# Patient Record
Sex: MALE | Race: BLACK OR AFRICAN AMERICAN | Employment: OTHER | URBAN - METROPOLITAN AREA
[De-identification: names, ages, dates, MRNs, and addresses within clinical notes are randomized per-mention and may not be internally consistent; named-entity substitution may affect disease eponyms.]

---

## 2017-03-17 ENCOUNTER — OFFICE VISIT (OUTPATIENT)
Dept: FAMILY MEDICINE CLINIC | Age: 52
End: 2017-03-17
Payer: MEDICAID

## 2017-03-17 VITALS
BODY MASS INDEX: 23.05 KG/M2 | DIASTOLIC BLOOD PRESSURE: 100 MMHG | SYSTOLIC BLOOD PRESSURE: 140 MMHG | TEMPERATURE: 98.1 F | WEIGHT: 155.6 LBS | HEART RATE: 104 BPM | HEIGHT: 69 IN

## 2017-03-17 DIAGNOSIS — E11.65 UNCONTROLLED TYPE 2 DIABETES MELLITUS WITH COMPLICATION, UNSPECIFIED LONG TERM INSULIN USE STATUS: Primary | ICD-10-CM

## 2017-03-17 DIAGNOSIS — N52.1 ERECTILE DYSFUNCTION ASSOCIATED WITH TYPE 2 DIABETES MELLITUS (HCC): ICD-10-CM

## 2017-03-17 DIAGNOSIS — M43.06 LUMBAR SPONDYLOLYSIS: ICD-10-CM

## 2017-03-17 DIAGNOSIS — I10 ESSENTIAL HYPERTENSION: ICD-10-CM

## 2017-03-17 DIAGNOSIS — E11.69 ERECTILE DYSFUNCTION ASSOCIATED WITH TYPE 2 DIABETES MELLITUS (HCC): ICD-10-CM

## 2017-03-17 DIAGNOSIS — E11.8 UNCONTROLLED TYPE 2 DIABETES MELLITUS WITH COMPLICATION, UNSPECIFIED LONG TERM INSULIN USE STATUS: Primary | ICD-10-CM

## 2017-03-17 DIAGNOSIS — F17.200 SMOKING: ICD-10-CM

## 2017-03-17 PROCEDURE — 99213 OFFICE O/P EST LOW 20 MIN: CPT | Performed by: FAMILY MEDICINE

## 2017-03-17 RX ORDER — LISINOPRIL 40 MG/1
40 TABLET ORAL DAILY
Qty: 30 TABLET | Refills: 3 | Status: SHIPPED | OUTPATIENT
Start: 2017-03-17 | End: 2017-07-06 | Stop reason: ALTCHOICE

## 2017-03-17 RX ORDER — SILDENAFIL 50 MG/1
50 TABLET, FILM COATED ORAL PRN
Qty: 10 TABLET | Refills: 0 | Status: SHIPPED | OUTPATIENT
Start: 2017-03-17 | End: 2018-03-29 | Stop reason: SDUPTHER

## 2017-03-17 RX ORDER — GABAPENTIN 300 MG/1
600 CAPSULE ORAL 3 TIMES DAILY
Qty: 120 CAPSULE | Refills: 3 | Status: SHIPPED | OUTPATIENT
Start: 2017-03-17 | End: 2017-07-06 | Stop reason: SDUPTHER

## 2017-03-17 RX ORDER — CHLORTHALIDONE 25 MG/1
25 TABLET ORAL DAILY
Qty: 30 TABLET | Refills: 3 | Status: SHIPPED | OUTPATIENT
Start: 2017-03-17 | End: 2017-07-06 | Stop reason: ALTCHOICE

## 2017-03-17 RX ORDER — ASPIRIN 81 MG/1
81 TABLET ORAL DAILY
Qty: 30 TABLET | Refills: 3 | Status: SHIPPED | OUTPATIENT
Start: 2017-03-17 | End: 2017-07-06 | Stop reason: ALTCHOICE

## 2017-03-17 RX ORDER — MEGESTROL ACETATE 40 MG/ML
625 SUSPENSION ORAL DAILY
Qty: 480 ML | Refills: 3 | Status: SHIPPED | OUTPATIENT
Start: 2017-03-17 | End: 2017-07-18 | Stop reason: SDUPTHER

## 2017-03-17 RX ORDER — LANCETS 33 GAUGE
EACH MISCELLANEOUS
Qty: 100 EACH | Refills: 0 | Status: SHIPPED | OUTPATIENT
Start: 2017-03-17 | End: 2017-07-18 | Stop reason: SDUPTHER

## 2017-03-17 RX ORDER — NICOTINE 21 MG/24HR
1 PATCH, TRANSDERMAL 24 HOURS TRANSDERMAL EVERY 24 HOURS
Qty: 30 PATCH | Refills: 3 | Status: SHIPPED | OUTPATIENT
Start: 2017-03-17 | End: 2017-07-18 | Stop reason: CLARIF

## 2017-03-17 RX ORDER — NAPROXEN SODIUM 220 MG
TABLET ORAL
Qty: 10 EACH | Refills: 6 | Status: SHIPPED | OUTPATIENT
Start: 2017-03-17 | End: 2017-07-18 | Stop reason: SDUPTHER

## 2017-03-17 RX ORDER — AMLODIPINE BESYLATE 10 MG/1
TABLET ORAL
Qty: 30 TABLET | Refills: 3 | Status: SHIPPED | OUTPATIENT
Start: 2017-03-17 | End: 2017-07-06 | Stop reason: ALTCHOICE

## 2017-03-17 RX ORDER — INSULIN GLARGINE 100 [IU]/ML
INJECTION, SOLUTION SUBCUTANEOUS
Qty: 3 VIAL | Refills: 3 | Status: SHIPPED | OUTPATIENT
Start: 2017-03-17 | End: 2017-07-18 | Stop reason: SDUPTHER

## 2017-03-17 RX ORDER — FAMOTIDINE 20 MG/1
TABLET, FILM COATED ORAL
Qty: 60 TABLET | Refills: 3 | Status: SHIPPED | OUTPATIENT
Start: 2017-03-17 | End: 2017-07-06 | Stop reason: ALTCHOICE

## 2017-03-17 ASSESSMENT — PATIENT HEALTH QUESTIONNAIRE - PHQ9
1. LITTLE INTEREST OR PLEASURE IN DOING THINGS: 0
SUM OF ALL RESPONSES TO PHQ9 QUESTIONS 1 & 2: 0
2. FEELING DOWN, DEPRESSED OR HOPELESS: 0
SUM OF ALL RESPONSES TO PHQ QUESTIONS 1-9: 0

## 2017-03-17 ASSESSMENT — ENCOUNTER SYMPTOMS
SHORTNESS OF BREATH: 0
SORE THROAT: 0
ABDOMINAL PAIN: 0

## 2017-04-03 ENCOUNTER — HOSPITAL ENCOUNTER (OUTPATIENT)
Age: 52
Setting detail: SPECIMEN
Discharge: HOME | End: 2017-04-03
Payer: MEDICAID

## 2017-04-03 DIAGNOSIS — E11.8 UNCONTROLLED TYPE 2 DIABETES MELLITUS WITH COMPLICATION, UNSPECIFIED LONG TERM INSULIN USE STATUS: ICD-10-CM

## 2017-04-03 DIAGNOSIS — E11.65 UNCONTROLLED TYPE 2 DIABETES MELLITUS WITH COMPLICATION, UNSPECIFIED LONG TERM INSULIN USE STATUS: ICD-10-CM

## 2017-04-03 LAB
CHOLESTEROL/HDL RATIO: 2.4
CHOLESTEROL: 158 MG/DL
CREATININE URINE: 245 MG/DL (ref 39–259)
HDLC SERPL-MCNC: 66 MG/DL
LDL CHOLESTEROL: 63 MG/DL (ref 0–130)
MICROALBUMIN/CREAT 24H UR: 147 MG/L
MICROALBUMIN/CREAT UR-RTO: 60 MCG/MG CREAT
TRIGL SERPL-MCNC: 147 MG/DL
VLDLC SERPL CALC-MCNC: NORMAL MG/DL (ref 1–30)

## 2017-04-07 ENCOUNTER — CLINICAL DOCUMENTATION (OUTPATIENT)
Dept: INTERNAL MEDICINE | Age: 52
End: 2017-04-07

## 2017-04-11 ENCOUNTER — TELEPHONE (OUTPATIENT)
Dept: FAMILY MEDICINE CLINIC | Age: 52
End: 2017-04-11

## 2017-04-17 ENCOUNTER — OFFICE VISIT (OUTPATIENT)
Dept: FAMILY MEDICINE CLINIC | Age: 52
End: 2017-04-17
Payer: MEDICAID

## 2017-04-17 VITALS
DIASTOLIC BLOOD PRESSURE: 83 MMHG | WEIGHT: 156.4 LBS | HEART RATE: 105 BPM | BODY MASS INDEX: 23.16 KG/M2 | HEIGHT: 69 IN | TEMPERATURE: 96.7 F | SYSTOLIC BLOOD PRESSURE: 110 MMHG

## 2017-04-17 DIAGNOSIS — H00.011 HORDEOLUM EXTERNUM OF RIGHT UPPER EYELID: ICD-10-CM

## 2017-04-17 DIAGNOSIS — E11.8 DIABETIC FOOT (HCC): Primary | ICD-10-CM

## 2017-04-17 PROCEDURE — 99213 OFFICE O/P EST LOW 20 MIN: CPT | Performed by: FAMILY MEDICINE

## 2017-04-17 ASSESSMENT — ENCOUNTER SYMPTOMS
SORE THROAT: 0
SHORTNESS OF BREATH: 0
ABDOMINAL PAIN: 0

## 2017-07-06 DIAGNOSIS — I10 ESSENTIAL HYPERTENSION: ICD-10-CM

## 2017-07-06 DIAGNOSIS — E11.8 UNCONTROLLED TYPE 2 DIABETES MELLITUS WITH COMPLICATION, UNSPECIFIED LONG TERM INSULIN USE STATUS: ICD-10-CM

## 2017-07-06 DIAGNOSIS — E11.65 UNCONTROLLED TYPE 2 DIABETES MELLITUS WITH COMPLICATION, UNSPECIFIED LONG TERM INSULIN USE STATUS: ICD-10-CM

## 2017-07-06 DIAGNOSIS — M43.06 LUMBAR SPONDYLOLYSIS: ICD-10-CM

## 2017-07-06 RX ORDER — FAMOTIDINE 20 MG/1
TABLET, FILM COATED ORAL
Qty: 60 TABLET | Refills: 0 | Status: SHIPPED | OUTPATIENT
Start: 2017-07-06 | End: 2017-07-18 | Stop reason: SDUPTHER

## 2017-07-06 RX ORDER — GABAPENTIN 300 MG/1
CAPSULE ORAL
Qty: 120 CAPSULE | Refills: 0 | Status: SHIPPED | OUTPATIENT
Start: 2017-07-06 | End: 2017-07-18 | Stop reason: SDUPTHER

## 2017-07-06 RX ORDER — AMLODIPINE BESYLATE 10 MG/1
TABLET ORAL
Qty: 30 TABLET | Refills: 0 | Status: SHIPPED | OUTPATIENT
Start: 2017-07-06 | End: 2017-07-18 | Stop reason: SDUPTHER

## 2017-07-06 RX ORDER — CHLORTHALIDONE 25 MG/1
TABLET ORAL
Qty: 30 TABLET | Refills: 0 | Status: SHIPPED | OUTPATIENT
Start: 2017-07-06 | End: 2017-07-18 | Stop reason: SDUPTHER

## 2017-07-06 RX ORDER — ASPIRIN 81 MG
TABLET, DELAYED RELEASE (ENTERIC COATED) ORAL
Qty: 30 TABLET | Refills: 0 | Status: SHIPPED | OUTPATIENT
Start: 2017-07-06 | End: 2017-07-18 | Stop reason: SDUPTHER

## 2017-07-06 RX ORDER — LISINOPRIL 40 MG/1
TABLET ORAL
Qty: 30 TABLET | Refills: 0 | Status: SHIPPED | OUTPATIENT
Start: 2017-07-06 | End: 2017-07-18 | Stop reason: SDUPTHER

## 2017-07-18 ENCOUNTER — OFFICE VISIT (OUTPATIENT)
Dept: FAMILY MEDICINE CLINIC | Age: 52
End: 2017-07-18
Payer: MEDICAID

## 2017-07-18 VITALS
HEART RATE: 95 BPM | BODY MASS INDEX: 21.8 KG/M2 | HEIGHT: 69 IN | TEMPERATURE: 98 F | WEIGHT: 147.2 LBS | SYSTOLIC BLOOD PRESSURE: 113 MMHG | DIASTOLIC BLOOD PRESSURE: 87 MMHG

## 2017-07-18 DIAGNOSIS — Z76.0 MEDICATION REFILL: ICD-10-CM

## 2017-07-18 DIAGNOSIS — E11.8 UNCONTROLLED TYPE 2 DIABETES MELLITUS WITH COMPLICATION, UNSPECIFIED LONG TERM INSULIN USE STATUS: ICD-10-CM

## 2017-07-18 DIAGNOSIS — E11.65 UNCONTROLLED TYPE 2 DIABETES MELLITUS WITH COMPLICATION, UNSPECIFIED LONG TERM INSULIN USE STATUS: ICD-10-CM

## 2017-07-18 DIAGNOSIS — I10 ESSENTIAL HYPERTENSION: Primary | ICD-10-CM

## 2017-07-18 DIAGNOSIS — M43.06 LUMBAR SPONDYLOLYSIS: ICD-10-CM

## 2017-07-18 LAB — HBA1C MFR BLD: 6.2 %

## 2017-07-18 PROCEDURE — 83036 HEMOGLOBIN GLYCOSYLATED A1C: CPT | Performed by: STUDENT IN AN ORGANIZED HEALTH CARE EDUCATION/TRAINING PROGRAM

## 2017-07-18 PROCEDURE — 99213 OFFICE O/P EST LOW 20 MIN: CPT | Performed by: STUDENT IN AN ORGANIZED HEALTH CARE EDUCATION/TRAINING PROGRAM

## 2017-07-18 RX ORDER — FAMOTIDINE 20 MG/1
TABLET, FILM COATED ORAL
Qty: 60 TABLET | Refills: 0 | Status: SHIPPED | OUTPATIENT
Start: 2017-07-18 | End: 2017-09-05 | Stop reason: SDUPTHER

## 2017-07-18 RX ORDER — NICOTINE 21 MG/24HR
1 PATCH, TRANSDERMAL 24 HOURS TRANSDERMAL EVERY 24 HOURS
Qty: 30 PATCH | Refills: 3 | Status: CANCELLED | OUTPATIENT
Start: 2017-07-18 | End: 2018-07-18

## 2017-07-18 RX ORDER — NAPROXEN SODIUM 220 MG
TABLET ORAL
Qty: 10 EACH | Refills: 6 | Status: SHIPPED | OUTPATIENT
Start: 2017-07-18 | End: 2018-03-29 | Stop reason: SDUPTHER

## 2017-07-18 RX ORDER — POLYETHYLENE GLYCOL 3350 17 G/17G
POWDER, FOR SOLUTION ORAL
Qty: 1 BOTTLE | Refills: 0 | Status: SHIPPED | OUTPATIENT
Start: 2017-07-18 | End: 2017-09-05 | Stop reason: SDUPTHER

## 2017-07-18 RX ORDER — ASPIRIN 81 MG/1
TABLET ORAL
Qty: 30 TABLET | Refills: 0 | Status: SHIPPED | OUTPATIENT
Start: 2017-07-18 | End: 2017-09-05 | Stop reason: SDUPTHER

## 2017-07-18 RX ORDER — LANCETS 33 GAUGE
EACH MISCELLANEOUS
Qty: 100 EACH | Refills: 0 | Status: SHIPPED | OUTPATIENT
Start: 2017-07-18 | End: 2017-09-05 | Stop reason: SDUPTHER

## 2017-07-18 RX ORDER — MEGESTROL ACETATE 40 MG/ML
625 SUSPENSION ORAL DAILY
Qty: 480 ML | Refills: 3 | Status: SHIPPED | OUTPATIENT
Start: 2017-07-18 | End: 2017-07-18 | Stop reason: ALTCHOICE

## 2017-07-18 RX ORDER — ONDANSETRON 4 MG/1
4 TABLET, FILM COATED ORAL EVERY 8 HOURS PRN
Qty: 12 TABLET | Refills: 0 | Status: SHIPPED | OUTPATIENT
Start: 2017-07-18 | End: 2017-12-12 | Stop reason: SDUPTHER

## 2017-07-18 RX ORDER — GABAPENTIN 300 MG/1
CAPSULE ORAL
Qty: 120 CAPSULE | Refills: 0 | Status: SHIPPED | OUTPATIENT
Start: 2017-07-18 | End: 2017-09-05 | Stop reason: SDUPTHER

## 2017-07-18 RX ORDER — CHLORTHALIDONE 25 MG/1
TABLET ORAL
Qty: 30 TABLET | Refills: 0 | Status: SHIPPED | OUTPATIENT
Start: 2017-07-18 | End: 2017-09-05 | Stop reason: SDUPTHER

## 2017-07-18 RX ORDER — INSULIN GLARGINE 100 [IU]/ML
INJECTION, SOLUTION SUBCUTANEOUS
Qty: 3 VIAL | Refills: 3 | Status: SHIPPED | OUTPATIENT
Start: 2017-07-18 | End: 2017-08-14 | Stop reason: ALTCHOICE

## 2017-07-18 RX ORDER — DOCUSATE SODIUM 100 MG/1
100 CAPSULE, LIQUID FILLED ORAL DAILY PRN
Qty: 60 CAPSULE | Refills: 3 | Status: SHIPPED | OUTPATIENT
Start: 2017-07-18 | End: 2018-03-29 | Stop reason: SDUPTHER

## 2017-07-18 RX ORDER — LISINOPRIL 40 MG/1
TABLET ORAL
Qty: 30 TABLET | Refills: 0 | Status: SHIPPED | OUTPATIENT
Start: 2017-07-18 | End: 2017-09-05 | Stop reason: SDUPTHER

## 2017-07-18 RX ORDER — AMLODIPINE BESYLATE 10 MG/1
TABLET ORAL
Qty: 30 TABLET | Refills: 0 | Status: SHIPPED | OUTPATIENT
Start: 2017-07-18 | End: 2017-09-05 | Stop reason: SDUPTHER

## 2017-07-18 ASSESSMENT — ENCOUNTER SYMPTOMS
ANAL BLEEDING: 0
CONSTIPATION: 0
WHEEZING: 0
CHEST TIGHTNESS: 0
DIARRHEA: 0
SHORTNESS OF BREATH: 0
STRIDOR: 0

## 2017-09-05 DIAGNOSIS — E11.65 UNCONTROLLED TYPE 2 DIABETES MELLITUS WITH COMPLICATION, UNSPECIFIED LONG TERM INSULIN USE STATUS: ICD-10-CM

## 2017-09-05 DIAGNOSIS — I10 ESSENTIAL HYPERTENSION: ICD-10-CM

## 2017-09-05 DIAGNOSIS — M43.06 LUMBAR SPONDYLOLYSIS: ICD-10-CM

## 2017-09-05 DIAGNOSIS — Z76.0 MEDICATION REFILL: ICD-10-CM

## 2017-09-05 DIAGNOSIS — E11.8 UNCONTROLLED TYPE 2 DIABETES MELLITUS WITH COMPLICATION, UNSPECIFIED LONG TERM INSULIN USE STATUS: ICD-10-CM

## 2017-09-06 RX ORDER — ASPIRIN 81 MG
TABLET, DELAYED RELEASE (ENTERIC COATED) ORAL
Qty: 30 TABLET | Refills: 0 | Status: SHIPPED | OUTPATIENT
Start: 2017-09-06 | End: 2017-10-06 | Stop reason: SDUPTHER

## 2017-09-06 RX ORDER — LANCETS 33 GAUGE
EACH MISCELLANEOUS
Qty: 100 EACH | Refills: 0 | Status: SHIPPED | OUTPATIENT
Start: 2017-09-06 | End: 2018-01-29 | Stop reason: SDUPTHER

## 2017-09-06 RX ORDER — AMLODIPINE BESYLATE 10 MG/1
TABLET ORAL
Qty: 30 TABLET | Refills: 0 | Status: SHIPPED | OUTPATIENT
Start: 2017-09-06 | End: 2017-10-06 | Stop reason: SDUPTHER

## 2017-09-06 RX ORDER — LISINOPRIL 40 MG/1
TABLET ORAL
Qty: 30 TABLET | Refills: 0 | Status: SHIPPED | OUTPATIENT
Start: 2017-09-06 | End: 2017-10-06 | Stop reason: SDUPTHER

## 2017-09-06 RX ORDER — POLYETHYLENE GLYCOL 3350 17 G/17G
POWDER, FOR SOLUTION ORAL
Qty: 255 BOTTLE | Refills: 0 | Status: SHIPPED | OUTPATIENT
Start: 2017-09-06 | End: 2017-10-06 | Stop reason: SDUPTHER

## 2017-09-06 RX ORDER — GABAPENTIN 300 MG/1
CAPSULE ORAL
Qty: 120 CAPSULE | Refills: 0 | Status: SHIPPED | OUTPATIENT
Start: 2017-09-06 | End: 2017-10-06 | Stop reason: SDUPTHER

## 2017-09-06 RX ORDER — FAMOTIDINE 20 MG/1
TABLET, FILM COATED ORAL
Qty: 60 TABLET | Refills: 0 | Status: SHIPPED | OUTPATIENT
Start: 2017-09-06 | End: 2017-10-06 | Stop reason: SDUPTHER

## 2017-09-06 RX ORDER — CHLORTHALIDONE 25 MG/1
TABLET ORAL
Qty: 30 TABLET | Refills: 0 | Status: SHIPPED | OUTPATIENT
Start: 2017-09-06 | End: 2017-10-06 | Stop reason: SDUPTHER

## 2017-10-06 DIAGNOSIS — I10 ESSENTIAL HYPERTENSION: ICD-10-CM

## 2017-10-06 DIAGNOSIS — M43.06 LUMBAR SPONDYLOLYSIS: ICD-10-CM

## 2017-10-06 DIAGNOSIS — E11.8 UNCONTROLLED TYPE 2 DIABETES MELLITUS WITH COMPLICATION, UNSPECIFIED LONG TERM INSULIN USE STATUS: ICD-10-CM

## 2017-10-06 DIAGNOSIS — E11.65 UNCONTROLLED TYPE 2 DIABETES MELLITUS WITH COMPLICATION, UNSPECIFIED LONG TERM INSULIN USE STATUS: ICD-10-CM

## 2017-10-06 DIAGNOSIS — Z76.0 MEDICATION REFILL: ICD-10-CM

## 2017-10-06 RX ORDER — LISINOPRIL 40 MG/1
TABLET ORAL
Qty: 30 TABLET | Refills: 0 | Status: SHIPPED | OUTPATIENT
Start: 2017-10-06 | End: 2017-11-08 | Stop reason: SDUPTHER

## 2017-10-06 RX ORDER — FAMOTIDINE 20 MG/1
TABLET, FILM COATED ORAL
Qty: 60 TABLET | Refills: 0 | Status: SHIPPED | OUTPATIENT
Start: 2017-10-06 | End: 2017-11-08 | Stop reason: SDUPTHER

## 2017-10-06 RX ORDER — AMLODIPINE BESYLATE 10 MG/1
TABLET ORAL
Qty: 30 TABLET | Refills: 0 | Status: SHIPPED | OUTPATIENT
Start: 2017-10-06 | End: 2017-11-08 | Stop reason: SDUPTHER

## 2017-10-06 RX ORDER — CHLORTHALIDONE 25 MG/1
TABLET ORAL
Qty: 30 TABLET | Refills: 0 | Status: SHIPPED | OUTPATIENT
Start: 2017-10-06 | End: 2017-11-08 | Stop reason: SDUPTHER

## 2017-10-06 RX ORDER — ASPIRIN 81 MG/1
TABLET ORAL
Qty: 30 TABLET | Refills: 0 | Status: SHIPPED | OUTPATIENT
Start: 2017-10-06 | End: 2017-11-08 | Stop reason: SDUPTHER

## 2017-10-06 RX ORDER — GABAPENTIN 300 MG/1
CAPSULE ORAL
Qty: 120 CAPSULE | Refills: 0 | Status: SHIPPED | OUTPATIENT
Start: 2017-10-06 | End: 2017-11-08 | Stop reason: SDUPTHER

## 2017-10-06 RX ORDER — POLYETHYLENE GLYCOL 3350 17 G/17G
POWDER, FOR SOLUTION ORAL
Qty: 255 BOTTLE | Refills: 0 | Status: SHIPPED | OUTPATIENT
Start: 2017-10-06 | End: 2018-07-30

## 2017-11-08 DIAGNOSIS — E11.8 UNCONTROLLED TYPE 2 DIABETES MELLITUS WITH COMPLICATION, UNSPECIFIED LONG TERM INSULIN USE STATUS: ICD-10-CM

## 2017-11-08 DIAGNOSIS — I10 ESSENTIAL HYPERTENSION: ICD-10-CM

## 2017-11-08 DIAGNOSIS — E11.65 UNCONTROLLED TYPE 2 DIABETES MELLITUS WITH COMPLICATION, UNSPECIFIED LONG TERM INSULIN USE STATUS: ICD-10-CM

## 2017-11-08 DIAGNOSIS — M43.06 LUMBAR SPONDYLOLYSIS: ICD-10-CM

## 2017-11-08 RX ORDER — LISINOPRIL 40 MG/1
TABLET ORAL
Qty: 30 TABLET | Refills: 0 | Status: SHIPPED | OUTPATIENT
Start: 2017-11-08 | End: 2017-12-07 | Stop reason: SDUPTHER

## 2017-11-08 RX ORDER — GABAPENTIN 300 MG/1
CAPSULE ORAL
Qty: 120 CAPSULE | Refills: 0 | Status: SHIPPED | OUTPATIENT
Start: 2017-11-08 | End: 2017-12-07 | Stop reason: SDUPTHER

## 2017-11-08 RX ORDER — AMLODIPINE BESYLATE 10 MG/1
TABLET ORAL
Qty: 30 TABLET | Refills: 0 | Status: SHIPPED | OUTPATIENT
Start: 2017-11-08 | End: 2017-12-07 | Stop reason: SDUPTHER

## 2017-11-08 RX ORDER — FAMOTIDINE 20 MG/1
TABLET, FILM COATED ORAL
Qty: 60 TABLET | Refills: 0 | Status: SHIPPED | OUTPATIENT
Start: 2017-11-08 | End: 2017-12-07 | Stop reason: SDUPTHER

## 2017-11-08 RX ORDER — ASPIRIN 81 MG/1
TABLET ORAL
Qty: 30 TABLET | Refills: 0 | Status: SHIPPED | OUTPATIENT
Start: 2017-11-08 | End: 2017-12-07 | Stop reason: SDUPTHER

## 2017-11-08 RX ORDER — CHLORTHALIDONE 25 MG/1
TABLET ORAL
Qty: 30 TABLET | Refills: 0 | Status: SHIPPED | OUTPATIENT
Start: 2017-11-08 | End: 2017-12-07 | Stop reason: SDUPTHER

## 2017-12-12 ENCOUNTER — OFFICE VISIT (OUTPATIENT)
Dept: FAMILY MEDICINE CLINIC | Age: 52
End: 2017-12-12
Payer: MEDICAID

## 2017-12-12 VITALS
WEIGHT: 147.8 LBS | BODY MASS INDEX: 21.89 KG/M2 | HEIGHT: 69 IN | DIASTOLIC BLOOD PRESSURE: 84 MMHG | TEMPERATURE: 99.7 F | SYSTOLIC BLOOD PRESSURE: 128 MMHG | HEART RATE: 108 BPM

## 2017-12-12 DIAGNOSIS — Z23 FLU VACCINE NEED: ICD-10-CM

## 2017-12-12 DIAGNOSIS — E11.8 UNCONTROLLED TYPE 2 DIABETES MELLITUS WITH COMPLICATION, UNSPECIFIED LONG TERM INSULIN USE STATUS: Primary | ICD-10-CM

## 2017-12-12 DIAGNOSIS — E78.5 DYSLIPIDEMIA: ICD-10-CM

## 2017-12-12 DIAGNOSIS — Z11.4 ENCOUNTER FOR SCREENING FOR HIV: ICD-10-CM

## 2017-12-12 DIAGNOSIS — Z12.11 COLON CANCER SCREENING: ICD-10-CM

## 2017-12-12 DIAGNOSIS — E11.65 UNCONTROLLED TYPE 2 DIABETES MELLITUS WITH COMPLICATION, UNSPECIFIED LONG TERM INSULIN USE STATUS: Primary | ICD-10-CM

## 2017-12-12 LAB — HBA1C MFR BLD: 9.8 %

## 2017-12-12 PROCEDURE — G8420 CALC BMI NORM PARAMETERS: HCPCS | Performed by: STUDENT IN AN ORGANIZED HEALTH CARE EDUCATION/TRAINING PROGRAM

## 2017-12-12 PROCEDURE — 3017F COLORECTAL CA SCREEN DOC REV: CPT | Performed by: STUDENT IN AN ORGANIZED HEALTH CARE EDUCATION/TRAINING PROGRAM

## 2017-12-12 PROCEDURE — 3046F HEMOGLOBIN A1C LEVEL >9.0%: CPT | Performed by: STUDENT IN AN ORGANIZED HEALTH CARE EDUCATION/TRAINING PROGRAM

## 2017-12-12 PROCEDURE — G8427 DOCREV CUR MEDS BY ELIG CLIN: HCPCS | Performed by: STUDENT IN AN ORGANIZED HEALTH CARE EDUCATION/TRAINING PROGRAM

## 2017-12-12 PROCEDURE — 99213 OFFICE O/P EST LOW 20 MIN: CPT | Performed by: STUDENT IN AN ORGANIZED HEALTH CARE EDUCATION/TRAINING PROGRAM

## 2017-12-12 PROCEDURE — 4004F PT TOBACCO SCREEN RCVD TLK: CPT | Performed by: STUDENT IN AN ORGANIZED HEALTH CARE EDUCATION/TRAINING PROGRAM

## 2017-12-12 PROCEDURE — 83036 HEMOGLOBIN GLYCOSYLATED A1C: CPT | Performed by: STUDENT IN AN ORGANIZED HEALTH CARE EDUCATION/TRAINING PROGRAM

## 2017-12-12 PROCEDURE — 90471 IMMUNIZATION ADMIN: CPT | Performed by: FAMILY MEDICINE

## 2017-12-12 PROCEDURE — G8484 FLU IMMUNIZE NO ADMIN: HCPCS | Performed by: STUDENT IN AN ORGANIZED HEALTH CARE EDUCATION/TRAINING PROGRAM

## 2017-12-12 PROCEDURE — 90688 IIV4 VACCINE SPLT 0.5 ML IM: CPT | Performed by: FAMILY MEDICINE

## 2017-12-12 RX ORDER — ATORVASTATIN CALCIUM 40 MG/1
40 TABLET, FILM COATED ORAL DAILY
Qty: 30 TABLET | Refills: 3 | Status: SHIPPED | OUTPATIENT
Start: 2017-12-12 | End: 2018-01-29 | Stop reason: SDUPTHER

## 2017-12-12 RX ORDER — ONDANSETRON 4 MG/1
4 TABLET, FILM COATED ORAL EVERY 8 HOURS PRN
Qty: 12 TABLET | Refills: 0 | Status: SHIPPED | OUTPATIENT
Start: 2017-12-12 | End: 2018-01-29 | Stop reason: SDUPTHER

## 2017-12-12 ASSESSMENT — ENCOUNTER SYMPTOMS
SHORTNESS OF BREATH: 0
COUGH: 0
DIARRHEA: 0
NAUSEA: 1
VOMITING: 0

## 2017-12-12 NOTE — PATIENT INSTRUCTIONS
Thank you for letting us take care of you today. We hope all your questions were addressed. If a question was overlooked or something else comes to mind after you return home, please contact a member of your Care Team listed below. Please make sure you have a routine office visit set up to follow-up on [address]. Your Care Team at [place] [address] is Team #1  Yuridia Washington MD (Faculty)  Mike Drummond MD (Faculty  Adalijanine Lockett MD (Resident)  Thierno Villatoro MD (Resident)  Anne Posadas MD (Resident)  Han Morrison MD (Resident)  Oscar Guajardo MD (Resident)  [facility]  Smitha Funez SARI MANZO, [facility], ([address] [facility])  LUCA Alva, ([address] [facility])  Zion Arroyo, Ph.D., ([address] [facility])  Ap Fink [facility] (Clinical Pharmacist)     Office phone number: [phone]    If you need to get in right away due to illness, please be advised we have \"Same Day\" appointments available Monday-Friday. Please call us at [phone] option #1 to schedule your \"Same Day\" appointment.

## 2017-12-12 NOTE — PROGRESS NOTES
Subjective: Deanna Rider is a 47 y.o. male with  has a past medical history of Chronic back pain; Diabetes mellitus ([address] Diabetic neuropathy ([address] Hepatitis C; Hypertension; MDRO (multiple drug resistant organisms) resistance; Murmur; and Osteoarthritis. Family History   Problem Relation Age of Onset    High Blood Pressure Mother     High Blood Pressure Father        Presented to the office today for:  Chief Complaint   Patient presents with    Diabetes     Follow up. Last A1C 07/18/17 = 6.2%       HPI   Presented for follow up of DM. [facility] sugar has been running high in the range of 300-400. [facility] has been drinking 1-2 beers every other day. Denies other complaints  [facility] has been taking basaglar 25 units daily. Prescription for pharmacy was sent for bid. [facility] he received it for daily    Review of Systems   Constitutional: Negative for fatigue and fever. Respiratory: Negative for cough and shortness of breath. Cardiovascular: Negative for chest pain, palpitations and leg swelling. Gastrointestinal: Positive for nausea (has not required to use zofran often). Negative for diarrhea and vomiting. Neurological: Negative for weakness and headaches. Objective:    /84 (Site: Left Arm, Position: Sitting, Cuff Size: Medium Adult)   Pulse 108   Temp 99.7 °F (37.6 °C) (Temporal)   Ht 5' 8.9\" (1.75 m)   Wt 147 lb 12.8 oz (67 kg)   BMI 21.89 kg/m²    BP Readings from Last 3 Encounters:   12/12/17 128/84   08/07/17 120/85   07/18/17 113/87     Physical Exam   Constitutional: He is oriented to person, place, and time. He appears well-developed and well-nourished. Cardiovascular: Normal heart sounds. No murmur heard. Pulmonary/Chest: Effort normal and breath sounds normal. No respiratory distress. He has no wheezes. He has no rales. He exhibits no tenderness. Musculoskeletal: He exhibits no edema. Neurological: He is alert and oriented to person, place, and time.          Lab 40 MG tablet 30 tablet 3     Sig: Take 1 tablet by mouth daily       Medications Discontinued During This Encounter   Medication Reason    glucose blood VI test strips (ONE TOUCH ULTRA TEST) strip Reorder    ondansetron (ZOFRAN) 4 MG tablet Reorder         Discussed use, benefit, and side effects of prescribed medications. Barriers to medication compliance addressed. All patient questions answered. Pt voiced understanding.      Return in about 1 month (around 1/12/2018) for DM, BP.

## 2017-12-18 ENCOUNTER — HOSPITAL ENCOUNTER (OUTPATIENT)
Age: 52
Setting detail: SPECIMEN
Discharge: HOME | End: 2017-12-18
Payer: MEDICAID

## 2017-12-18 DIAGNOSIS — Z11.4 ENCOUNTER FOR SCREENING FOR HIV: ICD-10-CM

## 2017-12-18 LAB — HIV AG/AB: NONREACTIVE

## 2017-12-21 DIAGNOSIS — Z12.11 COLON CANCER SCREENING: ICD-10-CM

## 2017-12-21 LAB
CONTROL: PRESENT
HEMOCCULT STL QL: NEGATIVE

## 2018-01-04 DIAGNOSIS — E11.65 UNCONTROLLED TYPE 2 DIABETES MELLITUS WITH COMPLICATION, UNSPECIFIED LONG TERM INSULIN USE STATUS: ICD-10-CM

## 2018-01-04 DIAGNOSIS — E11.8 UNCONTROLLED TYPE 2 DIABETES MELLITUS WITH COMPLICATION, UNSPECIFIED LONG TERM INSULIN USE STATUS: ICD-10-CM

## 2018-01-04 DIAGNOSIS — M43.06 LUMBAR SPONDYLOLYSIS: ICD-10-CM

## 2018-01-04 DIAGNOSIS — I10 ESSENTIAL HYPERTENSION: ICD-10-CM

## 2018-01-05 RX ORDER — LISINOPRIL 40 MG/1
TABLET ORAL
Qty: 30 TABLET | Refills: 0 | Status: SHIPPED | OUTPATIENT
Start: 2018-01-05 | End: 2018-01-29 | Stop reason: SDUPTHER

## 2018-01-05 RX ORDER — FAMOTIDINE 20 MG/1
TABLET, FILM COATED ORAL
Qty: 60 TABLET | Refills: 0 | Status: SHIPPED | OUTPATIENT
Start: 2018-01-05 | End: 2018-01-29 | Stop reason: SDUPTHER

## 2018-01-05 RX ORDER — GABAPENTIN 300 MG/1
CAPSULE ORAL
Qty: 120 CAPSULE | Refills: 0 | Status: SHIPPED | OUTPATIENT
Start: 2018-01-05 | End: 2018-01-29 | Stop reason: SDUPTHER

## 2018-01-05 RX ORDER — INSULIN GLARGINE 100 [IU]/ML
25 INJECTION, SOLUTION SUBCUTANEOUS 2 TIMES DAILY
Qty: 2 PEN | Refills: 3 | Status: SHIPPED | OUTPATIENT
Start: 2018-01-05 | End: 2018-01-29 | Stop reason: SDUPTHER

## 2018-01-05 RX ORDER — AMLODIPINE BESYLATE 10 MG/1
TABLET ORAL
Qty: 30 TABLET | Refills: 0 | Status: SHIPPED | OUTPATIENT
Start: 2018-01-05 | End: 2018-01-29 | Stop reason: SDUPTHER

## 2018-01-05 RX ORDER — ASPIRIN 81 MG/1
TABLET ORAL
Qty: 30 TABLET | Refills: 0 | Status: SHIPPED | OUTPATIENT
Start: 2018-01-05 | End: 2018-01-29 | Stop reason: SDUPTHER

## 2018-01-05 RX ORDER — CHLORTHALIDONE 25 MG/1
TABLET ORAL
Qty: 30 TABLET | Refills: 0 | Status: SHIPPED | OUTPATIENT
Start: 2018-01-05 | End: 2018-03-29 | Stop reason: SDUPTHER

## 2018-01-29 ENCOUNTER — OFFICE VISIT (OUTPATIENT)
Dept: FAMILY MEDICINE CLINIC | Age: 53
End: 2018-01-29
Payer: MEDICAID

## 2018-01-29 VITALS
TEMPERATURE: 97.3 F | HEART RATE: 86 BPM | SYSTOLIC BLOOD PRESSURE: 140 MMHG | DIASTOLIC BLOOD PRESSURE: 86 MMHG | HEIGHT: 69 IN | WEIGHT: 151.2 LBS | BODY MASS INDEX: 22.39 KG/M2

## 2018-01-29 DIAGNOSIS — M43.06 LUMBAR SPONDYLOLYSIS: ICD-10-CM

## 2018-01-29 DIAGNOSIS — E11.8 UNCONTROLLED TYPE 2 DIABETES MELLITUS WITH COMPLICATION, UNSPECIFIED LONG TERM INSULIN USE STATUS: Primary | ICD-10-CM

## 2018-01-29 DIAGNOSIS — J06.9 VIRAL URI WITH COUGH: ICD-10-CM

## 2018-01-29 DIAGNOSIS — Z71.6 ENCOUNTER FOR SMOKING CESSATION COUNSELING: ICD-10-CM

## 2018-01-29 DIAGNOSIS — E11.65 UNCONTROLLED TYPE 2 DIABETES MELLITUS WITH COMPLICATION, UNSPECIFIED LONG TERM INSULIN USE STATUS: Primary | ICD-10-CM

## 2018-01-29 DIAGNOSIS — I10 ESSENTIAL HYPERTENSION: ICD-10-CM

## 2018-01-29 DIAGNOSIS — E78.5 DYSLIPIDEMIA: ICD-10-CM

## 2018-01-29 PROCEDURE — 3046F HEMOGLOBIN A1C LEVEL >9.0%: CPT | Performed by: STUDENT IN AN ORGANIZED HEALTH CARE EDUCATION/TRAINING PROGRAM

## 2018-01-29 PROCEDURE — 99215 OFFICE O/P EST HI 40 MIN: CPT | Performed by: STUDENT IN AN ORGANIZED HEALTH CARE EDUCATION/TRAINING PROGRAM

## 2018-01-29 PROCEDURE — 4004F PT TOBACCO SCREEN RCVD TLK: CPT | Performed by: STUDENT IN AN ORGANIZED HEALTH CARE EDUCATION/TRAINING PROGRAM

## 2018-01-29 PROCEDURE — G8427 DOCREV CUR MEDS BY ELIG CLIN: HCPCS | Performed by: STUDENT IN AN ORGANIZED HEALTH CARE EDUCATION/TRAINING PROGRAM

## 2018-01-29 PROCEDURE — G8484 FLU IMMUNIZE NO ADMIN: HCPCS | Performed by: STUDENT IN AN ORGANIZED HEALTH CARE EDUCATION/TRAINING PROGRAM

## 2018-01-29 PROCEDURE — G8420 CALC BMI NORM PARAMETERS: HCPCS | Performed by: STUDENT IN AN ORGANIZED HEALTH CARE EDUCATION/TRAINING PROGRAM

## 2018-01-29 PROCEDURE — 3017F COLORECTAL CA SCREEN DOC REV: CPT | Performed by: STUDENT IN AN ORGANIZED HEALTH CARE EDUCATION/TRAINING PROGRAM

## 2018-01-29 RX ORDER — ASPIRIN 81 MG/1
TABLET ORAL
Qty: 30 TABLET | Refills: 3 | Status: SHIPPED | OUTPATIENT
Start: 2018-01-29 | End: 2018-03-29 | Stop reason: SDUPTHER

## 2018-01-29 RX ORDER — ATORVASTATIN CALCIUM 40 MG/1
40 TABLET, FILM COATED ORAL DAILY
Qty: 30 TABLET | Refills: 3 | Status: SHIPPED | OUTPATIENT
Start: 2018-01-29 | End: 2018-03-29 | Stop reason: SDUPTHER

## 2018-01-29 RX ORDER — AMLODIPINE BESYLATE 10 MG/1
TABLET ORAL
Qty: 30 TABLET | Refills: 3 | Status: SHIPPED | OUTPATIENT
Start: 2018-01-29 | End: 2018-03-29 | Stop reason: SDUPTHER

## 2018-01-29 RX ORDER — FAMOTIDINE 20 MG/1
TABLET, FILM COATED ORAL
Qty: 60 TABLET | Refills: 3 | Status: SHIPPED | OUTPATIENT
Start: 2018-01-29 | End: 2018-03-29 | Stop reason: SDUPTHER

## 2018-01-29 RX ORDER — AZITHROMYCIN 250 MG/1
TABLET, FILM COATED ORAL
Qty: 7 TABLET | Refills: 0 | Status: SHIPPED | OUTPATIENT
Start: 2018-01-29 | End: 2018-07-30

## 2018-01-29 RX ORDER — LISINOPRIL 40 MG/1
TABLET ORAL
Qty: 30 TABLET | Refills: 0 | Status: SHIPPED | OUTPATIENT
Start: 2018-01-29 | End: 2018-03-29 | Stop reason: SDUPTHER

## 2018-01-29 RX ORDER — GABAPENTIN 300 MG/1
CAPSULE ORAL
Qty: 120 CAPSULE | Refills: 3 | Status: SHIPPED | OUTPATIENT
Start: 2018-01-29 | End: 2018-03-29 | Stop reason: SDUPTHER

## 2018-01-29 RX ORDER — LANCETS 33 GAUGE
EACH MISCELLANEOUS
Qty: 100 EACH | Refills: 0 | Status: SHIPPED | OUTPATIENT
Start: 2018-01-29 | End: 2018-03-29 | Stop reason: SDUPTHER

## 2018-01-29 RX ORDER — DEXTROMETHORPHAN POLISTIREX 30 MG/5ML
30 SUSPENSION ORAL 2 TIMES DAILY PRN
Qty: 1 BOTTLE | Refills: 0 | Status: SHIPPED | OUTPATIENT
Start: 2018-01-29 | End: 2018-02-08

## 2018-01-29 RX ORDER — ONDANSETRON 4 MG/1
4 TABLET, FILM COATED ORAL EVERY 8 HOURS PRN
Qty: 12 TABLET | Refills: 0 | Status: SHIPPED | OUTPATIENT
Start: 2018-01-29 | End: 2018-03-29 | Stop reason: SDUPTHER

## 2018-01-29 ASSESSMENT — ENCOUNTER SYMPTOMS
TROUBLE SWALLOWING: 0
WHEEZING: 0
RHINORRHEA: 1
SHORTNESS OF BREATH: 0
BLOOD IN STOOL: 0
CHEST TIGHTNESS: 0
SORE THROAT: 0
ABDOMINAL PAIN: 0
DIARRHEA: 0
VOMITING: 0

## 2018-01-29 NOTE — PROGRESS NOTES
Subjective: Liang Faye is a 47 y.o. male with  has a past medical history of Chronic back pain; Diabetes mellitus ([address] Diabetic neuropathy ([address] Hepatitis C; Hypertension; MDRO (multiple drug resistant organisms) resistance; Murmur; and Osteoarthritis. Family History   Problem Relation Age of Onset    High Blood Pressure Mother     High Blood Pressure Father        Presented to the office today for:  Chief Complaint   Patient presents with    Diabetes     Follow up    Hypertension     Follow up    Congestion     Patient reports c/o cough, congestion and sore throat for about 2 weeks. Would like an antibiotic if possible. HPI   47 y.o. Male presents to the outpatient clinic for a follow up for HTN and DM, insulin-dependent. Patient has currently been on Basaglar 25 units BID and Metformin 1000 mg. He states that he has not been compliant to a diabetic diet over the holiday season. Patient did not bring his sugars log but states that his sugars are around 130 fasting and random draws up to 200. Last HbA1c was 9.8 in 12/2017. However, Patient was on Lantus previously and due to insurance at that time, Lantus had to be switched to [address] [place] [address] and patient mistakenly was only taking Basaglar 25 QD in the AM. Patient denies vision changes, chest pains, abdominal pain, or N/V. Patient states he has some mild neuropathy in his toes (more so in the R>L) that has been long standing. Patient currently complaint with his anti-hypertensives Amlodipine and Lisinopril and is HTN is controlled. Patient also complains of productive cough with green-whitish sputum x 2 weeks. + rhinorrhea and congestion associated with cough. Patient denies fevers, chest pain, nausea, vomiting, dysphagia or body aches. Denies sick contacts. Review of Systems   Constitutional: Negative for appetite change and fever. HENT: Positive for congestion and rhinorrhea.  Negative for ear discharge, ear pain, sore throat

## 2018-01-29 NOTE — PATIENT INSTRUCTIONS
Thank you for letting us take care of you today. We hope all your questions were addressed. If a question was overlooked or something else comes to mind after you return home, please contact a member of your Care Team listed below. Please make sure you have a routine office visit set up to follow-up on [address]. Your Care Team at [place] [address] is Team #1  Gaurav Botello MD (Faculty)  Tiana Covarrubias MD (Faculty  Lorcholo Fagan MD (Resident)  Jazmine Iglesias MD (Resident)  Jono Morrow MD (Resident)  Antoine Hopkins MD (Resident)  Joseph Galicia MD (Resident)  [facility], [facility]  Tg MANZO, [facility], ([address])  LUCA Barbosa, ([address] [facility])  Jonathan Angela, Ph.D., ([address] [facility])  Deneen Cortez [facility] (Clinical Pharmacist)     Office phone number: [phone]    If you need to get in right away due to illness, please be advised we have \"Same Day\" appointments available Monday-Friday. Please call us at [phone] option #1 to schedule your \"Same Day\" appointment.

## 2018-03-02 ENCOUNTER — HOSPITAL ENCOUNTER (OUTPATIENT)
Age: 53
Setting detail: SPECIMEN
Discharge: HOME | End: 2018-03-02
Payer: MEDICAID

## 2018-03-02 DIAGNOSIS — E11.8 UNCONTROLLED TYPE 2 DIABETES MELLITUS WITH COMPLICATION, UNSPECIFIED LONG TERM INSULIN USE STATUS: ICD-10-CM

## 2018-03-02 DIAGNOSIS — E11.65 UNCONTROLLED TYPE 2 DIABETES MELLITUS WITH COMPLICATION, UNSPECIFIED LONG TERM INSULIN USE STATUS: ICD-10-CM

## 2018-03-02 LAB
ALBUMIN SERPL-MCNC: 4.1 G/DL (ref 3.5–5.2)
ALBUMIN/GLOBULIN RATIO: 1.3 (ref 1–2.5)
ALP BLD-CCNC: 72 U/L (ref 40–129)
ALT SERPL-CCNC: 21 U/L (ref 5–41)
ANION GAP SERPL CALCULATED.3IONS-SCNC: 11 MMOL/L (ref 9–17)
AST SERPL-CCNC: 23 U/L
BILIRUB SERPL-MCNC: 0.56 MG/DL (ref 0.3–1.2)
BUN BLDV-MCNC: 11 MG/DL (ref 6–20)
BUN/CREAT BLD: ABNORMAL (ref 9–20)
CALCIUM SERPL-MCNC: 9.9 MG/DL (ref 8.6–10.4)
CHLORIDE BLD-SCNC: 96 MMOL/L (ref 98–107)
CO2: 25 MMOL/L (ref 20–31)
CREAT SERPL-MCNC: 0.7 MG/DL (ref 0.7–1.2)
CREATININE URINE: 71.8 MG/DL (ref 39–259)
ESTIMATED AVERAGE GLUCOSE: 169 MG/DL
GFR AFRICAN AMERICAN: >60 ML/MIN
GFR NON-AFRICAN AMERICAN: >60 ML/MIN
GFR SERPL CREATININE-BSD FRML MDRD: ABNORMAL ML/MIN/{1.73_M2}
GFR SERPL CREATININE-BSD FRML MDRD: ABNORMAL ML/MIN/{1.73_M2}
GLUCOSE BLD-MCNC: 166 MG/DL (ref 70–99)
HBA1C MFR BLD: 7.5 % (ref 4–6)
MICROALBUMIN/CREAT 24H UR: 106 MG/L
MICROALBUMIN/CREAT UR-RTO: 148 MCG/MG CREAT
POTASSIUM SERPL-SCNC: 4.2 MMOL/L (ref 3.7–5.3)
SODIUM BLD-SCNC: 132 MMOL/L (ref 135–144)
TOTAL PROTEIN: 7.3 G/DL (ref 6.4–8.3)

## 2018-03-29 ENCOUNTER — OFFICE VISIT (OUTPATIENT)
Dept: FAMILY MEDICINE CLINIC | Age: 53
End: 2018-03-29
Payer: MEDICAID

## 2018-03-29 VITALS
DIASTOLIC BLOOD PRESSURE: 92 MMHG | SYSTOLIC BLOOD PRESSURE: 140 MMHG | HEIGHT: 69 IN | WEIGHT: 154.4 LBS | HEART RATE: 90 BPM | BODY MASS INDEX: 22.87 KG/M2 | TEMPERATURE: 97.3 F

## 2018-03-29 DIAGNOSIS — E11.69 ERECTILE DYSFUNCTION ASSOCIATED WITH TYPE 2 DIABETES MELLITUS (HCC): ICD-10-CM

## 2018-03-29 DIAGNOSIS — N52.1 ERECTILE DYSFUNCTION ASSOCIATED WITH TYPE 2 DIABETES MELLITUS (HCC): ICD-10-CM

## 2018-03-29 DIAGNOSIS — Z71.6 ENCOUNTER FOR SMOKING CESSATION COUNSELING: ICD-10-CM

## 2018-03-29 DIAGNOSIS — I10 ESSENTIAL HYPERTENSION: Primary | ICD-10-CM

## 2018-03-29 DIAGNOSIS — E78.5 DYSLIPIDEMIA: ICD-10-CM

## 2018-03-29 DIAGNOSIS — Z12.11 COLON CANCER SCREENING: ICD-10-CM

## 2018-03-29 DIAGNOSIS — M43.06 LUMBAR SPONDYLOLYSIS: ICD-10-CM

## 2018-03-29 LAB — HBA1C MFR BLD: 7.5 %

## 2018-03-29 PROCEDURE — 3045F PR MOST RECENT HEMOGLOBIN A1C LEVEL 7.0-9.0%: CPT | Performed by: STUDENT IN AN ORGANIZED HEALTH CARE EDUCATION/TRAINING PROGRAM

## 2018-03-29 PROCEDURE — G8427 DOCREV CUR MEDS BY ELIG CLIN: HCPCS | Performed by: STUDENT IN AN ORGANIZED HEALTH CARE EDUCATION/TRAINING PROGRAM

## 2018-03-29 PROCEDURE — 99213 OFFICE O/P EST LOW 20 MIN: CPT

## 2018-03-29 PROCEDURE — 83036 HEMOGLOBIN GLYCOSYLATED A1C: CPT | Performed by: STUDENT IN AN ORGANIZED HEALTH CARE EDUCATION/TRAINING PROGRAM

## 2018-03-29 PROCEDURE — 1036F TOBACCO NON-USER: CPT | Performed by: STUDENT IN AN ORGANIZED HEALTH CARE EDUCATION/TRAINING PROGRAM

## 2018-03-29 PROCEDURE — 3017F COLORECTAL CA SCREEN DOC REV: CPT | Performed by: STUDENT IN AN ORGANIZED HEALTH CARE EDUCATION/TRAINING PROGRAM

## 2018-03-29 PROCEDURE — G8420 CALC BMI NORM PARAMETERS: HCPCS | Performed by: STUDENT IN AN ORGANIZED HEALTH CARE EDUCATION/TRAINING PROGRAM

## 2018-03-29 PROCEDURE — 99213 OFFICE O/P EST LOW 20 MIN: CPT | Performed by: STUDENT IN AN ORGANIZED HEALTH CARE EDUCATION/TRAINING PROGRAM

## 2018-03-29 PROCEDURE — G8482 FLU IMMUNIZE ORDER/ADMIN: HCPCS | Performed by: STUDENT IN AN ORGANIZED HEALTH CARE EDUCATION/TRAINING PROGRAM

## 2018-03-29 RX ORDER — AZITHROMYCIN 250 MG/1
TABLET, FILM COATED ORAL
Qty: 7 TABLET | Refills: 0 | Status: CANCELLED | OUTPATIENT
Start: 2018-03-29

## 2018-03-29 RX ORDER — CHLORTHALIDONE 25 MG/1
TABLET ORAL
Qty: 30 TABLET | Refills: 3 | Status: SHIPPED | OUTPATIENT
Start: 2018-03-29 | End: 2018-07-28 | Stop reason: SDUPTHER

## 2018-03-29 RX ORDER — LISINOPRIL 40 MG/1
TABLET ORAL
Qty: 30 TABLET | Refills: 0 | Status: SHIPPED | OUTPATIENT
Start: 2018-03-29 | End: 2018-04-25 | Stop reason: SDUPTHER

## 2018-03-29 RX ORDER — NAPROXEN SODIUM 220 MG
TABLET ORAL
Qty: 10 EACH | Refills: 6 | Status: SHIPPED | OUTPATIENT
Start: 2018-03-29 | End: 2018-10-01 | Stop reason: SDUPTHER

## 2018-03-29 RX ORDER — FAMOTIDINE 20 MG/1
TABLET, FILM COATED ORAL
Qty: 60 TABLET | Refills: 3 | Status: SHIPPED | OUTPATIENT
Start: 2018-03-29 | End: 2018-07-28 | Stop reason: SDUPTHER

## 2018-03-29 RX ORDER — ASPIRIN 81 MG/1
TABLET ORAL
Qty: 30 TABLET | Refills: 3 | Status: SHIPPED | OUTPATIENT
Start: 2018-03-29 | End: 2018-07-28 | Stop reason: SDUPTHER

## 2018-03-29 RX ORDER — DOCUSATE SODIUM 100 MG/1
100 CAPSULE, LIQUID FILLED ORAL DAILY PRN
Qty: 60 CAPSULE | Refills: 3 | Status: SHIPPED | OUTPATIENT
Start: 2018-03-29 | End: 2018-12-07 | Stop reason: SDUPTHER

## 2018-03-29 RX ORDER — BLOOD-GLUCOSE METER
1 KIT MISCELLANEOUS DAILY
Qty: 1 KIT | Refills: 0 | Status: SHIPPED | OUTPATIENT
Start: 2018-03-29 | End: 2018-12-07 | Stop reason: SDUPTHER

## 2018-03-29 RX ORDER — GABAPENTIN 300 MG/1
CAPSULE ORAL
Qty: 120 CAPSULE | Refills: 3 | Status: SHIPPED | OUTPATIENT
Start: 2018-03-29 | End: 2018-07-28 | Stop reason: SDUPTHER

## 2018-03-29 RX ORDER — LANCETS 33 GAUGE
EACH MISCELLANEOUS
Qty: 100 EACH | Refills: 0 | Status: SHIPPED | OUTPATIENT
Start: 2018-03-29 | End: 2018-06-26 | Stop reason: SDUPTHER

## 2018-03-29 RX ORDER — AMLODIPINE BESYLATE 10 MG/1
TABLET ORAL
Qty: 30 TABLET | Refills: 3 | Status: SHIPPED | OUTPATIENT
Start: 2018-03-29 | End: 2018-07-28 | Stop reason: SDUPTHER

## 2018-03-29 RX ORDER — ONDANSETRON 4 MG/1
4 TABLET, FILM COATED ORAL EVERY 8 HOURS PRN
Qty: 12 TABLET | Refills: 0 | Status: SHIPPED | OUTPATIENT
Start: 2018-03-29 | End: 2018-10-01 | Stop reason: SDUPTHER

## 2018-03-29 RX ORDER — SILDENAFIL 50 MG/1
50 TABLET, FILM COATED ORAL PRN
Qty: 10 TABLET | Refills: 0 | Status: SHIPPED | OUTPATIENT
Start: 2018-03-29 | End: 2019-07-26 | Stop reason: SDUPTHER

## 2018-03-29 RX ORDER — ATORVASTATIN CALCIUM 40 MG/1
40 TABLET, FILM COATED ORAL DAILY
Qty: 30 TABLET | Refills: 3 | Status: SHIPPED | OUTPATIENT
Start: 2018-03-29 | End: 2018-07-28 | Stop reason: SDUPTHER

## 2018-03-29 ASSESSMENT — ENCOUNTER SYMPTOMS
VOMITING: 0
BLOOD IN STOOL: 0
WHEEZING: 0
SHORTNESS OF BREATH: 0
COUGH: 0
CONSTIPATION: 0
NAUSEA: 0
ABDOMINAL PAIN: 0

## 2018-03-29 ASSESSMENT — PATIENT HEALTH QUESTIONNAIRE - PHQ9
SUM OF ALL RESPONSES TO PHQ9 QUESTIONS 1 & 2: 0
2. FEELING DOWN, DEPRESSED OR HOPELESS: 0
1. LITTLE INTEREST OR PLEASURE IN DOING THINGS: 0
SUM OF ALL RESPONSES TO PHQ QUESTIONS 1-9: 0

## 2018-04-10 ENCOUNTER — TELEPHONE (OUTPATIENT)
Dept: FAMILY MEDICINE CLINIC | Age: 53
End: 2018-04-10

## 2018-04-25 ENCOUNTER — OFFICE VISIT (OUTPATIENT)
Dept: SURGERY | Age: 53
End: 2018-04-25
Payer: MEDICAID

## 2018-04-25 VITALS
BODY MASS INDEX: 23.09 KG/M2 | WEIGHT: 155.9 LBS | SYSTOLIC BLOOD PRESSURE: 140 MMHG | DIASTOLIC BLOOD PRESSURE: 100 MMHG | TEMPERATURE: 98.2 F | HEIGHT: 69 IN | HEART RATE: 79 BPM

## 2018-04-25 DIAGNOSIS — Z12.11 ENCOUNTER FOR SCREENING COLONOSCOPY: ICD-10-CM

## 2018-04-25 DIAGNOSIS — I10 ESSENTIAL HYPERTENSION: ICD-10-CM

## 2018-04-25 DIAGNOSIS — B18.2 CHRONIC HEPATITIS C WITHOUT HEPATIC COMA (HCC): Primary | ICD-10-CM

## 2018-04-25 DIAGNOSIS — F17.200 SMOKER UNMOTIVATED TO QUIT: ICD-10-CM

## 2018-04-25 PROCEDURE — G8427 DOCREV CUR MEDS BY ELIG CLIN: HCPCS | Performed by: SURGERY

## 2018-04-25 PROCEDURE — 99213 OFFICE O/P EST LOW 20 MIN: CPT

## 2018-04-25 PROCEDURE — 99215 OFFICE O/P EST HI 40 MIN: CPT | Performed by: SURGERY

## 2018-04-25 PROCEDURE — 1036F TOBACCO NON-USER: CPT | Performed by: SURGERY

## 2018-04-25 PROCEDURE — 3017F COLORECTAL CA SCREEN DOC REV: CPT | Performed by: SURGERY

## 2018-04-25 PROCEDURE — G8420 CALC BMI NORM PARAMETERS: HCPCS | Performed by: SURGERY

## 2018-04-25 RX ORDER — POLYETHYLENE GLYCOL 3350 17 G/17G
POWDER, FOR SOLUTION ORAL
Qty: 238 G | Refills: 0 | Status: SHIPPED | OUTPATIENT
Start: 2018-04-25 | End: 2018-07-30

## 2018-04-26 RX ORDER — LISINOPRIL 40 MG/1
TABLET ORAL
Qty: 30 TABLET | Refills: 0 | Status: SHIPPED | OUTPATIENT
Start: 2018-04-26 | End: 2018-05-25 | Stop reason: SDUPTHER

## 2018-05-02 ENCOUNTER — TELEPHONE (OUTPATIENT)
Dept: SURGERY | Age: 53
End: 2018-05-02

## 2018-05-03 ENCOUNTER — TELEPHONE (OUTPATIENT)
Dept: FAMILY MEDICINE CLINIC | Age: 53
End: 2018-05-03

## 2018-05-25 DIAGNOSIS — I10 ESSENTIAL HYPERTENSION: ICD-10-CM

## 2018-05-25 RX ORDER — LISINOPRIL 40 MG/1
TABLET ORAL
Qty: 30 TABLET | Refills: 0 | Status: SHIPPED | OUTPATIENT
Start: 2018-05-25 | End: 2018-06-26 | Stop reason: SDUPTHER

## 2018-06-14 ENCOUNTER — OFFICE VISIT (OUTPATIENT)
Dept: GASTROENTEROLOGY | Age: 53
End: 2018-06-14
Payer: MEDICAID

## 2018-06-14 VITALS
WEIGHT: 152.2 LBS | DIASTOLIC BLOOD PRESSURE: 84 MMHG | HEART RATE: 98 BPM | OXYGEN SATURATION: 96 % | SYSTOLIC BLOOD PRESSURE: 127 MMHG | BODY MASS INDEX: 22.54 KG/M2

## 2018-06-14 DIAGNOSIS — Z12.11 COLON CANCER SCREENING: ICD-10-CM

## 2018-06-14 DIAGNOSIS — B18.2 HEP C W/O COMA, CHRONIC (HCC): Primary | ICD-10-CM

## 2018-06-14 DIAGNOSIS — R14.0 BLOATING: ICD-10-CM

## 2018-06-14 PROCEDURE — G8427 DOCREV CUR MEDS BY ELIG CLIN: HCPCS | Performed by: INTERNAL MEDICINE

## 2018-06-14 PROCEDURE — 3017F COLORECTAL CA SCREEN DOC REV: CPT | Performed by: INTERNAL MEDICINE

## 2018-06-14 PROCEDURE — G8420 CALC BMI NORM PARAMETERS: HCPCS | Performed by: INTERNAL MEDICINE

## 2018-06-14 PROCEDURE — 4004F PT TOBACCO SCREEN RCVD TLK: CPT | Performed by: INTERNAL MEDICINE

## 2018-06-14 PROCEDURE — 99214 OFFICE O/P EST MOD 30 MIN: CPT | Performed by: INTERNAL MEDICINE

## 2018-06-14 ASSESSMENT — ENCOUNTER SYMPTOMS
ABDOMINAL DISTENTION: 1
DIARRHEA: 0
SINUS PRESSURE: 0
WHEEZING: 0
SORE THROAT: 0
ANAL BLEEDING: 0
BLOOD IN STOOL: 0
RECTAL PAIN: 0
SINUS PAIN: 0
ABDOMINAL PAIN: 0
VOMITING: 0
SHORTNESS OF BREATH: 0
COUGH: 0
NAUSEA: 0
CHEST TIGHTNESS: 0
TROUBLE SWALLOWING: 0
CONSTIPATION: 0
BACK PAIN: 1

## 2018-06-26 DIAGNOSIS — I10 ESSENTIAL HYPERTENSION: ICD-10-CM

## 2018-06-27 RX ORDER — LANCETS 33 GAUGE
EACH MISCELLANEOUS
Qty: 100 EACH | Refills: 0 | Status: SHIPPED | OUTPATIENT
Start: 2018-06-27 | End: 2018-07-28 | Stop reason: SDUPTHER

## 2018-06-27 RX ORDER — INSULIN GLARGINE 100 [IU]/ML
30 INJECTION, SOLUTION SUBCUTANEOUS 2 TIMES DAILY
Qty: 18 ML | Refills: 0 | Status: SHIPPED | OUTPATIENT
Start: 2018-06-27 | End: 2018-07-28 | Stop reason: SDUPTHER

## 2018-06-27 RX ORDER — LISINOPRIL 40 MG/1
TABLET ORAL
Qty: 30 TABLET | Refills: 0 | Status: SHIPPED | OUTPATIENT
Start: 2018-06-27 | End: 2018-07-28 | Stop reason: SDUPTHER

## 2018-07-09 ENCOUNTER — TELEPHONE (OUTPATIENT)
Dept: SURGERY | Age: 53
End: 2018-07-09

## 2018-07-27 ENCOUNTER — HOSPITAL ENCOUNTER (OUTPATIENT)
Age: 53
Setting detail: SPECIMEN
Discharge: HOME | End: 2018-07-27
Payer: MEDICAID

## 2018-07-27 DIAGNOSIS — B18.2 HEP C W/O COMA, CHRONIC (HCC): ICD-10-CM

## 2018-07-27 LAB
ABSOLUTE EOS #: 0.15 K/UL (ref 0–0.44)
ABSOLUTE IMMATURE GRANULOCYTE: 0.03 K/UL (ref 0–0.3)
ABSOLUTE LYMPH #: 1.93 K/UL (ref 1.1–3.7)
ABSOLUTE MONO #: 1.04 K/UL (ref 0.1–1.2)
ALBUMIN SERPL-MCNC: 4.4 G/DL (ref 3.5–5.2)
ALBUMIN/GLOBULIN RATIO: 1.3 (ref 1–2.5)
ALP BLD-CCNC: 85 U/L (ref 40–129)
ALT SERPL-CCNC: 32 U/L (ref 5–41)
ANION GAP SERPL CALCULATED.3IONS-SCNC: 14 MMOL/L (ref 9–17)
AST SERPL-CCNC: 31 U/L
BASOPHILS # BLD: 1 % (ref 0–2)
BASOPHILS ABSOLUTE: 0.04 K/UL (ref 0–0.2)
BILIRUB SERPL-MCNC: 0.56 MG/DL (ref 0.3–1.2)
BILIRUBIN DIRECT: 0.19 MG/DL
BILIRUBIN, INDIRECT: 0.37 MG/DL (ref 0–1)
BUN BLDV-MCNC: 15 MG/DL (ref 6–20)
CALCIUM SERPL-MCNC: 10.1 MG/DL (ref 8.6–10.4)
CHLORIDE BLD-SCNC: 99 MMOL/L (ref 98–107)
CO2: 23 MMOL/L (ref 20–31)
CREAT SERPL-MCNC: 0.86 MG/DL (ref 0.7–1.2)
DIFFERENTIAL TYPE: ABNORMAL
EOSINOPHILS RELATIVE PERCENT: 2 % (ref 1–4)
ETHANOL PERCENT: <0.01 %
ETHANOL: <10 MG/DL
FERRITIN: 82 UG/L (ref 30–400)
GFR AFRICAN AMERICAN: >60 ML/MIN
GFR NON-AFRICAN AMERICAN: >60 ML/MIN
GFR SERPL CREATININE-BSD FRML MDRD: NORMAL ML/MIN/{1.73_M2}
GFR SERPL CREATININE-BSD FRML MDRD: NORMAL ML/MIN/{1.73_M2}
GLUCOSE BLD-MCNC: 92 MG/DL (ref 70–99)
HAV AB SERPL IA-ACNC: NONREACTIVE
HBV SURFACE AB TITR SER: <3.5 MIU/ML
HCT VFR BLD CALC: 54.7 % (ref 40.7–50.3)
HEMOGLOBIN: 17.6 G/DL (ref 13–17)
HEPATITIS B CORE TOTAL ANTIBODY: NONREACTIVE
IGG: 1086 MG/DL (ref 700–1600)
IGM: 409 MG/DL (ref 40–230)
IMMATURE GRANULOCYTES: 0 %
IRON SATURATION: 38 % (ref 20–55)
IRON: 147 UG/DL (ref 59–158)
LYMPHOCYTES # BLD: 24 % (ref 24–43)
MCH RBC QN AUTO: 25.8 PG (ref 25.2–33.5)
MCHC RBC AUTO-ENTMCNC: 32.2 G/DL (ref 28.4–34.8)
MCV RBC AUTO: 80.2 FL (ref 82.6–102.9)
MONOCYTES # BLD: 13 % (ref 3–12)
NRBC AUTOMATED: 0 PER 100 WBC
PDW BLD-RTO: 14.3 % (ref 11.8–14.4)
PLATELET # BLD: 295 K/UL (ref 138–453)
PLATELET ESTIMATE: ABNORMAL
PMV BLD AUTO: 10.5 FL (ref 8.1–13.5)
POTASSIUM SERPL-SCNC: 4.1 MMOL/L (ref 3.7–5.3)
RBC # BLD: 6.82 M/UL (ref 4.21–5.77)
RBC # BLD: ABNORMAL 10*6/UL
SEG NEUTROPHILS: 60 % (ref 36–65)
SEGMENTED NEUTROPHILS ABSOLUTE COUNT: 4.86 K/UL (ref 1.5–8.1)
SODIUM BLD-SCNC: 136 MMOL/L (ref 135–144)
TOTAL IRON BINDING CAPACITY: 385 UG/DL (ref 250–450)
TOTAL PROTEIN: 7.9 G/DL (ref 6.4–8.3)
UNSATURATED IRON BINDING CAPACITY: 238 UG/DL (ref 112–347)
WBC # BLD: 8.1 K/UL (ref 3.5–11.3)
WBC # BLD: ABNORMAL 10*3/UL

## 2018-07-28 DIAGNOSIS — E78.5 DYSLIPIDEMIA: ICD-10-CM

## 2018-07-28 DIAGNOSIS — I10 ESSENTIAL HYPERTENSION: ICD-10-CM

## 2018-07-28 DIAGNOSIS — Z71.6 ENCOUNTER FOR SMOKING CESSATION COUNSELING: ICD-10-CM

## 2018-07-28 DIAGNOSIS — M43.06 LUMBAR SPONDYLOLYSIS: ICD-10-CM

## 2018-07-30 ENCOUNTER — OFFICE VISIT (OUTPATIENT)
Dept: FAMILY MEDICINE CLINIC | Age: 53
End: 2018-07-30
Payer: MEDICAID

## 2018-07-30 VITALS
BODY MASS INDEX: 23.43 KG/M2 | DIASTOLIC BLOOD PRESSURE: 100 MMHG | TEMPERATURE: 99.3 F | WEIGHT: 158.2 LBS | HEART RATE: 88 BPM | SYSTOLIC BLOOD PRESSURE: 142 MMHG

## 2018-07-30 DIAGNOSIS — I10 HYPERTENSION, UNSPECIFIED TYPE: ICD-10-CM

## 2018-07-30 DIAGNOSIS — E78.5 DYSLIPIDEMIA: ICD-10-CM

## 2018-07-30 DIAGNOSIS — Z80.3 FAMILY HISTORY OF BREAST CANCER: Primary | ICD-10-CM

## 2018-07-30 LAB
ALANINE AMINOTRANSFERASE, FIBROMETER: 36 U/L (ref 5–50)
ALPHA-2-MACROGLOBULIN, FIBROMETER: 448 MG/DL (ref 131–293)
ANTI-NUCLEAR ANTIBODY (ANA): NEGATIVE
ASPARTATE AMINOTRANSFERASE, FIBROMETER: 42 U/L (ref 9–50)
CIRRHOMETER PATIENT SCORE: 0.14
EER FIBROMETER REPORT: ABNORMAL
FIBROMETER INTERPRETATION: ABNORMAL
FIBROMETER PATIENT SCORE: 0.89
FIBROMETER PLATELET COUNT: 295
FIBROMETER PROTHROMBIN INDEX: 82 % (ref 90–120)
FIBROSIS METAVIR CLASSIFICATION: ABNORMAL
GAMMA GLUTAMYL TRANSFERASE, FIBROMETER: 279 U/L (ref 7–51)
HCV QUANTITATIVE: NORMAL
HEPATITIS C GENOTYPE: NORMAL
INFLAMETER METAVIR CLASSIFICATION: ABNORMAL
INFLAMETER PATIENT SCORE: 0.59
SEND OUT REPORT: NORMAL
SMOOTH MUSCLE ANTIBODY: NORMAL
TEST NAME: NORMAL
UREA NITROGEN, FIBROMETER: 16 MG/DL (ref 7–20)

## 2018-07-30 PROCEDURE — G8427 DOCREV CUR MEDS BY ELIG CLIN: HCPCS | Performed by: STUDENT IN AN ORGANIZED HEALTH CARE EDUCATION/TRAINING PROGRAM

## 2018-07-30 PROCEDURE — 2022F DILAT RTA XM EVC RTNOPTHY: CPT | Performed by: STUDENT IN AN ORGANIZED HEALTH CARE EDUCATION/TRAINING PROGRAM

## 2018-07-30 PROCEDURE — 99213 OFFICE O/P EST LOW 20 MIN: CPT | Performed by: STUDENT IN AN ORGANIZED HEALTH CARE EDUCATION/TRAINING PROGRAM

## 2018-07-30 PROCEDURE — 3017F COLORECTAL CA SCREEN DOC REV: CPT | Performed by: STUDENT IN AN ORGANIZED HEALTH CARE EDUCATION/TRAINING PROGRAM

## 2018-07-30 PROCEDURE — G8420 CALC BMI NORM PARAMETERS: HCPCS | Performed by: STUDENT IN AN ORGANIZED HEALTH CARE EDUCATION/TRAINING PROGRAM

## 2018-07-30 PROCEDURE — 3045F PR MOST RECENT HEMOGLOBIN A1C LEVEL 7.0-9.0%: CPT | Performed by: STUDENT IN AN ORGANIZED HEALTH CARE EDUCATION/TRAINING PROGRAM

## 2018-07-30 PROCEDURE — 4004F PT TOBACCO SCREEN RCVD TLK: CPT | Performed by: STUDENT IN AN ORGANIZED HEALTH CARE EDUCATION/TRAINING PROGRAM

## 2018-07-30 RX ORDER — LISINOPRIL 40 MG/1
TABLET ORAL
Qty: 30 TABLET | Refills: 0 | Status: SHIPPED | OUTPATIENT
Start: 2018-07-30 | End: 2018-08-27 | Stop reason: SDUPTHER

## 2018-07-30 RX ORDER — GABAPENTIN 300 MG/1
CAPSULE ORAL
Qty: 120 CAPSULE | Refills: 1 | Status: SHIPPED | OUTPATIENT
Start: 2018-07-30 | End: 2018-10-01 | Stop reason: SDUPTHER

## 2018-07-30 RX ORDER — AMLODIPINE BESYLATE 10 MG/1
TABLET ORAL
Qty: 30 TABLET | Refills: 0 | Status: SHIPPED | OUTPATIENT
Start: 2018-07-30 | End: 2018-08-27 | Stop reason: SDUPTHER

## 2018-07-30 RX ORDER — ASPIRIN 81 MG/1
TABLET ORAL
Qty: 30 TABLET | Refills: 0 | Status: SHIPPED | OUTPATIENT
Start: 2018-07-30 | End: 2018-08-27 | Stop reason: SDUPTHER

## 2018-07-30 RX ORDER — FAMOTIDINE 20 MG/1
TABLET, FILM COATED ORAL
Qty: 60 TABLET | Refills: 0 | Status: SHIPPED | OUTPATIENT
Start: 2018-07-30 | End: 2018-08-27 | Stop reason: SDUPTHER

## 2018-07-30 RX ORDER — INSULIN GLARGINE 100 [IU]/ML
30 INJECTION, SOLUTION SUBCUTANEOUS 2 TIMES DAILY
Qty: 18 ML | Refills: 0 | Status: SHIPPED | OUTPATIENT
Start: 2018-07-30 | End: 2018-07-30 | Stop reason: SDUPTHER

## 2018-07-30 RX ORDER — NICOTINE POLACRILEX 2 MG/1
2 GUM, CHEWING ORAL PRN
Qty: 110 EACH | Refills: 0 | Status: SHIPPED | OUTPATIENT
Start: 2018-07-30 | End: 2018-08-27 | Stop reason: SDUPTHER

## 2018-07-30 RX ORDER — LANCETS 33 GAUGE
EACH MISCELLANEOUS
Qty: 100 EACH | Refills: 0 | Status: SHIPPED | OUTPATIENT
Start: 2018-07-30 | End: 2018-08-27 | Stop reason: SDUPTHER

## 2018-07-30 RX ORDER — ATORVASTATIN CALCIUM 40 MG/1
TABLET, FILM COATED ORAL
Qty: 30 TABLET | Refills: 0 | Status: SHIPPED | OUTPATIENT
Start: 2018-07-30 | End: 2018-08-27 | Stop reason: SDUPTHER

## 2018-07-30 RX ORDER — CHLORTHALIDONE 25 MG/1
TABLET ORAL
Qty: 30 TABLET | Refills: 0 | Status: SHIPPED | OUTPATIENT
Start: 2018-07-30 | End: 2018-08-27 | Stop reason: SDUPTHER

## 2018-07-30 ASSESSMENT — ENCOUNTER SYMPTOMS
VOMITING: 0
COUGH: 0
SHORTNESS OF BREATH: 0
WHEEZING: 0
NAUSEA: 0

## 2018-07-30 NOTE — PROGRESS NOTES
Attending Physician Statement  I have discussed the care of Remi Gomez, including pertinent history and exam findings,  with the resident. I have reviewed the key elements of all parts of the encounter with the resident. I agree with the assessment, plan and orders as documented by the resident.   (GE Modifier)    Rodney Moon
CLEANUP    polyethylene glycol (GLYCOLAX) powder LIST CLEANUP    polyethylene glycol (MIRALAX) powder LIST CLEANUP    BASAGLAR KWIKPEN 100 UNIT/ML injection pen Woodrow Pellet received counseling on the following healthy behaviors: nutrition, exercise and medication adherence    Discussed use, benefit, and side effects of prescribed medications. Barriers to medication compliance addressed. All patient questions answered. Pt voiced understanding. Return in about 1 week (around 8/6/2018) for follow up of lab results, BP, DM.

## 2018-07-30 NOTE — PATIENT INSTRUCTIONS
Visit Information    Have you changed or started any medications since your last visit including any over-the-counter medicines, vitamins, or herbal medicines? no   Have you stopped taking any of your medications? Is so, why? -  no  Are you having any side effects from any of your medications? - no    Have you seen any other physician or provider since your last visit?  no   Have you had any other diagnostic tests since your last visit?  no   Have you been seen in the emergency room and/or had an admission in a hospital since we last saw you?  no   Have you had your routine dental cleaning in the past 6 months?  no     Do you have an active MyChart account? If no, what is the barrier? No:     Patient Care Team:  Barbara Schultz MD as PCP - General (Family Medicine)  Belle Brown MD as PCP - S Attributed Provider  Sharon Jaramillo MD as Orthopedic Surgeon (Orthopedic Surgery)  Nikole Blancas MD as Consulting Physician (Neurology)  Claudette Soliman DPM as Surgeon (Podiatry)  Cristine Hatch DO as Consulting Physician (General Surgery)    Medical History Review  Past Medical, Family, and Social History reviewed and does not contribute to the patient presenting condition    Health Maintenance   Topic Date Due    Shingles Vaccine (1 of 2 - 2 Dose Series) 02/11/2014    Diabetic retinal exam  03/10/2016    Lipid screen  04/03/2018    Diabetic foot exam  01/07/2019 (Originally 4/3/2015)    Flu vaccine (1) 09/01/2018    Colon Cancer Screen FIT/FOBT  12/20/2018    Diabetic microalbuminuria test  03/02/2019    Creatinine monitoring  03/02/2019    A1C test (Diabetic or Prediabetic)  03/29/2019    Potassium monitoring  07/27/2019    DTaP/Tdap/Td vaccine (2 - Td) 02/22/2026    Pneumococcal med risk  Completed    HIV screen  Completed         Thank you for letting us take care of you today. We hope all your questions were addressed.  If a question was overlooked or something else comes to mind after you return home, please contact a member of your Care Team listed below. Please make sure you have a routine office visit set up to follow-up on [address]. Your Care Team at [place] [address] is Team #1  Bryan Zimmerman MD (Faculty)  Daly Felton MD (Faculty)  Sigifredo Boateng MD (Resident)  Laurie Johnson MD (Resident)  Edis Pierre MD (Resident)  Georgie Sam MD (Resident)  Raza Chowdhury., [facility]  Alexis Fortune., [address] [place] [address], ([address] [facility])  LUCA Dubois Res, (Clinical Practice Manager)  Mariusz Cavanaugh, Ph.D., (Behavioral Services)  Yael Salcido [facility] (Clinical Pharmacist)     Office phone number: [phone]    If you need to get in right away due to illness, please be advised we have \"Same Day\" appointments available Monday-Friday. Please call us at [phone] option #3 to schedule your \"Same Day\" appointment.

## 2018-07-30 NOTE — TELEPHONE ENCOUNTER
Please address the medication refill and close the encounter. If I can be of assistance, please route to the applicable pool. Thank you. Next Visit Date:  Future Appointments  Date Time Provider [place]   7/30/2018 3:45 PM Teresa Lenz MD [facility] [place]   8/30/2018 1:15 PM Donald Castelan MD [address] [address] Maintenance   Topic Date Due    Shingles Vaccine (1 of 2 - 2 Dose Series) 02/11/2014    Diabetic retinal exam  03/10/2016    Lipid screen  04/03/2018    Diabetic foot exam  01/07/2019 (Originally 4/3/2015)    Flu vaccine (1) 09/01/2018    Colon Cancer Screen FIT/FOBT  12/20/2018    Diabetic microalbuminuria test  03/02/2019    Creatinine monitoring  03/02/2019    A1C test (Diabetic or Prediabetic)  03/29/2019    Potassium monitoring  07/27/2019    DTaP/Tdap/Td vaccine (2 - Td) 02/22/2026    Pneumococcal med risk  Completed    HIV screen  Completed       Hemoglobin A1C (%)   Date Value   03/29/2018 7.5   03/02/2018 7.5 (H)   12/12/2017 9.8             ( goal A1C is < 7)   Microalb/Crt.  Ratio (mcg/mg creat)   Date Value   03/02/2018 148 (H)     LDL Cholesterol (mg/dL)   Date Value   04/03/2017 63   12/09/2013 95       (goal LDL is <100)   AST (U/L)   Date Value   07/27/2018 31     ALT (U/L)   Date Value   07/27/2018 32     BUN (mg/dL)   Date Value   07/27/2018 15     BP Readings from Last 3 Encounters:   06/14/18 127/84   04/25/18 (!) 140/100   03/29/18 (!) 140/92          (goal 120/80)    All Future Testing planned in [facility]  Lab Frequency Next Occurrence               Patient Active Problem List:     Hypertension     Liver disease     Type II or unspecified type diabetes mellitus without mention of complication, not stated as uncontrolled     Lumbago     Thoracic or lumbosacral neuritis or radiculitis, unspecified     Degeneration of lumbar or lumbosacral intervertebral disc     DM (diabetes mellitus) (HCC)     Hep C w/o coma, chronic (HCC)     Back pain     Back pain, chronic     HTN (hypertension)     Osteoarthritis of lumbar spine     Diabetes mellitus ([address]     Needs flu shot     HLD (hyperlipidemia)     Smoking     Chronic back pain     Spinal stenosis, lumbar region, without neurogenic claudication     Foot drop, right     Neuritis or radiculitis due to displacement of lumbar intervertebral disc     Lumbar disc herniation     Polyneuropathic pain     Displacement of lumbar intervertebral disc without myelopathy     Diabetes mellitus, type 2 (HCC)     Smoker     Cellulitis and abscess     Exposed orthopaedic hardware ([address]     Hallux abducto valgus     Diabetes (HCC)     Positional vertigo     Abdominal pain, right upper quadrant     Cellulitis     Esophagitis determined by endoscopy     Cholelithiasis     Diabetes type 2, uncontrolled ([address]     Diabetic mononeuropathy associated with diabetes mellitus due to underlying condition ([address]     Lumbar spondylolysis     Erectile dysfunction associated with type 2 diabetes mellitus ([address]     Colon cancer screening     Bloating

## 2018-07-31 LAB
DIRECT EXAM: ABNORMAL
Lab: ABNORMAL
SPECIMEN DESCRIPTION: ABNORMAL
STATUS: ABNORMAL

## 2018-08-01 DIAGNOSIS — N64.4 BREAST PAIN IN MALE: ICD-10-CM

## 2018-08-01 DIAGNOSIS — N62 GYNECOMASTIA: ICD-10-CM

## 2018-08-01 DIAGNOSIS — Z80.3 FAMILY HISTORY OF BREAST CANCER: Primary | ICD-10-CM

## 2018-08-08 ENCOUNTER — HOSPITAL ENCOUNTER (OUTPATIENT)
Dept: ULTRASOUND IMAGING | Age: 53
Discharge: HOME | End: 2018-08-10
Payer: MEDICAID

## 2018-08-08 ENCOUNTER — HOSPITAL ENCOUNTER (OUTPATIENT)
Dept: MAMMOGRAPHY | Age: 53
Discharge: HOME | End: 2018-08-10
Payer: MEDICAID

## 2018-08-08 DIAGNOSIS — N62 GYNECOMASTIA: ICD-10-CM

## 2018-08-08 DIAGNOSIS — N64.4 BREAST PAIN IN MALE: ICD-10-CM

## 2018-08-08 DIAGNOSIS — R52 PAIN: ICD-10-CM

## 2018-08-08 DIAGNOSIS — Z80.3 FAMILY HISTORY OF BREAST CANCER: ICD-10-CM

## 2018-08-08 PROCEDURE — 76642 ULTRASOUND BREAST LIMITED: CPT

## 2018-08-08 PROCEDURE — 77066 DX MAMMO INCL CAD BI: CPT

## 2018-08-27 DIAGNOSIS — Z71.6 ENCOUNTER FOR SMOKING CESSATION COUNSELING: ICD-10-CM

## 2018-08-27 DIAGNOSIS — M43.06 LUMBAR SPONDYLOLYSIS: ICD-10-CM

## 2018-08-27 DIAGNOSIS — I10 ESSENTIAL HYPERTENSION: ICD-10-CM

## 2018-08-27 DIAGNOSIS — E78.5 DYSLIPIDEMIA: ICD-10-CM

## 2018-08-27 RX ORDER — FAMOTIDINE 20 MG/1
TABLET, FILM COATED ORAL
Qty: 60 TABLET | Refills: 0 | Status: SHIPPED | OUTPATIENT
Start: 2018-08-27 | End: 2018-09-24 | Stop reason: SDUPTHER

## 2018-08-27 RX ORDER — NICOTINE POLACRILEX 2 MG/1
2 GUM, CHEWING ORAL PRN
Qty: 110 EACH | Refills: 0 | Status: SHIPPED | OUTPATIENT
Start: 2018-08-27 | End: 2018-09-24 | Stop reason: SDUPTHER

## 2018-08-27 RX ORDER — ATORVASTATIN CALCIUM 40 MG/1
TABLET, FILM COATED ORAL
Qty: 30 TABLET | Refills: 0 | Status: SHIPPED | OUTPATIENT
Start: 2018-08-27 | End: 2018-09-24 | Stop reason: SDUPTHER

## 2018-08-27 RX ORDER — LISINOPRIL 40 MG/1
TABLET ORAL
Qty: 30 TABLET | Refills: 0 | Status: SHIPPED | OUTPATIENT
Start: 2018-08-27 | End: 2018-09-24 | Stop reason: SDUPTHER

## 2018-08-27 RX ORDER — LANCETS 33 GAUGE
EACH MISCELLANEOUS
Qty: 100 EACH | Refills: 0 | Status: SHIPPED | OUTPATIENT
Start: 2018-08-27 | End: 2018-09-24 | Stop reason: SDUPTHER

## 2018-08-27 RX ORDER — CHLORTHALIDONE 25 MG/1
TABLET ORAL
Qty: 30 TABLET | Refills: 0 | Status: SHIPPED | OUTPATIENT
Start: 2018-08-27 | End: 2018-09-24 | Stop reason: SDUPTHER

## 2018-08-27 RX ORDER — ASPIRIN 81 MG/1
TABLET ORAL
Qty: 30 TABLET | Refills: 0 | Status: SHIPPED | OUTPATIENT
Start: 2018-08-27 | End: 2018-09-24 | Stop reason: SDUPTHER

## 2018-08-27 RX ORDER — AMLODIPINE BESYLATE 10 MG/1
TABLET ORAL
Qty: 30 TABLET | Refills: 0 | Status: SHIPPED | OUTPATIENT
Start: 2018-08-27 | End: 2018-09-24 | Stop reason: SDUPTHER

## 2018-08-27 NOTE — TELEPHONE ENCOUNTER
Medications are on med list please review and address    Please address the medication refill and close the encounter. If I can be of assistance, please route to the applicable pool. Thank you. Last visit:n/a  Last Med refill:n/a    Next Visit Date:  Future Appointments  Date Time Provider [place]   9/27/2018 2:45 Laurel Vance MD [facility]   10/1/2018 10:30 AM Saqib Joseph MD [address] [place] [address] Maintenance   Topic Date Due    Shingles Vaccine (1 of 2 - 2 Dose Series) 02/11/2014    Diabetic retinal exam  03/10/2016    Lipid screen  04/03/2018    Diabetic foot exam  01/07/2019 (Originally 4/3/2015)    Flu vaccine (1) 09/01/2018    Colon Cancer Screen FIT/FOBT  12/20/2018    Diabetic microalbuminuria test  03/02/2019    Creatinine monitoring  03/02/2019    A1C test (Diabetic or Prediabetic)  03/29/2019    Potassium monitoring  07/27/2019    DTaP/Tdap/Td vaccine (2 - Td) 02/22/2026    Pneumococcal med risk  Completed    HIV screen  Completed       Hemoglobin A1C (%)   Date Value   03/29/2018 7.5   03/02/2018 7.5 (H)   12/12/2017 9.8             ( goal A1C is < 7)   Microalb/Crt.  Ratio (mcg/mg creat)   Date Value   03/02/2018 148 (H)     LDL Cholesterol (mg/dL)   Date Value   04/03/2017 63   12/09/2013 95       (goal LDL is <100)   AST (U/L)   Date Value   07/27/2018 31     ALT (U/L)   Date Value   07/27/2018 32     BUN (mg/dL)   Date Value   07/27/2018 15     BP Readings from Last 3 Encounters:   07/30/18 (!) 142/100   06/14/18 127/84   04/25/18 (!) 140/100          (goal 120/80)    All Future Testing planned in [facility]  Lab Frequency Next Occurrence   VANESSA DIGITAL SCREEN W OR WO CAD BILATERAL Once 07/30/2018   Lipid, Fasting Once 10/08/2018               Patient Active Problem List:     Hypertension     Liver disease     Type II or unspecified type diabetes mellitus without mention of complication, not stated as uncontrolled     Lumbago     Thoracic or

## 2018-09-24 DIAGNOSIS — I10 ESSENTIAL HYPERTENSION: ICD-10-CM

## 2018-09-24 DIAGNOSIS — M43.06 LUMBAR SPONDYLOLYSIS: ICD-10-CM

## 2018-09-24 DIAGNOSIS — Z71.6 ENCOUNTER FOR SMOKING CESSATION COUNSELING: ICD-10-CM

## 2018-09-24 DIAGNOSIS — E78.5 DYSLIPIDEMIA: ICD-10-CM

## 2018-09-24 RX ORDER — AMLODIPINE BESYLATE 10 MG/1
TABLET ORAL
Qty: 30 TABLET | Refills: 0 | Status: SHIPPED | OUTPATIENT
Start: 2018-09-24 | End: 2018-10-01 | Stop reason: SDUPTHER

## 2018-09-24 RX ORDER — ATORVASTATIN CALCIUM 40 MG/1
TABLET, FILM COATED ORAL
Qty: 30 TABLET | Refills: 0 | Status: SHIPPED | OUTPATIENT
Start: 2018-09-24 | End: 2018-10-01 | Stop reason: SDUPTHER

## 2018-09-24 RX ORDER — FAMOTIDINE 20 MG/1
TABLET, FILM COATED ORAL
Qty: 60 TABLET | Refills: 0 | Status: SHIPPED | OUTPATIENT
Start: 2018-09-24 | End: 2018-10-01 | Stop reason: SDUPTHER

## 2018-09-24 RX ORDER — LANCETS 33 GAUGE
EACH MISCELLANEOUS
Qty: 100 EACH | Refills: 0 | Status: SHIPPED | OUTPATIENT
Start: 2018-09-24 | End: 2018-12-07 | Stop reason: SDUPTHER

## 2018-09-24 RX ORDER — ASPIRIN 81 MG/1
TABLET ORAL
Qty: 30 TABLET | Refills: 0 | Status: SHIPPED | OUTPATIENT
Start: 2018-09-24 | End: 2018-10-01 | Stop reason: SDUPTHER

## 2018-09-24 RX ORDER — NICOTINE POLACRILEX 2 MG/1
2 GUM, CHEWING ORAL PRN
Qty: 110 EACH | Refills: 0 | Status: ON HOLD | OUTPATIENT
Start: 2018-09-24 | End: 2020-01-05 | Stop reason: SDUPTHER

## 2018-09-24 RX ORDER — LISINOPRIL 40 MG/1
TABLET ORAL
Qty: 30 TABLET | Refills: 0 | Status: SHIPPED | OUTPATIENT
Start: 2018-09-24 | End: 2018-10-01 | Stop reason: SDUPTHER

## 2018-09-24 RX ORDER — CHLORTHALIDONE 25 MG/1
TABLET ORAL
Qty: 30 TABLET | Refills: 0 | Status: SHIPPED | OUTPATIENT
Start: 2018-09-24 | End: 2018-10-01 | Stop reason: SDUPTHER

## 2018-09-24 NOTE — TELEPHONE ENCOUNTER
Last visit:7/30/2018  Last Med refill:8/27/2018    Next Visit Date:  Future Appointments  Date Time Provider [place]   9/27/2018 2:45 PM Haven Peralta MD [facility]   10/1/2018 10:30 AM Evy Dobbins MD [address] [place] [address] Maintenance   Topic Date Due    Shingles Vaccine (1 of 2 - 2 Dose Series) 02/11/2014    Diabetic retinal exam  03/10/2016    Lipid screen  04/03/2018    Flu vaccine (1) 09/01/2018    Diabetic foot exam  01/07/2019 (Originally 4/3/2015)    Colon Cancer Screen FIT/FOBT  12/20/2018    Diabetic microalbuminuria test  03/02/2019    Creatinine monitoring  03/02/2019    A1C test (Diabetic or Prediabetic)  03/29/2019    Potassium monitoring  07/27/2019    DTaP/Tdap/Td vaccine (2 - Td) 02/22/2026    Pneumococcal med risk  Completed    HIV screen  Completed       Hemoglobin A1C (%)   Date Value   03/29/2018 7.5   03/02/2018 7.5 (H)   12/12/2017 9.8             ( goal A1C is < 7)   Microalb/Crt.  Ratio (mcg/mg creat)   Date Value   03/02/2018 148 (H)     LDL Cholesterol (mg/dL)   Date Value   04/03/2017 63   12/09/2013 95       (goal LDL is <100)   AST (U/L)   Date Value   07/27/2018 31     ALT (U/L)   Date Value   07/27/2018 32     BUN (mg/dL)   Date Value   07/27/2018 15     BP Readings from Last 3 Encounters:   07/30/18 (!) 142/100   06/14/18 127/84   04/25/18 (!) 140/100          (goal 120/80)    All Future Testing planned in [facility]  Lab Frequency Next Occurrence   VANESSA DIGITAL SCREEN W OR WO CAD BILATERAL Once 07/30/2018   Lipid, Fasting Once 10/08/2018               Patient Active Problem List:     Hypertension     Liver disease     Type II or unspecified type diabetes mellitus without mention of complication, not stated as uncontrolled     Lumbago     Thoracic or lumbosacral neuritis or radiculitis, unspecified     Degeneration of lumbar or lumbosacral intervertebral disc     DM (diabetes mellitus) (HCC)     Hep C w/o coma, chronic (HCC)     Back pain Back pain, chronic     HTN (hypertension)     Osteoarthritis of lumbar spine     Diabetes mellitus ([address]     Needs flu shot     HLD (hyperlipidemia)     Smoking     Chronic back pain     Spinal stenosis, lumbar region, without neurogenic claudication     Foot drop, right     Neuritis or radiculitis due to displacement of lumbar intervertebral disc     Lumbar disc herniation     Polyneuropathic pain     Displacement of lumbar intervertebral disc without myelopathy     Diabetes mellitus, type 2 (HCC)     Smoker     Cellulitis and abscess     Exposed orthopaedic hardware ([address]     Hallux abducto valgus     Diabetes (HCC)     Positional vertigo     Abdominal pain, right upper quadrant     Cellulitis     Esophagitis determined by endoscopy     Cholelithiasis     Diabetes type 2, uncontrolled ([address]     Diabetic mononeuropathy associated with diabetes mellitus due to underlying condition ([address]     Lumbar spondylolysis     Erectile dysfunction associated with type 2 diabetes mellitus ([address]     Colon cancer screening     Bloating     Family history of breast cancer     Dyslipidemia     Please address the medication refill and close the encounter. If I can be of assistance, please route to the applicable pool. Thank you.

## 2018-10-01 ENCOUNTER — OFFICE VISIT (OUTPATIENT)
Dept: FAMILY MEDICINE CLINIC | Age: 53
End: 2018-10-01
Payer: MEDICAID

## 2018-10-01 VITALS
HEIGHT: 69 IN | BODY MASS INDEX: 22.36 KG/M2 | DIASTOLIC BLOOD PRESSURE: 80 MMHG | WEIGHT: 151 LBS | TEMPERATURE: 97.9 F | HEART RATE: 60 BPM | SYSTOLIC BLOOD PRESSURE: 136 MMHG

## 2018-10-01 DIAGNOSIS — Z23 FLU VACCINE NEED: ICD-10-CM

## 2018-10-01 DIAGNOSIS — E78.5 DYSLIPIDEMIA: ICD-10-CM

## 2018-10-01 DIAGNOSIS — Z23 NEED FOR SHINGLES VACCINE: Primary | ICD-10-CM

## 2018-10-01 DIAGNOSIS — I10 ESSENTIAL HYPERTENSION: ICD-10-CM

## 2018-10-01 DIAGNOSIS — M43.06 LUMBAR SPONDYLOLYSIS: ICD-10-CM

## 2018-10-01 PROCEDURE — 3017F COLORECTAL CA SCREEN DOC REV: CPT | Performed by: STUDENT IN AN ORGANIZED HEALTH CARE EDUCATION/TRAINING PROGRAM

## 2018-10-01 PROCEDURE — 3045F PR MOST RECENT HEMOGLOBIN A1C LEVEL 7.0-9.0%: CPT | Performed by: STUDENT IN AN ORGANIZED HEALTH CARE EDUCATION/TRAINING PROGRAM

## 2018-10-01 PROCEDURE — 90686 IIV4 VACC NO PRSV 0.5 ML IM: CPT | Performed by: HOSPITALIST

## 2018-10-01 PROCEDURE — G8427 DOCREV CUR MEDS BY ELIG CLIN: HCPCS | Performed by: STUDENT IN AN ORGANIZED HEALTH CARE EDUCATION/TRAINING PROGRAM

## 2018-10-01 PROCEDURE — 4004F PT TOBACCO SCREEN RCVD TLK: CPT | Performed by: STUDENT IN AN ORGANIZED HEALTH CARE EDUCATION/TRAINING PROGRAM

## 2018-10-01 PROCEDURE — G8482 FLU IMMUNIZE ORDER/ADMIN: HCPCS | Performed by: STUDENT IN AN ORGANIZED HEALTH CARE EDUCATION/TRAINING PROGRAM

## 2018-10-01 PROCEDURE — G8420 CALC BMI NORM PARAMETERS: HCPCS | Performed by: STUDENT IN AN ORGANIZED HEALTH CARE EDUCATION/TRAINING PROGRAM

## 2018-10-01 PROCEDURE — 2022F DILAT RTA XM EVC RTNOPTHY: CPT | Performed by: STUDENT IN AN ORGANIZED HEALTH CARE EDUCATION/TRAINING PROGRAM

## 2018-10-01 PROCEDURE — 99213 OFFICE O/P EST LOW 20 MIN: CPT | Performed by: STUDENT IN AN ORGANIZED HEALTH CARE EDUCATION/TRAINING PROGRAM

## 2018-10-01 RX ORDER — AMLODIPINE BESYLATE 10 MG/1
10 TABLET ORAL DAILY
Qty: 30 TABLET | Refills: 0 | Status: SHIPPED | OUTPATIENT
Start: 2018-10-01 | End: 2019-07-26 | Stop reason: SDUPTHER

## 2018-10-01 RX ORDER — ASPIRIN 81 MG/1
81 TABLET ORAL DAILY
Qty: 30 TABLET | Refills: 0 | Status: SHIPPED | OUTPATIENT
Start: 2018-10-01 | End: 2018-11-26 | Stop reason: SDUPTHER

## 2018-10-01 RX ORDER — LISINOPRIL 40 MG/1
40 TABLET ORAL DAILY
Qty: 30 TABLET | Refills: 3 | Status: SHIPPED | OUTPATIENT
Start: 2018-10-01 | End: 2019-07-26 | Stop reason: SDUPTHER

## 2018-10-01 RX ORDER — ATORVASTATIN CALCIUM 40 MG/1
40 TABLET, FILM COATED ORAL DAILY
Qty: 60 TABLET | Refills: 0 | Status: SHIPPED | OUTPATIENT
Start: 2018-10-01 | End: 2018-12-07 | Stop reason: SDUPTHER

## 2018-10-01 RX ORDER — NAPROXEN SODIUM 220 MG
TABLET ORAL
Qty: 10 EACH | Refills: 6 | Status: SHIPPED | OUTPATIENT
Start: 2018-10-01 | End: 2018-12-07 | Stop reason: SDUPTHER

## 2018-10-01 RX ORDER — FAMOTIDINE 20 MG/1
TABLET, FILM COATED ORAL
Qty: 60 TABLET | Refills: 0 | Status: SHIPPED | OUTPATIENT
Start: 2018-10-01 | End: 2018-11-26 | Stop reason: SDUPTHER

## 2018-10-01 RX ORDER — CHLORTHALIDONE 25 MG/1
TABLET ORAL
Qty: 30 TABLET | Refills: 0 | Status: SHIPPED | OUTPATIENT
Start: 2018-10-01 | End: 2018-11-26 | Stop reason: SDUPTHER

## 2018-10-01 RX ORDER — ONDANSETRON 4 MG/1
4 TABLET, FILM COATED ORAL EVERY 8 HOURS PRN
Qty: 12 TABLET | Refills: 0 | Status: SHIPPED | OUTPATIENT
Start: 2018-10-01 | End: 2018-12-07 | Stop reason: SDUPTHER

## 2018-10-01 RX ORDER — GABAPENTIN 300 MG/1
CAPSULE ORAL
Qty: 120 CAPSULE | Refills: 1 | Status: SHIPPED | OUTPATIENT
Start: 2018-10-01 | End: 2018-11-26 | Stop reason: SDUPTHER

## 2018-10-01 ASSESSMENT — ENCOUNTER SYMPTOMS
CONSTIPATION: 0
COUGH: 0
SHORTNESS OF BREATH: 0
VOMITING: 0
WHEEZING: 0
DIARRHEA: 0
NAUSEA: 0
ABDOMINAL PAIN: 0
ANAL BLEEDING: 0

## 2018-10-01 NOTE — PROGRESS NOTES
with diabetic mononeuropathy, with long-term current use of insulin (HCC)  aspirin 81 MG EC tablet    ondansetron (ZOFRAN) 4 MG tablet    blood glucose test strips (ONE TOUCH ULTRA TEST) strip    insulin glargine (BASAGLAR KWIKPEN) 100 UNIT/ML injection pen    Insulin Syringe-Needle U-100 (INSULIN SYRINGE 1CC/30GX5/16\") 30G X 5/16\" 1 ML MISC    metFORMIN (GLUCOPHAGE) 1000 MG tablet    [facility] MD [place]*   6. Flu vaccine need  INFLUENZA, QUADV, 3 YRS AND OLDER, IM, PF, PREFILL SYR OR SDV, 0.5ML (FLUZONE QUADV, PF)     I agree with  orders as documented by the resident. Recommendations:   Patient's exam is benign, BP is controlled, Hgb A1C is 7.3, and diabetic eye and foot exams updated. Patient had positive FIT positive and he was referred for screening colonoscopy. Medication regimen reviewed and refills provided. Health maintenance reviewed and updated. Return in about 2 months (around 12/1/2018) for DM with sugar log, follow up of lab results.    (Cheyanne Master ) Dr. Lynette Denny MD

## 2018-10-01 NOTE — PROGRESS NOTES
tenderness. Musculoskeletal: He exhibits no edema. Neurological: He is alert and oriented to person, place, and time. Skin: He is not diaphoretic. Lab Results   Component Value Date    WBC 8.1 07/27/2018    HGB 17.6 (H) 07/27/2018    HCT 54.7 (H) 07/27/2018     07/27/2018    CHOL 158 04/03/2017    TRIG 147 04/03/2017    HDL 66 04/03/2017    ALT 32 07/27/2018    AST 31 07/27/2018     07/27/2018    K 4.1 07/27/2018    CL 99 07/27/2018    CREATININE 0.86 07/27/2018    BUN 15 07/27/2018    CO2 23 07/27/2018    TSH 0.35 10/10/2014    INR 1.6 09/08/2015    LABA1C 7.5 03/29/2018    LABMICR 148 (H) 03/02/2018     Lab Results   Component Value Date    CALCIUM 10.1 07/27/2018     Lab Results   Component Value Date    LDLCHOLESTEROL 63 04/03/2017       Assessment and Plan:    1. Essential hypertension    - amLODIPine (NORVASC) 10 MG tablet; Take 1 tablet by mouth daily  Dispense: 30 tablet; Refill: 0  - lisinopril (PRINIVIL;ZESTRIL) 40 MG tablet; Take 1 tablet by mouth daily  Dispense: 30 tablet; Refill: 3    2. Dyslipidemia    - atorvastatin (LIPITOR) 40 MG tablet; Take 1 tablet by mouth daily  Dispense: 60 tablet; Refill: 0    3. Lumbar spondylolysis    - chlorthalidone (HYGROTON) 25 MG tablet; TAKE 1 TAB BY MOUTH ONCE A DAY  Dispense: 30 tablet; Refill: 0  - famotidine (PEPCID) 20 MG tablet; TAKE 1 TAB BY MOUTH TWICE A DAY  Dispense: 60 tablet; Refill: 0  - gabapentin (NEURONTIN) 300 MG capsule; TAKE TWO CAPSULES BY MOUTH THREE TIMES A DAY. Dispense: 120 capsule; Refill: 1    4. Uncontrolled type 2 diabetes mellitus with diabetic mononeuropathy, with long-term current use of insulin ([facility]    - A1 c in office today is 7.4  - aspirin 81 MG EC tablet; Take 1 tablet by mouth daily  Dispense: 30 tablet; Refill: 0  - ondansetron (ZOFRAN) 4 MG tablet; Take 1 tablet by mouth every 8 hours as needed for Nausea or Vomiting  Dispense: 12 tablet;  Refill: 0  - blood glucose test strips (ONE TOUCH ULTRA TEST)

## 2018-11-07 ENCOUNTER — INITIAL CONSULT (OUTPATIENT)
Dept: SURGERY | Age: 53
End: 2018-11-07
Payer: MEDICAID

## 2018-11-07 VITALS
DIASTOLIC BLOOD PRESSURE: 90 MMHG | SYSTOLIC BLOOD PRESSURE: 150 MMHG | HEART RATE: 69 BPM | TEMPERATURE: 97 F | BODY MASS INDEX: 22.74 KG/M2 | WEIGHT: 154 LBS

## 2018-11-07 DIAGNOSIS — R19.5 POSITIVE FIT (FECAL IMMUNOCHEMICAL TEST): Primary | ICD-10-CM

## 2018-11-07 PROCEDURE — G8420 CALC BMI NORM PARAMETERS: HCPCS | Performed by: STUDENT IN AN ORGANIZED HEALTH CARE EDUCATION/TRAINING PROGRAM

## 2018-11-07 PROCEDURE — 3017F COLORECTAL CA SCREEN DOC REV: CPT | Performed by: STUDENT IN AN ORGANIZED HEALTH CARE EDUCATION/TRAINING PROGRAM

## 2018-11-07 PROCEDURE — 99212 OFFICE O/P EST SF 10 MIN: CPT | Performed by: STUDENT IN AN ORGANIZED HEALTH CARE EDUCATION/TRAINING PROGRAM

## 2018-11-07 PROCEDURE — G8427 DOCREV CUR MEDS BY ELIG CLIN: HCPCS | Performed by: STUDENT IN AN ORGANIZED HEALTH CARE EDUCATION/TRAINING PROGRAM

## 2018-11-07 PROCEDURE — 4004F PT TOBACCO SCREEN RCVD TLK: CPT | Performed by: STUDENT IN AN ORGANIZED HEALTH CARE EDUCATION/TRAINING PROGRAM

## 2018-11-07 PROCEDURE — G8482 FLU IMMUNIZE ORDER/ADMIN: HCPCS | Performed by: STUDENT IN AN ORGANIZED HEALTH CARE EDUCATION/TRAINING PROGRAM

## 2018-11-07 RX ORDER — OXYCODONE HYDROCHLORIDE AND ACETAMINOPHEN 5; 325 MG/1; MG/1
1 TABLET ORAL EVERY 6 HOURS
Refills: 0 | COMMUNITY
Start: 2018-09-28 | End: 2019-11-09

## 2018-11-07 RX ORDER — POLYETHYLENE GLYCOL 3350 17 G/17G
238 POWDER, FOR SOLUTION ORAL DAILY
Qty: 238 G | Refills: 0 | Status: SHIPPED | OUTPATIENT
Start: 2018-11-07 | End: 2018-12-07

## 2018-11-19 ENCOUNTER — ANESTHESIA EVENT (OUTPATIENT)
Dept: ENDOSCOPY | Age: 53
End: 2018-11-19
Payer: MEDICAID

## 2018-11-19 ENCOUNTER — ANESTHESIA (OUTPATIENT)
Dept: ENDOSCOPY | Age: 53
End: 2018-11-19
Payer: MEDICAID

## 2018-11-19 ENCOUNTER — APPOINTMENT (OUTPATIENT)
Dept: GENERAL RADIOLOGY | Age: 53
End: 2018-11-19
Attending: SURGERY
Payer: MEDICAID

## 2018-11-19 ENCOUNTER — HOSPITAL ENCOUNTER (OUTPATIENT)
Age: 53
Setting detail: OUTPATIENT SURGERY
Discharge: HOME | End: 2018-11-19
Attending: SURGERY | Admitting: SURGERY
Payer: MEDICAID

## 2018-11-19 VITALS
BODY MASS INDEX: 23.55 KG/M2 | OXYGEN SATURATION: 94 % | HEART RATE: 71 BPM | HEIGHT: 69 IN | TEMPERATURE: 96.6 F | SYSTOLIC BLOOD PRESSURE: 123 MMHG | RESPIRATION RATE: 18 BRPM | WEIGHT: 159 LBS | DIASTOLIC BLOOD PRESSURE: 89 MMHG

## 2018-11-19 VITALS
DIASTOLIC BLOOD PRESSURE: 77 MMHG | RESPIRATION RATE: 17 BRPM | SYSTOLIC BLOOD PRESSURE: 113 MMHG | OXYGEN SATURATION: 96 %

## 2018-11-19 PROCEDURE — 3700000000 HC ANESTHESIA ATTENDED CARE: Performed by: SURGERY

## 2018-11-19 PROCEDURE — 2500000003 HC RX 250 WO HCPCS: Performed by: NURSE ANESTHETIST, CERTIFIED REGISTERED

## 2018-11-19 PROCEDURE — 6360000002 HC RX W HCPCS: Performed by: NURSE ANESTHETIST, CERTIFIED REGISTERED

## 2018-11-19 PROCEDURE — 2580000003 HC RX 258: Performed by: SURGERY

## 2018-11-19 PROCEDURE — 3609009500 HC COLONOSCOPY DIAGNOSTIC OR SCREENING: Performed by: SURGERY

## 2018-11-19 PROCEDURE — 7100000011 HC PHASE II RECOVERY - ADDTL 15 MIN: Performed by: SURGERY

## 2018-11-19 PROCEDURE — 3700000001 HC ADD 15 MINUTES (ANESTHESIA): Performed by: SURGERY

## 2018-11-19 PROCEDURE — 7100000010 HC PHASE II RECOVERY - FIRST 15 MIN: Performed by: SURGERY

## 2018-11-19 PROCEDURE — 71045 X-RAY EXAM CHEST 1 VIEW: CPT

## 2018-11-19 RX ORDER — PROPOFOL 10 MG/ML
INJECTION, EMULSION INTRAVENOUS PRN
Status: DISCONTINUED | OUTPATIENT
Start: 2018-11-19 | End: 2018-11-19 | Stop reason: SDUPTHER

## 2018-11-19 RX ORDER — SODIUM CHLORIDE 9 MG/ML
INJECTION, SOLUTION INTRAVENOUS CONTINUOUS
Status: DISCONTINUED | OUTPATIENT
Start: 2018-11-19 | End: 2018-11-19 | Stop reason: HOSPADM

## 2018-11-19 RX ORDER — LIDOCAINE HYDROCHLORIDE 10 MG/ML
INJECTION, SOLUTION INFILTRATION; PERINEURAL PRN
Status: DISCONTINUED | OUTPATIENT
Start: 2018-11-19 | End: 2018-11-19 | Stop reason: SDUPTHER

## 2018-11-19 RX ADMIN — PROPOFOL 100 MG: 10 INJECTION, EMULSION INTRAVENOUS at 12:54

## 2018-11-19 RX ADMIN — SODIUM CHLORIDE: 9 INJECTION, SOLUTION INTRAVENOUS at 12:16

## 2018-11-19 RX ADMIN — PROPOFOL 100 MG: 10 INJECTION, EMULSION INTRAVENOUS at 12:45

## 2018-11-19 RX ADMIN — LIDOCAINE HYDROCHLORIDE 50 MG: 10 INJECTION, SOLUTION INFILTRATION; PERINEURAL at 12:30

## 2018-11-19 RX ADMIN — PROPOFOL 200 MG: 10 INJECTION, EMULSION INTRAVENOUS at 12:30

## 2018-11-19 RX ADMIN — PROPOFOL 50 MG: 10 INJECTION, EMULSION INTRAVENOUS at 13:10

## 2018-11-19 RX ADMIN — PROPOFOL 100 MG: 10 INJECTION, EMULSION INTRAVENOUS at 13:00

## 2018-11-19 ASSESSMENT — PAIN SCALES - GENERAL
PAINLEVEL_OUTOF10: 0
PAINLEVEL_OUTOF10: 0

## 2018-11-19 ASSESSMENT — PAIN - FUNCTIONAL ASSESSMENT: PAIN_FUNCTIONAL_ASSESSMENT: 0-10

## 2018-11-19 ASSESSMENT — ENCOUNTER SYMPTOMS
SHORTNESS OF BREATH: 0
STRIDOR: 0

## 2018-11-26 DIAGNOSIS — I10 ESSENTIAL HYPERTENSION: ICD-10-CM

## 2018-11-26 DIAGNOSIS — M43.06 LUMBAR SPONDYLOLYSIS: ICD-10-CM

## 2018-11-27 RX ORDER — CHLORTHALIDONE 25 MG/1
TABLET ORAL
Qty: 30 TABLET | Refills: 0 | Status: SHIPPED | OUTPATIENT
Start: 2018-11-27 | End: 2018-12-07 | Stop reason: SDUPTHER

## 2018-11-27 RX ORDER — FAMOTIDINE 20 MG/1
TABLET, FILM COATED ORAL
Qty: 60 TABLET | Refills: 0 | Status: SHIPPED | OUTPATIENT
Start: 2018-11-27 | End: 2018-12-07 | Stop reason: SDUPTHER

## 2018-11-27 RX ORDER — GABAPENTIN 300 MG/1
CAPSULE ORAL
Qty: 120 CAPSULE | Refills: 2 | Status: SHIPPED | OUTPATIENT
Start: 2018-11-27 | End: 2018-12-07 | Stop reason: SDUPTHER

## 2018-11-27 RX ORDER — ASPIRIN 81 MG/1
TABLET ORAL
Qty: 30 TABLET | Refills: 0 | Status: SHIPPED | OUTPATIENT
Start: 2018-11-27 | End: 2018-12-07 | Stop reason: SDUPTHER

## 2018-11-27 NOTE — TELEPHONE ENCOUNTER
Please address the medication refill and close the encounter. If I can be of assistance, please route to the applicable pool. Thank you. Last visit: 10/01/2018  Last Med refill: 10/01/2018    Next Visit Date:  Future Appointments  Date Time Provider Maryana Chairez   12/7/2018 9:15 AM Purnima Huang MD [id number]   12/13/2018 2:45 PM Dearl MD [facility] [address] Maintenance   Topic Date Due    Shingles Vaccine (1 of 2 - 2 Dose Series) 02/11/2014    Diabetic retinal exam  03/10/2016    Lipid screen  04/03/2018    Diabetic foot exam  01/07/2019 (Originally 4/3/2015)    Diabetic microalbuminuria test  03/02/2019    Creatinine monitoring  03/02/2019    A1C test (Diabetic or Prediabetic)  03/29/2019    Potassium monitoring  07/27/2019    DTaP/Tdap/Td vaccine (2 - Td) 02/22/2026    Colon cancer screen colonoscopy  11/19/2028    Flu vaccine  Completed    Pneumococcal med risk  Completed    HIV screen  Completed       Hemoglobin A1C (%)   Date Value   03/29/2018 7.5   03/02/2018 7.5 (H)   12/12/2017 9.8             ( goal A1C is < 7)   Microalb/Crt.  Ratio (mcg/mg creat)   Date Value   03/02/2018 148 (H)     LDL Cholesterol (mg/dL)   Date Value   04/03/2017 63   12/09/2013 95       (goal LDL is <100)   AST (U/L)   Date Value   07/27/2018 31     ALT (U/L)   Date Value   07/27/2018 32     BUN (mg/dL)   Date Value   07/27/2018 15     BP Readings from Last 3 Encounters:   11/19/18 123/89   11/19/18 113/77   11/07/18 (!) 150/90          (goal 120/80)    All Future Testing planned in [facility]  Lab Frequency Next Occurrence   VANESSA DIGITAL SCREEN W OR WO CAD BILATERAL Once 07/30/2018   Lipid, Fasting Once 12/14/2018               Patient Active Problem List:     Hypertension     Liver disease     Type II or unspecified type diabetes mellitus without mention of complication, not stated as uncontrolled     Lumbago     Thoracic or lumbosacral neuritis or radiculitis, unspecified Degeneration of lumbar or lumbosacral intervertebral disc     DM (diabetes mellitus) (HCC)     Hep C w/o coma, chronic (HCC)     Back pain     Back pain, chronic     HTN (hypertension)     Osteoarthritis of lumbar spine     Diabetes mellitus ([address]     Needs flu shot     HLD (hyperlipidemia)     Smoking     Chronic back pain     Spinal stenosis, lumbar region, without neurogenic claudication     Foot drop, right     Neuritis or radiculitis due to displacement of lumbar intervertebral disc     Lumbar disc herniation     Polyneuropathic pain     Displacement of lumbar intervertebral disc without myelopathy     Diabetes mellitus, type 2 (HCC)     Smoker     Cellulitis and abscess     Exposed orthopaedic hardware ([address]     Hallux abducto valgus     Diabetes (HCC)     Positional vertigo     Abdominal pain, right upper quadrant     Cellulitis     Esophagitis determined by endoscopy     Cholelithiasis     Diabetes type 2, uncontrolled ([address]     Diabetic mononeuropathy associated with diabetes mellitus due to underlying condition ([address]     Lumbar spondylolysis     Erectile dysfunction associated with type 2 diabetes mellitus (HCC)     Bloating     Family history of breast cancer     Dyslipidemia

## 2018-12-07 ENCOUNTER — OFFICE VISIT (OUTPATIENT)
Dept: FAMILY MEDICINE CLINIC | Age: 53
End: 2018-12-07
Payer: MEDICAID

## 2018-12-07 VITALS
HEIGHT: 69 IN | TEMPERATURE: 97.9 F | DIASTOLIC BLOOD PRESSURE: 83 MMHG | SYSTOLIC BLOOD PRESSURE: 130 MMHG | WEIGHT: 154.8 LBS | HEART RATE: 69 BPM | BODY MASS INDEX: 22.93 KG/M2

## 2018-12-07 DIAGNOSIS — I10 ESSENTIAL HYPERTENSION: ICD-10-CM

## 2018-12-07 DIAGNOSIS — J01.90 ACUTE BACTERIAL SINUSITIS: Primary | ICD-10-CM

## 2018-12-07 DIAGNOSIS — Z71.6 ENCOUNTER FOR SMOKING CESSATION COUNSELING: ICD-10-CM

## 2018-12-07 DIAGNOSIS — E78.5 DYSLIPIDEMIA: ICD-10-CM

## 2018-12-07 DIAGNOSIS — M43.06 LUMBAR SPONDYLOLYSIS: ICD-10-CM

## 2018-12-07 DIAGNOSIS — B96.89 ACUTE BACTERIAL SINUSITIS: Primary | ICD-10-CM

## 2018-12-07 PROCEDURE — 99211 OFF/OP EST MAY X REQ PHY/QHP: CPT | Performed by: STUDENT IN AN ORGANIZED HEALTH CARE EDUCATION/TRAINING PROGRAM

## 2018-12-07 PROCEDURE — 99213 OFFICE O/P EST LOW 20 MIN: CPT | Performed by: STUDENT IN AN ORGANIZED HEALTH CARE EDUCATION/TRAINING PROGRAM

## 2018-12-07 PROCEDURE — G8482 FLU IMMUNIZE ORDER/ADMIN: HCPCS | Performed by: STUDENT IN AN ORGANIZED HEALTH CARE EDUCATION/TRAINING PROGRAM

## 2018-12-07 PROCEDURE — 2022F DILAT RTA XM EVC RTNOPTHY: CPT | Performed by: STUDENT IN AN ORGANIZED HEALTH CARE EDUCATION/TRAINING PROGRAM

## 2018-12-07 PROCEDURE — G8427 DOCREV CUR MEDS BY ELIG CLIN: HCPCS | Performed by: STUDENT IN AN ORGANIZED HEALTH CARE EDUCATION/TRAINING PROGRAM

## 2018-12-07 PROCEDURE — 3045F PR MOST RECENT HEMOGLOBIN A1C LEVEL 7.0-9.0%: CPT | Performed by: STUDENT IN AN ORGANIZED HEALTH CARE EDUCATION/TRAINING PROGRAM

## 2018-12-07 PROCEDURE — 4004F PT TOBACCO SCREEN RCVD TLK: CPT | Performed by: STUDENT IN AN ORGANIZED HEALTH CARE EDUCATION/TRAINING PROGRAM

## 2018-12-07 PROCEDURE — G8420 CALC BMI NORM PARAMETERS: HCPCS | Performed by: STUDENT IN AN ORGANIZED HEALTH CARE EDUCATION/TRAINING PROGRAM

## 2018-12-07 PROCEDURE — 3017F COLORECTAL CA SCREEN DOC REV: CPT | Performed by: STUDENT IN AN ORGANIZED HEALTH CARE EDUCATION/TRAINING PROGRAM

## 2018-12-07 RX ORDER — DOCUSATE SODIUM 100 MG/1
100 CAPSULE, LIQUID FILLED ORAL DAILY PRN
Qty: 60 CAPSULE | Refills: 3 | Status: SHIPPED | OUTPATIENT
Start: 2018-12-07 | End: 2019-10-24 | Stop reason: SDUPTHER

## 2018-12-07 RX ORDER — AMOXICILLIN AND CLAVULANATE POTASSIUM 875; 125 MG/1; MG/1
1 TABLET, FILM COATED ORAL 2 TIMES DAILY
Qty: 14 TABLET | Refills: 0 | Status: SHIPPED | OUTPATIENT
Start: 2018-12-07 | End: 2018-12-14

## 2018-12-07 RX ORDER — BLOOD-GLUCOSE METER
1 KIT MISCELLANEOUS DAILY
Qty: 1 KIT | Refills: 0 | Status: SHIPPED | OUTPATIENT
Start: 2018-12-07 | End: 2021-12-07 | Stop reason: SDUPTHER

## 2018-12-07 RX ORDER — CHLORTHALIDONE 25 MG/1
TABLET ORAL
Qty: 30 TABLET | Refills: 3 | Status: SHIPPED | OUTPATIENT
Start: 2018-12-07 | End: 2019-07-15 | Stop reason: SDUPTHER

## 2018-12-07 RX ORDER — FAMOTIDINE 20 MG/1
TABLET, FILM COATED ORAL
Qty: 60 TABLET | Refills: 3 | Status: SHIPPED | OUTPATIENT
Start: 2018-12-07 | End: 2019-07-26 | Stop reason: SDUPTHER

## 2018-12-07 RX ORDER — ASPIRIN 81 MG/1
TABLET ORAL
Qty: 30 TABLET | Refills: 3 | Status: SHIPPED | OUTPATIENT
Start: 2018-12-07 | End: 2019-07-15 | Stop reason: SDUPTHER

## 2018-12-07 RX ORDER — NAPROXEN SODIUM 220 MG
TABLET ORAL
Qty: 10 EACH | Refills: 6 | Status: SHIPPED | OUTPATIENT
Start: 2018-12-07 | End: 2019-07-26 | Stop reason: SDUPTHER

## 2018-12-07 RX ORDER — FLUTICASONE PROPIONATE 50 MCG
1 SPRAY, SUSPENSION (ML) NASAL DAILY
Qty: 1 BOTTLE | Refills: 0 | Status: SHIPPED | OUTPATIENT
Start: 2018-12-07 | End: 2019-07-26 | Stop reason: SDUPTHER

## 2018-12-07 RX ORDER — LANCETS 33 GAUGE
EACH MISCELLANEOUS
Qty: 100 EACH | Refills: 0 | Status: SHIPPED | OUTPATIENT
Start: 2018-12-07 | End: 2019-07-26 | Stop reason: SDUPTHER

## 2018-12-07 RX ORDER — LORATADINE 10 MG/1
10 TABLET ORAL DAILY
Qty: 10 TABLET | Refills: 0 | Status: SHIPPED | OUTPATIENT
Start: 2018-12-07 | End: 2018-12-17

## 2018-12-07 RX ORDER — ONDANSETRON 4 MG/1
4 TABLET, FILM COATED ORAL EVERY 8 HOURS PRN
Qty: 12 TABLET | Refills: 0 | Status: SHIPPED | OUTPATIENT
Start: 2018-12-07 | End: 2019-07-26 | Stop reason: SDUPTHER

## 2018-12-07 RX ORDER — ATORVASTATIN CALCIUM 40 MG/1
40 TABLET, FILM COATED ORAL DAILY
Qty: 60 TABLET | Refills: 3 | Status: SHIPPED | OUTPATIENT
Start: 2018-12-07 | End: 2019-07-26 | Stop reason: SDUPTHER

## 2018-12-07 RX ORDER — GABAPENTIN 300 MG/1
CAPSULE ORAL
Qty: 120 CAPSULE | Refills: 2 | Status: SHIPPED | OUTPATIENT
Start: 2018-12-07 | End: 2019-06-17 | Stop reason: SDUPTHER

## 2018-12-07 ASSESSMENT — ENCOUNTER SYMPTOMS
NAUSEA: 0
SHORTNESS OF BREATH: 0
EYE PAIN: 0
COUGH: 1
RHINORRHEA: 1
WHEEZING: 0
VOMITING: 0
CHEST TIGHTNESS: 0
DIARRHEA: 0
SORE THROAT: 1
SINUS PRESSURE: 0
EYE DISCHARGE: 0
SINUS PAIN: 0

## 2018-12-11 ENCOUNTER — TELEPHONE (OUTPATIENT)
Dept: FAMILY MEDICINE CLINIC | Age: 53
End: 2018-12-11

## 2018-12-13 ENCOUNTER — OFFICE VISIT (OUTPATIENT)
Dept: GASTROENTEROLOGY | Age: 53
End: 2018-12-13
Payer: MEDICAID

## 2018-12-13 VITALS
BODY MASS INDEX: 22.96 KG/M2 | HEART RATE: 79 BPM | SYSTOLIC BLOOD PRESSURE: 131 MMHG | WEIGHT: 155 LBS | OXYGEN SATURATION: 98 % | DIASTOLIC BLOOD PRESSURE: 88 MMHG | HEIGHT: 69 IN

## 2018-12-13 DIAGNOSIS — B18.2 HEP C W/O COMA, CHRONIC (HCC): Primary | ICD-10-CM

## 2018-12-13 PROCEDURE — 99213 OFFICE O/P EST LOW 20 MIN: CPT | Performed by: INTERNAL MEDICINE

## 2018-12-13 ASSESSMENT — ENCOUNTER SYMPTOMS
RECTAL PAIN: 0
DIARRHEA: 0
BACK PAIN: 1
ALLERGIC/IMMUNOLOGIC NEGATIVE: 1
BLOOD IN STOOL: 0
GASTROINTESTINAL NEGATIVE: 1
NAUSEA: 0
EYES NEGATIVE: 1
RESPIRATORY NEGATIVE: 1
ABDOMINAL PAIN: 0
ABDOMINAL DISTENTION: 0
ANAL BLEEDING: 0
CONSTIPATION: 0
VOMITING: 0

## 2018-12-13 NOTE — PROGRESS NOTES
Take 1 tablet by mouth as needed for Erectile Dysfunction, Disp: 10 tablet, Rfl: 0    AmLODIPine Besylate (NORVASC PO), Take by mouth, Disp: , Rfl:     oxyCODONE-acetaminophen (PERCOCET) 5-325 MG per tablet, Take 1 tablet by mouth every 4 hours as needed for Pain, Disp: , Rfl:     ALLERGIES:   Allergies   Allergen Reactions    Morphine Hives       SOCIAL HISTORY:   Social History     Social History    Marital status: Single     Spouse name: N/A    Number of children: N/A    Years of education: N/A     Occupational History     N/A     Social History Main Topics    Smoking status: Current Every Day Smoker     Packs/day: 0.25     Years: 15.00     Types: Cigarettes    Smokeless tobacco: Former User     Types: Chew      Comment: stopped chew 9 years ago . pt states he smokes 4-5 ciggs daily    Alcohol use Yes      Comment: pt reports that he was a daily drinker approx. 1 year ago. Pt reports that now he just drinks occasionally    Drug use: No    Sexual activity: Not on file     Other Topics Concern    Not on file     Social History Narrative    ** Merged History Encounter **            REVIEW OF SYSTEMS: A 12-point review of systems was obtained and pertinent positives and negatives were enumerated above in the history of present illness. All other reviewed systems / symptoms were negative. Review of Systems   Constitutional: Negative. HENT: Negative. Eyes: Negative. Respiratory: Negative. Cardiovascular: Negative. Gastrointestinal: Negative. Negative for abdominal distention, abdominal pain, anal bleeding, blood in stool, constipation, diarrhea, nausea, rectal pain and vomiting. Endocrine: Negative. Genitourinary: Negative. Musculoskeletal: Positive for back pain and myalgias. Skin: Negative. Allergic/Immunologic: Negative. Neurological: Negative. Hematological: Negative. Psychiatric/Behavioral: Negative.          PHYSICAL EXAMINATION: Vital signs reviewed per the

## 2019-06-17 DIAGNOSIS — M43.06 LUMBAR SPONDYLOLYSIS: ICD-10-CM

## 2019-06-17 RX ORDER — GABAPENTIN 300 MG/1
CAPSULE ORAL
Qty: 120 CAPSULE | Refills: 0 | Status: SHIPPED | OUTPATIENT
Start: 2019-06-17 | End: 2019-07-15 | Stop reason: SDUPTHER

## 2019-06-17 NOTE — TELEPHONE ENCOUNTER
Please address the medication refill and close the encounter. If I can be of assistance, please route to the applicable pool. Thank you. Last visit: 509386  Last Med refill: [id number]  Does patient have enough medication for 72 hours:  Next Visit Date:  No future appointments. Health Maintenance   Topic Date Due    Hepatitis A vaccine (1 of 2 - Risk 2-dose series) 02[birth year]11/1965    Hepatitis B Vaccine (1 of 3 - Risk 3-dose series) 02/11/1983    Shingles Vaccine (1 of 2) 02/11/2014    Diabetic foot exam  04/03/2015    Diabetic retinal exam  03/10/2016    Lipid screen  04/03/2018    Diabetic microalbuminuria test  03/02/2019    Creatinine monitoring  03/02/2019    A1C test (Diabetic or Prediabetic)  03/29/2019    Potassium monitoring  07/27/2019    DTaP/Tdap/Td vaccine (2 - Td) 02/22/2026    Colon cancer screen colonoscopy  11/19/2028    Flu vaccine  Completed    Pneumococcal 0-64 years Vaccine  Completed    HIV screen  Completed       Hemoglobin A1C (%)   Date Value   03/29/2018 7.5   03/02/2018 7.5 (H)   12/12/2017 9.8             ( goal A1C is < 7)   Microalb/Crt.  Ratio (mcg/mg creat)   Date Value   03/02/2018 148 (H)     LDL Cholesterol (mg/dL)   Date Value   04/03/2017 63   12/09/2013 95       (goal LDL is <100)   AST (U/L)   Date Value   07/27/2018 31     ALT (U/L)   Date Value   07/27/2018 32     BUN (mg/dL)   Date Value   07/27/2018 15     BP Readings from Last 3 Encounters:   12/13/18 131/88   12/07/18 130/83   11/19/18 123/89          (goal 120/80)    All Future Testing planned in [facility]  Lab Frequency Next Occurrence   VANESSA DIGITAL SCREEN W OR WO CAD BILATERAL Once 07/30/2018   Lipid, Fasting Once 07/30/2019   Urine Drug Screen Once 06/36/4320   Comp Metabolic w Bili Profile Once 12/13/2018   CBC Auto Differential Once 12/13/2018   Hepatitis C RNA, quantitative, PCR Once 03/13/2019   Ethanol Once 12/13/2018               Patient Active Problem List:     Hypertension     Liver disease Type II or unspecified type diabetes mellitus without mention of complication, not stated as uncontrolled     Lumbago     Thoracic or lumbosacral neuritis or radiculitis, unspecified     Degeneration of lumbar or lumbosacral intervertebral disc     DM (diabetes mellitus) ([facility])     Hep C w/o coma, chronic (HCC)     Back pain     Back pain, chronic     HTN (hypertension)     Osteoarthritis of lumbar spine     Diabetes mellitus ([address]     Needs flu shot     HLD (hyperlipidemia)     Smoking     Chronic back pain     Spinal stenosis, lumbar region, without neurogenic claudication     Foot drop, right     Neuritis or radiculitis due to displacement of lumbar intervertebral disc     Lumbar disc herniation     Polyneuropathic pain     Displacement of lumbar intervertebral disc without myelopathy     Diabetes mellitus, type 2 (HCC)     Smoker     Cellulitis and abscess     Exposed orthopaedic hardware ([address]     Hallux abducto valgus     Diabetes (HCC)     Positional vertigo     Abdominal pain, right upper quadrant     Cellulitis     Esophagitis determined by endoscopy     Cholelithiasis     Diabetes type 2, uncontrolled ([address]     Diabetic mononeuropathy associated with diabetes mellitus due to underlying condition ([address]     Lumbar spondylolysis     Erectile dysfunction associated with type 2 diabetes mellitus (HCC)     Bloating     Family history of breast cancer     Dyslipidemia

## 2019-07-15 DIAGNOSIS — I10 ESSENTIAL HYPERTENSION: ICD-10-CM

## 2019-07-15 DIAGNOSIS — M43.06 LUMBAR SPONDYLOLYSIS: ICD-10-CM

## 2019-07-16 RX ORDER — GABAPENTIN 300 MG/1
CAPSULE ORAL
Qty: 120 CAPSULE | Refills: 0 | Status: SHIPPED | OUTPATIENT
Start: 2019-07-16 | End: 2019-10-24 | Stop reason: SDUPTHER

## 2019-07-16 RX ORDER — ASPIRIN 81 MG/1
TABLET ORAL
Qty: 30 TABLET | Refills: 0 | Status: SHIPPED | OUTPATIENT
Start: 2019-07-16 | End: 2019-07-26 | Stop reason: SDUPTHER

## 2019-07-16 RX ORDER — CHLORTHALIDONE 25 MG/1
TABLET ORAL
Qty: 30 TABLET | Refills: 0 | Status: SHIPPED | OUTPATIENT
Start: 2019-07-16 | End: 2019-07-26 | Stop reason: SDUPTHER

## 2019-07-26 ENCOUNTER — OFFICE VISIT (OUTPATIENT)
Dept: FAMILY MEDICINE CLINIC | Age: 54
End: 2019-07-26
Payer: MEDICAID

## 2019-07-26 VITALS
WEIGHT: 147 LBS | TEMPERATURE: 98.1 F | HEART RATE: 85 BPM | SYSTOLIC BLOOD PRESSURE: 142 MMHG | DIASTOLIC BLOOD PRESSURE: 96 MMHG | BODY MASS INDEX: 21.71 KG/M2

## 2019-07-26 DIAGNOSIS — B96.89 ACUTE BACTERIAL SINUSITIS: ICD-10-CM

## 2019-07-26 DIAGNOSIS — Z23 NEED FOR VACCINATION: ICD-10-CM

## 2019-07-26 DIAGNOSIS — B18.2 HEP C W/O COMA, CHRONIC (HCC): ICD-10-CM

## 2019-07-26 DIAGNOSIS — E78.5 DYSLIPIDEMIA: ICD-10-CM

## 2019-07-26 DIAGNOSIS — J01.90 ACUTE BACTERIAL SINUSITIS: ICD-10-CM

## 2019-07-26 DIAGNOSIS — M21.612 BUNION OF GREAT TOE OF LEFT FOOT: ICD-10-CM

## 2019-07-26 DIAGNOSIS — E11.69 ERECTILE DYSFUNCTION ASSOCIATED WITH TYPE 2 DIABETES MELLITUS (HCC): ICD-10-CM

## 2019-07-26 DIAGNOSIS — I10 ESSENTIAL HYPERTENSION: ICD-10-CM

## 2019-07-26 DIAGNOSIS — E08.41 DIABETIC MONONEUROPATHY ASSOCIATED WITH DIABETES MELLITUS DUE TO UNDERLYING CONDITION (HCC): ICD-10-CM

## 2019-07-26 DIAGNOSIS — N52.1 ERECTILE DYSFUNCTION ASSOCIATED WITH TYPE 2 DIABETES MELLITUS (HCC): ICD-10-CM

## 2019-07-26 DIAGNOSIS — J06.9 VIRAL URI: ICD-10-CM

## 2019-07-26 DIAGNOSIS — M43.06 LUMBAR SPONDYLOLYSIS: ICD-10-CM

## 2019-07-26 DIAGNOSIS — Z71.6 ENCOUNTER FOR SMOKING CESSATION COUNSELING: ICD-10-CM

## 2019-07-26 LAB — HBA1C MFR BLD: 6.7 %

## 2019-07-26 PROCEDURE — G0010 ADMIN HEPATITIS B VACCINE: HCPCS | Performed by: STUDENT IN AN ORGANIZED HEALTH CARE EDUCATION/TRAINING PROGRAM

## 2019-07-26 PROCEDURE — 83036 HEMOGLOBIN GLYCOSYLATED A1C: CPT | Performed by: STUDENT IN AN ORGANIZED HEALTH CARE EDUCATION/TRAINING PROGRAM

## 2019-07-26 PROCEDURE — 90632 HEPA VACCINE ADULT IM: CPT | Performed by: STUDENT IN AN ORGANIZED HEALTH CARE EDUCATION/TRAINING PROGRAM

## 2019-07-26 PROCEDURE — 99213 OFFICE O/P EST LOW 20 MIN: CPT | Performed by: STUDENT IN AN ORGANIZED HEALTH CARE EDUCATION/TRAINING PROGRAM

## 2019-07-26 RX ORDER — NAPROXEN SODIUM 220 MG
TABLET ORAL
Qty: 10 EACH | Refills: 6 | Status: SHIPPED | OUTPATIENT
Start: 2019-07-26 | End: 2019-12-03 | Stop reason: SDUPTHER

## 2019-07-26 RX ORDER — AMLODIPINE BESYLATE 10 MG/1
10 TABLET ORAL DAILY
Qty: 30 TABLET | Refills: 3 | Status: SHIPPED | OUTPATIENT
Start: 2019-07-26 | End: 2019-10-24 | Stop reason: SDUPTHER

## 2019-07-26 RX ORDER — BLOOD PRESSURE TEST KIT
1 KIT MISCELLANEOUS 2 TIMES DAILY
Qty: 1 KIT | Refills: 0 | Status: SHIPPED | OUTPATIENT
Start: 2019-07-26 | End: 2020-01-09 | Stop reason: ALTCHOICE

## 2019-07-26 RX ORDER — ONDANSETRON 4 MG/1
4 TABLET, FILM COATED ORAL EVERY 8 HOURS PRN
Qty: 12 TABLET | Refills: 3 | Status: SHIPPED | OUTPATIENT
Start: 2019-07-26 | End: 2019-10-24 | Stop reason: SDUPTHER

## 2019-07-26 RX ORDER — FAMOTIDINE 20 MG/1
TABLET, FILM COATED ORAL
Qty: 60 TABLET | Refills: 3 | Status: SHIPPED | OUTPATIENT
Start: 2019-07-26 | End: 2019-10-24 | Stop reason: SDUPTHER

## 2019-07-26 RX ORDER — ASPIRIN 81 MG/1
TABLET ORAL
Qty: 30 TABLET | Refills: 3 | Status: SHIPPED | OUTPATIENT
Start: 2019-07-26 | End: 2019-10-24 | Stop reason: SDUPTHER

## 2019-07-26 RX ORDER — SILDENAFIL 50 MG/1
50 TABLET, FILM COATED ORAL PRN
Qty: 10 TABLET | Refills: 3 | Status: SHIPPED | OUTPATIENT
Start: 2019-07-26 | End: 2020-08-19 | Stop reason: SDUPTHER

## 2019-07-26 RX ORDER — LANCETS 33 GAUGE
EACH MISCELLANEOUS
Qty: 100 EACH | Refills: 0 | Status: SHIPPED | OUTPATIENT
Start: 2019-07-26 | End: 2019-12-03 | Stop reason: SDUPTHER

## 2019-07-26 RX ORDER — CHLORTHALIDONE 25 MG/1
TABLET ORAL
Qty: 30 TABLET | Refills: 3 | Status: SHIPPED | OUTPATIENT
Start: 2019-07-26 | End: 2019-10-24 | Stop reason: SDUPTHER

## 2019-07-26 RX ORDER — ATORVASTATIN CALCIUM 40 MG/1
40 TABLET, FILM COATED ORAL DAILY
Qty: 60 TABLET | Refills: 3 | Status: SHIPPED | OUTPATIENT
Start: 2019-07-26 | End: 2019-10-24 | Stop reason: SDUPTHER

## 2019-07-26 RX ORDER — FLUTICASONE PROPIONATE 50 MCG
1 SPRAY, SUSPENSION (ML) NASAL DAILY
Qty: 1 BOTTLE | Refills: 3 | Status: SHIPPED | OUTPATIENT
Start: 2019-07-26 | End: 2019-10-24 | Stop reason: SDUPTHER

## 2019-07-26 RX ORDER — LISINOPRIL 40 MG/1
40 TABLET ORAL DAILY
Qty: 30 TABLET | Refills: 3 | Status: SHIPPED | OUTPATIENT
Start: 2019-07-26 | End: 2019-10-24 | Stop reason: SDUPTHER

## 2019-07-26 ASSESSMENT — PATIENT HEALTH QUESTIONNAIRE - PHQ9
SUM OF ALL RESPONSES TO PHQ QUESTIONS 1-9: 0
2. FEELING DOWN, DEPRESSED OR HOPELESS: 0
1. LITTLE INTEREST OR PLEASURE IN DOING THINGS: 0
SUM OF ALL RESPONSES TO PHQ QUESTIONS 1-9: 0
SUM OF ALL RESPONSES TO PHQ9 QUESTIONS 1 & 2: 0

## 2019-07-26 ASSESSMENT — ENCOUNTER SYMPTOMS
SINUS PAIN: 0
PHOTOPHOBIA: 0
STRIDOR: 0
SORE THROAT: 0
RHINORRHEA: 1
SINUS PRESSURE: 0
COUGH: 1
WHEEZING: 0
CHEST TIGHTNESS: 0
ABDOMINAL DISTENTION: 0
TROUBLE SWALLOWING: 0
SHORTNESS OF BREATH: 0
ABDOMINAL PAIN: 0

## 2019-07-26 NOTE — PROGRESS NOTES
I have reviewed and discussed key elements of [address] [place] [address] with the resident including plan of care and follow up and agree with the care rosemarie plan.

## 2019-07-26 NOTE — PROGRESS NOTES
hearing loss, postnasal drip, sinus pressure, sinus pain, sneezing, sore throat and trouble swallowing. Eyes: Negative for photophobia and visual disturbance. Respiratory: Positive for cough (dry cough). Negative for chest tightness, shortness of breath, wheezing and stridor. Cardiovascular: Negative for chest pain, palpitations and leg swelling. Gastrointestinal: Negative for abdominal distention and abdominal pain. Endocrine: Negative for polydipsia, polyphagia and polyuria. Genitourinary: Negative for difficulty urinating, dysuria, enuresis, frequency and urgency. Musculoskeletal: Negative for arthralgias. Neurological: Negative for dizziness, weakness, numbness and headaches. The patient has a   Family History   Problem Relation Age of Onset    High Blood Pressure Mother     High Blood Pressure Father        Objective:    BP (!) 142/96 (Site: Right Upper Arm, Position: Sitting, Cuff Size: Medium Adult)   Pulse 85   Temp 98.1 °F (36.7 °C)   Wt 147 lb (66.7 kg)   BMI 21.71 kg/m²    BP Readings from Last 3 Encounters:   07/26/19 (!) 142/96   12/13/18 131/88   12/07/18 130/83       Physical Exam   Constitutional: He is oriented to person, place, and time. He appears well-developed and well-nourished. HENT:   Head: Normocephalic and atraumatic. Right Ear: External ear normal.   Left Ear: External ear normal.   Nose: Mucosal edema present. No rhinorrhea, sinus tenderness or nasal deformity. No epistaxis. Right sinus exhibits no maxillary sinus tenderness and no frontal sinus tenderness. Left sinus exhibits no maxillary sinus tenderness and no frontal sinus tenderness. Mouth/Throat: Uvula is midline and mucous membranes are normal. No oropharyngeal exudate. Tonsils are 0 on the right. Tonsils are 0 on the left. Erythematous pharynx and erythematous and boggy nasal mucosa   Eyes: Pupils are equal, round, and reactive to light.  Conjunctivae and EOM are normal. Right eye exhibits no discharge. Left eye exhibits no discharge. Neck: Normal range of motion. Cardiovascular: Normal rate, regular rhythm, normal heart sounds and intact distal pulses. Exam reveals no gallop and no friction rub. No murmur heard. Pulmonary/Chest: Effort normal and breath sounds normal. No stridor. No respiratory distress. He has no wheezes. He has no rales. He exhibits no tenderness. Abdominal: Soft. Bowel sounds are normal. He exhibits no distension. There is no tenderness. Lymphadenopathy:     He has no cervical adenopathy. Neurological: He is alert and oriented to person, place, and time. Skin: Skin is warm. Lab Results   Component Value Date    WBC 8.1 07/27/2018    HGB 17.6 (H) 07/27/2018    HCT 54.7 (H) 07/27/2018     07/27/2018    CHOL 158 04/03/2017    TRIG 147 04/03/2017    HDL 66 04/03/2017    ALT 32 07/27/2018    AST 31 07/27/2018     07/27/2018    K 4.1 07/27/2018    CL 99 07/27/2018    CREATININE 0.86 07/27/2018    BUN 15 07/27/2018    CO2 23 07/27/2018    TSH 0.35 10/10/2014    INR 1.6 09/08/2015    LABA1C 6.7 07/26/2019    LABMICR 148 (H) 03/02/2018     Lab Results   Component Value Date    CALCIUM 10.1 07/27/2018     Lab Results   Component Value Date    LDLCHOLESTEROL 63 04/03/2017       Assessment and Plan:    1. Uncontrolled type 2 diabetes mellitus with diabetic mononeuropathy, with long-term current use of insulin ([facility])  - POCT glycosylated hemoglobin (Hb A1C) - 6.7  - controlled, continue with medications  - aspirin (ASPIRIN ADULT LOW STRENGTH) 81 MG EC tablet; TAKE 1 TAB BY MOUTH ONCE A DAY  Dispense: 30 tablet; Refill: 3  - blood glucose test strips (ONE TOUCH ULTRA TEST) strip; TESTING TWICE A DAY  Dispense: 100 each; Refill: 5  - insulin glargine (BASAGLAR KWIKPEN) 100 UNIT/ML injection pen;  Inject 32 Units into the skin 2 times daily  Dispense: 19.2 mL; Refill: 3  - Insulin Syringe-Needle U-100 (B-D INS SYR ULTRAFINE 1CC/30G) 30G X 1/2\" 1 ML MISC; AS DIRECTED WITH LANTUS  Dispense: 10 each; Refill: 3  - Insulin Syringe-Needle U-100 (INSULIN SYRINGE 1CC/30GX5/16\") 30G X 5/16\" 1 ML MISC; Dispense 1 pack to Use with Lantus as directed  Dispense: 10 each; Refill: 6  - metFORMIN (GLUCOPHAGE) 1000 MG tablet; Take 1 tablet by mouth 2 times daily (with meals)  Dispense: 60 tablet; Refill: 3  - ondansetron (ZOFRAN) 4 MG tablet; Take 1 tablet by mouth every 8 hours as needed for Nausea or Vomiting  Dispense: 12 tablet; Refill: 3  - ONETOUCH DELICA LANCETS 84E MISC; USE AS DIRECTED TWICE A DAY  Dispense: 100 each; Refill: 0  - Basic Metabolic Panel; Future  - Microalbumin, Ur; Future  -  DIABETES FOOT EXAM - normal  - Pt will schedule an appointment for his yearly eye exam    2. Essential hypertension  - BP today 156/104, repeat 142/96  - continue with medications  - amLODIPine (NORVASC) 10 MG tablet; Take 1 tablet by mouth daily  Dispense: 30 tablet; Refill: 3  - chlorthalidone (HYGROTON) 25 MG tablet; Once daily  Dispense: 30 tablet; Refill: 3  - lisinopril (PRINIVIL;ZESTRIL) 40 MG tablet; Take 1 tablet by mouth daily  Dispense: 30 tablet; Refill: 3  - Basic Metabolic Panel; Future  - Blood Pressure KIT; 1 Device by Does not apply route 2 times daily  Dispense: 1 kit; Refill: 0  - pt counseled to keep a blood pressure log and bring in next appointment    3. Viral URI  - pt instructed to maintain adequate hydration  - suggested Tylenol cold and flonase for symptomatic relief  - fluticasone (FLONASE) 50 MCG/ACT nasal spray; 1 spray by Each Nostril route daily  Dispense: 1 Bottle; Refill: 3    4. Dyslipidemia  - atorvastatin (LIPITOR) 40 MG tablet; Take 1 tablet by mouth daily  Dispense: 60 tablet; Refill: 3  - Lipid Panel; Future    5. Lumbar spondylolysis  - famotidine (PEPCID) 20 MG tablet; TAKE 1 TAB BY MOUTH TWICE A DAY  Dispense: 60 tablet; Refill: 3    6.  Acute bacterial sinusitis  - fluticasone (FLONASE) 50 MCG/ACT nasal spray; 1 spray by Each Nostril route

## 2019-07-26 NOTE — PROGRESS NOTES
DIABETES and HYPERTENSION visit    BP Readings from Last 3 Encounters:   12/13/18 131/88   12/07/18 130/83   11/19/18 123/89        Hemoglobin A1C (%)   Date Value   03/29/2018 7.5   03/02/2018 7.5 (H)   12/12/2017 9.8     Microalb/Crt. Ratio (mcg/mg creat)   Date Value   03/02/2018 148 (H)     LDL Cholesterol (mg/dL)   Date Value   04/03/2017 63     HDL (mg/dL)   Date Value   04/03/2017 66     BUN (mg/dL)   Date Value   07/27/2018 15     CREATININE (mg/dL)   Date Value   07/27/2018 0.86     Glucose (mg/dL)   Date Value   07/27/2018 92   11/10/2011 354 (H)            Have you changed or started any medications since your last visit including any over-the-counter medicines, vitamins, or herbal medicines? no   Have you stopped taking any of your medications? Is so, why? -  no  Are you having any side effects from any of your medications? - no    Have you seen any other physician or provider since your last visit? yes    Have you had any other diagnostic tests since your last visit? yes    Have you been seen in the emergency room and/or had an admission in a hospital since we last saw you?  no   Have you had your routine dental cleaning in the past 6 months?  no     Have you had your annual diabetic retinal (eye) exam? No   (ensure copy of exam is in the chart)    Do you have an active MyChart account? If no, what is the barrier?   Yes    Patient Care Team:  Mariola Pal MD as PCP - General (Family Medicine)  Kirsten Woods MD as Orthopedic Surgeon (Orthopedic Surgery)  Stephania Bautista MD as Consulting Physician (Neurology)  Srinivasa Ballesteros DPM as Surgeon (Podiatry)  Krystyna Rich DO as Consulting Physician (General Surgery)  Sanjay Callahan MD as Consulting Physician (Gastroenterology)    Medical History Review  Past Medical, Family, and Social History reviewed and does contribute to the patient presenting condition    Health Maintenance   Topic Date Due    Hepatitis A vaccine (1 of 2 - Risk

## 2019-07-29 ENCOUNTER — TELEPHONE (OUTPATIENT)
Dept: GASTROENTEROLOGY | Age: 54
End: 2019-07-29

## 2019-07-30 ENCOUNTER — TELEPHONE (OUTPATIENT)
Dept: FAMILY MEDICINE CLINIC | Age: 54
End: 2019-07-30

## 2019-07-30 NOTE — TELEPHONE ENCOUNTER
Patient called office stating that he never received the scripts for the nasal spray, an antibiotic or anything for his cough from his last appointment on 07/26/19. Please address or advise. Scripts go to [employer].

## 2019-08-15 ENCOUNTER — HOSPITAL ENCOUNTER (OUTPATIENT)
Age: 54
Setting detail: SPECIMEN
Discharge: HOME | End: 2019-08-15
Payer: MEDICAID

## 2019-08-15 DIAGNOSIS — E78.5 DYSLIPIDEMIA: ICD-10-CM

## 2019-08-15 DIAGNOSIS — I10 ESSENTIAL HYPERTENSION: ICD-10-CM

## 2019-08-15 LAB
ANION GAP SERPL CALCULATED.3IONS-SCNC: 12 MMOL/L (ref 9–17)
BUN BLDV-MCNC: 10 MG/DL (ref 6–20)
BUN/CREAT BLD: ABNORMAL (ref 9–20)
CALCIUM SERPL-MCNC: 10 MG/DL (ref 8.6–10.4)
CHLORIDE BLD-SCNC: 100 MMOL/L (ref 98–107)
CHOLESTEROL/HDL RATIO: 3
CHOLESTEROL: 216 MG/DL
CO2: 28 MMOL/L (ref 20–31)
CREAT SERPL-MCNC: 0.88 MG/DL (ref 0.7–1.2)
CREATININE URINE: 127.2 MG/DL (ref 39–259)
GFR AFRICAN AMERICAN: >60 ML/MIN
GFR NON-AFRICAN AMERICAN: >60 ML/MIN
GFR SERPL CREATININE-BSD FRML MDRD: ABNORMAL ML/MIN/{1.73_M2}
GFR SERPL CREATININE-BSD FRML MDRD: ABNORMAL ML/MIN/{1.73_M2}
GLUCOSE BLD-MCNC: 107 MG/DL (ref 70–99)
HDLC SERPL-MCNC: 71 MG/DL
LDL CHOLESTEROL: 112 MG/DL (ref 0–130)
MICROALBUMIN/CREAT 24H UR: 126 MG/L
MICROALBUMIN/CREAT UR-RTO: 99 MCG/MG CREAT
POTASSIUM SERPL-SCNC: 4.6 MMOL/L (ref 3.7–5.3)
SODIUM BLD-SCNC: 140 MMOL/L (ref 135–144)
TRIGL SERPL-MCNC: 165 MG/DL
VLDLC SERPL CALC-MCNC: ABNORMAL MG/DL (ref 1–30)

## 2019-08-23 ENCOUNTER — NURSE ONLY (OUTPATIENT)
Dept: FAMILY MEDICINE CLINIC | Age: 54
End: 2019-08-23
Payer: MEDICAID

## 2019-08-23 DIAGNOSIS — Z23 NEED FOR HEPATITIS B VACCINATION: Primary | ICD-10-CM

## 2019-08-23 PROCEDURE — 90746 HEPB VACCINE 3 DOSE ADULT IM: CPT | Performed by: FAMILY MEDICINE

## 2019-08-23 PROCEDURE — 99999 PR OFFICE/OUTPT VISIT,PROCEDURE ONLY: CPT | Performed by: FAMILY MEDICINE

## 2019-08-23 PROCEDURE — 99211 OFF/OP EST MAY X REQ PHY/QHP: CPT | Performed by: FAMILY MEDICINE

## 2019-08-23 NOTE — PROGRESS NOTES
Patient here today for nurse visit for Hepatitis B injection #2 of 3. Administered injection in left deltoid. Patient tolerated procedure well. VIS given.   To follow up around 12/23/2019 for Hepatitis B injection #3

## 2019-10-24 ENCOUNTER — OFFICE VISIT (OUTPATIENT)
Dept: FAMILY MEDICINE CLINIC | Age: 54
End: 2019-10-24
Payer: MEDICAID

## 2019-10-24 VITALS
BODY MASS INDEX: 22.87 KG/M2 | WEIGHT: 154.4 LBS | DIASTOLIC BLOOD PRESSURE: 95 MMHG | SYSTOLIC BLOOD PRESSURE: 154 MMHG | HEART RATE: 89 BPM | HEIGHT: 69 IN | TEMPERATURE: 98.4 F

## 2019-10-24 DIAGNOSIS — M43.06 LUMBAR SPONDYLOLYSIS: ICD-10-CM

## 2019-10-24 DIAGNOSIS — E78.5 DYSLIPIDEMIA: ICD-10-CM

## 2019-10-24 DIAGNOSIS — B96.89 ACUTE BACTERIAL SINUSITIS: ICD-10-CM

## 2019-10-24 DIAGNOSIS — J01.90 ACUTE BACTERIAL SINUSITIS: ICD-10-CM

## 2019-10-24 DIAGNOSIS — Z71.6 ENCOUNTER FOR SMOKING CESSATION COUNSELING: ICD-10-CM

## 2019-10-24 DIAGNOSIS — I10 ESSENTIAL HYPERTENSION: ICD-10-CM

## 2019-10-24 PROCEDURE — 3017F COLORECTAL CA SCREEN DOC REV: CPT | Performed by: STUDENT IN AN ORGANIZED HEALTH CARE EDUCATION/TRAINING PROGRAM

## 2019-10-24 PROCEDURE — G8420 CALC BMI NORM PARAMETERS: HCPCS | Performed by: STUDENT IN AN ORGANIZED HEALTH CARE EDUCATION/TRAINING PROGRAM

## 2019-10-24 PROCEDURE — 4004F PT TOBACCO SCREEN RCVD TLK: CPT | Performed by: STUDENT IN AN ORGANIZED HEALTH CARE EDUCATION/TRAINING PROGRAM

## 2019-10-24 PROCEDURE — 2022F DILAT RTA XM EVC RTNOPTHY: CPT | Performed by: STUDENT IN AN ORGANIZED HEALTH CARE EDUCATION/TRAINING PROGRAM

## 2019-10-24 PROCEDURE — G8427 DOCREV CUR MEDS BY ELIG CLIN: HCPCS | Performed by: STUDENT IN AN ORGANIZED HEALTH CARE EDUCATION/TRAINING PROGRAM

## 2019-10-24 PROCEDURE — 3044F HG A1C LEVEL LT 7.0%: CPT | Performed by: STUDENT IN AN ORGANIZED HEALTH CARE EDUCATION/TRAINING PROGRAM

## 2019-10-24 PROCEDURE — 99213 OFFICE O/P EST LOW 20 MIN: CPT | Performed by: STUDENT IN AN ORGANIZED HEALTH CARE EDUCATION/TRAINING PROGRAM

## 2019-10-24 PROCEDURE — G8484 FLU IMMUNIZE NO ADMIN: HCPCS | Performed by: STUDENT IN AN ORGANIZED HEALTH CARE EDUCATION/TRAINING PROGRAM

## 2019-10-24 RX ORDER — AMLODIPINE BESYLATE 10 MG/1
10 TABLET ORAL DAILY
Qty: 30 TABLET | Refills: 3 | Status: SHIPPED | OUTPATIENT
Start: 2019-10-24 | End: 2019-12-03 | Stop reason: SDUPTHER

## 2019-10-24 RX ORDER — NALOXONE HYDROCHLORIDE 4 MG/.1ML
1 SPRAY NASAL PRN
Qty: 1 EACH | Refills: 5 | Status: ON HOLD | OUTPATIENT
Start: 2019-10-24 | End: 2020-01-05 | Stop reason: SDUPTHER

## 2019-10-24 RX ORDER — OXYCODONE HYDROCHLORIDE AND ACETAMINOPHEN 5; 325 MG/1; MG/1
1 TABLET ORAL EVERY 4 HOURS PRN
Qty: 84 TABLET | Refills: 0 | Status: CANCELLED | OUTPATIENT
Start: 2019-10-24 | End: 2019-11-07

## 2019-10-24 RX ORDER — GABAPENTIN 300 MG/1
CAPSULE ORAL
Qty: 120 CAPSULE | Refills: 0 | Status: ON HOLD | OUTPATIENT
Start: 2019-10-24 | End: 2020-01-03

## 2019-10-24 RX ORDER — LISINOPRIL 40 MG/1
40 TABLET ORAL DAILY
Qty: 30 TABLET | Refills: 3 | Status: SHIPPED | OUTPATIENT
Start: 2019-10-24 | End: 2019-12-03 | Stop reason: SDUPTHER

## 2019-10-24 RX ORDER — OXYCODONE HYDROCHLORIDE AND ACETAMINOPHEN 5; 325 MG/1; MG/1
1 TABLET ORAL EVERY 6 HOURS PRN
Qty: 20 TABLET | Refills: 0 | Status: SHIPPED | OUTPATIENT
Start: 2019-10-24 | End: 2019-10-29

## 2019-10-24 RX ORDER — FLUTICASONE PROPIONATE 50 MCG
1 SPRAY, SUSPENSION (ML) NASAL DAILY
Qty: 1 BOTTLE | Refills: 3 | Status: ON HOLD | OUTPATIENT
Start: 2019-10-24 | End: 2020-01-05 | Stop reason: SDUPTHER

## 2019-10-24 RX ORDER — ASPIRIN 81 MG/1
TABLET ORAL
Qty: 30 TABLET | Refills: 3 | Status: SHIPPED | OUTPATIENT
Start: 2019-10-24 | End: 2019-12-03 | Stop reason: SDUPTHER

## 2019-10-24 RX ORDER — DOCUSATE SODIUM 100 MG/1
100 CAPSULE, LIQUID FILLED ORAL DAILY PRN
Qty: 60 CAPSULE | Refills: 3 | Status: ON HOLD | OUTPATIENT
Start: 2019-10-24 | End: 2020-01-05 | Stop reason: SDUPTHER

## 2019-10-24 RX ORDER — ONDANSETRON 4 MG/1
4 TABLET, FILM COATED ORAL EVERY 8 HOURS PRN
Qty: 12 TABLET | Refills: 3 | Status: SHIPPED | OUTPATIENT
Start: 2019-10-24 | End: 2019-12-03 | Stop reason: SDUPTHER

## 2019-10-24 RX ORDER — CHLORTHALIDONE 25 MG/1
TABLET ORAL
Qty: 30 TABLET | Refills: 3 | Status: SHIPPED | OUTPATIENT
Start: 2019-10-24 | End: 2019-12-03 | Stop reason: SDUPTHER

## 2019-10-24 RX ORDER — ATORVASTATIN CALCIUM 40 MG/1
40 TABLET, FILM COATED ORAL DAILY
Qty: 60 TABLET | Refills: 3 | Status: SHIPPED | OUTPATIENT
Start: 2019-10-24 | End: 2019-12-03 | Stop reason: SDUPTHER

## 2019-10-24 RX ORDER — FAMOTIDINE 20 MG/1
TABLET, FILM COATED ORAL
Qty: 60 TABLET | Refills: 3 | Status: SHIPPED | OUTPATIENT
Start: 2019-10-24 | End: 2019-12-03 | Stop reason: SDUPTHER

## 2019-10-24 ASSESSMENT — ENCOUNTER SYMPTOMS
ANAL BLEEDING: 0
BACK PAIN: 1
ABDOMINAL PAIN: 0
CONSTIPATION: 0
DIARRHEA: 0
CHEST TIGHTNESS: 0
SHORTNESS OF BREATH: 0
WHEEZING: 0

## 2019-10-25 ENCOUNTER — TELEPHONE (OUTPATIENT)
Dept: FAMILY MEDICINE CLINIC | Age: 54
End: 2019-10-25

## 2019-11-09 ENCOUNTER — HOSPITAL ENCOUNTER (EMERGENCY)
Age: 54
Discharge: HOME | End: 2019-11-09
Attending: EMERGENCY MEDICINE
Payer: MEDICAID

## 2019-11-09 VITALS
RESPIRATION RATE: 15 BRPM | SYSTOLIC BLOOD PRESSURE: 173 MMHG | HEART RATE: 73 BPM | TEMPERATURE: 98.4 F | OXYGEN SATURATION: 100 % | DIASTOLIC BLOOD PRESSURE: 102 MMHG

## 2019-11-09 DIAGNOSIS — M62.830 BACK MUSCLE SPASM: ICD-10-CM

## 2019-11-09 DIAGNOSIS — M54.50 ACUTE EXACERBATION OF CHRONIC LOW BACK PAIN: Primary | ICD-10-CM

## 2019-11-09 DIAGNOSIS — G89.29 ACUTE EXACERBATION OF CHRONIC LOW BACK PAIN: Primary | ICD-10-CM

## 2019-11-09 DIAGNOSIS — M54.32 BILATERAL SCIATICA: ICD-10-CM

## 2019-11-09 DIAGNOSIS — M54.31 BILATERAL SCIATICA: ICD-10-CM

## 2019-11-09 PROCEDURE — 6360000002 HC RX W HCPCS: Performed by: EMERGENCY MEDICINE

## 2019-11-09 PROCEDURE — 99283 EMERGENCY DEPT VISIT LOW MDM: CPT

## 2019-11-09 PROCEDURE — 6370000000 HC RX 637 (ALT 250 FOR IP): Performed by: EMERGENCY MEDICINE

## 2019-11-09 PROCEDURE — 96372 THER/PROPH/DIAG INJ SC/IM: CPT

## 2019-11-09 RX ORDER — GABAPENTIN 600 MG/1
600 TABLET ORAL ONCE
Status: DISCONTINUED | OUTPATIENT
Start: 2019-11-09 | End: 2019-11-09 | Stop reason: HOSPADM

## 2019-11-09 RX ORDER — ORPHENADRINE CITRATE 30 MG/ML
60 INJECTION INTRAMUSCULAR; INTRAVENOUS ONCE
Status: COMPLETED | OUTPATIENT
Start: 2019-11-09 | End: 2019-11-09

## 2019-11-09 RX ORDER — OXYCODONE HYDROCHLORIDE AND ACETAMINOPHEN 5; 325 MG/1; MG/1
1 TABLET ORAL EVERY 6 HOURS PRN
Qty: 10 TABLET | Refills: 0 | Status: SHIPPED | OUTPATIENT
Start: 2019-11-09 | End: 2019-11-12

## 2019-11-09 RX ORDER — DEXAMETHASONE 4 MG/1
4 TABLET ORAL ONCE
Qty: 1 TABLET | Refills: 0 | Status: SHIPPED | OUTPATIENT
Start: 2019-11-11 | End: 2019-11-11

## 2019-11-09 RX ORDER — OXYCODONE HYDROCHLORIDE AND ACETAMINOPHEN 5; 325 MG/1; MG/1
1 TABLET ORAL EVERY 6 HOURS PRN
Qty: 20 TABLET | Refills: 0 | Status: SHIPPED | OUTPATIENT
Start: 2019-11-09 | End: 2019-11-09 | Stop reason: SDUPTHER

## 2019-11-09 RX ORDER — LIDOCAINE 4 G/G
1 PATCH TOPICAL ONCE
Status: DISCONTINUED | OUTPATIENT
Start: 2019-11-09 | End: 2019-11-09 | Stop reason: HOSPADM

## 2019-11-09 RX ORDER — DEXAMETHASONE 4 MG/1
4 TABLET ORAL ONCE
Status: COMPLETED | OUTPATIENT
Start: 2019-11-09 | End: 2019-11-09

## 2019-11-09 RX ORDER — IBUPROFEN 800 MG/1
800 TABLET ORAL EVERY 8 HOURS PRN
Qty: 30 TABLET | Refills: 0 | Status: ON HOLD | OUTPATIENT
Start: 2019-11-09 | End: 2020-01-05 | Stop reason: SDUPTHER

## 2019-11-09 RX ORDER — DIAZEPAM 2 MG/1
2 TABLET ORAL EVERY 8 HOURS PRN
Qty: 20 TABLET | Refills: 0 | Status: SHIPPED | OUTPATIENT
Start: 2019-11-09 | End: 2019-11-19

## 2019-11-09 RX ORDER — OXYCODONE HYDROCHLORIDE AND ACETAMINOPHEN 5; 325 MG/1; MG/1
1 TABLET ORAL ONCE
Status: COMPLETED | OUTPATIENT
Start: 2019-11-09 | End: 2019-11-09

## 2019-11-09 RX ORDER — KETOROLAC TROMETHAMINE 30 MG/ML
30 INJECTION, SOLUTION INTRAMUSCULAR; INTRAVENOUS ONCE
Status: COMPLETED | OUTPATIENT
Start: 2019-11-09 | End: 2019-11-09

## 2019-11-09 RX ADMIN — KETOROLAC TROMETHAMINE 30 MG: 30 INJECTION, SOLUTION INTRAMUSCULAR at 12:14

## 2019-11-09 RX ADMIN — ORPHENADRINE CITRATE 60 MG: 30 INJECTION INTRAMUSCULAR; INTRAVENOUS at 12:14

## 2019-11-09 RX ADMIN — OXYCODONE HYDROCHLORIDE AND ACETAMINOPHEN 1 TABLET: 5; 325 TABLET ORAL at 13:04

## 2019-11-09 RX ADMIN — OXYCODONE HYDROCHLORIDE AND ACETAMINOPHEN 1 TABLET: 5; 325 TABLET ORAL at 12:14

## 2019-11-09 RX ADMIN — DEXAMETHASONE 4 MG: 4 TABLET ORAL at 12:14

## 2019-11-09 ASSESSMENT — PAIN SCALES - GENERAL
PAINLEVEL_OUTOF10: 10
PAINLEVEL_OUTOF10: 8
PAINLEVEL_OUTOF10: 10

## 2019-11-09 ASSESSMENT — PAIN DESCRIPTION - DESCRIPTORS: DESCRIPTORS: ACHING

## 2019-11-09 ASSESSMENT — PAIN DESCRIPTION - ORIENTATION: ORIENTATION: LOWER

## 2019-11-09 ASSESSMENT — PAIN DESCRIPTION - PROGRESSION: CLINICAL_PROGRESSION: GRADUALLY WORSENING

## 2019-11-09 ASSESSMENT — PAIN DESCRIPTION - ONSET: ONSET: PROGRESSIVE

## 2019-11-09 ASSESSMENT — PAIN DESCRIPTION - PAIN TYPE: TYPE: ACUTE PAIN

## 2019-11-09 ASSESSMENT — PAIN DESCRIPTION - LOCATION: LOCATION: BACK

## 2019-11-12 ASSESSMENT — ENCOUNTER SYMPTOMS
SHORTNESS OF BREATH: 0
BACK PAIN: 1
ABDOMINAL PAIN: 0
DIARRHEA: 0
SORE THROAT: 0
NAUSEA: 0
EYE PAIN: 0
COUGH: 0

## 2019-12-03 ENCOUNTER — OFFICE VISIT (OUTPATIENT)
Dept: FAMILY MEDICINE CLINIC | Age: 54
End: 2019-12-03
Payer: MEDICAID

## 2019-12-03 VITALS
SYSTOLIC BLOOD PRESSURE: 167 MMHG | WEIGHT: 148 LBS | BODY MASS INDEX: 21.92 KG/M2 | TEMPERATURE: 97.1 F | HEIGHT: 69 IN | HEART RATE: 95 BPM | DIASTOLIC BLOOD PRESSURE: 110 MMHG

## 2019-12-03 DIAGNOSIS — H53.8 BLURRY VISION, BILATERAL: ICD-10-CM

## 2019-12-03 DIAGNOSIS — M43.06 LUMBAR SPONDYLOLYSIS: Primary | ICD-10-CM

## 2019-12-03 DIAGNOSIS — Z76.0 MEDICATION REFILL: ICD-10-CM

## 2019-12-03 DIAGNOSIS — I10 ESSENTIAL HYPERTENSION: ICD-10-CM

## 2019-12-03 PROCEDURE — 99213 OFFICE O/P EST LOW 20 MIN: CPT | Performed by: STUDENT IN AN ORGANIZED HEALTH CARE EDUCATION/TRAINING PROGRAM

## 2019-12-03 PROCEDURE — 2022F DILAT RTA XM EVC RTNOPTHY: CPT | Performed by: STUDENT IN AN ORGANIZED HEALTH CARE EDUCATION/TRAINING PROGRAM

## 2019-12-03 PROCEDURE — 3044F HG A1C LEVEL LT 7.0%: CPT | Performed by: STUDENT IN AN ORGANIZED HEALTH CARE EDUCATION/TRAINING PROGRAM

## 2019-12-03 PROCEDURE — G8427 DOCREV CUR MEDS BY ELIG CLIN: HCPCS | Performed by: STUDENT IN AN ORGANIZED HEALTH CARE EDUCATION/TRAINING PROGRAM

## 2019-12-03 PROCEDURE — G8484 FLU IMMUNIZE NO ADMIN: HCPCS | Performed by: STUDENT IN AN ORGANIZED HEALTH CARE EDUCATION/TRAINING PROGRAM

## 2019-12-03 PROCEDURE — G8420 CALC BMI NORM PARAMETERS: HCPCS | Performed by: STUDENT IN AN ORGANIZED HEALTH CARE EDUCATION/TRAINING PROGRAM

## 2019-12-03 PROCEDURE — 3017F COLORECTAL CA SCREEN DOC REV: CPT | Performed by: STUDENT IN AN ORGANIZED HEALTH CARE EDUCATION/TRAINING PROGRAM

## 2019-12-03 PROCEDURE — 4004F PT TOBACCO SCREEN RCVD TLK: CPT | Performed by: STUDENT IN AN ORGANIZED HEALTH CARE EDUCATION/TRAINING PROGRAM

## 2019-12-03 RX ORDER — FAMOTIDINE 20 MG/1
TABLET, FILM COATED ORAL
Qty: 60 TABLET | Refills: 3 | Status: ON HOLD | OUTPATIENT
Start: 2019-12-03 | End: 2020-01-05 | Stop reason: SDUPTHER

## 2019-12-03 RX ORDER — LANCETS 33 GAUGE
EACH MISCELLANEOUS
Qty: 100 EACH | Refills: 0 | Status: SHIPPED | OUTPATIENT
Start: 2019-12-03 | End: 2021-04-19 | Stop reason: SDUPTHER

## 2019-12-03 RX ORDER — AMLODIPINE BESYLATE 10 MG/1
10 TABLET ORAL DAILY
Qty: 30 TABLET | Refills: 5 | Status: ON HOLD | OUTPATIENT
Start: 2019-12-03 | End: 2020-01-05 | Stop reason: HOSPADM

## 2019-12-03 RX ORDER — ATORVASTATIN CALCIUM 40 MG/1
40 TABLET, FILM COATED ORAL DAILY
Qty: 60 TABLET | Refills: 3 | Status: ON HOLD | OUTPATIENT
Start: 2019-12-03 | End: 2020-01-05 | Stop reason: HOSPADM

## 2019-12-03 RX ORDER — CHLORTHALIDONE 25 MG/1
TABLET ORAL
Qty: 30 TABLET | Refills: 3 | Status: ON HOLD | OUTPATIENT
Start: 2019-12-03 | End: 2020-01-05 | Stop reason: SDUPTHER

## 2019-12-03 RX ORDER — OXYCODONE HYDROCHLORIDE AND ACETAMINOPHEN 5; 325 MG/1; MG/1
1 TABLET ORAL EVERY 6 HOURS PRN
Qty: 120 TABLET | Refills: 0 | Status: SHIPPED | OUTPATIENT
Start: 2019-12-03 | End: 2020-01-02

## 2019-12-03 RX ORDER — ONDANSETRON 4 MG/1
4 TABLET, FILM COATED ORAL EVERY 8 HOURS PRN
Qty: 12 TABLET | Refills: 3 | Status: ON HOLD | OUTPATIENT
Start: 2019-12-03 | End: 2020-01-05 | Stop reason: SDUPTHER

## 2019-12-03 RX ORDER — ASPIRIN 81 MG/1
TABLET ORAL
Qty: 30 TABLET | Refills: 3 | Status: ON HOLD | OUTPATIENT
Start: 2019-12-03 | End: 2020-01-05 | Stop reason: SDUPTHER

## 2019-12-03 RX ORDER — GABAPENTIN 400 MG/1
CAPSULE ORAL
Qty: 120 CAPSULE | Refills: 3 | Status: ON HOLD | OUTPATIENT
Start: 2019-12-03 | End: 2020-01-05 | Stop reason: HOSPADM

## 2019-12-03 RX ORDER — LISINOPRIL 40 MG/1
40 TABLET ORAL DAILY
Qty: 30 TABLET | Refills: 3 | Status: ON HOLD | OUTPATIENT
Start: 2019-12-03 | End: 2020-01-03

## 2019-12-03 RX ORDER — NAPROXEN SODIUM 220 MG
TABLET ORAL
Qty: 10 EACH | Refills: 6 | Status: ON HOLD | OUTPATIENT
Start: 2019-12-03 | End: 2020-01-05 | Stop reason: HOSPADM

## 2019-12-03 ASSESSMENT — ENCOUNTER SYMPTOMS
WHEEZING: 0
SHORTNESS OF BREATH: 0
COUGH: 0
CHEST TIGHTNESS: 0
CHOKING: 0
NAUSEA: 0
BACK PAIN: 1
SINUS PRESSURE: 0
SORE THROAT: 0
DIARRHEA: 0
CONSTIPATION: 0

## 2020-01-03 ENCOUNTER — APPOINTMENT (OUTPATIENT)
Dept: CT IMAGING | Age: 55
DRG: 204 | End: 2020-01-03
Payer: MEDICAID

## 2020-01-03 ENCOUNTER — HOSPITAL ENCOUNTER (INPATIENT)
Age: 55
LOS: 2 days | Discharge: HOME | DRG: 204 | End: 2020-01-05
Attending: EMERGENCY MEDICINE | Admitting: FAMILY MEDICINE
Payer: MEDICAID

## 2020-01-03 ENCOUNTER — APPOINTMENT (OUTPATIENT)
Dept: MRI IMAGING | Age: 55
DRG: 204 | End: 2020-01-03
Payer: MEDICAID

## 2020-01-03 PROBLEM — R42 DIZZINESS: Status: ACTIVE | Noted: 2020-01-03

## 2020-01-03 LAB
% CKMB: 2.1 % (ref 0–3.5)
ABSOLUTE EOS #: 0.18 K/UL (ref 0–0.44)
ABSOLUTE IMMATURE GRANULOCYTE: 0.03 K/UL (ref 0–0.3)
ABSOLUTE LYMPH #: 1.86 K/UL (ref 1.1–3.7)
ABSOLUTE MONO #: 1.04 K/UL (ref 0.1–1.2)
ALLEN TEST: ABNORMAL
ANION GAP SERPL CALCULATED.3IONS-SCNC: 12 MMOL/L (ref 9–17)
ANION GAP: 10 MMOL/L (ref 7–16)
BASOPHILS # BLD: 0 % (ref 0–2)
BASOPHILS ABSOLUTE: 0.03 K/UL (ref 0–0.2)
BUN BLDV-MCNC: 20 MG/DL (ref 6–20)
BUN/CREAT BLD: ABNORMAL (ref 9–20)
CALCIUM SERPL-MCNC: 8.9 MG/DL (ref 8.6–10.4)
CHLORIDE BLD-SCNC: 102 MMOL/L (ref 98–107)
CK MB: 5 NG/ML
CKMB INTERPRETATION: ABNORMAL
CO2: 24 MMOL/L (ref 20–31)
CREAT SERPL-MCNC: 0.75 MG/DL (ref 0.7–1.2)
DIFFERENTIAL TYPE: ABNORMAL
EOSINOPHILS RELATIVE PERCENT: 2 % (ref 1–4)
FIO2: ABNORMAL
GFR AFRICAN AMERICAN: >60 ML/MIN
GFR NON-AFRICAN AMERICAN: >60 ML/MIN
GFR NON-AFRICAN AMERICAN: >60 ML/MIN
GFR SERPL CREATININE-BSD FRML MDRD: >60 ML/MIN
GFR SERPL CREATININE-BSD FRML MDRD: ABNORMAL ML/MIN/{1.73_M2}
GFR SERPL CREATININE-BSD FRML MDRD: ABNORMAL ML/MIN/{1.73_M2}
GFR SERPL CREATININE-BSD FRML MDRD: NORMAL ML/MIN/{1.73_M2}
GLUCOSE BLD-MCNC: 172 MG/DL (ref 70–99)
GLUCOSE BLD-MCNC: 182 MG/DL (ref 74–100)
GLUCOSE BLD-MCNC: 217 MG/DL (ref 75–110)
GLUCOSE BLD-MCNC: 218 MG/DL (ref 75–110)
HCO3 VENOUS: 28.5 MMOL/L (ref 22–29)
HCT VFR BLD CALC: 54.7 % (ref 40.7–50.3)
HEMOGLOBIN: 18.3 G/DL (ref 13–17)
IMMATURE GRANULOCYTES: 0 %
INR BLD: 1.1
LYMPHOCYTES # BLD: 21 % (ref 24–43)
MCH RBC QN AUTO: 29 PG (ref 25.2–33.5)
MCHC RBC AUTO-ENTMCNC: 33.5 G/DL (ref 28.4–34.8)
MCV RBC AUTO: 86.7 FL (ref 82.6–102.9)
MODE: ABNORMAL
MONOCYTES # BLD: 12 % (ref 3–12)
MYOGLOBIN: 151 NG/ML (ref 28–72)
NEGATIVE BASE EXCESS, VEN: ABNORMAL (ref 0–2)
NRBC AUTOMATED: 0 PER 100 WBC
O2 DEVICE/FLOW/%: ABNORMAL
O2 SAT, VEN: 92 % (ref 60–85)
PARTIAL THROMBOPLASTIN TIME: 24.6 SEC (ref 20.5–30.5)
PATIENT TEMP: ABNORMAL
PCO2, VEN: 44.1 MM HG (ref 41–51)
PDW BLD-RTO: 11.9 % (ref 11.8–14.4)
PH VENOUS: 7.42 (ref 7.32–7.43)
PLATELET # BLD: ABNORMAL K/UL (ref 138–453)
PLATELET ESTIMATE: ABNORMAL
PLATELET, FLUORESCENCE: 246 K/UL (ref 138–453)
PLATELET, IMMATURE FRACTION: 2.4 % (ref 1.1–10.3)
PMV BLD AUTO: ABNORMAL FL (ref 8.1–13.5)
PO2, VEN: 64.1 MM HG (ref 30–50)
POC CHLORIDE: 103 MMOL/L (ref 98–107)
POC CREATININE: 0.99 MG/DL (ref 0.51–1.19)
POC HEMATOCRIT: 54 % (ref 41–53)
POC HEMOGLOBIN: 18.5 G/DL (ref 13.5–17.5)
POC IONIZED CALCIUM: 1.14 MMOL/L (ref 1.15–1.33)
POC LACTIC ACID: 1.32 MMOL/L (ref 0.56–1.39)
POC PCO2 TEMP: ABNORMAL MM HG
POC PH TEMP: ABNORMAL
POC PO2 TEMP: ABNORMAL MM HG
POC POTASSIUM: 4 MMOL/L (ref 3.5–4.5)
POC SODIUM: 141 MMOL/L (ref 138–146)
POSITIVE BASE EXCESS, VEN: 3 (ref 0–3)
POTASSIUM SERPL-SCNC: 4.2 MMOL/L (ref 3.7–5.3)
PROTHROMBIN TIME: 11.7 SEC (ref 9–12)
RBC # BLD: 6.31 M/UL (ref 4.21–5.77)
RBC # BLD: ABNORMAL 10*6/UL
SAMPLE SITE: ABNORMAL
SEG NEUTROPHILS: 65 % (ref 36–65)
SEGMENTED NEUTROPHILS ABSOLUTE COUNT: 5.88 K/UL (ref 1.5–8.1)
SODIUM BLD-SCNC: 138 MMOL/L (ref 135–144)
TOTAL CK: 233 U/L (ref 39–308)
TOTAL CO2, VENOUS: 30 MMOL/L (ref 23–30)
TROPONIN INTERP: ABNORMAL
TROPONIN INTERP: NORMAL
TROPONIN T: ABNORMAL NG/ML
TROPONIN T: NORMAL NG/ML
TROPONIN, HIGH SENSITIVITY: 13 NG/L (ref 0–22)
TROPONIN, HIGH SENSITIVITY: 14 NG/L (ref 0–22)
WBC # BLD: 9 K/UL (ref 3.5–11.3)
WBC # BLD: ABNORMAL 10*3/UL

## 2020-01-03 PROCEDURE — 2060000000 HC ICU INTERMEDIATE R&B

## 2020-01-03 PROCEDURE — G0378 HOSPITAL OBSERVATION PER HR: HCPCS

## 2020-01-03 PROCEDURE — 2580000003 HC RX 258: Performed by: STUDENT IN AN ORGANIZED HEALTH CARE EDUCATION/TRAINING PROGRAM

## 2020-01-03 PROCEDURE — 84295 ASSAY OF SERUM SODIUM: CPT

## 2020-01-03 PROCEDURE — 82947 ASSAY GLUCOSE BLOOD QUANT: CPT

## 2020-01-03 PROCEDURE — 70450 CT HEAD/BRAIN W/O DYE: CPT

## 2020-01-03 PROCEDURE — 85025 COMPLETE CBC W/AUTO DIFF WBC: CPT

## 2020-01-03 PROCEDURE — 99223 1ST HOSP IP/OBS HIGH 75: CPT | Performed by: FAMILY MEDICINE

## 2020-01-03 PROCEDURE — 6370000000 HC RX 637 (ALT 250 FOR IP): Performed by: STUDENT IN AN ORGANIZED HEALTH CARE EDUCATION/TRAINING PROGRAM

## 2020-01-03 PROCEDURE — 82435 ASSAY OF BLOOD CHLORIDE: CPT

## 2020-01-03 PROCEDURE — 85730 THROMBOPLASTIN TIME PARTIAL: CPT

## 2020-01-03 PROCEDURE — 83874 ASSAY OF MYOGLOBIN: CPT

## 2020-01-03 PROCEDURE — 83605 ASSAY OF LACTIC ACID: CPT

## 2020-01-03 PROCEDURE — 85610 PROTHROMBIN TIME: CPT

## 2020-01-03 PROCEDURE — 82803 BLOOD GASES ANY COMBINATION: CPT

## 2020-01-03 PROCEDURE — 93005 ELECTROCARDIOGRAM TRACING: CPT | Performed by: FAMILY MEDICINE

## 2020-01-03 PROCEDURE — 82553 CREATINE MB FRACTION: CPT

## 2020-01-03 PROCEDURE — 99222 1ST HOSP IP/OBS MODERATE 55: CPT | Performed by: PSYCHIATRY & NEUROLOGY

## 2020-01-03 PROCEDURE — 6360000004 HC RX CONTRAST MEDICATION: Performed by: EMERGENCY MEDICINE

## 2020-01-03 PROCEDURE — 82550 ASSAY OF CK (CPK): CPT

## 2020-01-03 PROCEDURE — 84132 ASSAY OF SERUM POTASSIUM: CPT

## 2020-01-03 PROCEDURE — 85055 RETICULATED PLATELET ASSAY: CPT

## 2020-01-03 PROCEDURE — 80048 BASIC METABOLIC PNL TOTAL CA: CPT

## 2020-01-03 PROCEDURE — 82565 ASSAY OF CREATININE: CPT

## 2020-01-03 PROCEDURE — 85014 HEMATOCRIT: CPT

## 2020-01-03 PROCEDURE — 70496 CT ANGIOGRAPHY HEAD: CPT

## 2020-01-03 PROCEDURE — 82330 ASSAY OF CALCIUM: CPT

## 2020-01-03 PROCEDURE — 84484 ASSAY OF TROPONIN QUANT: CPT

## 2020-01-03 PROCEDURE — 99285 EMERGENCY DEPT VISIT HI MDM: CPT

## 2020-01-03 PROCEDURE — 70551 MRI BRAIN STEM W/O DYE: CPT

## 2020-01-03 RX ORDER — POTASSIUM CHLORIDE 7.45 MG/ML
10 INJECTION INTRAVENOUS PRN
Status: DISCONTINUED | OUTPATIENT
Start: 2020-01-03 | End: 2020-01-05 | Stop reason: HOSPADM

## 2020-01-03 RX ORDER — OXYCODONE HYDROCHLORIDE AND ACETAMINOPHEN 5; 325 MG/1; MG/1
1 TABLET ORAL ONCE
Status: COMPLETED | OUTPATIENT
Start: 2020-01-03 | End: 2020-01-03

## 2020-01-03 RX ORDER — ACETAMINOPHEN 325 MG/1
650 TABLET ORAL EVERY 4 HOURS PRN
Status: DISCONTINUED | OUTPATIENT
Start: 2020-01-03 | End: 2020-01-05 | Stop reason: HOSPADM

## 2020-01-03 RX ORDER — ONDANSETRON 2 MG/ML
4 INJECTION INTRAMUSCULAR; INTRAVENOUS EVERY 6 HOURS PRN
Status: DISCONTINUED | OUTPATIENT
Start: 2020-01-03 | End: 2020-01-04

## 2020-01-03 RX ORDER — CLOPIDOGREL 300 MG/1
300 TABLET, FILM COATED ORAL ONCE
Status: DISCONTINUED | OUTPATIENT
Start: 2020-01-03 | End: 2020-01-03

## 2020-01-03 RX ORDER — LISINOPRIL 20 MG/1
40 TABLET ORAL DAILY
Status: DISCONTINUED | OUTPATIENT
Start: 2020-01-03 | End: 2020-01-05 | Stop reason: HOSPADM

## 2020-01-03 RX ORDER — ONDANSETRON 4 MG/1
4 TABLET, FILM COATED ORAL EVERY 8 HOURS PRN
Status: DISCONTINUED | OUTPATIENT
Start: 2020-01-03 | End: 2020-01-05 | Stop reason: HOSPADM

## 2020-01-03 RX ORDER — CHLORTHALIDONE 25 MG/1
25 TABLET ORAL DAILY
Status: DISCONTINUED | OUTPATIENT
Start: 2020-01-03 | End: 2020-01-05 | Stop reason: HOSPADM

## 2020-01-03 RX ORDER — DEXTROSE MONOHYDRATE 50 MG/ML
100 INJECTION, SOLUTION INTRAVENOUS PRN
Status: DISCONTINUED | OUTPATIENT
Start: 2020-01-03 | End: 2020-01-05 | Stop reason: HOSPADM

## 2020-01-03 RX ORDER — SODIUM CHLORIDE 0.9 % (FLUSH) 0.9 %
10 SYRINGE (ML) INJECTION PRN
Status: DISCONTINUED | OUTPATIENT
Start: 2020-01-03 | End: 2020-01-05 | Stop reason: HOSPADM

## 2020-01-03 RX ORDER — CLOPIDOGREL BISULFATE 75 MG/1
225 TABLET ORAL ONCE
Status: COMPLETED | OUTPATIENT
Start: 2020-01-03 | End: 2020-01-03

## 2020-01-03 RX ORDER — SODIUM CHLORIDE 0.9 % (FLUSH) 0.9 %
10 SYRINGE (ML) INJECTION EVERY 12 HOURS SCHEDULED
Status: DISCONTINUED | OUTPATIENT
Start: 2020-01-03 | End: 2020-01-05 | Stop reason: HOSPADM

## 2020-01-03 RX ORDER — SIMVASTATIN 40 MG
40 TABLET ORAL NIGHTLY
Status: DISCONTINUED | OUTPATIENT
Start: 2020-01-03 | End: 2020-01-05 | Stop reason: HOSPADM

## 2020-01-03 RX ORDER — CLOPIDOGREL BISULFATE 75 MG/1
75 TABLET ORAL DAILY
Status: DISCONTINUED | OUTPATIENT
Start: 2020-01-03 | End: 2020-01-03

## 2020-01-03 RX ORDER — OXYCODONE HYDROCHLORIDE AND ACETAMINOPHEN 5; 325 MG/1; MG/1
1 TABLET ORAL EVERY 6 HOURS PRN
Status: DISCONTINUED | OUTPATIENT
Start: 2020-01-03 | End: 2020-01-05 | Stop reason: HOSPADM

## 2020-01-03 RX ORDER — ATORVASTATIN CALCIUM 40 MG/1
40 TABLET, FILM COATED ORAL DAILY
Status: DISCONTINUED | OUTPATIENT
Start: 2020-01-03 | End: 2020-01-03

## 2020-01-03 RX ORDER — INSULIN GLARGINE 100 [IU]/ML
32 INJECTION, SOLUTION SUBCUTANEOUS 2 TIMES DAILY
Status: DISCONTINUED | OUTPATIENT
Start: 2020-01-03 | End: 2020-01-05

## 2020-01-03 RX ORDER — FAMOTIDINE 20 MG/1
20 TABLET, FILM COATED ORAL 2 TIMES DAILY
Status: DISCONTINUED | OUTPATIENT
Start: 2020-01-03 | End: 2020-01-05 | Stop reason: HOSPADM

## 2020-01-03 RX ORDER — ASPIRIN 81 MG/1
81 TABLET ORAL DAILY
Status: DISCONTINUED | OUTPATIENT
Start: 2020-01-03 | End: 2020-01-05 | Stop reason: HOSPADM

## 2020-01-03 RX ORDER — POTASSIUM CHLORIDE 20 MEQ/1
40 TABLET, EXTENDED RELEASE ORAL PRN
Status: DISCONTINUED | OUTPATIENT
Start: 2020-01-03 | End: 2020-01-05 | Stop reason: HOSPADM

## 2020-01-03 RX ORDER — AMLODIPINE BESYLATE 10 MG/1
10 TABLET ORAL DAILY
Status: DISCONTINUED | OUTPATIENT
Start: 2020-01-03 | End: 2020-01-05 | Stop reason: HOSPADM

## 2020-01-03 RX ORDER — DEXTROSE MONOHYDRATE 25 G/50ML
12.5 INJECTION, SOLUTION INTRAVENOUS PRN
Status: DISCONTINUED | OUTPATIENT
Start: 2020-01-03 | End: 2020-01-05 | Stop reason: HOSPADM

## 2020-01-03 RX ORDER — OXYCODONE HYDROCHLORIDE AND ACETAMINOPHEN 5; 325 MG/1; MG/1
1 TABLET ORAL EVERY 6 HOURS PRN
Status: ON HOLD | COMMUNITY
Start: 2019-08-19 | End: 2020-01-05

## 2020-01-03 RX ORDER — GABAPENTIN 300 MG/1
600 CAPSULE ORAL 3 TIMES DAILY
Status: DISCONTINUED | OUTPATIENT
Start: 2020-01-03 | End: 2020-01-05 | Stop reason: HOSPADM

## 2020-01-03 RX ORDER — ASPIRIN 81 MG/1
324 TABLET, CHEWABLE ORAL ONCE
Status: COMPLETED | OUTPATIENT
Start: 2020-01-03 | End: 2020-01-03

## 2020-01-03 RX ORDER — NICOTINE POLACRILEX 4 MG
15 LOZENGE BUCCAL PRN
Status: DISCONTINUED | OUTPATIENT
Start: 2020-01-03 | End: 2020-01-05 | Stop reason: HOSPADM

## 2020-01-03 RX ADMIN — LISINOPRIL 40 MG: 20 TABLET ORAL at 16:35

## 2020-01-03 RX ADMIN — CHLORTHALIDONE 25 MG: 25 TABLET ORAL at 16:35

## 2020-01-03 RX ADMIN — SIMVASTATIN 40 MG: 40 TABLET, FILM COATED ORAL at 20:22

## 2020-01-03 RX ADMIN — INSULIN LISPRO 2 UNITS: 100 INJECTION, SOLUTION INTRAVENOUS; SUBCUTANEOUS at 20:33

## 2020-01-03 RX ADMIN — IOHEXOL 90 ML: 350 INJECTION, SOLUTION INTRAVENOUS at 07:45

## 2020-01-03 RX ADMIN — ASPIRIN 81 MG 324 MG: 81 TABLET ORAL at 08:11

## 2020-01-03 RX ADMIN — OXYCODONE HYDROCHLORIDE AND ACETAMINOPHEN 1 TABLET: 5; 325 TABLET ORAL at 20:22

## 2020-01-03 RX ADMIN — AMLODIPINE BESYLATE 10 MG: 10 TABLET ORAL at 16:35

## 2020-01-03 RX ADMIN — SODIUM CHLORIDE, PRESERVATIVE FREE 10 ML: 5 INJECTION INTRAVENOUS at 20:22

## 2020-01-03 RX ADMIN — OXYCODONE HYDROCHLORIDE AND ACETAMINOPHEN 1 TABLET: 5; 325 TABLET ORAL at 07:54

## 2020-01-03 RX ADMIN — INSULIN LISPRO 4 UNITS: 100 INJECTION, SOLUTION INTRAVENOUS; SUBCUTANEOUS at 16:36

## 2020-01-03 RX ADMIN — CLOPIDOGREL BISULFATE 75 MG: 75 TABLET ORAL at 08:11

## 2020-01-03 RX ADMIN — CLOPIDOGREL BISULFATE 225 MG: 75 TABLET ORAL at 11:33

## 2020-01-03 RX ADMIN — OXYCODONE HYDROCHLORIDE AND ACETAMINOPHEN 1 TABLET: 5; 325 TABLET ORAL at 13:56

## 2020-01-03 RX ADMIN — GABAPENTIN 600 MG: 300 CAPSULE ORAL at 20:22

## 2020-01-03 RX ADMIN — FAMOTIDINE 20 MG: 20 TABLET, FILM COATED ORAL at 20:22

## 2020-01-03 RX ADMIN — GABAPENTIN 600 MG: 300 CAPSULE ORAL at 16:35

## 2020-01-03 RX ADMIN — INSULIN GLARGINE 32 UNITS: 100 INJECTION, SOLUTION SUBCUTANEOUS at 20:33

## 2020-01-03 RX ADMIN — Medication 81 MG: at 16:35

## 2020-01-03 ASSESSMENT — PAIN SCALES - GENERAL
PAINLEVEL_OUTOF10: 10
PAINLEVEL_OUTOF10: 9
PAINLEVEL_OUTOF10: 10

## 2020-01-03 ASSESSMENT — PAIN DESCRIPTION - LOCATION
LOCATION: HEAD
LOCATION: BACK

## 2020-01-03 ASSESSMENT — ENCOUNTER SYMPTOMS
ABDOMINAL PAIN: 0
WHEEZING: 0
SHORTNESS OF BREATH: 1
NAUSEA: 0
SORE THROAT: 1
COUGH: 0
EYES NEGATIVE: 1
VOMITING: 0
BACK PAIN: 1
RHINORRHEA: 0
DIARRHEA: 0

## 2020-01-03 ASSESSMENT — PAIN DESCRIPTION - ORIENTATION
ORIENTATION: MID
ORIENTATION: LOWER

## 2020-01-03 ASSESSMENT — PAIN DESCRIPTION - PAIN TYPE
TYPE: CHRONIC PAIN
TYPE: CHRONIC PAIN

## 2020-01-03 ASSESSMENT — PAIN DESCRIPTION - FREQUENCY: FREQUENCY: CONTINUOUS

## 2020-01-03 ASSESSMENT — PAIN DESCRIPTION - DESCRIPTORS: DESCRIPTORS: ACHING;TENDER

## 2020-01-03 NOTE — CARE COORDINATION
Case Management Initial Discharge Plan  Tyesha Gillette,             Met with:patient to discuss discharge plans. Information verified: address, contacts, phone number, , insurance Yes  PCP: Graciela Peraza MD  Date of last visit: 1 month    Insurance Provider: [facility]    Discharge Planning    Living Arrangements:  Spouse/Significant Other   Support Systems:  Family Members    Home has 1 stories  3 stairs to climb to get into front door, 0stairs to climb to reach second floor  Location of bedroom/bathroom in home main    Patient able to perform ADL's:Assisted girlfriend helps as needed since back surgery    Current Services (outpatient & in home) none  DME equipment: cane  DME provider: none      Potential Assistance Needed:  N/A    Patient agreeable to home care: No  [place] of choice provided:  n/a    Prior SNF/Rehab Placement and Facility: none  Agreeable to SNF/Rehab: No  [place] of choice provided: n/a   Evaluation: no    Expected Discharge date:     Patient expects to be discharged to:  home  Follow Up Appointment: Best Day/ Time:      Transportation provider: friends, cab  Transportation arrangements needed for discharge: Yes    Readmission Risk              Risk of Unplanned Readmission:        0             Does patient have a readmission risk score greater than 14?: No  If yes, follow-up appointment must be made within 7 days of discharge.      Goals of Care: Improved dizziness      Discharge Plan: Home independently with improved dizziness, may need transportation,pcp established          Electronically signed by Stephane Howard RN on 1/3/20 at 12:15 PM

## 2020-01-03 NOTE — FLOWSHEET NOTE
[address] [facility] [address]     Emergency/Trauma Note    PATIENT NAME: Theresia Aschoff    Shift date: 01/03/2020  Shift day: Friday   Shift # 1    Room # 27/27   Name: Theresia Aschoff            Age: 53 y.o. Gender: male          Hindu: Mosque   Place of Orthodox: Unknown    Trauma/Incident type: Stroke Alert  Admit Date & Time: 1/3/2020  7:02 AM  TRAUMA NAME: None    PATIENT/EVENT DESCRIPTION:  Theresia Aschoff is a 53 y.o. male who arrived ED as stroke alert. Patient was conscious and responsive. Patient to be admitted to 27/27. SPIRITUAL ASSESSMENT/INTERVENTION:  No spiritual assessment was carried out. Patient looked weak but was able to respond to . Family was not present at the time. However, patient said family was aware he came to [facility]'s.  maintained listening presence, offered support, prayed with patient and reassured him that he was in good hands. Patient was very appreciative of the spiritual support he received. PATIENT BELONGINGS:  No belongings noted    ANY BELONGINGS OF SIGNIFICANT VALUE NOTED:  Unknown    REGISTRATION STAFF NOTIFIED? Yes    WHAT IS YOUR SPIRITUAL CARE PLAN FOR THIS PATIENT?:   Follow up visits recommended for on going assessment of patient's condition and for more prayers and support. Electronically signed by Fr. Rupinder Espinosa on 1/3/2020 at 7:57 AM.  [place]  [phone]

## 2020-01-03 NOTE — ED NOTES
Bed: 27  Expected date:   Expected time:   Means of arrival:   Comments:  [address], RN  01/03/20 2536

## 2020-01-03 NOTE — ED PROVIDER NOTES
Years of education: Not on file    Highest education level: Not on file   Occupational History     Employer: N/A   Social Needs    Financial resource strain: Not on file    Food insecurity:     Worry: Not on file     Inability: Not on file    Transportation needs:     Medical: Not on file     Non-medical: Not on file   Tobacco Use    Smoking status: Current Every Day Smoker     Packs/day: 0.25     Years: 15.00     Pack years: 3.75     Types: Cigarettes    Smokeless tobacco: Former User     Types: Chew    Tobacco comment: stopped chew 9 years ago . pt states he smokes 4-5 ciggs daily   Substance and Sexual Activity    Alcohol use: Yes     Comment: pt reports that he was a daily drinker approx. 1 year ago. Pt reports that now he just drinks occasionally    Drug use: No    Sexual activity: Not on file   Lifestyle    Physical activity:     Days per week: Not on file     Minutes per session: Not on file    Stress: Not on file   Relationships    Social connections:     Talks on phone: Not on file     Gets together: Not on file     Attends Quaker service: Not on file     Active member of club or organization: Not on file     Attends meetings of clubs or organizations: Not on file     Relationship status: Not on file    Intimate partner violence:     Fear of current or ex partner: Not on file     Emotionally abused: Not on file     Physically abused: Not on file     Forced sexual activity: Not on file   Other Topics Concern    Not on file   Social History Narrative    ** Merged History Encounter **            Family History   Problem Relation Age of Onset    High Blood Pressure Mother     High Blood Pressure Father        Allergies:  Morphine    Home Medications:  Prior to Admission medications    Medication Sig Start Date End Date Taking?  Authorizing Provider   insulin glargine (BASAGLAR KWIKPEN) 100 UNIT/ML injection pen Inject 32 Units into the skin 2 times daily 12/3/19 1/2/20  Jose Martin Carter MD blood glucose test strips (ONE TOUCH ULTRA TEST) strip TESTING TWICE A DAY 12/3/19   Sarai Peralta MD   amLODIPine (NORVASC) 10 MG tablet Take 1 tablet by mouth daily 12/3/19 2/1/20  Wong Toribio MD   atorvastatin (LIPITOR) 40 MG tablet Take 1 tablet by mouth daily 12/3/19 2/1/20  Wong Toribio MD   chlorthalidone (HYGROTON) 25 MG tablet Once daily 12/3/19   Wong oTribio MD   aspirin (ASPIRIN ADULT LOW STRENGTH) 81 MG EC tablet TAKE 1 TAB BY MOUTH ONCE A DAY 12/3/19   Wong Toribio MD   famotidine (PEPCID) 20 MG tablet TAKE 1 TAB BY MOUTH TWICE A DAY 12/3/19   Wong Toribio MD   gabapentin (NEURONTIN) 400 MG capsule TAKE TWO CAPSULES BY MOUTH THREE TIMES A DAY 12/3/19 4/3/20  Wong Toribio MD   Insulin Syringe-Needle U-100 (B-D INS SYR ULTRAFINE 1CC/30G) 30G X 1/2\" 1 ML MISC AS DIRECTED WITH LANTUS 12/3/19   Wong Toribio MD   Insulin Syringe-Needle U-100 (INSULIN SYRINGE 1CC/30GX5/16\") 30G X 5/16\" 1 ML MISC Dispense 1 pack to Use with Lantus as directed 12/3/19   Wong Toribio MD   lisinopril (PRINIVIL;ZESTRIL) 40 MG tablet Take 1 tablet by mouth daily 12/3/19 2/8/20  Wong Toribio MD   ondansetron (ZOFRAN) 4 MG tablet Take 1 tablet by mouth every 8 hours as needed for Nausea or Vomiting 12/3/19   [facility] L[facility] 36N MISC USE AS DIRECTED TWICE A DAY 12/3/19 1/3/20  Wong Toribio MD   fluticasone (FLONASE) 50 MCG/ACT nasal spray USE 1 SPRAY BY EACH NOSTRIL ROUTE DAILY 12/2/19   Wong Toribio MD   amLODIPine (NORVASC) 10 MG tablet TAKE 1 TAB BY MOUTH ONCE A DAY 12/2/19   Wong Toribio MD   lisinopril (PRINIVIL;ZESTRIL) 40 MG tablet TAKE 1 TAB BY MOUTH ONCE A DAY 12/2/19   Wong Toribio MD   diclofenac sodium 1 % GEL Apply 4 g topically 4 times daily 11/9/19   Kaylee Sung MD   ibuprofen (ADVIL;MOTRIN) 800 MG tablet Take 1 tablet by mouth every 8 hours as needed for Pain 11/9/19   Kaylee Sung MD   gabapentin fL    NRBC Automated 0.0 0.0 per 100 WBC    Differential Type NOT REPORTED     WBC Morphology NOT REPORTED     RBC Morphology NOT REPORTED     Platelet Estimate NOT REPORTED     Seg Neutrophils 65 36 - 65 %    Lymphocytes 21 (L) 24 - 43 %    Monocytes 12 3 - 12 %    Eosinophils % 2 1 - 4 %    Basophils 0 0 - 2 %    Immature Granulocytes 0 0 %    Segs Absolute 5.88 1.50 - 8.10 k/uL    Absolute Lymph # 1.86 1.10 - 3.70 k/uL    Absolute Mono # 1.04 0.10 - 1.20 k/uL    Absolute Eos # 0.18 0.00 - 0.44 k/uL    Basophils Absolute 0.03 0.00 - 0.20 k/uL    Absolute Immature Granulocyte 0.03 0.00 - 0.30 k/uL    Protime 11.7 9.0 - 12.0 sec    INR 1.1     PTT 24.6 20.5 - 30.5 sec    Myoglobin 151 (H) 28 - 72 ng/mL    Troponin, High Sensitivity 13 0 - 22 ng/L    Troponin T NOT REPORTED <0.03 ng/mL    Troponin Interp NOT REPORTED     Glucose 172 (H) 70 - 99 mg/dL    BUN 20 6 - 20 mg/dL    CREATININE 0.75 0.70 - 1.20 mg/dL    Bun/Cre Ratio NOT REPORTED 9 - 20    Calcium 8.9 8.6 - 10.4 mg/dL    Sodium 138 135 - 144 mmol/L    Potassium 4.2 3.7 - 5.3 mmol/L    Chloride 102 98 - 107 mmol/L    CO2 24 20 - 31 mmol/L    Anion Gap 12 9 - 17 mmol/L    GFR Non-African American >60 >60 mL/min    GFR African American >60 >60 mL/min    GFR Comment          GFR Staging NOT REPORTED     Total  39 - 308 U/L    CK-MB 5.0 <10.5 ng/mL    % CKMB 2.1 0.0 - 3.5 %    CKMB Interpretation NORMAL ISOENZYME PATTERN    Hemoglobin and hematocrit, blood   Result Value Ref Range    POC Hemoglobin 18.5 (H) 13.5 - 17.5 g/dL    POC Hematocrit 54 (H) 41 - 53 %   SODIUM (POC)   Result Value Ref Range    POC Sodium 141 138 - 146 mmol/L   POTASSIUM (POC)   Result Value Ref Range    POC Potassium 4.0 3.5 - 4.5 mmol/L   CHLORIDE (POC)   Result Value Ref Range    POC Chloride 103 98 - 107 mmol/L   CALCIUM, IONIC (POC)   Result Value Ref Range    POC Ionized Calcium 1.14 (L) 1.15 - 1.33 mmol/L   Immature Platelet Fraction   Result Value Ref Range    Platelet, MEDICATIONS:  Current Discharge Medication List        Current Discharge Medication List           Allegra Agrawal MD  Emergency Medicine Resident    (Please note that portions of this note were completed with a voice recognition program.  Efforts were made to edit the dictations but occasionally words are mis-transcribed.)       Allegra Agrawal MD  Resident  01/04/20 3675

## 2020-01-03 NOTE — H&P
closure    FOOT SURGERY Right 10/15/14    removal of hardware    OSTEOTOMY Right 2/6/2015    Akin Osteotomy right       Social History     Social History     Tobacco Use    Smoking status: Current Every Day Smoker     Packs/day: 0.25     Years: 15.00     Pack years: 3.75     Types: Cigarettes    Smokeless tobacco: Former User     Types: Chew    Tobacco comment: stopped chew 9 years ago . pt states he smokes 4-5 ciggs daily   Substance Use Topics    Alcohol use: Yes     Comment: pt reports that he was a daily drinker approx. 1 year ago. Pt reports that now he just drinks occasionally    Drug use: No       Family History     Family History   Problem Relation Age of Onset    High Blood Pressure Mother     High Blood Pressure Father        Allergies     Allergies   Allergen Reactions    Morphine Hives       Home Medications     Prior to Admission medications    Medication Sig Start Date End Date Taking? Authorizing Provider   oxyCODONE-acetaminophen (PERCOCET) 5-325 MG per tablet Take 1 tablet by mouth every 6 hours as needed.   8/19/19  Yes Historical Provider, MD   aspirin (ASPIRIN ADULT LOW STRENGTH) 81 MG EC tablet TAKE 1 TAB BY MOUTH ONCE A DAY 12/3/19  Yes Levi Cardenas MD   amLODIPine (NORVASC) 10 MG tablet TAKE 1 TAB BY MOUTH ONCE A DAY 12/2/19  Yes Levi Cardenas MD   lisinopril (PRINIVIL;ZESTRIL) 40 MG tablet TAKE 1 TAB BY MOUTH ONCE A DAY 12/2/19  Yes Levi Cardenas MD   insulin glargine (BASAGLAR KWIKPEN) 100 UNIT/ML injection pen Inject 32 Units into the skin 2 times daily 12/3/19 1/2/20  Levi Cardenas MD   blood glucose test strips (ONE TOUCH ULTRA TEST) strip TESTING TWICE A DAY 12/3/19   Sarai Baldwin MD   amLODIPine (NORVASC) 10 MG tablet Take 1 tablet by mouth daily 12/3/19 2/1/20  Levi Cardenas MD   atorvastatin (LIPITOR) 40 MG tablet Take 1 tablet by mouth daily 12/3/19 2/1/20  Levi Cardenas MD   chlorthalidone (HYGROTON) 25 MG tablet Once daily 12/3/19   Sarai Basia Dozier MD   famotidine (PEPCID) 20 MG tablet TAKE 1 TAB BY MOUTH TWICE A DAY 12/3/19   Di Liu MD   gabapentin (NEURONTIN) 400 MG capsule TAKE TWO CAPSULES BY MOUTH THREE TIMES A DAY 12/3/19 4/3/20  Di Liu MD   Insulin Syringe-Needle U-100 (B-D INS SYR ULTRAFINE 1CC/30G) 30G X 1/2\" 1 ML MISC AS DIRECTED WITH LANTUS 12/3/19   Di Liu MD   Insulin Syringe-Needle U-100 (INSULIN SYRINGE 1CC/30GX5/16\") 30G X 5/16\" 1 ML MISC Dispense 1 pack to Use with Lantus as directed 12/3/19   Di Liu MD   lisinopril (PRINIVIL;ZESTRIL) 40 MG tablet Take 1 tablet by mouth daily 12/3/19 2/8/20  Di Liu MD   ondansetron (ZOFRAN) 4 MG tablet Take 1 tablet by mouth every 8 hours as needed for Nausea or Vomiting 12/3/19   MD Aaliyah Christianson Cools LANCETS 32V MISC USE AS DIRECTED TWICE A DAY 12/3/19 1/3/20  Di Liu MD   fluticasone (FLONASE) 50 MCG/ACT nasal spray USE 1 SPRAY BY EACH NOSTRIL ROUTE DAILY 12/2/19   Di Liu MD   diclofenac sodium 1 % GEL Apply 4 g topically 4 times daily 11/9/19   Manuelito Pierson MD   ibuprofen (ADVIL;MOTRIN) 800 MG tablet Take 1 tablet by mouth every 8 hours as needed for Pain 11/9/19   Manuelito Pierson MD   gabapentin (NEURONTIN) 300 MG capsule Take 2 capsules three times daily 10/24/19 11/24/19  Di Liu MD   docusate sodium (COLACE) 100 MG capsule Take 1 capsule by mouth daily as needed for Constipation 10/24/19   Di Liu MD   fluticasone (FLONASE) 50 MCG/ACT nasal spray 1 spray by Each Nostril route daily 10/24/19   Di Liu MD   metFORMIN (GLUCOPHAGE) 1000 MG tablet Take 1 tablet by mouth 2 times daily (with meals) 10/24/19   Di Liu MD   nicotine (NICODERM CQ) 7 MG/24HR Place 1 patch onto the skin every 24 hours 10/24/19   Di iLu MD   naloxone 4 MG/0.1ML LIQD nasal spray 1 spray by Nasal route as needed for Opioid Reversal 10/24/19   Louis Covarrubias MD   sildenafil Extraocular Movements: Extraocular movements intact. Conjunctiva/sclera: Conjunctivae normal.      Pupils: Pupils are equal, round, and reactive to light. Cardiovascular:      Rate and Rhythm: Normal rate and regular rhythm. Pulses: Normal pulses. Heart sounds: Normal heart sounds. No murmur. No friction rub. No gallop. Pulmonary:      Effort: Pulmonary effort is normal. No respiratory distress. Breath sounds: Normal breath sounds. No wheezing. Abdominal:      General: Abdomen is flat. Bowel sounds are normal. There is no distension. Palpations: Abdomen is soft. Tenderness: There is no tenderness. There is no guarding. Musculoskeletal:         General: Tenderness (Back pain to tenderness lower lumbar midline) present. Neurological:      General: No focal deficit present. Mental Status: He is alert and oriented to person, place, and time. Cranial Nerves: No cranial nerve deficit. Diagnostic Labs:     CBC:   Recent Labs     01/03/20 0726   WBC 9.0   HGB 18.3*   PLT See Reflexed IPF Result     BMP:    Recent Labs     01/03/20 0726 01/03/20  0732     --    K 4.2  --      --    CO2 24  --    BUN 20  --    CREATININE 0.75 0.99   GLUCOSE 172*  --      Hepatic: No results for input(s): AST, ALT, ALB, BILITOT, ALKPHOS in the last 72 hours. Troponin: No results for input(s): TROPONINI in the last 72 hours. BNP: No results for input(s): BNP in the last 72 hours. Lipids: No results for input(s): CHOL, HDL in the last 72 hours. Invalid input(s): LDLCALCU  INR:   Recent Labs     01/03/20 0726   INR 1.1     ABG: Invalid input(s): ABG    Imaging:   MRI brain  1. No acute infarct, intracranial hemorrhage, or significant mass effect.       2. Mild amount of chronic small vessel ischemic white matter disease and   diffuse cerebral volume loss.      CTA head neck:  No high-grade stenosis or focal occlusion involving the intracranial or   cervical vasculature.  No evidence of acute dissection.  No evidence of   aneurysm.       Atherosclerotic disease at the bilateral carotid bifurcation, right greater   than left.  No flow-limiting stenosis by NASCET criteria. CT head  No acute intracranial abnormality. Assessment:   Principal Problem:    Dizziness  Active Problems:    Back pain, chronic    Diabetes ([address]  Resolved Problems:    * No resolved hospital problems. *      Plan:     Dizziness, syncope, rule out acute stroke  - Neurology consulted   -ABCD 2 score of 4 recommend loading , Plavix 300, Zocor 40 mg   -Continue ASA 81 mg and Plavix 75 mg in a.m.  - NIH stroke scale 0  -Echo  - Neurochecks  -Daily labs  -PT/OT/SLP    Type 2 diabetes mellitus  - Home meds Lantus 32 units twice daily  - Medium dose insulin sliding scale  - Hypoglycemia protocol, POCT glucose    History of hypertension  -BP elevated in ED 170s to 180s  - Home meds: Amlodipine 10, lisinopril 40, chlorthalidone 25     Chronic low back pain  - Gabapentin 600 mg 3 times daily    History of hyperlipidemia  - Zocor 40 mg daily    DVT: Lovenox  GI: Pepcid  Diet: Diabetic  Disposition: Admit      Above plan discussed with the patient and nursing staff. The severity of this patient's signs and symptoms (specify Dizziness, rule out stroke) indicate the need for an inpatient admission. This plan will be discussed with the rounding attending: Jonathan Jain DO.       Angie Kang MD   Resident Physician  Family Medicine Inpatient Service  1/3/2020 1:52 PM

## 2020-01-03 NOTE — CONSULTS
Employer: N/A   Social Needs    Financial resource strain: Not on file    Food insecurity:     Worry: Not on file     Inability: Not on file    Transportation needs:     Medical: Not on file     Non-medical: Not on file   Tobacco Use    Smoking status: Current Every Day Smoker     Packs/day: 0.25     Years: 15.00     Pack years: 3.75     Types: Cigarettes    Smokeless tobacco: Former User     Types: Chew    Tobacco comment: stopped chew 9 years ago . pt states he smokes 4-5 ciggs daily   Substance and Sexual Activity    Alcohol use: Yes     Comment: pt reports that he was a daily drinker approx. 1 year ago. Pt reports that now he just drinks occasionally    Drug use: No    Sexual activity: Not on file   Lifestyle    Physical activity:     Days per week: Not on file     Minutes per session: Not on file    Stress: Not on file   Relationships    Social connections:     Talks on phone: Not on file     Gets together: Not on file     Attends Evangelical service: Not on file     Active member of club or organization: Not on file     Attends meetings of clubs or organizations: Not on file     Relationship status: Not on file    Intimate partner violence:     Fear of current or ex partner: Not on file     Emotionally abused: Not on file     Physically abused: Not on file     Forced sexual activity: Not on file   Other Topics Concern    Not on file   Social History Narrative    ** Merged History Encounter **            Family History:       Problem Relation Age of Onset    High Blood Pressure Mother     High Blood Pressure Father        Allergies:  Morphine    Home Medications:  Prior to Admission medications    Medication Sig Start Date End Date Taking?  Authorizing Provider   insulin glargine (BASAGLAR KWIKPEN) 100 UNIT/ML injection pen Inject 32 Units into the skin 2 times daily 12/3/19 1/2/20  Silverio Hodges MD   blood glucose test strips (ONE TOUCH ULTRA TEST) strip TESTING TWICE A DAY 12/3/19   Sarai CONSTITUTIONAL:  Well developed, well nourished, alert and oriented x 3, in no acute distress. GCS 15, nontoxic. No dysarthria, no aphasia. HEAD:  normocephalic, atraumatic    EYES:  PERRLA, EOMI.   ENT:  moist mucous membranes   NECK:  supple, symmetric, no midline tenderness to palpation    BACK:  without midline tenderness, step-offs or deformities    LUNGS:  Equal air entry bilaterally   CARDIOVASCULAR:  normal s1 / s2   ABDOMEN:  Soft, no rigidity   NEUROLOGIC:    Neurologic:  Cranial Nerves:              CNII: Visual acuity normal, Visual fields full to confrontation              CNIII, IV, VI: Pupils equal, round and reactive to light, full extraoccular movements without nystagmus              CN V: Facial sensation intact bilaterally to fine touch and pinprick, masseter 5/5              CN VII: Facial muscles symmetric and strong              CN VIII: Hears finger rub well bilaterally              CN IX: Deferred              CN X: Palate elevates symmetrically              CN XI: Full strength shoulder shrug bilaterally              CN XII: Tongue protrusion full and midline  Sensation: Sensation is intact to proprioception, two point discrimination, and light touch  Cerebellar: Heel to shin intact bilaterally  Gait: Patient's gait is normal not ataxic wide-based, no shuffling. DTRs:  +2/4 Left Bicep, Triceps, Brachioradialis  + 2/4 Right Bicep, Triceps, Brachioradialis  + 2 / 4 Left Patellar, Achilles  + 2/ 4 Right Patellar, Achillis     Muscle Strength:  + 5 / 5 Strength to LUE flexion, Extension  + 5 / 5 Strength to RUE flexion , Extension  + 5 / 5 Strength to flexion & extension of Left Hip leg plantar and dorsi flexion  + 5 / 5 Strength to flexion & extension of Right Hip leg plantar and dorsi flexion  No weakness upon cervical flexion to indicate critical spinal stenosis      INITIAL NIH STROKE SCALE:    Time Performed:  7:44AM  1a. Level of consciousness:  0 - alert; keenly responsive  1b. Level of consciousness questions:  0 - answers both questions correctly  1c. Level of consciousness questions:  0 - performs both tasks correctly  2. Best Gaze:  0 - normal  3. Visual:  0 - no visual loss  4. Facial Palsy:  0 - normal symmetric movement  5a. Motor left arm:  0 - no drift, limb holds 90 (or 45) degrees for full 10 seconds  5b. Motor right arm:  0 - no drift, limb holds 90 (or 45) degrees for full 10 seconds  6a. Motor left le - no drift; leg holds 30 degree position for full 5 seconds  6b. Motor right le - no drift; leg holds 30 degree position for full 5 seconds  7. Limb Ataxia:  0 - absent  8. Sensory:  0 - normal; no sensory loss  9. Best Language:  0 - no aphasia, normal  10. Dysarthria:  0 - normal  11.   Extinction and Inattention:  0 - no abnormality    TOTAL:  0     SKIN:  no rash      Modified [place] Score Scale:     [x] Zero: No symptoms at all   [] 1: No significant disability despite symptoms; able to carry out all usual duties and activities   [] 2: Slight disability; unable to carry out all previous activities, but able to look after own affairs without assistance   [] 3:Moderate disability; requiring some help, but able to walk without assistance   [] 4: Moderately severe disability; unable to walk and attend to bodily needs without assistance   [] 5:Severe disability; bedridden, incontinent and requiring constant nursing care and attention     ABCD2 score - 4, will load with ASA plavix    LABS AND IMAGING:     CBC with Differential:    Lab Results   Component Value Date    WBC 9.0 2020    RBC 6.31 2020    RBC 6.07 11/10/2011    HGB 18.3 2020    HCT 54.7 2020    PLT See Reflexed IPF Result 2020     11/10/2011    MCV 86.7 2020    MCH 29.0 2020    MCHC 33.5 2020    RDW 11.9 2020    LYMPHOPCT 21 2020    MONOPCT 12 2020    BASOPCT 0 2020    MONOSABS 1.04 2020    LYMPHSABS 1.86 01/03/2020    EOSABS 0.18 01/03/2020    BASOSABS 0.03 01/03/2020    DIFFTYPE NOT REPORTED 01/03/2020     BMP:    Lab Results   Component Value Date    NA PENDING 01/03/2020    K PENDING 01/03/2020    CL PENDING 01/03/2020    CO2 PENDING 01/03/2020    BUN PENDING 01/03/2020    LABALBU 4.4 07/27/2018    LABALBU 4.7 11/10/2011    CREATININE 0.99 01/03/2020    CREATININE PENDING 01/03/2020    CALCIUM PENDING 01/03/2020    GFRAA PENDING 01/03/2020    LABGLOM >60 01/03/2020    GLUCOSE PENDING 01/03/2020    GLUCOSE 354 11/10/2011       Radiology Review:    1.) CT Head without contrast: (Reviewed at 673)  No acute process    2.) CTA Head/Neck: (Reviewed at 18)  No significant stenosis    ASSESSMENT AND PLAN:       Patient Active Problem List   Diagnosis    Hypertension    Liver disease    Type II or unspecified type diabetes mellitus without mention of complication, not stated as uncontrolled    Lumbago    Thoracic or lumbosacral neuritis or radiculitis, unspecified    Degeneration of lumbar or lumbosacral intervertebral disc    DM (diabetes mellitus) ([address]    Hep C w/o coma, chronic (HCC)    Back pain    Back pain, chronic    HTN (hypertension)    Osteoarthritis of lumbar spine    Diabetes mellitus ([address]    Needs flu shot    HLD (hyperlipidemia)    Smoking    Chronic back pain    Spinal stenosis, lumbar region, without neurogenic claudication    Foot drop, right    Neuritis or radiculitis due to displacement of lumbar intervertebral disc    Lumbar disc herniation    Polyneuropathic pain    Displacement of lumbar intervertebral disc without myelopathy    Uncontrolled type 2 diabetes mellitus with diabetic mononeuropathy, with long-term current use of insulin (HCC)    Smoker    Cellulitis and abscess    Exposed orthopaedic hardware ([address]    Hallux abducto valgus    Diabetes (HCC)    Positional vertigo    Abdominal pain, right upper quadrant    Cellulitis    Esophagitis determined by

## 2020-01-03 NOTE — ED NOTES
Pt to ED c/o dizziness. Pt states that he has been dizzy since last week when he was supposed to be admitted but left AMA because \"I had something to do. \" pt states that he fell this morning and hit his head due to being so dizzy. Pt c/o pain 10/10 to the back of his head where he fell.  Pt placed on full cardiac monitor on arrival.      Latrice La RN  01/03/20 [id number]

## 2020-01-03 NOTE — ED NOTES
Sreedhar Denise and Jany Stokes at bedside      [facility], [address] [facility]  01/03/20 3590

## 2020-01-04 LAB
ABSOLUTE EOS #: 0.17 K/UL (ref 0–0.44)
ABSOLUTE IMMATURE GRANULOCYTE: 0.03 K/UL (ref 0–0.3)
ABSOLUTE LYMPH #: 2.43 K/UL (ref 1.1–3.7)
ABSOLUTE MONO #: 0.93 K/UL (ref 0.1–1.2)
ANION GAP SERPL CALCULATED.3IONS-SCNC: 12 MMOL/L (ref 9–17)
BASOPHILS # BLD: 1 % (ref 0–2)
BASOPHILS ABSOLUTE: 0.04 K/UL (ref 0–0.2)
BUN BLDV-MCNC: 16 MG/DL (ref 6–20)
BUN/CREAT BLD: ABNORMAL (ref 9–20)
CALCIUM SERPL-MCNC: 9 MG/DL (ref 8.6–10.4)
CHLORIDE BLD-SCNC: 101 MMOL/L (ref 98–107)
CO2: 27 MMOL/L (ref 20–31)
CREAT SERPL-MCNC: 0.77 MG/DL (ref 0.7–1.2)
DIFFERENTIAL TYPE: ABNORMAL
EKG ATRIAL RATE: 74 BPM
EKG P AXIS: 79 DEGREES
EKG P-R INTERVAL: 126 MS
EKG Q-T INTERVAL: 446 MS
EKG QRS DURATION: 74 MS
EKG QTC CALCULATION (BAZETT): 495 MS
EKG R AXIS: 23 DEGREES
EKG T AXIS: 81 DEGREES
EKG VENTRICULAR RATE: 74 BPM
EOSINOPHILS RELATIVE PERCENT: 2 % (ref 1–4)
FOLATE: 9.1 NG/ML
GFR AFRICAN AMERICAN: >60 ML/MIN
GFR NON-AFRICAN AMERICAN: >60 ML/MIN
GFR SERPL CREATININE-BSD FRML MDRD: ABNORMAL ML/MIN/{1.73_M2}
GFR SERPL CREATININE-BSD FRML MDRD: ABNORMAL ML/MIN/{1.73_M2}
GLUCOSE BLD-MCNC: 119 MG/DL (ref 75–110)
GLUCOSE BLD-MCNC: 120 MG/DL (ref 70–99)
GLUCOSE BLD-MCNC: 134 MG/DL (ref 75–110)
GLUCOSE BLD-MCNC: 205 MG/DL (ref 75–110)
GLUCOSE BLD-MCNC: 227 MG/DL (ref 75–110)
HCT VFR BLD CALC: 52.8 % (ref 40.7–50.3)
HEMOGLOBIN: 17.8 G/DL (ref 13–17)
IMMATURE GRANULOCYTES: 0 %
LV EF: 58 %
LVEF MODALITY: NORMAL
LYMPHOCYTES # BLD: 32 % (ref 24–43)
MAGNESIUM: 2 MG/DL (ref 1.6–2.6)
MCH RBC QN AUTO: 29.1 PG (ref 25.2–33.5)
MCHC RBC AUTO-ENTMCNC: 33.7 G/DL (ref 28.4–34.8)
MCV RBC AUTO: 86.3 FL (ref 82.6–102.9)
MONOCYTES # BLD: 12 % (ref 3–12)
NRBC AUTOMATED: 0 PER 100 WBC
PDW BLD-RTO: 12 % (ref 11.8–14.4)
PLATELET # BLD: 258 K/UL (ref 138–453)
PLATELET ESTIMATE: ABNORMAL
PMV BLD AUTO: 9.3 FL (ref 8.1–13.5)
POTASSIUM SERPL-SCNC: 3.5 MMOL/L (ref 3.7–5.3)
RBC # BLD: 6.12 M/UL (ref 4.21–5.77)
RBC # BLD: ABNORMAL 10*6/UL
SEG NEUTROPHILS: 53 % (ref 36–65)
SEGMENTED NEUTROPHILS ABSOLUTE COUNT: 3.93 K/UL (ref 1.5–8.1)
SODIUM BLD-SCNC: 140 MMOL/L (ref 135–144)
T. PALLIDUM, IGG: NONREACTIVE
TSH SERPL DL<=0.05 MIU/L-ACNC: 1.05 MIU/L (ref 0.3–5)
VITAMIN B-12: 323 PG/ML (ref 232–1245)
WBC # BLD: 7.5 K/UL (ref 3.5–11.3)
WBC # BLD: ABNORMAL 10*3/UL

## 2020-01-04 PROCEDURE — 82746 ASSAY OF FOLIC ACID SERUM: CPT

## 2020-01-04 PROCEDURE — 6360000002 HC RX W HCPCS: Performed by: FAMILY MEDICINE

## 2020-01-04 PROCEDURE — 36415 COLL VENOUS BLD VENIPUNCTURE: CPT

## 2020-01-04 PROCEDURE — G0008 ADMIN INFLUENZA VIRUS VAC: HCPCS | Performed by: FAMILY MEDICINE

## 2020-01-04 PROCEDURE — 96372 THER/PROPH/DIAG INJ SC/IM: CPT

## 2020-01-04 PROCEDURE — 86780 TREPONEMA PALLIDUM: CPT

## 2020-01-04 PROCEDURE — 2580000003 HC RX 258: Performed by: STUDENT IN AN ORGANIZED HEALTH CARE EDUCATION/TRAINING PROGRAM

## 2020-01-04 PROCEDURE — 99232 SBSQ HOSP IP/OBS MODERATE 35: CPT | Performed by: FAMILY MEDICINE

## 2020-01-04 PROCEDURE — 2060000000 HC ICU INTERMEDIATE R&B

## 2020-01-04 PROCEDURE — 6370000000 HC RX 637 (ALT 250 FOR IP): Performed by: STUDENT IN AN ORGANIZED HEALTH CARE EDUCATION/TRAINING PROGRAM

## 2020-01-04 PROCEDURE — 97535 SELF CARE MNGMENT TRAINING: CPT

## 2020-01-04 PROCEDURE — 97166 OT EVAL MOD COMPLEX 45 MIN: CPT

## 2020-01-04 PROCEDURE — 92523 SPEECH SOUND LANG COMPREHEN: CPT

## 2020-01-04 PROCEDURE — 6360000002 HC RX W HCPCS: Performed by: STUDENT IN AN ORGANIZED HEALTH CARE EDUCATION/TRAINING PROGRAM

## 2020-01-04 PROCEDURE — 82947 ASSAY GLUCOSE BLOOD QUANT: CPT

## 2020-01-04 PROCEDURE — 83735 ASSAY OF MAGNESIUM: CPT

## 2020-01-04 PROCEDURE — 90686 IIV4 VACC NO PRSV 0.5 ML IM: CPT | Performed by: FAMILY MEDICINE

## 2020-01-04 PROCEDURE — G0378 HOSPITAL OBSERVATION PER HR: HCPCS

## 2020-01-04 PROCEDURE — 99222 1ST HOSP IP/OBS MODERATE 55: CPT | Performed by: PSYCHIATRY & NEUROLOGY

## 2020-01-04 PROCEDURE — 82607 VITAMIN B-12: CPT

## 2020-01-04 PROCEDURE — 93306 TTE W/DOPPLER COMPLETE: CPT

## 2020-01-04 PROCEDURE — 84443 ASSAY THYROID STIM HORMONE: CPT

## 2020-01-04 PROCEDURE — 84425 ASSAY OF VITAMIN B-1: CPT

## 2020-01-04 PROCEDURE — 80048 BASIC METABOLIC PNL TOTAL CA: CPT

## 2020-01-04 PROCEDURE — 85025 COMPLETE CBC W/AUTO DIFF WBC: CPT

## 2020-01-04 RX ORDER — MECLIZINE HCL 12.5 MG/1
12.5 TABLET ORAL 3 TIMES DAILY PRN
Status: DISCONTINUED | OUTPATIENT
Start: 2020-01-04 | End: 2020-01-05 | Stop reason: HOSPADM

## 2020-01-04 RX ADMIN — MECLIZINE HYDROCHLORIDE 12.5 MG: 12.5 TABLET, FILM COATED ORAL at 17:17

## 2020-01-04 RX ADMIN — FAMOTIDINE 20 MG: 20 TABLET, FILM COATED ORAL at 20:24

## 2020-01-04 RX ADMIN — SODIUM CHLORIDE, PRESERVATIVE FREE 10 ML: 5 INJECTION INTRAVENOUS at 10:01

## 2020-01-04 RX ADMIN — POTASSIUM CHLORIDE 40 MEQ: 1500 TABLET, EXTENDED RELEASE ORAL at 06:29

## 2020-01-04 RX ADMIN — OXYCODONE HYDROCHLORIDE AND ACETAMINOPHEN 1 TABLET: 5; 325 TABLET ORAL at 05:10

## 2020-01-04 RX ADMIN — GABAPENTIN 600 MG: 300 CAPSULE ORAL at 12:58

## 2020-01-04 RX ADMIN — INSULIN LISPRO 4 UNITS: 100 INJECTION, SOLUTION INTRAVENOUS; SUBCUTANEOUS at 08:40

## 2020-01-04 RX ADMIN — INSULIN LISPRO 2 UNITS: 100 INJECTION, SOLUTION INTRAVENOUS; SUBCUTANEOUS at 22:31

## 2020-01-04 RX ADMIN — AMLODIPINE BESYLATE 10 MG: 10 TABLET ORAL at 08:36

## 2020-01-04 RX ADMIN — INFLUENZA A VIRUS A/BRISBANE/02/2018 IVR-190 (H1N1) ANTIGEN (PROPIOLACTONE INACTIVATED), INFLUENZA A VIRUS A/KANSAS/14/2017 X-327 (H3N2) ANTIGEN (PROPIOLACTONE INACTIVATED), INFLUENZA B VIRUS B/MARYLAND/15/2016 ANTIGEN (PROPIOLACTONE INACTIVATED), INFLUENZA B VIRUS B/PHUKET/3073/2013 BVR-1B ANTIGEN (PROPIOLACTONE INACTIVATED) 0.5 ML: 15; 15; 15; 15 INJECTION, SUSPENSION INTRAMUSCULAR at 08:37

## 2020-01-04 RX ADMIN — ENOXAPARIN SODIUM 40 MG: 40 INJECTION SUBCUTANEOUS at 08:36

## 2020-01-04 RX ADMIN — SIMVASTATIN 40 MG: 40 TABLET, FILM COATED ORAL at 20:24

## 2020-01-04 RX ADMIN — SODIUM CHLORIDE, PRESERVATIVE FREE 10 ML: 5 INJECTION INTRAVENOUS at 20:24

## 2020-01-04 RX ADMIN — Medication 81 MG: at 08:36

## 2020-01-04 RX ADMIN — MECLIZINE HYDROCHLORIDE 12.5 MG: 12.5 TABLET, FILM COATED ORAL at 08:52

## 2020-01-04 RX ADMIN — LISINOPRIL 40 MG: 20 TABLET ORAL at 08:36

## 2020-01-04 RX ADMIN — FAMOTIDINE 20 MG: 20 TABLET, FILM COATED ORAL at 08:36

## 2020-01-04 RX ADMIN — INSULIN GLARGINE 32 UNITS: 100 INJECTION, SOLUTION SUBCUTANEOUS at 08:40

## 2020-01-04 RX ADMIN — OXYCODONE HYDROCHLORIDE AND ACETAMINOPHEN 1 TABLET: 5; 325 TABLET ORAL at 11:26

## 2020-01-04 RX ADMIN — GABAPENTIN 600 MG: 300 CAPSULE ORAL at 08:36

## 2020-01-04 RX ADMIN — OXYCODONE HYDROCHLORIDE AND ACETAMINOPHEN 1 TABLET: 5; 325 TABLET ORAL at 17:14

## 2020-01-04 RX ADMIN — OXYCODONE HYDROCHLORIDE AND ACETAMINOPHEN 1 TABLET: 5; 325 TABLET ORAL at 23:03

## 2020-01-04 RX ADMIN — CHLORTHALIDONE 25 MG: 25 TABLET ORAL at 08:36

## 2020-01-04 RX ADMIN — INSULIN GLARGINE 32 UNITS: 100 INJECTION, SOLUTION SUBCUTANEOUS at 22:31

## 2020-01-04 RX ADMIN — GABAPENTIN 600 MG: 300 CAPSULE ORAL at 20:24

## 2020-01-04 ASSESSMENT — ENCOUNTER SYMPTOMS
BACK PAIN: 1
ABDOMINAL PAIN: 0
SHORTNESS OF BREATH: 1
DIARRHEA: 0
RHINORRHEA: 0
SHORTNESS OF BREATH: 0
EYE PAIN: 0
NAUSEA: 0
COUGH: 0
PHOTOPHOBIA: 0
VOMITING: 0
SINUS PAIN: 0
APNEA: 0
BLOOD IN STOOL: 0
EYE REDNESS: 0
WHEEZING: 0

## 2020-01-04 ASSESSMENT — PAIN SCALES - GENERAL
PAINLEVEL_OUTOF10: 10
PAINLEVEL_OUTOF10: 7
PAINLEVEL_OUTOF10: 10

## 2020-01-04 ASSESSMENT — PAIN DESCRIPTION - LOCATION
LOCATION: BACK
LOCATION: BACK

## 2020-01-04 ASSESSMENT — PAIN DESCRIPTION - ORIENTATION: ORIENTATION: LOWER

## 2020-01-04 ASSESSMENT — PAIN DESCRIPTION - PAIN TYPE
TYPE: CHRONIC PAIN
TYPE: CHRONIC PAIN

## 2020-01-04 NOTE — PROGRESS NOTES
Speech Language Pathology  Facility/Department: [facility]  Initial Speech/Language/Cognitive Assessment    NAME: Rigo Grace  : 1965   MRN: [id number]  ADMISSION DATE: 1/3/2020     ADMITTING DIAGNOSIS: has Hypertension; Liver disease; Type II or unspecified type diabetes mellitus without mention of complication, not stated as uncontrolled; Lumbago; Thoracic or lumbosacral neuritis or radiculitis, unspecified; Degeneration of lumbar or lumbosacral intervertebral disc; DM (diabetes mellitus) ([address] Hep C w/o coma, chronic ([address] Back pain; Back pain, chronic; HTN (hypertension); Osteoarthritis of lumbar spine; Diabetes mellitus ([address] Needs flu shot; HLD (hyperlipidemia); Smoking; Chronic back pain; Spinal stenosis, lumbar region, without neurogenic claudication; Foot drop, right; Neuritis or radiculitis due to displacement of lumbar intervertebral disc; Lumbar disc herniation; Polyneuropathic pain; Displacement of lumbar intervertebral disc without myelopathy; Uncontrolled type 2 diabetes mellitus with diabetic mononeuropathy, with long-term current use of insulin ([address] Smoker; Cellulitis and abscess; Exposed orthopaedic hardware ([address] Hallux abducto valgus; Diabetes ([address] Positional vertigo; Abdominal pain, right upper quadrant; Cellulitis; Esophagitis determined by endoscopy; Cholelithiasis; Diabetes type 2, uncontrolled ([address] Diabetic mononeuropathy associated with diabetes mellitus due to underlying condition ([address] Lumbar spondylolysis; Erectile dysfunction associated with type 2 diabetes mellitus ([address] Encounter for smoking cessation counseling; Bloating; Family history of breast cancer; Dyslipidemia;  Acute bacterial sinusitis; Blurry vision, bilateral; Dizziness; and Syncope and collapse on their problem list.       DATE ONSET: 2020      Date of Eval: 2020   Evaluating Therapist: AYANNA Linares       RECENT RESULTS  CT OF HEAD/MRI:  CT head 1/3/2020  No acute intracranial abnormality    CTA head/neck 1/3/2020  No high-grade stenosis or focal occlusion involving the intracranial or   cervical vasculature.  No evidence of acute dissection.  No evidence of   aneurysm.       Atherosclerotic disease at the bilateral carotid bifurcation, right greater   than left.  No flow-limiting stenosis by NASCET criteria. MRI Brain - 1/3/2020  1. No acute infarct, intracranial hemorrhage, or significant mass effect.       2. Mild amount of chronic small vessel ischemic white matter disease and   diffuse cerebral volume loss                 Primary Complaint:   Preet Dent is a 54 yo man admitted 1/3/2020 with 1 week h/o dizziness. Pt reported to ED staff he was supposed to be admitted 1 week ago but left AMA when recommended admission for cirrhosis. Pt reports ongoing feeling of dizzy/room spinning sensation and increased falls d/t loss of balance. Brain imaging negative for acute/subacute intracranial event. NIHSS is +0 or WNL    Pt and girlfriend deny any changes to speech or language over past week. IMPRESSIONS  1)  [facility] Language skills; no s/s of aphasia    2)  WFL speech; no s/s of motor speech disorder    3) Mild Cognitive impairment in working memory/cognitive flexibility and reasoning but could also be d/t dizziness sensation and/or reduced effort.  Could also be premorbid/chronic given pt has 10th grade education and GED; has been on disability since 2008          RECOMMENDATIONS  1) ST does not appear warranted at this point              Pain:  Pain Assessment  Pain Assessment: 0-10  Pain Level: 10  Patient's Stated Pain Goal: 9  Pain Type: Chronic pain  Pain Location: Back  Pain Orientation: Lower  Pain Descriptors: Aching, Tender  Pain Frequency: Continuous    Assessment:  Cognitive Diagnosis: mild defictis noted in cognitive flexibility/working memory and divergent thinking/reasoning   Diagnosis: mild defictis noted in cognitive flexibility/working memory and divergent thinking/reasoning    Recommendations:  Requires SLP Intervention: No             Plan:   Goals:      Patient/family involved in developing goals and treatment plan: yes    Subjective:   Previous level of function and limitations: single; has girlfriend of 17 years. No children. On disability since 2008. Completed 10th grade and portion of 11th before dropping out. Did earn his GED                    Objective:     Oral/Motor  Oral Motor: Within functional limits    Auditory Comprehension  Comprehension: Within Functional Limits         Expression  Primary Mode of Expression: Verbal    Verbal Expression  Verbal Expression: Within functional limits         Motor Speech  Motor Speech: Within Functional Limits         Cognition:      Attention  Attention: Exceptions to [facility]  Sustained Attention: Moderate  Problem Solving  Problem Solving: Exceptions to [facility]  Verbal Reasoning Skills: Mild    Additional Assessments:             Prognosis:  Speech Therapy Prognosis  Prognosis: Fair  Prognosis Considerations: Medical Diagnosis;Previous Level of Function; Co-Morbidities  Individuals consulted  Consulted and agree with results and recommendations: Patient; Family member  Family member consulted: significant other of 17 years    Education:  Patient Education Response: Demonstrated understanding          G-Code:             Therapy Time:   Individual Concurrent Group Co-treatment   Time In 1050         Time Out 1115         Minutes [address], [place]  1/4/2020 11:39 AM

## 2020-01-04 NOTE — CONSULTS
Gigi Pendleton MD   [facility] 82H MISC USE AS DIRECTED TWICE A DAY 12/3/19 1/3/20  Abdiel Reed MD   fluticasone (FLONASE) 50 MCG/ACT nasal spray USE 1 SPRAY BY EACH NOSTRIL ROUTE DAILY 12/2/19   Abdiel Reed MD   ibuprofen (ADVIL;MOTRIN) 800 MG tablet Take 1 tablet by mouth every 8 hours as needed for Pain 11/9/19   Sriram Hernandes MD   docusate sodium (COLACE) 100 MG capsule Take 1 capsule by mouth daily as needed for Constipation 10/24/19   Abdiel Reed MD   fluticasone (FLONASE) 50 MCG/ACT nasal spray 1 spray by Each Nostril route daily 10/24/19   Abdiel Reed MD   metFORMIN (GLUCOPHAGE) 1000 MG tablet Take 1 tablet by mouth 2 times daily (with meals) 10/24/19   Abdiel Reed MD   nicotine (NICODERM CQ) 7 MG/24HR Place 1 patch onto the skin every 24 hours 10/24/19   Abdiel Reed MD   naloxone 4 MG/0.1ML LIQD nasal spray 1 spray by Nasal route as needed for Opioid Reversal 10/24/19   Inessa Ferrer MD   sildenafil (VIAGRA) 50 MG tablet Take 1 tablet by mouth as needed for Erectile Dysfunction 7/26/19   Abdiel Reed MD   Blood Pressure KIT 1 Device by Does not apply route 2 times daily 7/26/19 7/26/22  Abdiel Reed MD   glucose monitoring kit (FREESTYLE) monitoring kit 1 kit by Does not apply route daily Whichever the insurance approves 12/7/18 1/6/19  Eugenia Callahan MD   NICORELIEF 2 MG gum TAKE 1 EACH BY MOUTH AS NEEDED FOR SMOKING CESSATION 9/24/18   Eugenia Callahan MD       Current Medications:   Current Facility-Administered Medications: meclizine (ANTIVERT) tablet 12.5 mg, 12.5 mg, Oral, TID PRN  simvastatin (ZOCOR) tablet 40 mg, 40 mg, Oral, Nightly  sodium chloride flush 0.9 % injection 10 mL, 10 mL, Intravenous, 2 times per day  sodium chloride flush 0.9 % injection 10 mL, 10 mL, Intravenous, PRN  acetaminophen (TYLENOL) tablet 650 mg, 650 mg, Oral, Q4H PRN  oxyCODONE-acetaminophen (PERCOCET) 5-325 MG per tablet 1 tablet, 1 tablet, Oral, Q6H PRN  amLODIPine urgency. Musculoskeletal: No myalgia or arthralgia. Skin: No rashes or scarring or bruises. Neurological: No headache, paresthesia, or focal weakness. Endo/Heme/Allergies: Negative for itchy eyes or runny nose. Psychiatric/Behavioral: No anxiety or depressed mood. Review of systems otherwise negative. PHYSICAL EXAM:       BP (!) 135/96   Pulse 63   Temp 97.7 °F (36.5 °C) (Oral)   Resp 14   Ht 5' 9\" (1.753 m)   Wt 150 lb (68 kg)   SpO2 94%   BMI 22.15 kg/m²    General Examination    General Resting comfortably in bed   Head Normocephalic, without obvious abnormality   Neck Supple, symmetrical. Good ROM. No midline or paraspinal tenderness. Lungs Respirations unlabored, no wheezing   Chest Wall No deformity   Heart RRR, no murmur   Abdomen Soft. Non-tender, non-distended   Extremities No cyanosis or edema or warmth. Pulses 2+ and symmetric   Skin: Skin  turgor normal, no rashes or lesions     Mental status  Speech Alert. Oriented to person, place, and time. Speech is fluent without paraphasic errors  Good repetition and naming  Can do 1 step, 2 step, and cross-body commands  Can spell world backwards. Language appropriate. No hallucinations or delusions. No SI/HI. Cranial nerves   II - VFF, visual threat intact  III, IV, VI - extra-ocular muscles full. No nystagmus. Pupils symmetric and responsive.    V - sensation symmetric         VII -  No facial droop or asymmetric NLF  VIII - intact hearing to conversational tone          IX, X - symmetrical palate elevation   XI - 5/5 strength symmetric  XII - tongue midline   Motor function  Strength: grossly 5/5 in b/l              Deltoid, biceps, triceps, wrist flexion, wrist extension             Hip flexion/extension, knee flexion/extension, plantar flexion  Bulk: grossly normal no atrophy  Tone: symmetric b/l arms and legs  Abnormal movements: No abnormal movements or tremor   Sensory function Symmetric to touch in all extremities

## 2020-01-04 NOTE — PROGRESS NOTES
Physical Therapy  DATE: 2020    NAME: Doug Robles  MRN: [id number]   : 1965    Patient not seen this date for Physical Therapy due to:  [] Blood transfusion in progress  [] Hemodialysis  []  Patient Declined  [] Spine Precautions   [] Strict Bedrest  [] Surgery/ Procedure  [] Testing      [] Other        [x] PT being discontinued at this time. Patient independent. No further needs. -pt is at baseline with chronic back pain and mobility. [] PT being discontinued at this time as the patient has been transferred to palliative care. No further needs.     Esperanza Snyder, PT

## 2020-01-04 NOTE — PROGRESS NOTES
Occupational Therapy   Occupational Therapy Initial Assessment  Date: 2020   Patient Name: Jake Calles  MRN: [id number]     : 1965     Chief Complaint   Patient presents with    Dizziness     Date of Service: 2020    Discharge Recommendations:    Further therapy recommended at discharge. OT Equipment Recommendations  Other: CTA pending progress     Assessment   Performance deficits / Impairments: Decreased functional mobility ; Decreased ADL status; Decreased endurance  Assessment: Pt would benefit from continued acute care OT to address minimal deficits in ADL/ functional activities, endurance and functional mobility d/t dizziness. Prognosis: Good  Decision Making: Medium Complexity  OT Education: OT Role;Plan of Care  Patient Education: safety during transfers with dizziness  REQUIRES OT FOLLOW UP: Yes  Activity Tolerance  Activity Tolerance: Patient Tolerated treatment well  Safety Devices  Safety Devices in place: Yes  Type of devices: Call light within reach; Left in bed;Nurse notified;Gait belt;Patient at risk for falls(RN notified of BP )  Restraints  Initially in place: No           Patient Diagnosis(es): The primary encounter diagnosis was Syncope and collapse. A diagnosis of Dizziness was also pertinent to this visit. has a past medical history of Chronic back pain, Diabetes mellitus ([facility] Diabetic neuropathy ([facility] DM (diabetes mellitus) ([facility] Hepatitis C, Hypertension, MDRO (multiple drug resistant organisms) resistance, Murmur, and Osteoarthritis. has a past surgical history that includes Foot surgery; Bunionectomy (Right); Foot surgery (Right); Foot surgery; Foot surgery (Right, 14); Foot surgery (Right, 10/15/14); osteotomy (Right, 2015); Cholecystectomy (9/8/15); and Colonoscopy (N/A, 2018).       Restrictions  Restrictions/Precautions  Restrictions/Precautions: General Precautions, Fall Risk  Required Braces or Orthoses?: No    Subjective   General  Patient Score: 42.8 (01/04/20 1546)  ADL Inpatient CMS G-Code Modifier : CK (01/04/20 1546)    Goals  Short term goals  Time Frame for Short term goals: by discharge, pt will  Short term goal 1: demo I in [place] ADL activities with AD as needed   Short term goal 2: demo increased activity tolerance of 30+ min to assist with ADL/ functional activities   Short term goal 3: demo understanding and I use of fall prevention tech toassist with ADL/ functional activities   Patient Goals   Patient goals : to go home       Therapy Time   Individual Concurrent Group Co-treatment   Time In 1002         Time Out 1032         Minutes 30          See above for LOF. RN reports patient is medically stable for therapy treatment this date. Chart reviewed prior to treatment and patient is agreeable for therapy. All lines intact and patient positioned comfortably at end of treatment. All patient needs addressed prior to ending therapy session.            Arianne Farooq, OTR/L

## 2020-01-04 NOTE — TELEPHONE ENCOUNTER
Fany Request for Insulin Syringes. Last Visit Date: 12/3/19  Next Visit Date:  Future Appointments   Date Time Provider Maryana Chairez   1/9/2020  3:00 PM Andrea Alvarez MD [address] [place] [address] Maintenance   Topic Date Due    Shingles Vaccine (1 of 2) 02/11/2014    Diabetic retinal exam  03/10/2016    Hepatitis B vaccine (3 of 3 - Risk 3-dose series) 12/23/2019    Hepatitis A vaccine (2 of 2 - Risk 2-dose series) 01/26/2020    Diabetic foot exam  07/26/2020    A1C test (Diabetic or Prediabetic)  07/26/2020    Diabetic microalbuminuria test  08/15/2020    Lipid screen  08/15/2020    Potassium monitoring  01/04/2021    Creatinine monitoring  01/04/2021    DTaP/Tdap/Td vaccine (2 - Td) 02/22/2026    Colon cancer screen colonoscopy  11/19/2028    Flu vaccine  Completed    Pneumococcal 0-64 years Vaccine  Completed    HIV screen  Completed       Hemoglobin A1C (%)   Date Value   07/26/2019 6.7   03/29/2018 7.5   03/02/2018 7.5 (H)             ( goal A1C is < 7)   Microalb/Crt.  Ratio (mcg/mg creat)   Date Value   08/15/2019 99 (H)     LDL Cholesterol (mg/dL)   Date Value   08/15/2019 112       (goal LDL is <100)   AST (U/L)   Date Value   07/27/2018 31     ALT (U/L)   Date Value   07/27/2018 32     BUN (mg/dL)   Date Value   01/04/2020 16     BP Readings from Last 3 Encounters:   01/04/20 (!) 135/96   12/03/19 (!) 167/110   11/09/19 (!) 173/102          (goal 120/80)    All Future Testing planned in [facility]  Lab Frequency Next Occurrence   Up with assistance     Initiate Oxygen Therapy Protocol     Up as tolerated     Basic Metabolic Panel w/ Reflex to MG     CBC auto differential     HYPOGLYCEMIA TREATMENT: blood glucose less than 50 mg/dL and patient  ALERT and TOLERATING PO     HYPOGLYCEMIA TREATMENT: blood glucose less than 70 mg/dL and patient ALERT and TOLERATING PO     HYPOGLYCEMIA TREATMENT: blood glucose less than 70 mg/dL and patient NOT ALERT or NPO     POCT Glucose

## 2020-01-05 VITALS
WEIGHT: 150 LBS | HEART RATE: 89 BPM | DIASTOLIC BLOOD PRESSURE: 7 MMHG | HEIGHT: 69 IN | RESPIRATION RATE: 21 BRPM | TEMPERATURE: 97.6 F | BODY MASS INDEX: 22.22 KG/M2 | OXYGEN SATURATION: 98 % | SYSTOLIC BLOOD PRESSURE: 121 MMHG

## 2020-01-05 LAB
ABSOLUTE EOS #: 0.15 K/UL (ref 0–0.44)
ABSOLUTE IMMATURE GRANULOCYTE: <0.03 K/UL (ref 0–0.3)
ABSOLUTE LYMPH #: 0.96 K/UL (ref 1.1–3.7)
ABSOLUTE MONO #: 0.76 K/UL (ref 0.1–1.2)
ANION GAP SERPL CALCULATED.3IONS-SCNC: 12 MMOL/L (ref 9–17)
BASOPHILS # BLD: 1 % (ref 0–2)
BASOPHILS ABSOLUTE: 0.03 K/UL (ref 0–0.2)
BUN BLDV-MCNC: 16 MG/DL (ref 6–20)
BUN/CREAT BLD: ABNORMAL (ref 9–20)
CALCIUM SERPL-MCNC: 8.8 MG/DL (ref 8.6–10.4)
CHLORIDE BLD-SCNC: 97 MMOL/L (ref 98–107)
CO2: 24 MMOL/L (ref 20–31)
CREAT SERPL-MCNC: 0.91 MG/DL (ref 0.7–1.2)
DIFFERENTIAL TYPE: ABNORMAL
EOSINOPHILS RELATIVE PERCENT: 2 % (ref 1–4)
ESTIMATED AVERAGE GLUCOSE: 143 MG/DL
GFR AFRICAN AMERICAN: >60 ML/MIN
GFR NON-AFRICAN AMERICAN: >60 ML/MIN
GFR SERPL CREATININE-BSD FRML MDRD: ABNORMAL ML/MIN/{1.73_M2}
GFR SERPL CREATININE-BSD FRML MDRD: ABNORMAL ML/MIN/{1.73_M2}
GLUCOSE BLD-MCNC: 101 MG/DL (ref 75–110)
GLUCOSE BLD-MCNC: 257 MG/DL (ref 70–99)
GLUCOSE BLD-MCNC: 298 MG/DL (ref 75–110)
HBA1C MFR BLD: 6.6 % (ref 4–6)
HCT VFR BLD CALC: 52.4 % (ref 40.7–50.3)
HEMOGLOBIN: 17.5 G/DL (ref 13–17)
IMMATURE GRANULOCYTES: 0 %
LYMPHOCYTES # BLD: 15 % (ref 24–43)
MCH RBC QN AUTO: 29.2 PG (ref 25.2–33.5)
MCHC RBC AUTO-ENTMCNC: 33.4 G/DL (ref 28.4–34.8)
MCV RBC AUTO: 87.5 FL (ref 82.6–102.9)
MONOCYTES # BLD: 11 % (ref 3–12)
NRBC AUTOMATED: 0 PER 100 WBC
PDW BLD-RTO: 11.6 % (ref 11.8–14.4)
PLATELET # BLD: 258 K/UL (ref 138–453)
PLATELET ESTIMATE: ABNORMAL
PMV BLD AUTO: 9.8 FL (ref 8.1–13.5)
POTASSIUM SERPL-SCNC: 3.7 MMOL/L (ref 3.7–5.3)
RBC # BLD: 5.99 M/UL (ref 4.21–5.77)
RBC # BLD: ABNORMAL 10*6/UL
SEG NEUTROPHILS: 71 % (ref 36–65)
SEGMENTED NEUTROPHILS ABSOLUTE COUNT: 4.73 K/UL (ref 1.5–8.1)
SODIUM BLD-SCNC: 133 MMOL/L (ref 135–144)
WBC # BLD: 6.6 K/UL (ref 3.5–11.3)
WBC # BLD: ABNORMAL 10*3/UL

## 2020-01-05 PROCEDURE — G0378 HOSPITAL OBSERVATION PER HR: HCPCS

## 2020-01-05 PROCEDURE — 6360000002 HC RX W HCPCS: Performed by: STUDENT IN AN ORGANIZED HEALTH CARE EDUCATION/TRAINING PROGRAM

## 2020-01-05 PROCEDURE — 82947 ASSAY GLUCOSE BLOOD QUANT: CPT

## 2020-01-05 PROCEDURE — 80048 BASIC METABOLIC PNL TOTAL CA: CPT

## 2020-01-05 PROCEDURE — 96372 THER/PROPH/DIAG INJ SC/IM: CPT

## 2020-01-05 PROCEDURE — 83036 HEMOGLOBIN GLYCOSYLATED A1C: CPT

## 2020-01-05 PROCEDURE — 82668 ASSAY OF ERYTHROPOIETIN: CPT

## 2020-01-05 PROCEDURE — 6370000000 HC RX 637 (ALT 250 FOR IP): Performed by: STUDENT IN AN ORGANIZED HEALTH CARE EDUCATION/TRAINING PROGRAM

## 2020-01-05 PROCEDURE — 36415 COLL VENOUS BLD VENIPUNCTURE: CPT

## 2020-01-05 PROCEDURE — 99232 SBSQ HOSP IP/OBS MODERATE 35: CPT | Performed by: PSYCHIATRY & NEUROLOGY

## 2020-01-05 PROCEDURE — 85025 COMPLETE CBC W/AUTO DIFF WBC: CPT

## 2020-01-05 PROCEDURE — 99239 HOSP IP/OBS DSCHRG MGMT >30: CPT | Performed by: FAMILY MEDICINE

## 2020-01-05 PROCEDURE — 81270 JAK2 GENE: CPT

## 2020-01-05 RX ORDER — OXYCODONE HYDROCHLORIDE AND ACETAMINOPHEN 5; 325 MG/1; MG/1
1 TABLET ORAL EVERY 6 HOURS PRN
Qty: 12 TABLET | Refills: 0 | Status: SHIPPED | OUTPATIENT
Start: 2020-01-05 | End: 2020-01-08

## 2020-01-05 RX ORDER — AMLODIPINE BESYLATE 10 MG/1
TABLET ORAL
Qty: 30 TABLET | Refills: 5 | Status: SHIPPED | OUTPATIENT
Start: 2020-01-05 | End: 2020-02-03 | Stop reason: SDUPTHER

## 2020-01-05 RX ORDER — CHLORTHALIDONE 25 MG/1
TABLET ORAL
Qty: 30 TABLET | Refills: 3 | Status: SHIPPED | OUTPATIENT
Start: 2020-01-05 | End: 2020-02-03 | Stop reason: SDUPTHER

## 2020-01-05 RX ORDER — INSULIN GLARGINE 100 [IU]/ML
35 INJECTION, SOLUTION SUBCUTANEOUS 2 TIMES DAILY
Status: DISCONTINUED | OUTPATIENT
Start: 2020-01-05 | End: 2020-01-05 | Stop reason: HOSPADM

## 2020-01-05 RX ORDER — MECLIZINE HCL 12.5 MG/1
12.5 TABLET ORAL 3 TIMES DAILY PRN
Qty: 30 TABLET | Refills: 0 | Status: SHIPPED | OUTPATIENT
Start: 2020-01-05 | End: 2020-01-15

## 2020-01-05 RX ORDER — GABAPENTIN 300 MG/1
600 CAPSULE ORAL 3 TIMES DAILY
Qty: 90 CAPSULE | Refills: 3 | Status: SHIPPED | OUTPATIENT
Start: 2020-01-05 | End: 2020-02-03 | Stop reason: SDUPTHER

## 2020-01-05 RX ORDER — DOCUSATE SODIUM 100 MG/1
100 CAPSULE, LIQUID FILLED ORAL DAILY PRN
Qty: 60 CAPSULE | Refills: 3 | Status: SHIPPED | OUTPATIENT
Start: 2020-01-05 | End: 2020-02-03 | Stop reason: SDUPTHER

## 2020-01-05 RX ORDER — FLUTICASONE PROPIONATE 50 MCG
1 SPRAY, SUSPENSION (ML) NASAL DAILY
Qty: 1 BOTTLE | Refills: 3 | Status: SHIPPED | OUTPATIENT
Start: 2020-01-05 | End: 2020-02-03 | Stop reason: SDUPTHER

## 2020-01-05 RX ORDER — IBUPROFEN 800 MG/1
800 TABLET ORAL EVERY 8 HOURS PRN
Qty: 30 TABLET | Refills: 0 | Status: SHIPPED | OUTPATIENT
Start: 2020-01-05 | End: 2020-02-03 | Stop reason: SDUPTHER

## 2020-01-05 RX ORDER — LIDOCAINE 50 MG/G
1 PATCH TOPICAL DAILY
Qty: 30 PATCH | Refills: 0 | Status: SHIPPED | OUTPATIENT
Start: 2020-01-05 | End: 2020-02-03 | Stop reason: SDUPTHER

## 2020-01-05 RX ORDER — FAMOTIDINE 20 MG/1
TABLET, FILM COATED ORAL
Qty: 60 TABLET | Refills: 3 | Status: SHIPPED | OUTPATIENT
Start: 2020-01-05 | End: 2020-02-03 | Stop reason: SDUPTHER

## 2020-01-05 RX ORDER — LISINOPRIL 40 MG/1
TABLET ORAL
Qty: 30 TABLET | Refills: 5 | Status: SHIPPED | OUTPATIENT
Start: 2020-01-05 | End: 2020-02-03 | Stop reason: SDUPTHER

## 2020-01-05 RX ORDER — ASPIRIN 81 MG/1
TABLET ORAL
Qty: 30 TABLET | Refills: 3 | Status: SHIPPED | OUTPATIENT
Start: 2020-01-05 | End: 2020-02-03 | Stop reason: SDUPTHER

## 2020-01-05 RX ORDER — NALOXONE HYDROCHLORIDE 4 MG/.1ML
1 SPRAY NASAL PRN
Qty: 1 EACH | Refills: 1 | Status: SHIPPED | OUTPATIENT
Start: 2020-01-05 | End: 2020-02-03 | Stop reason: SDUPTHER

## 2020-01-05 RX ORDER — SIMVASTATIN 40 MG
40 TABLET ORAL NIGHTLY
Qty: 30 TABLET | Refills: 3 | Status: SHIPPED | OUTPATIENT
Start: 2020-01-05 | End: 2020-01-24 | Stop reason: SINTOL

## 2020-01-05 RX ORDER — ONDANSETRON 4 MG/1
4 TABLET, FILM COATED ORAL EVERY 8 HOURS PRN
Qty: 12 TABLET | Refills: 1 | Status: SHIPPED | OUTPATIENT
Start: 2020-01-05 | End: 2020-02-03 | Stop reason: SDUPTHER

## 2020-01-05 RX ORDER — LIDOCAINE 4 G/G
1 PATCH TOPICAL DAILY
Status: DISCONTINUED | OUTPATIENT
Start: 2020-01-05 | End: 2020-01-05 | Stop reason: HOSPADM

## 2020-01-05 RX ADMIN — GABAPENTIN 600 MG: 300 CAPSULE ORAL at 13:34

## 2020-01-05 RX ADMIN — CHLORTHALIDONE 25 MG: 25 TABLET ORAL at 09:07

## 2020-01-05 RX ADMIN — AMLODIPINE BESYLATE 10 MG: 10 TABLET ORAL at 09:07

## 2020-01-05 RX ADMIN — FAMOTIDINE 20 MG: 20 TABLET, FILM COATED ORAL at 09:06

## 2020-01-05 RX ADMIN — INSULIN GLARGINE 32 UNITS: 100 INJECTION, SOLUTION SUBCUTANEOUS at 09:07

## 2020-01-05 RX ADMIN — OXYCODONE HYDROCHLORIDE AND ACETAMINOPHEN 1 TABLET: 5; 325 TABLET ORAL at 04:49

## 2020-01-05 RX ADMIN — GABAPENTIN 600 MG: 300 CAPSULE ORAL at 09:06

## 2020-01-05 RX ADMIN — OXYCODONE HYDROCHLORIDE AND ACETAMINOPHEN 1 TABLET: 5; 325 TABLET ORAL at 15:10

## 2020-01-05 RX ADMIN — INSULIN LISPRO 6 UNITS: 100 INJECTION, SOLUTION INTRAVENOUS; SUBCUTANEOUS at 09:07

## 2020-01-05 RX ADMIN — OXYCODONE HYDROCHLORIDE AND ACETAMINOPHEN 1 TABLET: 5; 325 TABLET ORAL at 09:06

## 2020-01-05 RX ADMIN — LISINOPRIL 40 MG: 20 TABLET ORAL at 09:06

## 2020-01-05 RX ADMIN — ENOXAPARIN SODIUM 40 MG: 40 INJECTION SUBCUTANEOUS at 09:06

## 2020-01-05 RX ADMIN — Medication 81 MG: at 09:07

## 2020-01-05 ASSESSMENT — ENCOUNTER SYMPTOMS
COUGH: 0
WHEEZING: 0
RESPIRATORY NEGATIVE: 1
EYES NEGATIVE: 1
GASTROINTESTINAL NEGATIVE: 1
SORE THROAT: 1
SHORTNESS OF BREATH: 0
BACK PAIN: 1

## 2020-01-05 ASSESSMENT — PAIN DESCRIPTION - ORIENTATION: ORIENTATION: LOWER

## 2020-01-05 ASSESSMENT — PAIN DESCRIPTION - DESCRIPTORS: DESCRIPTORS: ACHING

## 2020-01-05 ASSESSMENT — PAIN SCALES - GENERAL
PAINLEVEL_OUTOF10: 10
PAINLEVEL_OUTOF10: 7

## 2020-01-05 ASSESSMENT — PAIN DESCRIPTION - LOCATION: LOCATION: HEAD;BACK

## 2020-01-05 ASSESSMENT — PAIN DESCRIPTION - PAIN TYPE: TYPE: ACUTE PAIN

## 2020-01-05 ASSESSMENT — PAIN DESCRIPTION - FREQUENCY: FREQUENCY: CONTINUOUS

## 2020-01-05 NOTE — PROGRESS NOTES
Results   Component Value Date    MG 2.0 01/04/2020     Phosphorus: No results found for: PHOS  Ionized Calcium: No results found for: CAION   PT/INR:    Lab Results   Component Value Date    PROTIME 11.7 01/03/2020    PROTIME 18.5 09/08/2015    INR 1.1 01/03/2020     PTT:    Lab Results   Component Value Date    APTT 24.6 01/03/2020         ASSESSMENT   Principal Problem:    Dizziness  Active Problems:    Hypertension    Back pain, chronic    Diabetes ([facility]    Syncope and collapse    Diabetic neuropathy with neurologic complication ([facility]  Resolved Problems:    * No resolved hospital problems. *      PLAN     Dizziness, syncope likely orthostatic  - Neurology is following, clear for discharge  - Echo pending  - Continue ASA 81, Plavix 75mg, Zocor 40mg  - PT/OT  - Home care on discharge    Type 2 Diabetes Mellitus  - Increase to Lantus 35U BID  - Medium dose ISS  - Hypoglycemia protocol, POCT glucose    History of hypertension  - Amlodipine 10, lisinopril 40, chlorthalidone 25    Chronic low back pain  - Gabapentin 600mg TID  - Percocet q6h PRN  - Lidocaine patch  - Outpatient neurosurgery follow-up    History of hyperlipidemia  - Zocor 40mg daily    Elevated Hgb, r/o polycythemia  - Erythropoietin level, Jak2    Dispo: Discharge home with home care    This plan will be discussed with the rounding attending: DO. Rachana Darden MD   Family Medicine Resident Physician  1/5/2020 12:54 PM

## 2020-01-05 NOTE — PROGRESS NOTES
CLINICAL PHARMACY NOTE: MEDS TO [address] Select Patient?: Yes  Total # of Prescriptions Filled: 11   The following medications were delivered to the patient:  · Colace  · Metformin 1000mg  · Gabapentin 300mg  · Narcan  · Ibuprofen 800mg  · Nicotine gum 2mg  · Meclizine 12.5mg  · Ondansetron 4mg  · Lidocaine patches 5%  · flonase  · Simvastatin 40mg  Total # of Interventions Completed: 0  Time Spent (min): 5    Additional Documentation: meds delivered to patient 1-5-2020 at 2:54pm. Patient asked about percocet, not sure if doc will write script for it. May be back to deliver if sent down.

## 2020-01-05 NOTE — PLAN OF CARE
Patient remained free from injury. Patient verbalized understanding of need for the safety precautions. Demonstrates proper use of assistive devices. Bed remains in the lowest position. Call light remains within reach. [place] [facility] in use.

## 2020-01-05 NOTE — DISCHARGE INSTR - COC
Continuity of Care Form    Patient Name: Mohit Santana   :  1965  MRN:  [id number]    Admit date:  1/3/2020  Discharge date:  ***    Code Status Order: Full Code   Advance Directives:   Advance Care Flowsheet Documentation     Date/Time Healthcare Directive Type of Healthcare Directive Copy in [address] Agent's Name Healthcare Agent's Phone Number    20 1532  No, patient does not have an advance directive for healthcare treatment -- -- -- -- --          Admitting Physician:  Jaquelin Houston DO  PCP: Awilda Whaley MD    Discharging Nurse: [facility] Unit/Room#: 2821/8230-23  Discharging Unit Phone Number: ***    Emergency Contact:   Extended Emergency Contact Information  Primary Emergency Contact: Nelli Allison  Address: NOT AVAILABLE   [address] Phone: [phone]  Mobile Phone: [phone]  Relation: Brother/Sister  Secondary Emergency Contact: [address] Phone: [phone]  Relation: Other    Past Surgical History:  Past Surgical History:   Procedure Laterality Date    BUNIONECTOMY Right     CHOLECYSTECTOMY  9/8/15    laprascopic    COLONOSCOPY N/A 2018    COLONOSCOPY DIAGNOSTIC performed by Georgina Esparza IV, DO at [facility] Right     FOOT SURGERY      hardware removed    FOOT SURGERY Right 14    delayed closure    FOOT SURGERY Right 10/15/14    removal of hardware    OSTEOTOMY Right 2015    Akin Osteotomy right       Immunization History:   Immunization History   Administered Date(s) Administered    Hepatitis A Adult (Havrix, Vaqta) 2019    Hepatitis B Adult (Engerix-B) 2019, 2019    Influenza 10/22/2012, 2013, 2013    Influenza Virus Vaccine 2014, 2015    Influenza, Quadv, 6 mo and older, IM (Fluzone, Flulaval) 2017    Influenza, Quadv, IM, (6 mo and older Fluzone, Flulaval, Fluarix and 3 yrs and older Afluria) 11/28/2016    Influenza, Clarance Stage, IM, PF (6 mo and older Fluzone, Flulaval, Fluarix, and 3 yrs and older Afluria) 10/01/2018, 01/04/2020    Pneumococcal Polysaccharide ([id number]) 02/21/2013, 04/23/2014, 05/13/2015    Tdap (Boostrix, Adacel) 02/22/2016       Active Problems:  Patient Active Problem List   Diagnosis Code    Hypertension I10    Liver disease K76.9    Type II or unspecified type diabetes mellitus without mention of complication, not stated as uncontrolled E11.9    Lumbago M54.5    Thoracic or lumbosacral neuritis or radiculitis, unspecified [id number]    Degeneration of lumbar or lumbosacral intervertebral disc M51.37    DM (diabetes mellitus) ([facility] E11.9    Hep C w/o coma, chronic (HCC) B18.2    Back pain M54.9    Back pain, chronic M54.9, G89.29    HTN (hypertension) I10    Osteoarthritis of lumbar spine M47.816    Diabetes mellitus ([facility] E11.9    Needs flu shot Z23    HLD (hyperlipidemia) E78.5    Smoking F17.200    Chronic back pain M54.9, G89.29    Spinal stenosis, lumbar region, without neurogenic claudication M48.061    Foot drop, right M21.371    Neuritis or radiculitis due to displacement of lumbar intervertebral disc M51.16    Lumbar disc herniation M51.26    Polyneuropathic pain M79.2    Displacement of lumbar intervertebral disc without myelopathy M51.26    Uncontrolled type 2 diabetes mellitus with diabetic mononeuropathy, with long-term current use of insulin (HCC) E11.41, Z79.4, E11.65    Smoker F17.200    Cellulitis and abscess L03.90, L02.91    Exposed orthopaedic hardware ([facility] T84.498A    Hallux abducto valgus M20.10    Diabetes (HCC) E11.9    Positional vertigo [id number]    Abdominal pain, right upper quadrant R10.11    Cellulitis L03.90    Esophagitis determined by endoscopy K20.9    Cholelithiasis K80.20    Diabetes type 2, uncontrolled (HCC) E11.65    Diabetic mononeuropathy associated with diabetes mellitus due to underlying condition ([facility] E08.41    Lumbar spondylolysis M43.06    Erectile dysfunction associated with type 2 diabetes mellitus ([facility] E11.69, N52.1    Encounter for smoking cessation counseling Z71.6    Bloating R14.0    Family history of breast cancer Z80.3    Dyslipidemia E78.5    Acute bacterial sinusitis J01.90, B96.89    Blurry vision, bilateral H53.8    Dizziness R42    Syncope and collapse R55    Diabetic neuropathy with neurologic complication (HCC) F17.72, E11.49       Isolation/Infection:   Isolation          Contact        Patient Infection Status     Infection Onset Added Last Indicated Last Indicated By Review Planned Expiration Resolved Resolved By    MRSA  04/25/14 04/25/14 Victor Manuel Cai RN        Foot - 5/2015          Nurse Assessment:  Last Vital Signs: /87   Pulse 63   Temp 97.4 °F (36.3 °C) (Oral)   Resp 11   Ht 5' 9\" (1.753 m)   Wt 150 lb (68 kg)   SpO2 97%   BMI 22.15 kg/m²     Last documented pain score (0-10 scale): Pain Level: 10  Last Weight:   Wt Readings from Last 1 Encounters:   01/03/20 150 lb (68 kg)     Mental Status:  oriented    IV Access:  - None    Nursing Mobility/ADLs:  Walking   Assisted  Transfer  Assisted  Bathing  Assisted  Dressing  Independent  Toileting  Independent  Feeding  [address]  Independent  Med Delivery   whole    Wound Care Documentation and Therapy:        Elimination:  Continence:   · Bowel: Yes  · Bladder: Yes  Urinary Catheter: None   Colostomy/Ileostomy/Ileal Conduit:       Date of Last BM: ***  No intake or output data in the 24 hours ending 01/05/20 1049  No intake/output data recorded. Safety Concerns:      At Risk for Falls    Impairments/Disabilities:      None    Nutrition Therapy:  Current Nutrition Therapy:   - Oral Diet:  General/Diabetic      Routes of Feeding: Oral  Liquids: No Restrictions  Daily Fluid Restriction: no  Last Modified Barium Swallow with Video (Video Swallowing

## 2020-01-05 NOTE — DISCHARGE SUMMARY
Reversal             nicotine (NICODERM CQ) 7 MG/24HR  Place 1 patch onto the skin every 24 hours             nicotine polacrilex (NICORELIEF) 2 MG gum  Take 1 each by mouth as needed for Smoking cessation             ondansetron (ZOFRAN) 4 MG tablet  Take 1 tablet by mouth every 8 hours as needed for Nausea or Vomiting             ONETOUCH DELICA LANCETS 51Z MISC  USE AS DIRECTED TWICE A DAY             oxyCODONE-acetaminophen (PERCOCET) 5-325 MG per tablet  Take 1 tablet by mouth every 6 hours as needed. sildenafil (VIAGRA) 50 MG tablet  Take 1 tablet by mouth as needed for Erectile Dysfunction             simvastatin (ZOCOR) 40 MG tablet  Take 1 tablet by mouth nightly                 Send Copies to: Alyssa Marks MD,       Note that over 30 minutes was spent in preparing discharge papers, discussing discharge with patient and family, medication review, etc.    Signed:   Shashank Kwon MD Family Medicine Resident  1/5/2020, 1:10 PM

## 2020-01-05 NOTE — PROGRESS NOTES
[facility] Neurology   IN-PATIENT SERVICE      NEUROLOGY PROGRESS  NOTE                Interval History:     No changes overnight. 2D echo read pending. First set of orthostatics positive by heart rate. Second and third set negative. History of Present Illness:     53-year-old male presents with episodes of syncope with LOC upon standing. Complains of spinning sensation, SOB. history of diabetes with neuropathy. History of low back pain status post lumbar surgery scheduled to have another revision on . Hemoglobin A1c 6.7. Stroke imaging performed including CT, CTA, MRI which are negative for acute process. Exam revealed small, large fiber peripheral neuropathy. Physical Exam:   BP (!) 121/7   Pulse 89   Temp 97.6 °F (36.4 °C) (Oral)   Resp 21   Ht 5' 9\" (1.753 m)   Wt 150 lb (68 kg)   SpO2 98%   BMI 22.15 kg/m²   Temp (24hrs), Av.1 °F (36.7 °C), Min:97.4 °F (36.3 °C), Max:98.6 °F (37 °C)      Neurological examination:    Mental status   Alert and oriented x 3; following all commands; speech is fluent. Cranial nerves   CN II - XII intact   Motor function  Strength: 5/5 RUE, 5/5 RLE, 5/5 LUE, 5/5  LLE                  Sensory function decreased distally to proximally in LE to temperature and vibration     Cerebellar Intact finger-nose-finger testing. Intact heel-shin testing. Reflex function 2/4 symmetric throughout .       Gait                  Not assessed           Diagnostics:      Laboratory Testing:  CBC:   Recent Labs     20  0726 20  0422 20  0500   WBC 9.0 7.5 6.6   HGB 18.3* 17.8* 17.5*   PLT See Reflexed IPF Result 258 258     BMP:    Recent Labs     20  0726 20  0732 20  0422 20  0500     --  140 133*   K 4.2  --  3.5* 3.7     --  101 97*   CO2 24  --  27 24   BUN 20  --  16 16   CREATININE 0.75 0.99 0.77 0.91   GLUCOSE 172*  --  120* 257*         Lab Results   Component Value Date    CHOL 216 (H) 08/15/2019 LDLCHOLESTEROL 112 08/15/2019    HDL 71 08/15/2019    TRIG 165 (H) 08/15/2019    ALT 32 07/27/2018    AST 31 07/27/2018    TSH 1.05 01/04/2020    INR 1.1 01/03/2020    LABA1C 6.7 07/26/2019    LABMICR 99 (H) 08/15/2019    [id number] 323 01/04/2020         Impression:      1. Syncope, suspect orthostatic. Diabetic neuropathy with suspected autonomic component    2.  Falls secondary to lumbar spine stenosis    Plan:     -IV fluid hydration  -2D echo read pending  -PT OT  -Stable neurologically for discharge at this time      Electronically signed by Mary Santacruz DO on 1/5/2020 at 12:43 PM      Mary Santacruz [address] [place] [address]  Neurology

## 2020-01-06 RX ORDER — SYRINGE-NEEDLE,INSULIN,0.5 ML 28GX1/2"
SYRINGE, EMPTY DISPOSABLE MISCELLANEOUS
Qty: 100 SYRINGE | Refills: 5 | Status: SHIPPED | OUTPATIENT
Start: 2020-01-06 | End: 2020-02-03 | Stop reason: SDUPTHER

## 2020-01-07 LAB — ERYTHROPOIETIN: 5 MU/ML (ref 4–27)

## 2020-01-08 LAB — VITAMIN B1 WHOLE BLOOD: 85 NMOL/L (ref 70–180)

## 2020-01-09 ENCOUNTER — OFFICE VISIT (OUTPATIENT)
Dept: FAMILY MEDICINE CLINIC | Age: 55
End: 2020-01-09
Payer: MEDICAID

## 2020-01-09 VITALS
SYSTOLIC BLOOD PRESSURE: 150 MMHG | WEIGHT: 149 LBS | TEMPERATURE: 98.1 F | BODY MASS INDEX: 22 KG/M2 | DIASTOLIC BLOOD PRESSURE: 98 MMHG | HEART RATE: 93 BPM

## 2020-01-09 LAB — V617F MUTATION, QNT: 0 %

## 2020-01-09 PROCEDURE — 90746 HEPB VACCINE 3 DOSE ADULT IM: CPT | Performed by: FAMILY MEDICINE

## 2020-01-09 PROCEDURE — 3044F HG A1C LEVEL LT 7.0%: CPT | Performed by: STUDENT IN AN ORGANIZED HEALTH CARE EDUCATION/TRAINING PROGRAM

## 2020-01-09 PROCEDURE — 1111F DSCHRG MED/CURRENT MED MERGE: CPT | Performed by: STUDENT IN AN ORGANIZED HEALTH CARE EDUCATION/TRAINING PROGRAM

## 2020-01-09 PROCEDURE — 3017F COLORECTAL CA SCREEN DOC REV: CPT | Performed by: STUDENT IN AN ORGANIZED HEALTH CARE EDUCATION/TRAINING PROGRAM

## 2020-01-09 PROCEDURE — G8482 FLU IMMUNIZE ORDER/ADMIN: HCPCS | Performed by: STUDENT IN AN ORGANIZED HEALTH CARE EDUCATION/TRAINING PROGRAM

## 2020-01-09 PROCEDURE — 4004F PT TOBACCO SCREEN RCVD TLK: CPT | Performed by: STUDENT IN AN ORGANIZED HEALTH CARE EDUCATION/TRAINING PROGRAM

## 2020-01-09 PROCEDURE — 2022F DILAT RTA XM EVC RTNOPTHY: CPT | Performed by: STUDENT IN AN ORGANIZED HEALTH CARE EDUCATION/TRAINING PROGRAM

## 2020-01-09 PROCEDURE — G8427 DOCREV CUR MEDS BY ELIG CLIN: HCPCS | Performed by: STUDENT IN AN ORGANIZED HEALTH CARE EDUCATION/TRAINING PROGRAM

## 2020-01-09 PROCEDURE — 99213 OFFICE O/P EST LOW 20 MIN: CPT | Performed by: STUDENT IN AN ORGANIZED HEALTH CARE EDUCATION/TRAINING PROGRAM

## 2020-01-09 PROCEDURE — G8420 CALC BMI NORM PARAMETERS: HCPCS | Performed by: STUDENT IN AN ORGANIZED HEALTH CARE EDUCATION/TRAINING PROGRAM

## 2020-01-09 RX ORDER — NICOTINE 21 MG/24HR
1 PATCH, TRANSDERMAL 24 HOURS TRANSDERMAL DAILY
Qty: 42 PATCH | Refills: 0 | Status: CANCELLED | OUTPATIENT
Start: 2020-01-09 | End: 2020-02-20

## 2020-01-09 RX ORDER — BLOOD PRESSURE TEST KIT
1 KIT MISCELLANEOUS 3 TIMES DAILY
Qty: 1 KIT | Refills: 0 | Status: CANCELLED | OUTPATIENT
Start: 2020-01-09

## 2020-01-09 RX ORDER — BLOOD PRESSURE TEST KIT
1 KIT MISCELLANEOUS 3 TIMES DAILY
Qty: 1 KIT | Refills: 0 | Status: SHIPPED | OUTPATIENT
Start: 2020-01-09 | End: 2020-02-03 | Stop reason: SDUPTHER

## 2020-01-09 RX ORDER — NICOTINE 21 MG/24HR
1 PATCH, TRANSDERMAL 24 HOURS TRANSDERMAL EVERY 24 HOURS
Qty: 30 PATCH | Refills: 3 | Status: SHIPPED | OUTPATIENT
Start: 2020-01-09 | End: 2020-02-03 | Stop reason: SDUPTHER

## 2020-01-09 ASSESSMENT — ENCOUNTER SYMPTOMS
COLOR CHANGE: 0
COUGH: 0
VOMITING: 0
WHEEZING: 0
SINUS PAIN: 0
BACK PAIN: 1
NAUSEA: 0
VOICE CHANGE: 0
ABDOMINAL DISTENTION: 0
SHORTNESS OF BREATH: 0
ABDOMINAL PAIN: 0
SINUS PRESSURE: 0

## 2020-01-09 ASSESSMENT — PATIENT HEALTH QUESTIONNAIRE - PHQ9
SUM OF ALL RESPONSES TO PHQ9 QUESTIONS 1 & 2: 0
SUM OF ALL RESPONSES TO PHQ QUESTIONS 1-9: 0
1. LITTLE INTEREST OR PLEASURE IN DOING THINGS: 0
SUM OF ALL RESPONSES TO PHQ QUESTIONS 1-9: 0
2. FEELING DOWN, DEPRESSED OR HOPELESS: 0

## 2020-01-10 ENCOUNTER — TELEPHONE (OUTPATIENT)
Dept: FAMILY MEDICINE CLINIC | Age: 55
End: 2020-01-10

## 2020-01-10 NOTE — PROGRESS NOTES
i have reviewed morales elements of Mohit Santana history and exam. And I examined Mohit Santana  . I have discussed the treatment plan with the resident and agree with the plan. Careful discussion with patient about letting us make pain clinic referral. We made that appointment for him while he was here. Patient was not iinclined to accept other modalities in the meantime.
Results   Component Value Date    WBC 6.6 2020    HGB 17.5 (H) 2020    HCT 52.4 (H) 2020     2020    CHOL 216 (H) 08/15/2019    TRIG 165 (H) 08/15/2019    HDL 71 08/15/2019    ALT 32 2018    AST 31 2018     (L) 2020    K 3.7 2020    CL 97 (L) 2020    CREATININE 0.91 2020    BUN 16 2020    CO2 24 2020    TSH 1.05 2020    INR 1.1 2020    LABA1C 6.6 (H) 2020    LABMICR 99 (H) 08/15/2019     Lab Results   Component Value Date    CALCIUM 8.8 2020     Lab Results   Component Value Date    LDLCHOLESTEROL 112 08/15/2019       Assessment and Plan:    1. Chronic bilateral low back pain, unspecified whether sciatica present  - Upcomming appointment with Dr. Christina Jackson neurosurgery on   - Bridger Domingo MD, [place], [place] [facility] [place] on     2. Type 2 diabetes mellitus with diabetic polyneuropathy, with long-term current use of insulin ([facility])  - HbA1c () - 6.6  - well controlled    3. Essential hypertension  - Bp today 163/108, repeat  - Pt instructed to take his BP medications regularly without missing doses, will follow up in one month. - Blood Pressure KIT; 1 kit by Does not apply route three times daily Please replace with any kit that is covered by insurance. Dispense: 1 kit; Refill: 0    4. Bunion of left foot  - AFL - Genetta Justin, DPM, Podiatry, [place] ([address])    5. Encounter for smoking cessation counseling  - nicotine (NICODERM CQ) 14 MG/24HR; Place 1 patch onto the skin every 24 hours  Dispense: 30 patch; Refill: 3          Requested Prescriptions     Signed Prescriptions Disp Refills    nicotine (NICODERM CQ) 14 MG/24HR 30 patch 3     Sig: Place 1 patch onto the skin every 24 hours    Blood Pressure KIT 1 kit 0     Si kit by Does not apply route three times daily Please replace with any kit that is covered by insurance.        Medications Discontinued

## 2020-01-14 ENCOUNTER — INITIAL CONSULT (OUTPATIENT)
Dept: PAIN MANAGEMENT | Age: 55
End: 2020-01-14
Payer: MEDICAID

## 2020-01-14 VITALS
WEIGHT: 147 LBS | HEIGHT: 69 IN | BODY MASS INDEX: 21.77 KG/M2 | SYSTOLIC BLOOD PRESSURE: 152 MMHG | DIASTOLIC BLOOD PRESSURE: 99 MMHG | OXYGEN SATURATION: 98 % | HEART RATE: 106 BPM

## 2020-01-14 PROBLEM — Z98.890 HISTORY OF LUMBAR SURGERY: Status: ACTIVE | Noted: 2020-01-14

## 2020-01-14 PROCEDURE — G8427 DOCREV CUR MEDS BY ELIG CLIN: HCPCS | Performed by: ANESTHESIOLOGY

## 2020-01-14 PROCEDURE — G8482 FLU IMMUNIZE ORDER/ADMIN: HCPCS | Performed by: ANESTHESIOLOGY

## 2020-01-14 PROCEDURE — 99244 OFF/OP CNSLTJ NEW/EST MOD 40: CPT | Performed by: ANESTHESIOLOGY

## 2020-01-14 PROCEDURE — G8420 CALC BMI NORM PARAMETERS: HCPCS | Performed by: ANESTHESIOLOGY

## 2020-01-14 ASSESSMENT — ENCOUNTER SYMPTOMS
ALLERGIC/IMMUNOLOGIC NEGATIVE: 1
EYES NEGATIVE: 1
RESPIRATORY NEGATIVE: 1
BACK PAIN: 1
GASTROINTESTINAL NEGATIVE: 1

## 2020-01-14 NOTE — LETTER
2.  Development of small perineural cyst in the left L2-3 neural foramen. 3.  Postoperative changes at L4-5 and L5-S1  4.  Mild level degenerative disc disease with foraminal narrowing as above. 1. Chronic bilateral low back pain with bilateral sciatica    2. History of lumbar surgery        Chronic lower back pain with radiation down both legs history of previous lumbar spine surgery  Recent imaging showing lumbar spinal stenosis above the surgical level  His chronic pain could be related to chronic lumbar radiculopathy at the surgical level or spinal stenosis related neurogenic claudication symptom    I think EMG nerve testing would be helpful  I will also advise for a fresh course of physical therapy    I think patient can benefit from lumbar epidural steroid injection    I will recommend to avoid opioids at least until exhaustion of other modalities  Considering previous history of substance abuse he will also be at high risk for opioid abuse    Note sent to the referring physician   If you have questions, please do not hesitate to call me. I look forward to following [place] Pretty along with you.     Sincerely,        Lu Brumfield MD

## 2020-01-14 NOTE — PROGRESS NOTES
standing, sitting  9. Alleviating factors: gabapentin and percocet  10. Associated symptoms (numbness / tingling / weakness):  yes  -Where at: both legs and feet  -Down into finger tips or toes (specify which finger or toes): all toes  -constant or intermitting: constant  11. Red Flags: (weight loss / chills / loss of bladder or bowel control): none    Previous management history  1. Previous diagnostic workup: (Imaging/EMG)   CT, MRI, or Xray: MRI  What part of the body: lumbar spine  What facility did they have it at [address]  What year or specific date: 2019  EMG:  yes    2. Previous non interventional treatments tried:  chiropractor or physical therapy:  Physical therapy  What part of the body: back  What facility was it done at: [facility]  How long ago was it last tried: 2 years ago  Did it work: No  Did they complete it:No    3. Previous Medications tried  NSAID's: yes  Neurontin: yes  Lyrica: yes  Trycyclic antidepressant (Ellavil / Leah Spray ): yes  Cymbalta: yes  Opioids (Ultram / Vicodin / Percocet / Morphine / Dilaudid / Oramorph/ Fentanyl etc.): Percocet in the past, Norco In the past, Tramadol in the past. Morphine in the past.  Last Pain medication taken (name of med and date): percocet 1/1/2020    4. Previous Interventional pain procedures tried:  What kind of injection: none  Who did the injection: none  did the injection help: n/a  Last time injection was done:N/A    5.  Previous surgeries for pain  What part of the body did they have the surgery: L4-L5 fusion   What physician did the surgery: Dr. Fuentes Bryant did they have the surgery done: unsure  Date of Surgery: 2 years     Social History:  Marital status: Single   Employment History:   Working  No  Full time Or Part time:   Disability  Yes   Legal Issues related to pain complaint: No     Pain Disability Index score : 56    Lab Results   Component Value Date    LABA1C 6.6 (H) 01/05/2020     Lab Results   Component Value Date     Active member of club or organization: None     Attends meetings of clubs or organizations: None     Relationship status: None    Intimate partner violence:     Fear of current or ex partner: None     Emotionally abused: None     Physically abused: None     Forced sexual activity: None   Other Topics Concern    None   Social History Narrative    ** Merged History Encounter **          Family History   Problem Relation Age of Onset    High Blood Pressure Mother     High Blood Pressure Father      Allergies   Allergen Reactions    Morphine Hives     Morphine   Vitals:    01/14/20 1617   BP: (!) 152/99   Pulse: 106   SpO2: 98%     Current Outpatient Medications   Medication Sig Dispense Refill    nicotine (NICODERM CQ) 14 MG/24HR Place 1 patch onto the skin every 24 hours 30 patch 3    Blood Pressure KIT 1 kit by Does not apply route three times daily Please replace with any kit that is covered by insurance. 1 kit 0    Insulin Syringe-Needle U-100 (INSULIN SYRINGE .5CC/30GX1/2\") 30G X 1/2\" 0.5 ML MISC Use 3 times daily. Dx: 250.00   Prescribe this syringe for insulin dosages under  50 units per injection. 100 Syringe 5    gabapentin (NEURONTIN) 300 MG capsule Take 2 capsules by mouth 3 times daily for 30 days.  90 capsule 3    meclizine (ANTIVERT) 12.5 MG tablet Take 1 tablet by mouth 3 times daily as needed for Dizziness 30 tablet 0    simvastatin (ZOCOR) 40 MG tablet Take 1 tablet by mouth nightly 30 tablet 3    docusate sodium (COLACE) 100 MG capsule Take 1 capsule by mouth daily as needed for Constipation 60 capsule 3    fluticasone (FLONASE) 50 MCG/ACT nasal spray 1 spray by Each Nostril route daily 1 Bottle 3    ibuprofen (ADVIL;MOTRIN) 800 MG tablet Take 1 tablet by mouth every 8 hours as needed for Pain 30 tablet 0    metFORMIN (GLUCOPHAGE) 1000 MG tablet Take 1 tablet by mouth 2 times daily (with meals) 60 tablet 3    naloxone 4 MG/0.1ML LIQD nasal spray 1 spray by Nasal route as needed for

## 2020-01-21 ENCOUNTER — TELEPHONE (OUTPATIENT)
Dept: FAMILY MEDICINE CLINIC | Age: 55
End: 2020-01-21

## 2020-01-21 NOTE — TELEPHONE ENCOUNTER
Nurse Libia Lemons from [address] [place] [address] called with Orthostatic BP on patient. Supine: 158/100 (p) 74, Sittin/98 (p) 70, Standing 144/102 (p) 85. Stephania Freeman also reports a drug interaction warning of patients Amlodipine and Simvastatin. Attempted to get patient to come in today to see a physician, but patient declined and will come in tomorrow.

## 2020-01-22 ENCOUNTER — OFFICE VISIT (OUTPATIENT)
Dept: FAMILY MEDICINE CLINIC | Age: 55
End: 2020-01-22
Payer: MEDICAID

## 2020-01-22 VITALS
WEIGHT: 150.4 LBS | HEART RATE: 98 BPM | SYSTOLIC BLOOD PRESSURE: 134 MMHG | DIASTOLIC BLOOD PRESSURE: 92 MMHG | HEIGHT: 69 IN | BODY MASS INDEX: 22.28 KG/M2

## 2020-01-22 PROCEDURE — 99211 OFF/OP EST MAY X REQ PHY/QHP: CPT | Performed by: FAMILY MEDICINE

## 2020-01-22 PROCEDURE — G8427 DOCREV CUR MEDS BY ELIG CLIN: HCPCS | Performed by: FAMILY MEDICINE

## 2020-01-22 PROCEDURE — G8482 FLU IMMUNIZE ORDER/ADMIN: HCPCS | Performed by: FAMILY MEDICINE

## 2020-01-22 PROCEDURE — G8420 CALC BMI NORM PARAMETERS: HCPCS | Performed by: FAMILY MEDICINE

## 2020-01-22 PROCEDURE — 4004F PT TOBACCO SCREEN RCVD TLK: CPT | Performed by: FAMILY MEDICINE

## 2020-01-22 PROCEDURE — 99213 OFFICE O/P EST LOW 20 MIN: CPT | Performed by: STUDENT IN AN ORGANIZED HEALTH CARE EDUCATION/TRAINING PROGRAM

## 2020-01-22 PROCEDURE — 1111F DSCHRG MED/CURRENT MED MERGE: CPT | Performed by: FAMILY MEDICINE

## 2020-01-22 PROCEDURE — 3017F COLORECTAL CA SCREEN DOC REV: CPT | Performed by: FAMILY MEDICINE

## 2020-01-22 RX ORDER — OXYCODONE HYDROCHLORIDE AND ACETAMINOPHEN 5; 325 MG/1; MG/1
1 TABLET ORAL EVERY 6 HOURS PRN
Qty: 12 TABLET | Refills: 0 | Status: SHIPPED | OUTPATIENT
Start: 2020-01-22 | End: 2020-01-25

## 2020-01-22 ASSESSMENT — ENCOUNTER SYMPTOMS
SHORTNESS OF BREATH: 0
ANAL BLEEDING: 0
CONSTIPATION: 0
VOMITING: 0
COUGH: 0
WHEEZING: 0
DIARRHEA: 0
BACK PAIN: 1
NAUSEA: 0
ABDOMINAL PAIN: 0

## 2020-01-22 NOTE — PROGRESS NOTES
Subjective: Caitlyn Alvarez is a 53 y.o. male with  has a past medical history of Chronic back pain, Diabetes mellitus ([address] Diabetic neuropathy ([address] DM (diabetes mellitus) ([address] Hepatitis C, Hypertension, MDRO (multiple drug resistant organisms) resistance, Murmur, and Osteoarthritis. Family History   Problem Relation Age of Onset    High Blood Pressure Mother     High Blood Pressure Father        Presented tot office today for:  Chief Complaint   Patient presents with    Hypertension    Back Pain       54-year-old gentleman presents the office for follow-up related to essential hypertension. Patient reports that he is having severe back pain, 10 out of 10, reports that he has upcoming back surgery on February 4. No other concerns or complaints at this time. Review of Systems   Constitutional: Negative for chills and fever. Respiratory: Negative for cough, shortness of breath and wheezing. Cardiovascular: Negative for chest pain. Gastrointestinal: Negative for abdominal pain, anal bleeding, constipation, diarrhea, nausea and vomiting. Genitourinary: Negative for difficulty urinating, dysuria, frequency and urgency. Musculoskeletal: Positive for back pain. Neurological: Negative for dizziness, weakness, light-headedness and headaches. Objective:    BP (!) 134/92   Pulse 98   Ht 5' 9\" (1.753 m)   Wt 150 lb 6.4 oz (68.2 kg)   BMI 22.21 kg/m²    BP Readings from Last 3 Encounters:   01/22/20 (!) 134/92   01/14/20 (!) 152/99   01/09/20 (!) 150/98     Physical Exam  Constitutional:       General: He is not in acute distress. Appearance: He is well-developed. He is not diaphoretic. HENT:      Head: Normocephalic and atraumatic. Eyes:      Pupils: Pupils are equal, round, and reactive to light. Neck:      Thyroid: No thyromegaly. Vascular: No JVD. Cardiovascular:      Rate and Rhythm: Normal rate and regular rhythm. Heart sounds: Normal heart sounds.  No murmur. No friction rub. No gallop. Pulmonary:      Effort: Pulmonary effort is normal. No respiratory distress. Breath sounds: Normal breath sounds. No wheezing. Abdominal:      General: Bowel sounds are normal. There is no distension. Palpations: Abdomen is soft. Tenderness: There is no tenderness. Musculoskeletal:         General: Tenderness (lower back) present. Skin:     General: Skin is warm and dry. Findings: No erythema or rash. Neurological:      Mental Status: He is alert and oriented to person, place, and time. Lab Results   Component Value Date    WBC 6.6 01/05/2020    HGB 17.5 (H) 01/05/2020    HCT 52.4 (H) 01/05/2020     01/05/2020    CHOL 216 (H) 08/15/2019    TRIG 165 (H) 08/15/2019    HDL 71 08/15/2019    ALT 32 07/27/2018    AST 31 07/27/2018     (L) 01/05/2020    K 3.7 01/05/2020    CL 97 (L) 01/05/2020    CREATININE 0.91 01/05/2020    BUN 16 01/05/2020    CO2 24 01/05/2020    TSH 1.05 01/04/2020    INR 1.1 01/03/2020    LABA1C 6.6 (H) 01/05/2020    LABMICR 99 (H) 08/15/2019     Lab Results   Component Value Date    CALCIUM 8.8 01/05/2020     Lab Results   Component Value Date    LDLCHOLESTEROL 112 08/15/2019       Assessment and Plan:    1. Acute exacerbation of chronic low back pain  - Upcoming surgery   - oxyCODONE-acetaminophen (PERCOCET) 5-325 MG per tablet; Take 1 tablet by mouth every 6 hours as needed for Pain for up to 3 days. Intended supply: 3 days. Take lowest dose possible to manage pain  Dispense: 12 tablet; Refill: 0    2. Essential hypertension  - not at goal  - Likely elevated due to pain   - Continue current meds           Requested Prescriptions     Signed Prescriptions Disp Refills    oxyCODONE-acetaminophen (PERCOCET) 5-325 MG per tablet 12 tablet 0     Sig: Take 1 tablet by mouth every 6 hours as needed for Pain for up to 3 days. Intended supply: 3 days.  Take lowest dose possible to manage pain       There are no

## 2020-01-22 NOTE — PROGRESS NOTES
Visit Information    Have you changed or started any medications since your last visit including any over-the-counter medicines, vitamins, or herbal medicines? no   Have you stopped taking any of your medications? Is so, why? -  no  Are you having any side effects from any of your medications? - no    Have you seen any other physician or provider since your last visit? yes - pain mgmt    Have you had any other diagnostic tests since your last visit?  no   Have you been seen in the emergency room and/or had an admission in a hospital since we last saw you?  no   Have you had your routine dental cleaning in the past 6 months?  no     Do you have an active MyChart account? If no, what is the barrier?   No: Declined     Patient Care Team:  Senia Thompson MD as PCP - General (Family Medicine)  Radha Cardoza MD as Orthopedic Surgeon (Orthopedic Surgery)  Allegra Baron MD as Consulting Physician (Neurology)  Damaris Benito DPM as Surgeon (Podiatry)  Ricardo Walters DO as Consulting Physician (General Surgery)  Cierra Heart MD as Consulting Physician (Gastroenterology)    Medical History Review  Past Medical, Family, and Social History reviewed and does contribute to the patient presenting condition    Health Maintenance   Topic Date Due    Shingles Vaccine (1 of 2) 02/11/2014    Diabetic retinal exam  03/10/2016    Hepatitis A vaccine (2 of 2 - Risk 2-dose series) 01/26/2020    Diabetic foot exam  07/26/2020    Diabetic microalbuminuria test  08/15/2020    Lipid screen  08/15/2020    A1C test (Diabetic or Prediabetic)  01/05/2021    Potassium monitoring  01/05/2021    Creatinine monitoring  01/05/2021    DTaP/Tdap/Td vaccine (2 - Td) 02/22/2026    Colon cancer screen colonoscopy  11/19/2028    Hepatitis B vaccine  Completed    Flu vaccine  Completed    Pneumococcal 0-64 years Vaccine  Completed    HIV screen  Completed

## 2020-01-24 RX ORDER — ATORVASTATIN CALCIUM 20 MG/1
20 TABLET, FILM COATED ORAL DAILY
Qty: 30 TABLET | Refills: 5 | Status: SHIPPED | OUTPATIENT
Start: 2020-01-24 | End: 2020-02-03 | Stop reason: SDUPTHER

## 2020-01-24 NOTE — TELEPHONE ENCOUNTER
April Cramer, I have changed the medication for this patient from Simvastatin 40 mg to Atorvastatin 20 mg to avoid interaction with amlodipine. Could you please inform this patient of this change? Thank you!

## 2020-02-03 RX ORDER — CHLORTHALIDONE 25 MG/1
TABLET ORAL
Qty: 30 TABLET | Refills: 3 | Status: SHIPPED | OUTPATIENT
Start: 2020-02-03 | End: 2020-05-22

## 2020-02-03 RX ORDER — IBUPROFEN 800 MG/1
800 TABLET ORAL EVERY 8 HOURS PRN
Qty: 30 TABLET | Refills: 2 | Status: SHIPPED | OUTPATIENT
Start: 2020-02-03 | End: 2020-12-31 | Stop reason: SDUPTHER

## 2020-02-03 RX ORDER — ASPIRIN 81 MG/1
TABLET ORAL
Qty: 30 TABLET | Refills: 3 | Status: SHIPPED | OUTPATIENT
Start: 2020-02-03 | End: 2020-05-22

## 2020-02-03 RX ORDER — LIDOCAINE 50 MG/G
1 PATCH TOPICAL DAILY
Qty: 30 PATCH | Refills: 3 | Status: SHIPPED | OUTPATIENT
Start: 2020-02-03 | End: 2020-05-22

## 2020-02-03 RX ORDER — DOCUSATE SODIUM 100 MG/1
100 CAPSULE, LIQUID FILLED ORAL DAILY PRN
Qty: 60 CAPSULE | Refills: 3 | Status: SHIPPED | OUTPATIENT
Start: 2020-02-03 | End: 2020-08-19 | Stop reason: SDUPTHER

## 2020-02-03 RX ORDER — NICOTINE 21 MG/24HR
1 PATCH, TRANSDERMAL 24 HOURS TRANSDERMAL EVERY 24 HOURS
Qty: 30 PATCH | Refills: 3 | Status: SHIPPED | OUTPATIENT
Start: 2020-02-03 | End: 2020-08-19

## 2020-02-03 RX ORDER — FLUTICASONE PROPIONATE 50 MCG
1 SPRAY, SUSPENSION (ML) NASAL DAILY
Qty: 1 BOTTLE | Refills: 3 | Status: SHIPPED | OUTPATIENT
Start: 2020-02-03 | End: 2020-05-22

## 2020-02-03 RX ORDER — AMLODIPINE BESYLATE 10 MG/1
TABLET ORAL
Qty: 30 TABLET | Refills: 5 | Status: SHIPPED | OUTPATIENT
Start: 2020-02-03 | End: 2020-08-19 | Stop reason: SDUPTHER

## 2020-02-03 RX ORDER — NALOXONE HYDROCHLORIDE 4 MG/.1ML
1 SPRAY NASAL PRN
Qty: 1 EACH | Refills: 3 | Status: SHIPPED | OUTPATIENT
Start: 2020-02-03 | End: 2021-04-19 | Stop reason: SDUPTHER

## 2020-02-03 RX ORDER — ONDANSETRON 4 MG/1
4 TABLET, FILM COATED ORAL EVERY 8 HOURS PRN
Qty: 12 TABLET | Refills: 1 | Status: SHIPPED | OUTPATIENT
Start: 2020-02-03 | End: 2020-08-19 | Stop reason: SDUPTHER

## 2020-02-03 RX ORDER — FAMOTIDINE 20 MG/1
TABLET, FILM COATED ORAL
Qty: 60 TABLET | Refills: 3 | Status: SHIPPED | OUTPATIENT
Start: 2020-02-03 | End: 2020-03-02 | Stop reason: ALTCHOICE

## 2020-02-03 RX ORDER — BLOOD PRESSURE TEST KIT
1 KIT MISCELLANEOUS 3 TIMES DAILY
Qty: 1 KIT | Refills: 0 | Status: SHIPPED | OUTPATIENT
Start: 2020-02-03 | End: 2021-04-19 | Stop reason: SDUPTHER

## 2020-02-03 RX ORDER — ATORVASTATIN CALCIUM 20 MG/1
20 TABLET, FILM COATED ORAL DAILY
Qty: 30 TABLET | Refills: 5 | Status: SHIPPED | OUTPATIENT
Start: 2020-02-03 | End: 2020-08-19 | Stop reason: SDUPTHER

## 2020-02-03 RX ORDER — GABAPENTIN 300 MG/1
600 CAPSULE ORAL 3 TIMES DAILY
Qty: 90 CAPSULE | Refills: 3 | Status: SHIPPED | OUTPATIENT
Start: 2020-02-03 | End: 2020-02-11 | Stop reason: SDUPTHER

## 2020-02-03 RX ORDER — SYRINGE-NEEDLE,INSULIN,0.5 ML 28GX1/2"
SYRINGE, EMPTY DISPOSABLE MISCELLANEOUS
Qty: 100 SYRINGE | Refills: 5 | Status: SHIPPED | OUTPATIENT
Start: 2020-02-03 | End: 2020-12-31 | Stop reason: SDUPTHER

## 2020-02-03 RX ORDER — LISINOPRIL 40 MG/1
TABLET ORAL
Qty: 30 TABLET | Refills: 5 | Status: SHIPPED | OUTPATIENT
Start: 2020-02-03 | End: 2020-08-19 | Stop reason: SDUPTHER

## 2020-02-03 NOTE — TELEPHONE ENCOUNTER
[facility] calling in because the patient has switched to their pharmacy and he needs all new scripts sent. Patient confirmed that he will be going to [facility] from now on and is completely out of his HTN medications. All meds pending, please advise.

## 2020-02-03 NOTE — TELEPHONE ENCOUNTER
[address] called stating that patient is to take 2 tablets TID for 30 days of gabapentin and script was sent over for 90 days. Pharmacy wants to know if script can be changed to 180 days. Please advise.

## 2020-02-04 ENCOUNTER — TELEPHONE (OUTPATIENT)
Dept: FAMILY MEDICINE CLINIC | Age: 55
End: 2020-02-04

## 2020-02-07 NOTE — PROGRESS NOTES
Instructed patient to stop aspirin and motrin one week prior to surgery. Take with sips of water,amlodipine,gabapentin, and pepcid am of surgery and no am insulin or metformin.  Patient to arrive at 06:30am on 02/19/2020

## 2020-02-11 ENCOUNTER — OFFICE VISIT (OUTPATIENT)
Dept: FAMILY MEDICINE CLINIC | Age: 55
End: 2020-02-11
Payer: MEDICAID

## 2020-02-11 VITALS
DIASTOLIC BLOOD PRESSURE: 87 MMHG | HEIGHT: 69 IN | WEIGHT: 155.4 LBS | TEMPERATURE: 97.6 F | BODY MASS INDEX: 23.02 KG/M2 | OXYGEN SATURATION: 98 % | SYSTOLIC BLOOD PRESSURE: 131 MMHG | HEART RATE: 88 BPM

## 2020-02-11 PROCEDURE — G8420 CALC BMI NORM PARAMETERS: HCPCS | Performed by: STUDENT IN AN ORGANIZED HEALTH CARE EDUCATION/TRAINING PROGRAM

## 2020-02-11 PROCEDURE — 90632 HEPA VACCINE ADULT IM: CPT | Performed by: FAMILY MEDICINE

## 2020-02-11 PROCEDURE — 3044F HG A1C LEVEL LT 7.0%: CPT | Performed by: STUDENT IN AN ORGANIZED HEALTH CARE EDUCATION/TRAINING PROGRAM

## 2020-02-11 PROCEDURE — G8482 FLU IMMUNIZE ORDER/ADMIN: HCPCS | Performed by: STUDENT IN AN ORGANIZED HEALTH CARE EDUCATION/TRAINING PROGRAM

## 2020-02-11 PROCEDURE — 4004F PT TOBACCO SCREEN RCVD TLK: CPT | Performed by: STUDENT IN AN ORGANIZED HEALTH CARE EDUCATION/TRAINING PROGRAM

## 2020-02-11 PROCEDURE — G8427 DOCREV CUR MEDS BY ELIG CLIN: HCPCS | Performed by: STUDENT IN AN ORGANIZED HEALTH CARE EDUCATION/TRAINING PROGRAM

## 2020-02-11 PROCEDURE — 99211 OFF/OP EST MAY X REQ PHY/QHP: CPT | Performed by: FAMILY MEDICINE

## 2020-02-11 PROCEDURE — 3017F COLORECTAL CA SCREEN DOC REV: CPT | Performed by: STUDENT IN AN ORGANIZED HEALTH CARE EDUCATION/TRAINING PROGRAM

## 2020-02-11 PROCEDURE — 2022F DILAT RTA XM EVC RTNOPTHY: CPT | Performed by: STUDENT IN AN ORGANIZED HEALTH CARE EDUCATION/TRAINING PROGRAM

## 2020-02-11 PROCEDURE — 99213 OFFICE O/P EST LOW 20 MIN: CPT | Performed by: STUDENT IN AN ORGANIZED HEALTH CARE EDUCATION/TRAINING PROGRAM

## 2020-02-11 RX ORDER — GABAPENTIN 300 MG/1
600 CAPSULE ORAL 3 TIMES DAILY
Qty: 90 CAPSULE | Refills: 3 | Status: SHIPPED | OUTPATIENT
Start: 2020-02-11 | End: 2020-06-18

## 2020-02-11 ASSESSMENT — ENCOUNTER SYMPTOMS
ABDOMINAL DISTENTION: 0
BACK PAIN: 1
ABDOMINAL PAIN: 0
NAUSEA: 0
VOMITING: 0
SINUS PRESSURE: 0
COLOR CHANGE: 0
VOICE CHANGE: 0
SINUS PAIN: 0
COUGH: 0
SHORTNESS OF BREATH: 0
WHEEZING: 0

## 2020-02-11 NOTE — PROGRESS NOTES
30 days.  blood glucose test strips (ONE TOUCH ULTRA TEST) strip 100 each 5     Sig: TESTING TWICE A DAY       There are no discontinued medications. Ronni Burciaga received counseling on the following healthy behaviors: nutrition, exercise and medication adherence    Discussed use,benefit, and side effects of prescribed medications. Barriers to medication compliance addressed. All patient questions answered. Pt voiced understanding. No follow-ups on file. Disclaimer: Some orall of this note was transcribed using voice-recognition software. This may cause typographical errors occasionally. Although all effort is made to fix these errors, please do not hesitate to contact our office if there Eron Mcgarry concern with the understanding of this note.

## 2020-02-11 NOTE — PATIENT INSTRUCTIONS
Thank you for letting us take care of you today. We hope all your questions were addressed. If a question was overlooked or something else comes to mind after you return home, please contact a member of your Care Team listed below. Please make sure you have a routine office visit set up to follow-up on [address]. Your Care Team at [place] [address] is Team #3  Maria Eugenia Long MD (Faculty)  Nicolette West MD (Faculty  Glenn Gonsales MD (Resident)  Jennifer Bella MD (Resident)   Wale Lao MD (Resident)  Carson Arzate MD (Resident)  Phuong Radford MD (Resident)  LUCA Nieto., [facility]  Shayy Waters., [facility] office)  [facility] office)  Dong Gentile ([address] [facility])  [place], [place] ([id number] [place] [address])  Danae Fenton, Ph.D., (Behavioral Services)  Huong Bergeron [facility] (Clinical Pharmacist)     Office phone number: [phone]    If you need to get in right away due to illness, please be advised we have \"Same Day\" appointments available Monday-Friday. Please call us at [phone] option #3 to schedule your \"Same Day\" appointment. Patient Education        hepatitis A adult vaccine  Pronunciation:  HEP a VÍCTOR harrison  Brand:  Havrix, Vaqta  What is the most important information I should know about hepatitis A vaccine? You should not receive this vaccine if you have ever had a life-threatening allergic reaction to any vaccine containing hepatitis A, or if you are allergic to neomycin. What is hepatitis A vaccine? Hepatitis is a serious disease caused by a virus. Hepatitis causes inflammation of the liver, vomiting, and jaundice (yellowing of the skin or eyes). Hepatitis can lead to liver cancer, cirrhosis, or death. Hepatitis A is spread through contact with the stool (bowel movements) of a person infected with the hepatitis A virus.  This usually occurs by eating food or drinking water that has become contaminated as a result of handling by an could be more harmful to the baby if the mother becomes infected with a disease that this vaccine could prevent. Your doctor will decide whether you should receive this vaccine, especially if you have a high risk of infection with hepatitis A. It is not known if hepatitis A vaccine passes into breast milk or if it could harm a nursing baby. Tell your doctor if you are breast-feeding a baby. Hepatitis A vaccine is not approved for use by anyone younger than 13 months old. How is this vaccine given? This vaccine is given as an injection (shot) into a muscle. You will receive this injection in a doctor's office or clinic setting. You will most likely receive 2 separate injections of the hepatitis A vaccine at 6 months apart, depending on your exposure or risk of infection. To prevent hepatitis A while traveling, you should receive this vaccine at least 2 weeks before your trip. Your healthcare provider will determine the best dosing schedule for your situation. Your doctor may recommend treating fever and pain with an aspirin free pain reliever such as acetaminophen (Tylenol) or ibuprofen (Motrin, Advil, and others) when the shot is given and for the next 24 hours. Follow the label directions or your doctor's instructions about how much of this medicine to use. What happens if I miss a dose? Contact your doctor if you miss a booster dose or if you get behind schedule. The next dose should be given as soon as possible. There is no need to start over. Be sure to receive all recommended doses of this vaccine or you may not be fully protected against disease. What happens if I overdose? An overdose of this vaccine is unlikely to occur. What should I avoid before or after getting this vaccine? Follow your doctor's instructions about any restrictions on food, beverages, or activity. What are the possible side effects of hepatitis A vaccine?   Get emergency medical help if you have signs of an allergic reaction: hives; difficult breathing; swelling of your face, lips, tongue, or throat. You should not receive a booster vaccine if you had a life threatening allergic reaction after the first shot. Keep track of any and all side effects you have after receiving this vaccine. When you receive a booster dose, you will need to tell the doctor if the previous shot caused any side effects. Becoming infected with hepatitis A is much more dangerous to your health than receiving this vaccine. However, like any medicine, this vaccine can cause side effects but the risk of serious side effects is extremely low. Call your doctor at once if you have:  · extreme drowsiness, fainting; or  · high fever (within a few hours or a few days after the vaccine). Common side effects may include:  · low fever, general ill feeling;  · nausea, loss of appetite;  · headache; or  · swelling, tenderness, redness, warmth, or a hard lump where the shot was given. This is not a complete list of side effects and others may occur. Call your doctor for medical advice about side effects. You may report vaccine side effects to the [place] [address] and Human Services at [phone]. What other drugs will affect hepatitis A vaccine? Before receiving this vaccine, tell the doctor about all other vaccines you have recently received. Also tell the doctor if you have recently received drugs or treatments that can weaken the immune system, including:  · an oral, nasal, inhaled, or injectable steroid medicine;  · medications to treat psoriasis, rheumatoid arthritis, or other autoimmune disorders; or  · medicines to treat or prevent organ transplant rejection. If you are using any of these medications, you may not be able to receive the vaccine, or may need to wait until the other treatments are finished. This list is not complete.  Other drugs may interact with hepatitis A vaccine, including prescription and over-the-counter medicines, vitamins, and herbal products. Not all possible interactions are listed in this medication guide. Where can I get more information? Your doctor or pharmacist can provide more information about this vaccine. Additional information is available from your local health department or the Centers for Disease Control and Prevention. Remember, keep this and all other medicines out of the reach of children, never share your medicines with others, and use this medication only for the indication prescribed. Every effort has been made to ensure that the information provided by [address] is accurate, up-to-date, and complete, but no guarantee is made to that effect. Drug information contained herein may be time sensitive. [facility] information has been compiled for use by healthcare practitioners and consumers in the [place] and therefore [facility] does not warrant that uses outside of the [place] are appropriate, unless specifically indicated otherwise. [facility]'s drug information does not endorse drugs, diagnose patients or recommend therapy. [facility] drug information is an informational resource designed to assist licensed healthcare practitioners in caring for their patients and/or to serve consumers viewing this service as a supplement to, and not a substitute for, the expertise, skill, knowledge and judgment of healthcare practitioners. The absence of a warning for a given drug or drug combination in no way should be construed to indicate that the drug or drug combination is safe, effective or appropriate for any given patient. [facility] does not assume any responsibility for any aspect of healthcare administered with the aid of information [facility] provides. The information contained herein is not intended to cover all possible uses, directions, precautions, warnings, drug interactions, allergic reactions, or adverse effects.  If you have questions about the drugs you are taking, check with your doctor, nurse or

## 2020-02-18 ENCOUNTER — ANESTHESIA EVENT (OUTPATIENT)
Dept: OPERATING ROOM | Age: 55
End: 2020-02-18
Payer: MEDICAID

## 2020-02-19 ENCOUNTER — APPOINTMENT (OUTPATIENT)
Dept: GENERAL RADIOLOGY | Age: 55
End: 2020-02-19
Attending: PODIATRIST
Payer: MEDICAID

## 2020-02-19 ENCOUNTER — HOSPITAL ENCOUNTER (OUTPATIENT)
Age: 55
Setting detail: OUTPATIENT SURGERY
Discharge: HOME | End: 2020-02-19
Attending: PODIATRIST | Admitting: PODIATRIST
Payer: MEDICAID

## 2020-02-19 ENCOUNTER — ANESTHESIA (OUTPATIENT)
Dept: OPERATING ROOM | Age: 55
End: 2020-02-19
Payer: MEDICAID

## 2020-02-19 VITALS — DIASTOLIC BLOOD PRESSURE: 63 MMHG | TEMPERATURE: 99.5 F | SYSTOLIC BLOOD PRESSURE: 106 MMHG | OXYGEN SATURATION: 97 %

## 2020-02-19 VITALS
HEIGHT: 69 IN | WEIGHT: 155 LBS | BODY MASS INDEX: 22.96 KG/M2 | HEART RATE: 86 BPM | RESPIRATION RATE: 16 BRPM | OXYGEN SATURATION: 95 % | SYSTOLIC BLOOD PRESSURE: 128 MMHG | DIASTOLIC BLOOD PRESSURE: 76 MMHG | TEMPERATURE: 97.7 F

## 2020-02-19 DIAGNOSIS — Z98.890 STATUS POST LEFT FOOT SURGERY: ICD-10-CM

## 2020-02-19 DIAGNOSIS — G89.18 POST-OP PAIN: Primary | ICD-10-CM

## 2020-02-19 LAB
ALBUMIN SERPL-MCNC: 3.9 G/DL (ref 3.5–5.2)
ALBUMIN/GLOBULIN RATIO: NORMAL (ref 1–2.5)
ALP BLD-CCNC: 82 U/L (ref 40–129)
ALT SERPL-CCNC: 34 U/L (ref 5–41)
AST SERPL-CCNC: 36 U/L
BILIRUB SERPL-MCNC: 0.62 MG/DL (ref 0.3–1.2)
BILIRUBIN DIRECT: 0.23 MG/DL
BILIRUBIN, INDIRECT: 0.39 MG/DL (ref 0–1)
GLOBULIN: NORMAL G/DL (ref 1.5–3.8)
GLUCOSE BLD-MCNC: 145 MG/DL (ref 75–110)
GLUCOSE BLD-MCNC: 150 MG/DL (ref 70–99)
INR BLD: 1.1
PARTIAL THROMBOPLASTIN TIME: 28.5 SEC (ref 23–31)
PROTHROMBIN TIME: 10.9 SEC (ref 9.7–11.6)
TOTAL PROTEIN: 7.6 G/DL (ref 6.4–8.3)

## 2020-02-19 PROCEDURE — C1713 ANCHOR/SCREW BN/BN,TIS/BN: HCPCS | Performed by: PODIATRIST

## 2020-02-19 PROCEDURE — 3700000001 HC ADD 15 MINUTES (ANESTHESIA): Performed by: PODIATRIST

## 2020-02-19 PROCEDURE — 6360000002 HC RX W HCPCS: Performed by: NURSE ANESTHETIST, CERTIFIED REGISTERED

## 2020-02-19 PROCEDURE — 2500000003 HC RX 250 WO HCPCS: Performed by: PODIATRIST

## 2020-02-19 PROCEDURE — 80076 HEPATIC FUNCTION PANEL: CPT

## 2020-02-19 PROCEDURE — C1776 JOINT DEVICE (IMPLANTABLE): HCPCS | Performed by: PODIATRIST

## 2020-02-19 PROCEDURE — C9290 INJ, BUPIVACAINE LIPOSOME: HCPCS | Performed by: PODIATRIST

## 2020-02-19 PROCEDURE — 6360000002 HC RX W HCPCS: Performed by: PODIATRIST

## 2020-02-19 PROCEDURE — 2580000003 HC RX 258: Performed by: ANESTHESIOLOGY

## 2020-02-19 PROCEDURE — 85730 THROMBOPLASTIN TIME PARTIAL: CPT

## 2020-02-19 PROCEDURE — 3600000003 HC SURGERY LEVEL 3 BASE: Performed by: PODIATRIST

## 2020-02-19 PROCEDURE — 7100000011 HC PHASE II RECOVERY - ADDTL 15 MIN: Performed by: PODIATRIST

## 2020-02-19 PROCEDURE — 3700000000 HC ANESTHESIA ATTENDED CARE: Performed by: PODIATRIST

## 2020-02-19 PROCEDURE — 3209999900 FLUORO FOR SURGICAL PROCEDURES

## 2020-02-19 PROCEDURE — 82947 ASSAY GLUCOSE BLOOD QUANT: CPT

## 2020-02-19 PROCEDURE — 7100000010 HC PHASE II RECOVERY - FIRST 15 MIN: Performed by: PODIATRIST

## 2020-02-19 PROCEDURE — 73620 X-RAY EXAM OF FOOT: CPT

## 2020-02-19 PROCEDURE — C1769 GUIDE WIRE: HCPCS | Performed by: PODIATRIST

## 2020-02-19 PROCEDURE — 3600000013 HC SURGERY LEVEL 3 ADDTL 15MIN: Performed by: PODIATRIST

## 2020-02-19 PROCEDURE — 2709999900 HC NON-CHARGEABLE SUPPLY: Performed by: PODIATRIST

## 2020-02-19 PROCEDURE — 85610 PROTHROMBIN TIME: CPT

## 2020-02-19 PROCEDURE — 2580000003 HC RX 258: Performed by: PODIATRIST

## 2020-02-19 PROCEDURE — 2500000003 HC RX 250 WO HCPCS: Performed by: NURSE ANESTHETIST, CERTIFIED REGISTERED

## 2020-02-19 PROCEDURE — 73630 X-RAY EXAM OF FOOT: CPT

## 2020-02-19 PROCEDURE — 2720000010 HC SURG SUPPLY STERILE: Performed by: PODIATRIST

## 2020-02-19 PROCEDURE — 6370000000 HC RX 637 (ALT 250 FOR IP): Performed by: ANESTHESIOLOGY

## 2020-02-19 DEVICE — LOCKING SCREW
Type: IMPLANTABLE DEVICE | Site: FOOT | Status: FUNCTIONAL
Brand: VARIAX

## 2020-02-19 DEVICE — CANNULATED SCREW
Type: IMPLANTABLE DEVICE | Site: FOOT | Status: FUNCTIONAL
Brand: ASNIS

## 2020-02-19 DEVICE — POLYAXIAL LOCKING PLATE -  LAPIDUS CROSS-PLATE, LEFT (T10)
Type: IMPLANTABLE DEVICE | Site: FOOT | Status: FUNCTIONAL
Brand: ANCHORAGE

## 2020-02-19 DEVICE — BIOACTIVE BONE GRAFT SUBSTITUTE, FOAM PACK; BETA-TRICALCIUM PHOSPHATE, TYPE I BOVINE COLLAGEN, AND BIOACTIVE GLASS
Type: IMPLANTABLE DEVICE | Site: FOOT | Status: FUNCTIONAL
Brand: VITOSS BBTRAUMA

## 2020-02-19 DEVICE — OSTEOSYNTHESIS COMPRESSION STAPLE
Type: IMPLANTABLE DEVICE | Site: FOOT | Status: FUNCTIONAL
Brand: EASY CLIP

## 2020-02-19 RX ORDER — FENTANYL CITRATE 50 UG/ML
INJECTION, SOLUTION INTRAMUSCULAR; INTRAVENOUS PRN
Status: DISCONTINUED | OUTPATIENT
Start: 2020-02-19 | End: 2020-02-19 | Stop reason: SDUPTHER

## 2020-02-19 RX ORDER — MIDAZOLAM HYDROCHLORIDE 1 MG/ML
INJECTION INTRAMUSCULAR; INTRAVENOUS PRN
Status: DISCONTINUED | OUTPATIENT
Start: 2020-02-19 | End: 2020-02-19 | Stop reason: SDUPTHER

## 2020-02-19 RX ORDER — SODIUM CHLORIDE, SODIUM LACTATE, POTASSIUM CHLORIDE, CALCIUM CHLORIDE 600; 310; 30; 20 MG/100ML; MG/100ML; MG/100ML; MG/100ML
INJECTION, SOLUTION INTRAVENOUS CONTINUOUS
Status: DISCONTINUED | OUTPATIENT
Start: 2020-02-20 | End: 2020-02-19 | Stop reason: SDUPTHER

## 2020-02-19 RX ORDER — HYDROMORPHONE HCL 110MG/55ML
0.5 PATIENT CONTROLLED ANALGESIA SYRINGE INTRAVENOUS EVERY 5 MIN PRN
Status: DISCONTINUED | OUTPATIENT
Start: 2020-02-19 | End: 2020-02-19 | Stop reason: HOSPADM

## 2020-02-19 RX ORDER — PROMETHAZINE HYDROCHLORIDE 25 MG/ML
6.25 INJECTION, SOLUTION INTRAMUSCULAR; INTRAVENOUS
Status: DISCONTINUED | OUTPATIENT
Start: 2020-02-19 | End: 2020-02-19 | Stop reason: HOSPADM

## 2020-02-19 RX ORDER — PHENYLEPHRINE HCL IN 0.9% NACL 1 MG/10 ML
SYRINGE (ML) INTRAVENOUS PRN
Status: DISCONTINUED | OUTPATIENT
Start: 2020-02-19 | End: 2020-02-19 | Stop reason: SDUPTHER

## 2020-02-19 RX ORDER — OXYCODONE HYDROCHLORIDE AND ACETAMINOPHEN 5; 325 MG/1; MG/1
1 TABLET ORAL EVERY 4 HOURS PRN
Qty: 28 TABLET | Refills: 0 | Status: SHIPPED | OUTPATIENT
Start: 2020-02-19 | End: 2020-02-26

## 2020-02-19 RX ORDER — LIDOCAINE HYDROCHLORIDE 20 MG/ML
INJECTION, SOLUTION EPIDURAL; INFILTRATION; INTRACAUDAL; PERINEURAL PRN
Status: DISCONTINUED | OUTPATIENT
Start: 2020-02-19 | End: 2020-02-19 | Stop reason: SDUPTHER

## 2020-02-19 RX ORDER — ONDANSETRON 2 MG/ML
4 INJECTION INTRAMUSCULAR; INTRAVENOUS
Status: DISCONTINUED | OUTPATIENT
Start: 2020-02-19 | End: 2020-02-19 | Stop reason: HOSPADM

## 2020-02-19 RX ORDER — FENTANYL CITRATE 50 UG/ML
25 INJECTION, SOLUTION INTRAMUSCULAR; INTRAVENOUS EVERY 5 MIN PRN
Status: DISCONTINUED | OUTPATIENT
Start: 2020-02-19 | End: 2020-02-19 | Stop reason: HOSPADM

## 2020-02-19 RX ORDER — SODIUM CHLORIDE 0.9 % (FLUSH) 0.9 %
10 SYRINGE (ML) INJECTION EVERY 12 HOURS SCHEDULED
Status: DISCONTINUED | OUTPATIENT
Start: 2020-02-19 | End: 2020-02-19 | Stop reason: HOSPADM

## 2020-02-19 RX ORDER — DEXAMETHASONE SODIUM PHOSPHATE 10 MG/ML
INJECTION INTRAMUSCULAR; INTRAVENOUS PRN
Status: DISCONTINUED | OUTPATIENT
Start: 2020-02-19 | End: 2020-02-19 | Stop reason: SDUPTHER

## 2020-02-19 RX ORDER — SODIUM CHLORIDE 9 MG/ML
INJECTION, SOLUTION INTRAVENOUS CONTINUOUS
Status: DISCONTINUED | OUTPATIENT
Start: 2020-02-19 | End: 2020-02-19 | Stop reason: HOSPADM

## 2020-02-19 RX ORDER — OXYCODONE HYDROCHLORIDE AND ACETAMINOPHEN 5; 325 MG/1; MG/1
2 TABLET ORAL PRN
Status: COMPLETED | OUTPATIENT
Start: 2020-02-19 | End: 2020-02-19

## 2020-02-19 RX ORDER — SODIUM CHLORIDE 9 MG/ML
INJECTION, SOLUTION INTRAVENOUS CONTINUOUS
Status: DISCONTINUED | OUTPATIENT
Start: 2020-02-20 | End: 2020-02-19 | Stop reason: SDUPTHER

## 2020-02-19 RX ORDER — SODIUM CHLORIDE, SODIUM LACTATE, POTASSIUM CHLORIDE, CALCIUM CHLORIDE 600; 310; 30; 20 MG/100ML; MG/100ML; MG/100ML; MG/100ML
INJECTION, SOLUTION INTRAVENOUS CONTINUOUS
Status: DISCONTINUED | OUTPATIENT
Start: 2020-02-19 | End: 2020-02-19 | Stop reason: HOSPADM

## 2020-02-19 RX ORDER — OXYCODONE HYDROCHLORIDE AND ACETAMINOPHEN 5; 325 MG/1; MG/1
1 TABLET ORAL PRN
Status: COMPLETED | OUTPATIENT
Start: 2020-02-19 | End: 2020-02-19

## 2020-02-19 RX ORDER — PROPOFOL 10 MG/ML
INJECTION, EMULSION INTRAVENOUS PRN
Status: DISCONTINUED | OUTPATIENT
Start: 2020-02-19 | End: 2020-02-19 | Stop reason: SDUPTHER

## 2020-02-19 RX ORDER — ONDANSETRON 2 MG/ML
INJECTION INTRAMUSCULAR; INTRAVENOUS PRN
Status: DISCONTINUED | OUTPATIENT
Start: 2020-02-19 | End: 2020-02-19 | Stop reason: SDUPTHER

## 2020-02-19 RX ORDER — HYDRALAZINE HYDROCHLORIDE 20 MG/ML
5 INJECTION INTRAMUSCULAR; INTRAVENOUS EVERY 10 MIN PRN
Status: DISCONTINUED | OUTPATIENT
Start: 2020-02-19 | End: 2020-02-19 | Stop reason: HOSPADM

## 2020-02-19 RX ORDER — SODIUM CHLORIDE 0.9 % (FLUSH) 0.9 %
10 SYRINGE (ML) INJECTION PRN
Status: DISCONTINUED | OUTPATIENT
Start: 2020-02-19 | End: 2020-02-19 | Stop reason: HOSPADM

## 2020-02-19 RX ORDER — LIDOCAINE HYDROCHLORIDE 10 MG/ML
1 INJECTION, SOLUTION EPIDURAL; INFILTRATION; INTRACAUDAL; PERINEURAL
Status: DISCONTINUED | OUTPATIENT
Start: 2020-02-19 | End: 2020-02-19 | Stop reason: HOSPADM

## 2020-02-19 RX ORDER — LABETALOL HYDROCHLORIDE 5 MG/ML
5 INJECTION, SOLUTION INTRAVENOUS EVERY 10 MIN PRN
Status: DISCONTINUED | OUTPATIENT
Start: 2020-02-19 | End: 2020-02-19 | Stop reason: HOSPADM

## 2020-02-19 RX ADMIN — SODIUM CHLORIDE, POTASSIUM CHLORIDE, SODIUM LACTATE AND CALCIUM CHLORIDE: 600; 310; 30; 20 INJECTION, SOLUTION INTRAVENOUS at 09:48

## 2020-02-19 RX ADMIN — Medication 100 MCG: at 08:54

## 2020-02-19 RX ADMIN — LIDOCAINE HYDROCHLORIDE 100 MG: 20 INJECTION, SOLUTION EPIDURAL; INFILTRATION; INTRACAUDAL; PERINEURAL at 08:30

## 2020-02-19 RX ADMIN — Medication 25 MCG: at 09:24

## 2020-02-19 RX ADMIN — OXYCODONE HYDROCHLORIDE AND ACETAMINOPHEN 1 TABLET: 5; 325 TABLET ORAL at 13:06

## 2020-02-19 RX ADMIN — PROPOFOL 200 MG: 10 INJECTION, EMULSION INTRAVENOUS at 08:30

## 2020-02-19 RX ADMIN — Medication 50 MCG: at 08:30

## 2020-02-19 RX ADMIN — Medication 100 MCG: at 09:00

## 2020-02-19 RX ADMIN — Medication 25 MCG: at 11:28

## 2020-02-19 RX ADMIN — CEFAZOLIN 2 G: 1 INJECTION, POWDER, FOR SOLUTION INTRAMUSCULAR; INTRAVENOUS at 08:35

## 2020-02-19 RX ADMIN — DEXAMETHASONE SODIUM PHOSPHATE 10 MG: 10 INJECTION INTRAMUSCULAR; INTRAVENOUS at 08:35

## 2020-02-19 RX ADMIN — SODIUM CHLORIDE, POTASSIUM CHLORIDE, SODIUM LACTATE AND CALCIUM CHLORIDE: 600; 310; 30; 20 INJECTION, SOLUTION INTRAVENOUS at 07:31

## 2020-02-19 RX ADMIN — Medication 200 MCG: at 09:05

## 2020-02-19 RX ADMIN — SODIUM CHLORIDE, POTASSIUM CHLORIDE, SODIUM LACTATE AND CALCIUM CHLORIDE: 600; 310; 30; 20 INJECTION, SOLUTION INTRAVENOUS at 08:23

## 2020-02-19 RX ADMIN — ONDANSETRON 4 MG: 2 INJECTION, SOLUTION INTRAMUSCULAR; INTRAVENOUS at 12:06

## 2020-02-19 RX ADMIN — MIDAZOLAM 2 MG: 1 INJECTION INTRAMUSCULAR; INTRAVENOUS at 08:23

## 2020-02-19 ASSESSMENT — PULMONARY FUNCTION TESTS
PIF_VALUE: 3
PIF_VALUE: 16
PIF_VALUE: 15
PIF_VALUE: 3
PIF_VALUE: 3
PIF_VALUE: 31
PIF_VALUE: 3
PIF_VALUE: 3
PIF_VALUE: 15
PIF_VALUE: 15
PIF_VALUE: 6
PIF_VALUE: 3
PIF_VALUE: 15
PIF_VALUE: 16
PIF_VALUE: 3
PIF_VALUE: 17
PIF_VALUE: 3
PIF_VALUE: 15
PIF_VALUE: 3
PIF_VALUE: 15
PIF_VALUE: 14
PIF_VALUE: 3
PIF_VALUE: 3
PIF_VALUE: 0
PIF_VALUE: 3
PIF_VALUE: 15
PIF_VALUE: 3
PIF_VALUE: 15
PIF_VALUE: 3
PIF_VALUE: 3
PIF_VALUE: 14
PIF_VALUE: 3
PIF_VALUE: 0
PIF_VALUE: 3
PIF_VALUE: 26
PIF_VALUE: 3
PIF_VALUE: 16
PIF_VALUE: 3
PIF_VALUE: 3
PIF_VALUE: 17
PIF_VALUE: 15
PIF_VALUE: 15
PIF_VALUE: 3
PIF_VALUE: 15
PIF_VALUE: 15
PIF_VALUE: 3
PIF_VALUE: 3
PIF_VALUE: 1
PIF_VALUE: 16
PIF_VALUE: 3
PIF_VALUE: 15
PIF_VALUE: 16
PIF_VALUE: 14
PIF_VALUE: 3
PIF_VALUE: 15
PIF_VALUE: 3
PIF_VALUE: 15
PIF_VALUE: 4
PIF_VALUE: 15
PIF_VALUE: 3
PIF_VALUE: 15
PIF_VALUE: 14
PIF_VALUE: 3
PIF_VALUE: 15
PIF_VALUE: 3
PIF_VALUE: 15
PIF_VALUE: 16
PIF_VALUE: 3
PIF_VALUE: 15
PIF_VALUE: 3
PIF_VALUE: 15
PIF_VALUE: 0
PIF_VALUE: 3
PIF_VALUE: 15
PIF_VALUE: 3
PIF_VALUE: 3
PIF_VALUE: 4
PIF_VALUE: 3
PIF_VALUE: 0
PIF_VALUE: 3
PIF_VALUE: 16
PIF_VALUE: 3
PIF_VALUE: 1
PIF_VALUE: 15
PIF_VALUE: 3
PIF_VALUE: 3
PIF_VALUE: 15
PIF_VALUE: 3
PIF_VALUE: 25
PIF_VALUE: 3
PIF_VALUE: 16
PIF_VALUE: 3
PIF_VALUE: 3
PIF_VALUE: 12
PIF_VALUE: 3
PIF_VALUE: 1
PIF_VALUE: 3
PIF_VALUE: 1
PIF_VALUE: 17
PIF_VALUE: 3
PIF_VALUE: 0
PIF_VALUE: 3
PIF_VALUE: 15
PIF_VALUE: 3
PIF_VALUE: 3
PIF_VALUE: 15
PIF_VALUE: 15
PIF_VALUE: 3
PIF_VALUE: 16
PIF_VALUE: 3
PIF_VALUE: 4
PIF_VALUE: 12
PIF_VALUE: 3
PIF_VALUE: 3
PIF_VALUE: 16
PIF_VALUE: 15
PIF_VALUE: 3
PIF_VALUE: 15
PIF_VALUE: 2
PIF_VALUE: 3
PIF_VALUE: 2
PIF_VALUE: 11
PIF_VALUE: 3
PIF_VALUE: 2
PIF_VALUE: 3
PIF_VALUE: 3
PIF_VALUE: 13
PIF_VALUE: 3
PIF_VALUE: 15
PIF_VALUE: 3
PIF_VALUE: 15
PIF_VALUE: 4
PIF_VALUE: 3
PIF_VALUE: 3
PIF_VALUE: 0
PIF_VALUE: 3
PIF_VALUE: 3
PIF_VALUE: 15
PIF_VALUE: 16
PIF_VALUE: 17
PIF_VALUE: 17
PIF_VALUE: 3
PIF_VALUE: 1
PIF_VALUE: 3
PIF_VALUE: 15
PIF_VALUE: 3
PIF_VALUE: 16
PIF_VALUE: 3
PIF_VALUE: 16
PIF_VALUE: 15
PIF_VALUE: 0
PIF_VALUE: 3

## 2020-02-19 ASSESSMENT — PAIN DESCRIPTION - ORIENTATION: ORIENTATION: LEFT

## 2020-02-19 ASSESSMENT — PAIN SCALES - GENERAL
PAINLEVEL_OUTOF10: 2
PAINLEVEL_OUTOF10: 5
PAINLEVEL_OUTOF10: 6

## 2020-02-19 ASSESSMENT — PAIN DESCRIPTION - DESCRIPTORS: DESCRIPTORS: DISCOMFORT

## 2020-02-19 ASSESSMENT — PAIN DESCRIPTION - LOCATION: LOCATION: FOOT

## 2020-02-19 ASSESSMENT — PAIN - FUNCTIONAL ASSESSMENT: PAIN_FUNCTIONAL_ASSESSMENT: 0-10

## 2020-02-19 NOTE — ANESTHESIA PRE PROCEDURE
Answered  Counseling given: Not Answered  Comment: stopped chew 9 years ago . pt states he smokes 4-5 ciggs daily      Vital Signs (Current):   Vitals:    02/19/20 0643 02/19/20 0644 02/19/20 0713 02/19/20 0721   BP: (!) 154/102   132/80   Pulse: 90 93     Resp: 20      Temp: 97.7 °F (36.5 °C) 97.8 °F (36.6 °C)     TempSrc: Oral Oral     SpO2: 95% 96%     Weight:   155 lb (70.3 kg)    Height:   5' 9\" (1.753 m)                                               BP Readings from Last 3 Encounters:   02/19/20 132/80   02/11/20 131/87   01/22/20 (!) 134/92       NPO Status: Time of last liquid consumption: 2100                        Time of last solid consumption: 2200                        Date of last liquid consumption: 02/18/20                        Date of last solid food consumption: 02/18/20    BMI:   Wt Readings from Last 3 Encounters:   02/19/20 155 lb (70.3 kg)   02/11/20 155 lb 6.4 oz (70.5 kg)   01/22/20 150 lb 6.4 oz (68.2 kg)     Body mass index is 22.89 kg/m². CBC:   Lab Results   Component Value Date    WBC 6.6 01/05/2020    RBC 5.99 01/05/2020    RBC 6.07 11/10/2011    HGB 17.5 01/05/2020    HCT 52.4 01/05/2020    MCV 87.5 01/05/2020    RDW 11.6 01/05/2020     01/05/2020     11/10/2011       CMP:   Lab Results   Component Value Date     01/05/2020    K 3.7 01/05/2020    CL 97 01/05/2020    CO2 24 01/05/2020    BUN 16 01/05/2020    CREATININE 0.91 01/05/2020    GFRAA >60 01/05/2020    LABGLOM >60 01/05/2020    GLUCOSE 150 02/19/2020    GLUCOSE 354 11/10/2011    PROT 7.6 02/19/2020    CALCIUM 8.8 01/05/2020    BILITOT 0.62 02/19/2020    ALKPHOS 82 02/19/2020    AST 36 02/19/2020    ALT 34 02/19/2020       POC Tests: No results for input(s): POCGLU, POCNA, POCK, POCCL, POCBUN, POCHEMO, POCHCT in the last 72 hours.     Coags:   Lab Results   Component Value Date    PROTIME 10.9 02/19/2020    PROTIME 18.5 09/08/2015    INR 1.1 02/19/2020    APTT 28.5 02/19/2020       HCG (If Applicable): No results found for: PREGTESTUR, PREGSERUM, HCG, HCGQUANT     ABGs: No results found for: PHART, PO2ART, ENA8RMW, YLH8IND, BEART, Z9YOVBIC     Type & Screen (If Applicable):  No results found for: [facility]    Anesthesia Evaluation  Patient summary reviewed and Nursing notes reviewed no history of anesthetic complications:   Airway: Mallampati: II  TM distance: >3 FB   Neck ROM: full  Mouth opening: > = 3 FB Dental: normal exam         Pulmonary:normal exam        (-) COPD                           Cardiovascular:  Exercise tolerance: no interval change,   (+) hypertension:,     (-) pacemaker, CAD and CABG/stent    ECG reviewed    Rate: normal                    Neuro/Psych:               GI/Hepatic/Renal:   (+) hepatitis: C, liver disease:,           Endo/Other:    (+) Diabetes, . Abdominal:           Vascular:                                        Anesthesia Plan      general     ASA 3       Induction: intravenous. Anesthetic plan and risks discussed with patient. Plan discussed with CRNA.     Attending anesthesiologist reviewed and agrees with Pre Eval content              Fam Reyes DO   2/19/2020

## 2020-02-19 NOTE — H&P
History and Physical Update    Pt Name: Babita Noyola  MRN: [id number]  YOB: 1965  Date of evaluation: 2/19/2020      [x] I have reviewed the progress note found in Epic by Dr. Dereje Horner from 02/11/2020 which meets the criteria for an Interval History and Physical note. [x] I have examined  Babita Noyola a 63 y.o., male who is scheduled for a left foot modified lapidus with jojo osteotomy by Dr. Chloé Patterson due to left hallux valgus. The patient denies health changes since his appointment with Dr. Dereje Horner on 02/11/2020. Pt denies fever, chills, productive cough, SOB, chest pain, open sores, rashes, and wounds. History of diabetes. POC blood glucose is pending. ASA was last taken on 02/10/2020 per pt. Motrin was not taken in the past 10 days per pt. History of MRSA in the right foot. Pt denies history of asthma and COPD. Vital signs: BP (!) 154/102   Pulse 93   Temp 97.8 °F (36.6 °C) (Oral)   Resp 20   SpO2 96%      Allergies:  Morphine    Past medical history, surgical history, social history, and family history were reviewed and updated in EPIC as indicated. Medications:    Prior to Admission medications    Medication Sig Start Date End Date Taking? Authorizing Provider   gabapentin (NEURONTIN) 300 MG capsule Take 2 capsules by mouth 3 times daily for 30 days. 2/11/20 3/12/20  Dereje Horner MD   blood glucose test strips (ONE TOUCH ULTRA TEST) strip TESTING TWICE A DAY 2/11/20   Dereje Horner MD   [facility] [place]  Devices (WALL GRAB BAR) MISC Use as required 2/4/20   Dereje Horner MD   lidocaine (LIDODERM) 5 % Place 1 patch onto the skin daily 12 hours on, 12 hours off. 2/3/20 3/4/20  Dereje Horner MD   lisinopril (PRINIVIL;ZESTRIL) 40 MG tablet TAKE 1 TAB BY MOUTH ONCE A DAY 2/3/20   Dereje Horner MD   insulin glargine (BASAGLAR KWIKPEN) 100 UNIT/ML injection pen Inject 35 Units into the skin 2 times daily 2/3/20 3/4/20  Dereje Horner MD   famotidine (PEPCID) 20 MG tablet TAKE 1 TAB BY MOUTH TWICE A DAY 2/3/20   Huong Hernández MD   chlorthalidone (HYGROTON) 25 MG tablet Once daily 2/3/20   Huong Hernández MD   aspirin (ASPIRIN ADULT LOW STRENGTH) 81 MG EC tablet TAKE 1 TAB BY MOUTH ONCE A DAY 2/3/20   Huong Hernández MD   ondansetron (ZOFRAN) 4 MG tablet Take 1 tablet by mouth every 8 hours as needed for Nausea or Vomiting 2/3/20   Huong Hernández MD   amLODIPine (NORVASC) 10 MG tablet TAKE 1 TAB BY MOUTH ONCE A DAY 2/3/20   Huong Hernández MD   naloxone 4 MG/0.1ML LIQD nasal spray 1 spray by Nasal route as needed for Opioid Reversal 2/3/20   Huong Hernández MD   metFORMIN (GLUCOPHAGE) 1000 MG tablet Take 1 tablet by mouth 2 times daily (with meals) 2/3/20   Huong Hernández MD   ibuprofen (ADVIL;MOTRIN) 800 MG tablet Take 1 tablet by mouth every 8 hours as needed for Pain 2/3/20   Huong Hernández MD   fluticasone (FLONASE) 50 MCG/ACT nasal spray 1 spray by Each Nostril route daily 2/3/20   Huong Hernández MD   docusate sodium (COLACE) 100 MG capsule Take 1 capsule by mouth daily as needed for Constipation 2/3/20   Huong Hernández MD   Insulin Syringe-Needle U-100 (INSULIN SYRINGE .5CC/30GX1/2\") 30G X 1/2\" 0.5 ML MISC Use 3 times daily. Dx: 250.00   Prescribe this syringe for insulin dosages under  50 units per injection. 2/3/20   Huong Hernández MD   Blood Pressure KIT 1 kit by Does not apply route three times daily Please replace with any kit that is covered by insurance.  2/3/20   Huong Hernández MD   nicotine (NICODERM CQ) 14 MG/24HR Place 1 patch onto the skin every 24 hours 2/3/20 2/2/21  Huogn Hernández MD   atorvastatin (LIPITOR) 20 MG tablet Take 1 tablet by mouth daily 2/3/20   Huong Hernández MD   Insulin Syringe-Needle U-100 (B-D INS SYR ULTRAFINE 1CC/30G) 30G X 1/2\" 1 ML MISC AS DIRECTED WITH LANTUS 12/3/19   Huong Hernández MD   [facility] LANCETS 76R MISC USE AS DIRECTED TWICE A DAY 12/3/19 1/22/20  Sarai Shivam Kidd MD   sildenafil (VIAGRA) 50 MG tablet Take 1 tablet by mouth as needed for Erectile Dysfunction 7/26/19   Carolina Browning MD   glucose monitoring kit (FREESTYLE) monitoring kit 1 kit by Does not apply route daily Whichever the insurance approves 12/7/18 1/14/20  Claudette Cyr MD       This is a 63 y.o. male who is pleasant, cooperative, alert and oriented x 3, in no acute distress. Heart: Murmur 1/6. Regular rate and rhythm without gallop or rub. Lungs: Diminished throughout. Normal respiratory effort. Unlabored. No wheezes or rales bilaterally. Abdomen: Soft, non-tender, non-distended with active bowel sounds. Pedal pulses: 2+ bilaterally         Labs:  No results for input(s): HGB, HCT, WBC, MCV, PLT, NA, K, CL, CO2, BUN, CREATININE, GLUCOSE, INR, PROTIME, APTT, AST, ALT, LABALBU, HCG in the last 720 hours. TORIN Washburn-BC  Electronically signed 2/19/2020 at 7:08 Rj Avalos MD   Resident   Family Medicine   Progress Notes   Signed   Encounter Date:  2/11/2020          Related encounter: Office Visit from 2/11/2020 in [facility]         Signed        Expand All Collapse All     Show:Clear all  [x]Manual[x]Template[]Copied    Added by:  Tammi Mack MD    []Geena for details  Subjective: Christine [place] is a 63 y.o. male with  has a past medical history of Chronic back pain, Diabetes mellitus ([address] Diabetic neuropathy ([address] DM (diabetes mellitus) ([address] Hepatitis C, Hypertension, MDRO (multiple drug resistant organisms) resistance, Murmur, and Osteoarthritis. Presented to the office today for:       Chief Complaint   Patient presents with    Hypertension       follow up     Forms       pt is having surgery 02/19/20 @ 8:30am pt needs clearance filled out, he states has form with him          HPI     Pt is here for surgical clearance for his left foot bunion.  Pt has a surgery scheduled with Dr. Hilary Mena on 2/19/2020. Pt is also requesting pain medication for his chronic back problems. Pt states that he will soon receive a back surgery and needs pain medication to control his pain. Pt also requesting medication refills. Pt follow Dr. Amy Deluca at [address] [facility]. Pt has an EMG scheduled for March 11th. Review of Systems   Constitutional: Negative for fatigue, fever and unexpected weight change. HENT: Negative for sinus pressure, sinus pain and voice change. Eyes: Negative for visual disturbance. Respiratory: Negative for cough, shortness of breath and wheezing. Cardiovascular: Negative for chest pain, palpitations and leg swelling. Gastrointestinal: Negative for abdominal distention, abdominal pain, nausea and vomiting. Endocrine: Negative for polydipsia, polyphagia and polyuria. Genitourinary: Negative for difficulty urinating, flank pain and frequency. Musculoskeletal: Positive for back pain and gait problem. Skin: Negative for color change, rash and wound. Neurological: Negative for dizziness, light-headedness, numbness and headaches. Psychiatric/Behavioral: Negative for confusion and decreased concentration. The patient is not nervous/anxious. The patient has a   Family History         Family History   Problem Relation Age of Onset    High Blood Pressure Mother      High Blood Pressure Father              Objective:    /87 (Site: Left Upper Arm, Position: Sitting, Cuff Size: Medium Adult) Comment: machine  Pulse 88   Temp 97.6 °F (36.4 °C) (Oral)   Ht 5' 9.02\" (1.753 m)   Wt 155 lb 6.4 oz (70.5 kg)   SpO2 98%   BMI 22.94 kg/m²        BP Readings from Last 3 Encounters:   02/11/20 131/87   01/22/20 (!) 134/92   01/14/20 (!) 152/99         Physical Exam  Constitutional:       Appearance: Normal appearance. HENT:      Head: Atraumatic.       Mouth/Throat:      Mouth: Mucous membranes are moist.      Pharynx: No oropharyngeal exudate or posterior oropharyngeal erythema. Eyes:      Extraocular Movements: Extraocular movements intact. Neck:      Musculoskeletal: Normal range of motion. Cardiovascular:      Rate and Rhythm: Normal rate and regular rhythm. Heart sounds: No murmur. No friction rub. No gallop. Pulmonary:      Effort: Pulmonary effort is normal.      Breath sounds: No wheezing, rhonchi or rales. Abdominal:      General: Abdomen is flat. Tenderness: There is no abdominal tenderness. There is no guarding. Hernia: No hernia is present. Musculoskeletal: Normal range of motion. General: Tenderness (lumbar tenderness to palpation) present. No swelling. Comments: Bunions of left and right first toes   Skin:     General: Skin is warm. Capillary Refill: Capillary refill takes less than 2 seconds. Coloration: Skin is not jaundiced. Findings: No bruising. Neurological:      Mental Status: He is alert and oriented to person, place, and time. Lab Results   Component Value Date     WBC 6.6 01/05/2020     HGB 17.5 (H) 01/05/2020     HCT 52.4 (H) 01/05/2020      01/05/2020     CHOL 216 (H) 08/15/2019     TRIG 165 (H) 08/15/2019     HDL 71 08/15/2019     ALT 32 07/27/2018     AST 31 07/27/2018      (L) 01/05/2020     K 3.7 01/05/2020     CL 97 (L) 01/05/2020     CREATININE 0.91 01/05/2020     BUN 16 01/05/2020     CO2 24 01/05/2020     TSH 1.05 01/04/2020     INR 1.1 01/03/2020     LABA1C 6.6 (H) 01/05/2020     LABMICR 99 (H) 08/15/2019            Lab Results   Component Value Date     CALCIUM 8.8 01/05/2020            Lab Results   Component Value Date     LDLCHOLESTEROL 112 08/15/2019         Assessment and Plan:    1. Lumbar spondylolysis  - follows Dr. Stephany Puckett at [facility], further management per neurosurgery. - Pt presents frequently for pain control.  Referred to [id number] [employer] pain management in the past. Reviewed records, pain management suggested Epidural injection and fresh course of physical therapy. Pt at high risk for opioid abuse due to prior history of substance abuse. EMG/NCS, aquatic therapy and JAMAAL recommended by Dr. Elio Carrillo.   - Reinforced continuing with physical therapy and avoiding opioids per paint mgmt recommendations. Advised to exhaust all other modalities first. Pt in agreement and will look into other options for pain management. 2. Bunion of great toe of left foot  - RCRI Class II 6% 30 days risk for MI, cardiac arrest, death  - Cleared for left foot surgery on 2/19/2020     3. Need for prophylactic vaccination and inoculation against varicella  - zoster recombinant adjuvanted vaccine [facility]) 50 MCG/0.5ML SUSR injection; Inject 0.5 mLs into the muscle once for 1 dose 50 MCG IM then repeat 2-6 months. Dispense: 1 each; Refill: 1     4. Uncontrolled type 2 diabetes mellitus with diabetic mononeuropathy, with long-term current use of insulin (HCC)  - blood glucose test strips (ONE TOUCH ULTRA TEST) strip; TESTING TWICE A DAY  Dispense: 100 each; Refill: 5              Requested Prescriptions             Pending Prescriptions Disp Refills    zoster recombinant adjuvanted vaccine (SHINGRIX) 50 MCG/0.5ML SUSR injection 1 each 1       Sig: Inject 0.5 mLs into the muscle once for 1 dose 50 MCG IM then repeat 2-6 months.  gabapentin (NEURONTIN) 300 MG capsule 90 capsule 3       Sig: Take 2 capsules by mouth 3 times daily for 30 days.  blood glucose test strips (ONE TOUCH ULTRA TEST) strip 100 each 5       Sig: TESTING TWICE A DAY         There are no discontinued medications. Davi Gus received counseling on the following healthy behaviors: nutrition, exercise and medication adherence     Discussed use,benefit, and side effects of prescribed medications. Barriers to medication compliance addressed. All patient questions answered. Pt voiced understanding. No follow-ups on file.            Disclaimer: Some orall of this note was transcribed using voice-recognition software. This may cause typographical errors occasionally. Although all effort is made to fix these errors, please do not hesitate to contact our office if there Kaylee Hoffmann concern with the understanding of this note.                      Revision History

## 2020-02-19 NOTE — BRIEF OP NOTE
PODIATRY BRIEF OP NOTE    PATIENT NAME: Lisa [place]  YOB: 1965  -  63 y.o. male  MRN: [id number]  DATE: 2/19/2020  BILLING #: [id number]    Surgeon(s):  Evan Kirkpatrick DPM     ASSISTANTS: Carlos Guy DPM     PRE-OP DIAGNOSIS:   1. Hallux abductovalgus deformity, left foot    POST-OP DIAGNOSIS: Same as above. PROCEDURE:   1. Lapidus bunionectomy with Can, left foot    ANESTHESIA: General with 10 cc 1:1 mix of 0.5% marcaine plain and 1% lidocaine plain    HEMOSTASIS: Pneumatic ankle tourniquet @ 250 mmHg for 184 minutes. ESTIMATED BLOOD LOSS: Less than 10cc. MATERIALS: 3-0 vicryl, 4-0 vicryl, 4-0 nylon  * No implants in log *    INJECTABLES: 10 cc of Exparel    SPECIMEN:   * No specimens in log *    COMPLICATIONS: None    FINDINGS: IM angle improved post Lapidus bunionectomy with Akin and EHL lengthening.     Carlos Guy DPM   Podiatric Medicine & Surgery   2/19/2020 at 7:14 AM

## 2020-02-19 NOTE — ANESTHESIA POSTPROCEDURE EVALUATION
Department of Anesthesiology  Postprocedure Note    Patient: Kelle Lazcano  MRN: [id number]  YOB: 1965  Date of evaluation: 2/19/2020  Time:  2:23 PM     Procedure Summary     Date:  02/19/20 Room / Location:  [place] [address] / [facility] - INPATIENT    Anesthesia Start:  6501 Anesthesia Stop:  5177    Procedure:  LEFT FOOT MODIFIED LAPIDUS WITH ADRIANE OSTEOTOMY (Left Foot) Diagnosis:  (DX HALLUX VALGUS LEFT)    Surgeon:  Baljit Soliz DPM Responsible Provider:  Nusrat Easton DO    Anesthesia Type:  general ASA Status:  3          Anesthesia Type: general    Sandra Phase I:      Sandra Phase II: Sandra Score: 9    Last vitals: Reviewed and per EMR flowsheets.        Anesthesia Post Evaluation    Patient location during evaluation: PACU  Patient participation: complete - patient participated  Level of consciousness: awake and alert  Airway patency: patent  Nausea & Vomiting: no nausea and no vomiting  Complications: no  Cardiovascular status: hemodynamically stable  Respiratory status: acceptable  Hydration status: stable

## 2020-02-20 NOTE — OP NOTE
PATIENT NAME: Christine Puga  YOB: 1965  -  63 y.o. male  MRN: [id number]  DATE: 2/19/2020  BILLING #: [id number]     Surgeon(s):  Puneet Sow DPM      ASSISTANTS: Mike Bee DPM      PRE-OP DIAGNOSIS:   1. Hallux abductovalgus deformity, left foot     POST-OP DIAGNOSIS: Same as above.     PROCEDURE:   1. Lapidus bunionectomy with Can, left foot     ANESTHESIA: General with 10 cc 1:1 mix of 0.5% marcaine plain and 1% lidocaine plain     HEMOSTASIS: Pneumatic ankle tourniquet @ 250 mmHg for 184 minutes.     ESTIMATED BLOOD LOSS: Less than 10cc.     MATERIALS: 3-0 vicryl, 4-0 vicryl, 4-0 nylon  Implant Name Type Inv.  Item Serial No.  Lot No. LRB No. Used   PACK VITOSS TRAUMA FOAM Spine PACK VITOSS TRAUMA FOAM  JOJO: ORTHOPAEDICS [id number] Left 1   PLATE POLYAXIAL LK LAPIDUS CROSS LT T10 Screw/Plate/Nail/Valentino PLATE POLYAXIAL LK LAPIDUS CROSS LT T10  JOJO: ORTHOPAEDICS  Left 1   SCREW CP LAG 4.1X28MM Screw/Plate/Nail/Valentino SCREW CP LAG 4.1X28MM  JOJO: ORTHOPAEDICS  Left 1   SCREW LK 16MM T10 FULL Screw/Plate/Nail/Valentino SCREW LK 16MM T10 FULL  JOJO: ORTHOPAEDICS  Left 1   SCREW LK T10 FLTHRD 3.5X18MM Screw/Plate/Nail/Valentino SCREW LK T10 FLTHRD 3.5X18MM  JOJO: ORTHOPAEDICS  Left 3   SCREW DARIEL PTHRD ASNIS III 4.0X40MM Screw/Plate/Nail/Valentino SCREW DARIEL PTHRD ASNIS III 4.0X40MM  JOJO: ORTHOPAEDICS  Left 1   EASYCLIP 8U0G7CD SI FOREFOOT Leg/Ankle/Foot/Toe EASYCLIP 3N8S5NS SI FOREFOOT  JOJO: ORTHOPAEDICS [id number] Left 1   EASYCLIP 2U5S4UQ SI FOREFOOT Leg/Ankle/Foot/Toe EASYCLIP 9Z0W3VI SI FOREFOOT  JOJO: ORTHOPAEDICS [id number] Left 1   PIN ANCHORAGE FIX Screw/Plate/Nail/Valentino PIN ANCHORAGE FIX  JOJO: ORTHOPAEDICS  Left 1   SCREW LK T10 FLTHRD 3.5X36MM Screw/Plate/Nail/Valentino SCREW LK T10 FLTHRD 3.5X36MM  JOJO: ORTHOPAEDICS  Left 2        INJECTABLES: 10 cc of Exparel     SPECIMEN:   * No specimens in log *     COMPLICATIONS: None     FINDINGS: IM angle improved post Lapidus bunionectomy with Akin and EHL lengthening. Indications for procedure: Patient has had a painful bunion deformity on his left foot and has failed conservative treatments and elected to undergo surgery. Risks and benefits were discussed. No guarantees were given or implied. Consent is signed and in the chart. PROCEDURE IN DETAIL: The patient was transported from pre-op to the operating room and placed on the operating table in the supine position with a safety strap across his lap. A pneumatic ankle tourniquet was applied to the left ankle. After adequate sedation by the Anesthesia, a espinoza block of 10cc of 1:1 mixture of 1% lidocaine plain and 0.5% Marcaine plain was injected. The foot was then prepped and draped in the usual aseptic fashion. The foot was then exsanguinated with an Esmarch bandage. The pneumatic ankle tourniquet was inflated to 250 mmHg. Following a timeout, attention was directed to the first metatarsal cuneiform joint. A dorsal incision was created over the first metatarsal cuneiform joint down past the 1st IPJ with a #15 blade. The incision was then deepened. The skin and subcutaneous tissue were then undermined off of the capsule. A dorsal linear capsular incision was then created over the joint. The periosteum and capsule were then reflected off of the first metatarsal base and medial cuneiform. Care was taken to preserve the extensor halucis longus tendon. Flat cuts were made on the base of the metatarsal and cuneiform with angulation to correct for the intermetatarsal angle. With the joint surfaces removed, the fusion site was fixated temporarily. Vitoss bone graft was inserted into the osteotomy site to fill voids and allow for a better alignment. Confirming that correction was made under c-arm, The guide wire was inserted and was then drilled over the [place] 2 plate.  The 2 distal locking screws were inserted followed by the lag screw through the hole, and finally the proximal screws were inserted per manufacture guidelines. This was checked on c-arm, which showed a loss of correction. It was decided to remove the lag screw and insert a 4.0 40 mm partially threaded screw external from the plate and reinsert the 2 distal screws. The IM angle was improved, but there was still a reducible HAV deformity appreciated. Attention was then directed to the dorsal aspect of the 1st MTPJ where a dorsal medial incision was deepened through subcutaneous tissue over the first metatarsophalangeal joint with a #15 blade. The subcutaneous tissue was then dissected off of the capsule medially. A dorsal linear capsular incision was created over the first metatarsophalangeal joint. The periosteum and capsule were then reflected off of the first metatarsal head using a key elevator. There was noted to be a prominent medial eminence. Attention was then directed to medial eminence, which was resected with a sagittal saw. All rough edges of bone were smoothed with a power rasp. Attention was then directed to the first interspace where a lateral release was performed:  a combination of sharp and blunt dissection was carried out until the adductor tendon insertions were identified. The conjoined tendon of the adductor hallucis and the DTIM ligament was transected and a lateral capsulotomy was performed. Attention was then directed to the base of the proximal phalanx. The periosteum was incised with the #15 blade and reflected medially and laterally. A medially based closing wedge osteotomy was created, but the lateral hinge fractured. The osteotomy site was fixated with two compression staples. At this time the foot was loaded and the 1st ray was slightly dorsiflexed. A Z-lengthening was performed on the EHL tendon with a #15 blade and re approximated in the rectus position using 3-0 vicryl. The foot was loaded and the 1st ray was noted to be in a rectus position.  The skin was closed in layers

## 2020-03-02 ENCOUNTER — TELEPHONE (OUTPATIENT)
Dept: FAMILY MEDICINE CLINIC | Age: 55
End: 2020-03-02

## 2020-03-02 RX ORDER — RANITIDINE 150 MG/1
75 TABLET ORAL 2 TIMES DAILY
Qty: 180 TABLET | Refills: 3 | Status: SHIPPED | OUTPATIENT
Start: 2020-03-02 | End: 2020-12-31 | Stop reason: SDUPTHER

## 2020-03-02 NOTE — TELEPHONE ENCOUNTER
Pharmacy calling office because they no longer make famotidine. They would like t changes to Ranitidine. Please advise.

## 2020-03-02 NOTE — TELEPHONE ENCOUNTER
Medication has been changed from famotidine to zantac 75mg BID. Please inform patient and pharmacy of the change. Thank you!

## 2020-04-07 ENCOUNTER — VIRTUAL VISIT (OUTPATIENT)
Dept: FAMILY MEDICINE CLINIC | Age: 55
End: 2020-04-07
Payer: MEDICAID

## 2020-04-07 PROCEDURE — 99441 PR PHYS/QHP TELEPHONE EVALUATION 5-10 MIN: CPT | Performed by: STUDENT IN AN ORGANIZED HEALTH CARE EDUCATION/TRAINING PROGRAM

## 2020-04-07 NOTE — PROGRESS NOTES
Tan Sepulveda is a 63 y.o. male evaluated via telephone on 4/7/2020. Consent:  He and/or health care decision maker is aware that that he may receive a bill for this telephone service, depending on his insurance coverage, and has provided verbal consent to proceed: Yes      Documentation:  I communicated with the patient and/or health care decision maker about HTN. Details of this discussion including any medical advice provided: Called patient in regards to HTN. BP well controlled. States he has medications at home. Compliant with medications. Denies any acute concerns today. We will schedule appointment with patient in office in 2 months time. I affirm this is a Patient Initiated Episode with an Established Patient who has not had a related appointment within my department in the past 7 days or scheduled within the next 24 hours.     Total Time: minutes: 5-10 minutes    Note: not billable if this call serves to triage the patient into an appointment for the relevant concern      Seema Villareal

## 2020-04-08 NOTE — PROGRESS NOTES
Attending Physician Statement    Wt Readings from Last 3 Encounters:   02/19/20 155 lb (70.3 kg)   02/11/20 155 lb 6.4 oz (70.5 kg)   01/22/20 150 lb 6.4 oz (68.2 kg)     Temp Readings from Last 3 Encounters:   02/19/20 99.5 °F (37.5 °C)   02/19/20 97.7 °F (36.5 °C) (Temporal)   02/11/20 97.6 °F (36.4 °C) (Oral)     BP Readings from Last 3 Encounters:   02/19/20 106/63   02/19/20 128/76   02/11/20 131/87     Pulse Readings from Last 3 Encounters:   02/19/20 86   02/11/20 88   01/22/20 98         I have discussed the care of Kaylee Catherine, including pertinent history  with the resident. I have reviewed the key elements of all parts of the encounter with the resident. I agree with the assessment, plan and orders as documented by the resident.   (GE Modifier)

## 2020-06-18 ENCOUNTER — NURSE TRIAGE (OUTPATIENT)
Dept: OTHER | Facility: CLINIC | Age: 55
End: 2020-06-18

## 2020-06-18 NOTE — TELEPHONE ENCOUNTER
Out of diabetic strips and meter, out of all medication for 3 days, pt c/o of being dizzy and in pain due to his back    Reason for Disposition   Caller requesting a NON-URGENT new prescription or refill and triager unable to refill per department policy    Answer Assessment - Initial Assessment Questions  1. SYMPTOMS: \"Do you have any symptoms? \"      Dizziness and pain  2. SEVERITY: If symptoms are present, ask \"Are they mild, moderate or severe? \"      moderate    Protocols used: MEDICATION QUESTION CALL-ADULT-[place]

## 2020-06-23 ENCOUNTER — OFFICE VISIT (OUTPATIENT)
Dept: FAMILY MEDICINE CLINIC | Age: 55
End: 2020-06-23
Payer: MEDICAID

## 2020-06-23 VITALS
WEIGHT: 159 LBS | DIASTOLIC BLOOD PRESSURE: 90 MMHG | TEMPERATURE: 97.5 F | HEART RATE: 70 BPM | SYSTOLIC BLOOD PRESSURE: 130 MMHG | BODY MASS INDEX: 23.48 KG/M2

## 2020-06-23 PROCEDURE — 4004F PT TOBACCO SCREEN RCVD TLK: CPT | Performed by: STUDENT IN AN ORGANIZED HEALTH CARE EDUCATION/TRAINING PROGRAM

## 2020-06-23 PROCEDURE — G8427 DOCREV CUR MEDS BY ELIG CLIN: HCPCS | Performed by: STUDENT IN AN ORGANIZED HEALTH CARE EDUCATION/TRAINING PROGRAM

## 2020-06-23 PROCEDURE — 96372 THER/PROPH/DIAG INJ SC/IM: CPT

## 2020-06-23 PROCEDURE — 99213 OFFICE O/P EST LOW 20 MIN: CPT | Performed by: STUDENT IN AN ORGANIZED HEALTH CARE EDUCATION/TRAINING PROGRAM

## 2020-06-23 PROCEDURE — 6360000002 HC RX W HCPCS

## 2020-06-23 PROCEDURE — 3017F COLORECTAL CA SCREEN DOC REV: CPT | Performed by: STUDENT IN AN ORGANIZED HEALTH CARE EDUCATION/TRAINING PROGRAM

## 2020-06-23 PROCEDURE — 3044F HG A1C LEVEL LT 7.0%: CPT | Performed by: STUDENT IN AN ORGANIZED HEALTH CARE EDUCATION/TRAINING PROGRAM

## 2020-06-23 PROCEDURE — G8420 CALC BMI NORM PARAMETERS: HCPCS | Performed by: STUDENT IN AN ORGANIZED HEALTH CARE EDUCATION/TRAINING PROGRAM

## 2020-06-23 PROCEDURE — 2022F DILAT RTA XM EVC RTNOPTHY: CPT | Performed by: STUDENT IN AN ORGANIZED HEALTH CARE EDUCATION/TRAINING PROGRAM

## 2020-06-23 RX ORDER — CYCLOBENZAPRINE HCL 10 MG
10 TABLET ORAL NIGHTLY PRN
Qty: 10 TABLET | Refills: 0 | Status: SHIPPED | OUTPATIENT
Start: 2020-06-23 | End: 2020-07-03

## 2020-06-23 RX ORDER — KETOROLAC TROMETHAMINE 30 MG/ML
60 INJECTION, SOLUTION INTRAMUSCULAR; INTRAVENOUS ONCE
Status: COMPLETED | OUTPATIENT
Start: 2020-06-23 | End: 2020-06-23

## 2020-06-23 RX ORDER — NAPROXEN 500 MG/1
500 TABLET ORAL 2 TIMES DAILY WITH MEALS
Qty: 60 TABLET | Refills: 3 | Status: SHIPPED | OUTPATIENT
Start: 2020-06-23 | End: 2020-06-23

## 2020-06-23 RX ADMIN — KETOROLAC TROMETHAMINE 60 MG: 30 INJECTION, SOLUTION INTRAMUSCULAR; INTRAVENOUS at 10:43

## 2020-06-23 ASSESSMENT — ENCOUNTER SYMPTOMS
EYE PAIN: 0
WHEEZING: 0
EYE DISCHARGE: 0
PHOTOPHOBIA: 0
BLOOD IN STOOL: 0
EYE REDNESS: 0
NAUSEA: 0
ABDOMINAL PAIN: 0
COUGH: 0
CONSTIPATION: 0
SHORTNESS OF BREATH: 0
DIARRHEA: 0
CHEST TIGHTNESS: 0
BACK PAIN: 0
SORE THROAT: 0

## 2020-06-23 NOTE — PROGRESS NOTES
Visit Information    Have you changed or started any medications since your last visit including any over-the-counter medicines, vitamins, or herbal medicines? no   Have you stopped taking any of your medications? Is so, why? -  no  Are you having any side effects from any of your medications? - no    Have you seen any other physician or provider since your last visit?  no   Have you had any other diagnostic tests since your last visit?  no   Have you been seen in the emergency room and/or had an admission in a hospital since we last saw you?  no   Have you had your routine dental cleaning in the past 6 months?  no     Do you have an active MyChart account? If no, what is the barrier?   No: declined    Patient Care Team:  Radha Venegas MD as PCP - General (Family Medicine)  Jamir Jacobo MD as Orthopedic Surgeon (Orthopedic Surgery)  Jozef Peacock MD as Consulting Physician (Neurology)  Delfino July, DPM as Surgeon (Podiatry)  Carlton Kapoor,  as Consulting Physician (General Surgery)  Ganesh Calle MD as Consulting Physician (Gastroenterology)    Medical History Review  Past Medical, Family, and Social History reviewed and does not contribute to the patient presenting condition    Health Maintenance   Topic Date Due    Shingles Vaccine (1 of 2) 02/11/2014    Diabetic retinal exam  03/10/2016    Diabetic foot exam  07/26/2020    Diabetic microalbuminuria test  08/15/2020    Lipid screen  08/15/2020    A1C test (Diabetic or Prediabetic)  01/05/2021    Potassium monitoring  01/05/2021    Creatinine monitoring  01/05/2021    DTaP/Tdap/Td vaccine (2 - Td) 02/22/2026    Colon cancer screen colonoscopy  11/19/2028    Hepatitis A vaccine  Completed    Hepatitis B vaccine  Completed    Flu vaccine  Completed    Pneumococcal 0-64 years Vaccine  Completed    HIV screen  Completed    Hib vaccine  Aged Out    Meningococcal (ACWY) vaccine  Aged Out

## 2020-06-23 NOTE — PROGRESS NOTES
Subjective: Tan Sepulveda is a 63 y.o. male with  has a past medical history of Chronic back pain, Diabetes mellitus ([facility] Diabetic neuropathy ([facility] DM (diabetes mellitus) ([facility] Hepatitis C, Hypertension, MDRO (multiple drug resistant organisms) resistance, Murmur, and Osteoarthritis. Family History   Problem Relation Age of Onset    High Blood Pressure Mother     High Blood Pressure Father        Presented tothe office today for:  Chief Complaint   Patient presents with    Back Pain     patient complains of back pain, supossed to have surgery 7-16. Pain 10/10    Medication Refill     here for refill, wants to talk about increasing dose of gabapentin    Diabetes     needs dm eye exam, wants referral    Pain     wants referral to pain management       HPI  Back pain   Hx of diabetes last a1c is 6.6 in [facility]. Right low back pain. Has an appointment with neuro surg on July 16. Patient also sees neuro. States pain radiates down his leg. Sharp in nature. No problems with bowel or urination. Denies sensation loss. Review of Systems   Constitutional: Negative for chills, diaphoresis, fatigue and fever. HENT: Negative for ear pain and sore throat. Eyes: Negative for photophobia, pain, discharge, redness and visual disturbance. Respiratory: Negative for cough, chest tightness, shortness of breath and wheezing. Cardiovascular: Negative for chest pain, palpitations and leg swelling. Gastrointestinal: Negative for abdominal pain, blood in stool, constipation, diarrhea and nausea. Endocrine: Negative for cold intolerance and heat intolerance. Genitourinary: Negative for difficulty urinating and hematuria. Musculoskeletal: Negative for back pain and joint swelling. Neurological: Negative for light-headedness and headaches. Psychiatric/Behavioral: Negative for agitation and confusion.        Objective:    BP (!) 130/90   Pulse 70   Temp 97.5 °F (36.4 °C)   Wt 159 lb (72.1 kg)

## 2020-06-29 NOTE — PROGRESS NOTES
I have reviewed and discussed key elements of [address] [place] [address] with the resident including plan of care and follow up and agree with the care rosemarie plan.

## 2020-08-16 ENCOUNTER — HOSPITAL ENCOUNTER (EMERGENCY)
Age: 55
Discharge: HOME | End: 2020-08-16
Attending: EMERGENCY MEDICINE
Payer: MEDICAID

## 2020-08-16 VITALS
DIASTOLIC BLOOD PRESSURE: 88 MMHG | OXYGEN SATURATION: 99 % | RESPIRATION RATE: 16 BRPM | HEIGHT: 69 IN | BODY MASS INDEX: 23.7 KG/M2 | SYSTOLIC BLOOD PRESSURE: 130 MMHG | TEMPERATURE: 97.3 F | WEIGHT: 160 LBS | HEART RATE: 79 BPM

## 2020-08-16 PROCEDURE — 99283 EMERGENCY DEPT VISIT LOW MDM: CPT

## 2020-08-16 PROCEDURE — 6370000000 HC RX 637 (ALT 250 FOR IP): Performed by: STUDENT IN AN ORGANIZED HEALTH CARE EDUCATION/TRAINING PROGRAM

## 2020-08-16 PROCEDURE — 6360000002 HC RX W HCPCS: Performed by: STUDENT IN AN ORGANIZED HEALTH CARE EDUCATION/TRAINING PROGRAM

## 2020-08-16 PROCEDURE — 96372 THER/PROPH/DIAG INJ SC/IM: CPT

## 2020-08-16 RX ORDER — KETOROLAC TROMETHAMINE 15 MG/ML
15 INJECTION, SOLUTION INTRAMUSCULAR; INTRAVENOUS ONCE
Status: COMPLETED | OUTPATIENT
Start: 2020-08-16 | End: 2020-08-16

## 2020-08-16 RX ORDER — DIAZEPAM 5 MG/1
10 TABLET ORAL ONCE
Status: COMPLETED | OUTPATIENT
Start: 2020-08-16 | End: 2020-08-16

## 2020-08-16 RX ORDER — TRAMADOL HYDROCHLORIDE 50 MG/1
50 TABLET ORAL EVERY 8 HOURS PRN
Qty: 4 TABLET | Refills: 0 | Status: SHIPPED | OUTPATIENT
Start: 2020-08-16 | End: 2020-08-19

## 2020-08-16 RX ADMIN — KETOROLAC TROMETHAMINE 15 MG: 15 INJECTION, SOLUTION INTRAMUSCULAR; INTRAVENOUS at 11:37

## 2020-08-16 RX ADMIN — DIAZEPAM 10 MG: 5 TABLET ORAL at 11:37

## 2020-08-16 ASSESSMENT — PAIN DESCRIPTION - PAIN TYPE: TYPE: ACUTE PAIN

## 2020-08-16 ASSESSMENT — PAIN DESCRIPTION - LOCATION: LOCATION: BACK

## 2020-08-16 ASSESSMENT — PAIN SCALES - GENERAL
PAINLEVEL_OUTOF10: 10
PAINLEVEL_OUTOF10: 10

## 2020-08-16 ASSESSMENT — ENCOUNTER SYMPTOMS
SHORTNESS OF BREATH: 0
ABDOMINAL PAIN: 0
WHEEZING: 0
COUGH: 0
DIARRHEA: 0
BACK PAIN: 1
VOMITING: 0
NAUSEA: 0
CONSTIPATION: 0

## 2020-08-16 ASSESSMENT — PAIN DESCRIPTION - ORIENTATION: ORIENTATION: LOWER
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
## 2020-08-19 ENCOUNTER — OFFICE VISIT (OUTPATIENT)
Dept: FAMILY MEDICINE CLINIC | Age: 55
End: 2020-08-19
Payer: MEDICAID

## 2020-08-19 VITALS
SYSTOLIC BLOOD PRESSURE: 147 MMHG | BODY MASS INDEX: 22.31 KG/M2 | HEIGHT: 69 IN | HEART RATE: 91 BPM | TEMPERATURE: 98 F | WEIGHT: 150.6 LBS | DIASTOLIC BLOOD PRESSURE: 99 MMHG

## 2020-08-19 PROCEDURE — 3044F HG A1C LEVEL LT 7.0%: CPT | Performed by: STUDENT IN AN ORGANIZED HEALTH CARE EDUCATION/TRAINING PROGRAM

## 2020-08-19 PROCEDURE — G8427 DOCREV CUR MEDS BY ELIG CLIN: HCPCS | Performed by: STUDENT IN AN ORGANIZED HEALTH CARE EDUCATION/TRAINING PROGRAM

## 2020-08-19 PROCEDURE — 3017F COLORECTAL CA SCREEN DOC REV: CPT | Performed by: STUDENT IN AN ORGANIZED HEALTH CARE EDUCATION/TRAINING PROGRAM

## 2020-08-19 PROCEDURE — 2022F DILAT RTA XM EVC RTNOPTHY: CPT | Performed by: STUDENT IN AN ORGANIZED HEALTH CARE EDUCATION/TRAINING PROGRAM

## 2020-08-19 PROCEDURE — G8420 CALC BMI NORM PARAMETERS: HCPCS | Performed by: STUDENT IN AN ORGANIZED HEALTH CARE EDUCATION/TRAINING PROGRAM

## 2020-08-19 PROCEDURE — 99213 OFFICE O/P EST LOW 20 MIN: CPT | Performed by: STUDENT IN AN ORGANIZED HEALTH CARE EDUCATION/TRAINING PROGRAM

## 2020-08-19 PROCEDURE — 4004F PT TOBACCO SCREEN RCVD TLK: CPT | Performed by: STUDENT IN AN ORGANIZED HEALTH CARE EDUCATION/TRAINING PROGRAM

## 2020-08-19 PROCEDURE — 99211 OFF/OP EST MAY X REQ PHY/QHP: CPT | Performed by: FAMILY MEDICINE

## 2020-08-19 PROCEDURE — 1111F DSCHRG MED/CURRENT MED MERGE: CPT | Performed by: STUDENT IN AN ORGANIZED HEALTH CARE EDUCATION/TRAINING PROGRAM

## 2020-08-19 RX ORDER — LISINOPRIL 40 MG/1
TABLET ORAL
Qty: 30 TABLET | Refills: 5 | Status: SHIPPED | OUTPATIENT
Start: 2020-08-19 | End: 2020-12-31 | Stop reason: SDUPTHER

## 2020-08-19 RX ORDER — INSULIN GLARGINE 100 [IU]/ML
INJECTION, SOLUTION SUBCUTANEOUS
Qty: 5 PEN | Refills: 5 | Status: SHIPPED | OUTPATIENT
Start: 2020-08-19 | End: 2020-12-31 | Stop reason: SDUPTHER

## 2020-08-19 RX ORDER — AMLODIPINE BESYLATE 10 MG/1
TABLET ORAL
Qty: 30 TABLET | Refills: 5 | Status: SHIPPED | OUTPATIENT
Start: 2020-08-19 | End: 2020-12-31 | Stop reason: SDUPTHER

## 2020-08-19 RX ORDER — ASPIRIN 81 MG/1
TABLET ORAL
Qty: 30 TABLET | Refills: 3 | Status: SHIPPED | OUTPATIENT
Start: 2020-08-19 | End: 2020-12-14

## 2020-08-19 RX ORDER — SILDENAFIL 50 MG/1
50 TABLET, FILM COATED ORAL PRN
Qty: 10 TABLET | Refills: 3 | Status: SHIPPED | OUTPATIENT
Start: 2020-08-19 | End: 2020-12-31 | Stop reason: SDUPTHER

## 2020-08-19 RX ORDER — DOCUSATE SODIUM 100 MG/1
100 CAPSULE, LIQUID FILLED ORAL DAILY PRN
Qty: 60 CAPSULE | Refills: 3 | Status: SHIPPED | OUTPATIENT
Start: 2020-08-19 | End: 2021-12-07

## 2020-08-19 RX ORDER — CHLORTHALIDONE 25 MG/1
TABLET ORAL
Qty: 30 TABLET | Refills: 3 | Status: SHIPPED | OUTPATIENT
Start: 2020-08-19 | End: 2020-12-14

## 2020-08-19 RX ORDER — ATORVASTATIN CALCIUM 20 MG/1
20 TABLET, FILM COATED ORAL DAILY
Qty: 30 TABLET | Refills: 5 | Status: SHIPPED | OUTPATIENT
Start: 2020-08-19 | End: 2020-12-31 | Stop reason: SDUPTHER

## 2020-08-19 RX ORDER — GABAPENTIN 300 MG/1
CAPSULE ORAL
Qty: 90 CAPSULE | Refills: 3 | Status: SHIPPED | OUTPATIENT
Start: 2020-08-19 | End: 2020-12-23

## 2020-08-19 RX ORDER — ONDANSETRON 4 MG/1
4 TABLET, FILM COATED ORAL EVERY 8 HOURS PRN
Qty: 12 TABLET | Refills: 1 | Status: SHIPPED | OUTPATIENT
Start: 2020-08-19 | End: 2020-12-31 | Stop reason: SDUPTHER
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
## 2020-08-19 NOTE — PROGRESS NOTES
Visit Information    Have you changed or started any medications since your last visit including any over-the-counter medicines, vitamins, or herbal medicines? no   Have you stopped taking any of your medications? Is so, why? -  no  Are you having any side effects from any of your medications? - no    Have you seen any other physician or provider since your last visit?  no   Have you had any other diagnostic tests since your last visit?  no   Have you been seen in the emergency room and/or had an admission in a hospital since we last saw you?  no   Have you had your routine dental cleaning in the past 6 months?  no     Do you have an active MyChart account? If no, what is the barrier?   No: Decline    Patient Care Team:  Torsten Mack MD as PCP - General (Family Medicine)  Reji Madden MD as Orthopedic Surgeon (Orthopedic Surgery)  Davidson Khalil MD as Consulting Physician (Neurology)  Elba Heard DPM as Surgeon (Podiatry)  Dejon Bagley DO as Consulting Physician (General Surgery)  Leticia Thornton MD as Consulting Physician (Gastroenterology)    Medical History Review  Past Medical, Family, and Social History reviewed and does contribute to the patient presenting condition    Health Maintenance   Topic Date Due    Diabetic foot exam  07/26/2020    Diabetic microalbuminuria test  08/15/2020    Lipid screen  08/15/2020    Diabetic retinal exam  12/23/2020 (Originally 3/10/2016)    Shingles Vaccine (1 of 2) 06/23/2021 (Originally 2/11/2014)    Flu vaccine (1) 09/01/2020    A1C test (Diabetic or Prediabetic)  01/05/2021    Potassium monitoring  01/05/2021    Creatinine monitoring  01/05/2021    DTaP/Tdap/Td vaccine (2 - Td) 02/22/2026    Colon cancer screen colonoscopy  11/19/2028    Hepatitis A vaccine  Completed    Hepatitis B vaccine  Completed    Pneumococcal 0-64 years Vaccine  Completed    HIV screen  Completed    Hib vaccine  Aged Out    Meningococcal (ACWY) vaccine Aged Out

## 2020-08-20 ENCOUNTER — TELEPHONE (OUTPATIENT)
Dept: FAMILY MEDICINE CLINIC | Age: 55
End: 2020-08-20

## 2020-08-20 ENCOUNTER — TELEPHONE (OUTPATIENT)
Dept: PHARMACY | Age: 55
End: 2020-08-20

## 2020-08-20 NOTE — TELEPHONE ENCOUNTER
PA request for Basaglar     PA processed and submitted to pt insurance, waiting for response in regards to coverage

## 2020-08-20 NOTE — TELEPHONE ENCOUNTER
Medication Management Service     Patient's Name:  Lillian Myers YOB: 1965            ______________________________________________________________________    Date of patient's visit: 8/19/2020    Subjective/Objective: Lillian Myers spoke with pharmacy for a Comprehensive Medication Review and regarding the targeted intervention(s) listed below as identified by Outcomes[employer]M. Targeted Interventions:  [x] Medication adherence   [] Patient education  [] Suboptimal drug   [] Medication assessment  [] Cost-effective alternative  [] Needs drug therapy  [] Needs laboratory monitoring  [] Drug interaction  [] Unnecessary therapy  [] Adverse drug reaction  [] Inappropriate administration  ______________________________________________________________________    Past Medical/Social History:    Past Medical History:   Diagnosis Date    Chronic back pain     Diabetes mellitus ([facility] [address]     Diabetic neuropathy ([facility] [address]     DM (diabetes mellitus) ([facility] [address]     Hepatitis C     Hypertension     MDRO (multiple drug resistant organisms) resistance     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 pt states he smokes 4-5 ciggs daily   Substance Use Topics    Alcohol use: Yes     Comment: pt reports that he was a daily drinker approx. 1 year ago. Pt reports that now he just drinks occasionally    Drug use: No       Updated Medications and Allergies:     Allergies   Allergen Reactions    Morphine Hives         Medication Sig Comments    insulin glargine (BASAGLAR KWIKPEN) 100 UNIT/ML injection pen INJECT 35 UNITS SUBCUTANEOUSLY 2 TIMES DAILY     famotidine (PEPCID) 20 MG tablet TAKE ONE TABLET BY MOUTH 2 TIMES DAILY     gabapentin (NEURONTIN) 300 MG capsule TAKE TWO CAPSULES BY MOUTH THREE TIMES DAILY     lisinopril (PRINIVIL;ZESTRIL) 40 MG tablet TAKE 1 TAB BY MOUTH ONCE A DAY     metFORMIN (GLUCOPHAGE) 1000 MG tablet TAKE ONE TABLET BY MOUTH TWICE DAILY WITH MEALS     nicotine (NICODERM CQ) 14 MG/24HR  PLACE 1 PATCH ON SKIN EVERY 24 HOURS      ondansetron (ZOFRAN) 4 MG tablet Take 1 tablet by mouth every 8 hours as needed for Nausea or Vomiting     sildenafil (VIAGRA) 50 MG tablet Take 1 tablet by mouth as needed for Erectile Dysfunction     atorvastatin (LIPITOR) 20 MG tablet Take 1 tablet by mouth daily     aspirin (ASPIRIN LOW DOSE) 81 MG EC tablet TAKE ONE TABLET BY MOUTH ONCE DAILY     amLODIPine (NORVASC) 10 MG tablet TAKE 1 TAB BY MOUTH ONCE A DAY      No current facility-administered medications for this visit.        ______________________________________________________________________    Assessment/Plan:  · All patient questions answered. · Patient's medication list, current conditions, and allergies reviewed and updated. · Encouraged adherence to all prescribed therapy. · Patient provided with copy of [facility] [employer] including Personal Medication Record and Medication Action Plan (which will include indication for use) as required by CMS. Vamsi Belle, Pharm. D.   PGY-1 Pharmacy Practice Resident   8/20/2020 5:02 PM       I have discussed the care of this patient with the resident. I agree with the assessment and plan as documented by the resident.     Isael Mariano PharmD  PGY-2 Ambulatory Care Resident Pharmacist  Medication Management Service  [phone]  8/21/2020  10:02 AM          ========================================================    CLINICAL PHARMACY CONSULT: MED RECONCILIATION/REVIEW ADDENDUM    For Pharmacy Admin Tracking Only    PHSO: Yes  Total # of Interventions Recommended: 1  - Refills Provided #: 9  Total Interventions Accepted: 1  Time Spent (min): [address], PharmD  [facility] ([facility]) [address]

## 2020-09-08 ENCOUNTER — OFFICE VISIT (OUTPATIENT)
Dept: FAMILY MEDICINE CLINIC | Age: 55
End: 2020-09-08
Payer: MEDICAID

## 2020-09-08 VITALS
BODY MASS INDEX: 21.71 KG/M2 | SYSTOLIC BLOOD PRESSURE: 132 MMHG | TEMPERATURE: 96.6 F | HEART RATE: 82 BPM | DIASTOLIC BLOOD PRESSURE: 87 MMHG | WEIGHT: 147 LBS

## 2020-09-08 PROCEDURE — 99213 OFFICE O/P EST LOW 20 MIN: CPT | Performed by: STUDENT IN AN ORGANIZED HEALTH CARE EDUCATION/TRAINING PROGRAM

## 2020-09-08 PROCEDURE — 99211 OFF/OP EST MAY X REQ PHY/QHP: CPT | Performed by: FAMILY MEDICINE

## 2020-09-08 ASSESSMENT — ENCOUNTER SYMPTOMS
BACK PAIN: 0
BLOOD IN STOOL: 0
PHOTOPHOBIA: 0
COUGH: 0
SORE THROAT: 0
ABDOMINAL PAIN: 0
NAUSEA: 0
EYE DISCHARGE: 0
CHEST TIGHTNESS: 0
EYE PAIN: 0
EYE REDNESS: 0
WHEEZING: 0
DIARRHEA: 0
SHORTNESS OF BREATH: 0
CONSTIPATION: 0
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
## 2020-09-08 NOTE — PROGRESS NOTES
Attending Physician Statement    Wt Readings from Last 3 Encounters:   09/08/20 147 lb (66.7 kg)   08/19/20 150 lb 9.6 oz (68.3 kg)   08/16/20 160 lb (72.6 kg)     Temp Readings from Last 3 Encounters:   09/08/20 96.6 °F (35.9 °C)   08/19/20 98 °F (36.7 °C) (Temporal)   08/16/20 97.3 °F (36.3 °C) (Oral)     BP Readings from Last 3 Encounters:   09/08/20 132/87   08/19/20 (!) 147/99   08/16/20 130/88     Pulse Readings from Last 3 Encounters:   09/08/20 82   08/19/20 91   08/16/20 79         I have discussed the care of Andreas Verduzco, including pertinent history and exam findings with the resident. I have reviewed the key elements of all parts of the encounter with the resident. I agree with the assessment, plan and orders as documented by the resident.   (GE Modifier)

## 2020-09-08 NOTE — PROGRESS NOTES
Visit Information    Have you changed or started any medications since your last visit including any over-the-counter medicines, vitamins, or herbal medicines? no   Have you stopped taking any of your medications? Is so, why? -  no  Are you having any side effects from any of your medications? - no    Have you seen any other physician or provider since your last visit?  no   Have you had any other diagnostic tests since your last visit?  no   Have you been seen in the emergency room and/or had an admission in a hospital since we last saw you?  no   Have you had your routine dental cleaning in the past 6 months?  no     Do you have an active MyChart account? If no, what is the barrier?   No: declined    Patient Care Team:  Kandy Huang MD as PCP - General (Family Medicine)  Mary Jerry MD as Orthopedic Surgeon (Orthopedic Surgery)  Junior Busby MD as Consulting Physician (Neurology)  Sarita Baldwin DPM as Surgeon (Podiatry)  Alexandrea Dougherty DO as Consulting Physician (General Surgery)  Travon Franco MD as Consulting Physician (Gastroenterology)    Medical History Review  Past Medical, Family, and Social History reviewed and does not contribute to the patient presenting condition    Health Maintenance   Topic Date Due    Diabetic microalbuminuria test  08/15/2020    Flu vaccine (1) 09/01/2020    Lipid screen  08/15/2020    Diabetic retinal exam  12/23/2020 (Originally 3/10/2016)    Shingles Vaccine (1 of 2) 06/23/2021 (Originally 2/11/2014)    A1C test (Diabetic or Prediabetic)  01/05/2021    Potassium monitoring  01/05/2021    Creatinine monitoring  01/05/2021    Diabetic foot exam  08/19/2021    DTaP/Tdap/Td vaccine (2 - Td) 02/22/2026    Colon cancer screen colonoscopy  11/19/2028    Hepatitis A vaccine  Completed    Hepatitis B vaccine  Completed    Pneumococcal 0-64 years Vaccine  Completed    HIV screen  Completed    Hib vaccine  Aged Out    Meningococcal (ACWY) vaccine Aged Out

## 2020-09-22 ASSESSMENT — ENCOUNTER SYMPTOMS
SHORTNESS OF BREATH: 0
BLURRED VISION: 0
PERSISTENT INFECTIONS: 0
ABDOMINAL PAIN: 0
BACK PAIN: 1
POOR WOUND HEALING: 0

## 2020-09-22 NOTE — PROGRESS NOTES
Left 2/19/2020    LEFT FOOT MODIFIED LAPIDUS WITH AKIN OSTEOTOMY performed by Gavin Serrano DPM at [address] [place] [address]  9/8/15    laprascopic    COLONOSCOPY N/A 11/19/2018    COLONOSCOPY DIAGNOSTIC performed by Napoleon Esparza IV, DO at [facility] Right     FOOT SURGERY      hardware removed    FOOT SURGERY Right 6/26/14    delayed closure    FOOT SURGERY Right 10/15/14    removal of hardware    FOOT SURGERY Left 02/19/2020    Left Foot Modified Lapidus With Akin Osteotomy    OSTEOTOMY Right 2/6/2015    Akin Osteotomy right       Allergies   Allergen Reactions    Morphine Hives         Current Outpatient Medications:     insulin glargine (BASAGLAR KWIKPEN) 100 UNIT/ML injection pen, INJECT 35 UNITS SUBCUTANEOUSLY 2 TIMES DAILY, Disp: 5 pen, Rfl: 5    blood glucose test strips (ONE TOUCH ULTRA TEST) strip, TESTING TWICE A DAY, Disp: 100 each, Rfl: 5    chlorthalidone (HYGROTON) 25 MG tablet, TAKE ONE TABLET BY MOUTH DAILY, Disp: 30 tablet, Rfl: 3    docusate sodium (COLACE) 100 MG capsule, Take 1 capsule by mouth daily as needed for Constipation, Disp: 60 capsule, Rfl: 3    gabapentin (NEURONTIN) 300 MG capsule, TAKE TWO CAPSULES BY MOUTH THREE TIMES DAILY, Disp: 90 capsule, Rfl: 3    lisinopril (PRINIVIL;ZESTRIL) 40 MG tablet, TAKE 1 TAB BY MOUTH ONCE A DAY, Disp: 30 tablet, Rfl: 5    metFORMIN (GLUCOPHAGE) 1000 MG tablet, TAKE ONE TABLET BY MOUTH TWICE DAILY WITH MEALS, Disp: 60 tablet, Rfl: 3    ondansetron (ZOFRAN) 4 MG tablet, Take 1 tablet by mouth every 8 hours as needed for Nausea or Vomiting, Disp: 12 tablet, Rfl: 1    sildenafil (VIAGRA) 50 MG tablet, Take 1 tablet by mouth as needed for Erectile Dysfunction, Disp: 10 tablet, Rfl: 3    atorvastatin (LIPITOR) 20 MG tablet, Take 1 tablet by mouth daily, Disp: 30 tablet, Rfl: 5    aspirin (ASPIRIN LOW DOSE) 81 MG EC tablet, TAKE ONE TABLET BY MOUTH ONCE DAILY, Disp: 30 tablet, Rfl: 3   amLODIPine (NORVASC) 10 MG tablet, TAKE 1 TAB BY MOUTH ONCE A DAY, Disp: 30 tablet, Rfl: 5    lidocaine (LIDODERM) 5 %, APPLY 1 PATCH DAILY AS DIRECTED (12 HOURS ON, 12 HOURS OFF), Disp: 30 patch, Rfl: 3    raNITIdine (ZANTAC) 150 MG tablet, Take 0.5 tablets by mouth 2 times daily (Patient not taking: Reported on 8/19/2020), Disp: 180 tablet, Rfl: 3    Misc. Devices (WALL GRAB BAR) MISC, Use as required, Disp: 2 each, Rfl: 0    naloxone 4 MG/0.1ML LIQD nasal spray, 1 spray by Nasal route as needed for Opioid Reversal (Patient not taking: Reported on 6/23/2020), Disp: 1 each, Rfl: 3    ibuprofen (ADVIL;MOTRIN) 800 MG tablet, Take 1 tablet by mouth every 8 hours as needed for Pain, Disp: 30 tablet, Rfl: 2    Insulin Syringe-Needle U-100 (INSULIN SYRINGE .5CC/30GX1/2\") 30G X 1/2\" 0.5 ML MISC, Use 3 times daily. Dx: 250.00  Prescribe this syringe for insulin dosages under  50 units per injection. , Disp: 100 Syringe, Rfl: 5    Blood Pressure KIT, 1 kit by Does not apply route three times daily Please replace with any kit that is covered by insurance., Disp: 1 kit, Rfl: 0    Insulin Syringe-Needle U-100 (B-D INS SYR ULTRAFINE 1CC/30G) 30G X 1/2\" 1 ML MISC, AS DIRECTED WITH LANTUS, Disp: 10 each, Rfl: 3    ONETOUCH DELICA LANCETS 26N MISC, USE AS DIRECTED TWICE A DAY, Disp: 100 each, Rfl: 0    glucose monitoring kit (FREESTYLE) monitoring kit, 1 kit by Does not apply route daily Whichever the insurance approves, Disp: 1 kit, Rfl: 0    Family History   Problem Relation Age of Onset    High Blood Pressure Mother     High Blood Pressure Father        Social History     Socioeconomic History    Marital status: Single     Spouse name: Not on file    Number of children: Not on file    Years of education: Not on file    Highest education level: Not on file   Occupational History     Employer: N/A   Social Needs    Financial resource strain: Not on file    Food insecurity     Worry: Not on file     Inability: Not patient does not have insomnia. Allergic/Immunologic: Negative for persistent infections. Physical Exam:  Temp 98.2 °F (36.8 °C) (Temporal)   Ht 5' 9\" (1.753 m)   Wt 155 lb (70.3 kg)   BMI 22.89 kg/m²     Physical Exam  Vitals signs reviewed. Constitutional:       General: He is awake. Appearance: He is not ill-appearing or diaphoretic. HENT:      Right Ear: External ear normal.      Left Ear: External ear normal.   Eyes:      General:         Right eye: No discharge. Left eye: No discharge. Conjunctiva/sclera: Conjunctivae normal.   Neck:      Thyroid: No thyromegaly. Trachea: No tracheal deviation. Pulmonary:      Effort: Pulmonary effort is normal. No respiratory distress. Breath sounds: No stridor. Musculoskeletal:      Lumbar back: He exhibits tenderness. He exhibits no edema and no deformity. Skin:     General: Skin is warm and dry. Neurological:      Mental Status: He is alert and oriented to person, place, and time. Gait: Gait normal.      Deep Tendon Reflexes: Reflexes are normal and symmetric. Psychiatric:         Attention and Perception: Attention and perception normal.         Behavior: Behavior normal.         Record/Diagnostics Review:    As above, I did review the imaging    Orders:  Orders Placed This Encounter   Procedures    MRI LUMBAR SPINE W WO CONTRAST    DRUG SCREEN, PAIN       Assessment:  1. Lumbosacral spondylosis without myelopathy    2. Lumbar radiculopathy    3. Other chronic pain    4. Diabetic mononeuropathy associated with diabetes mellitus due to underlying condition ([facility] [address]    5. History of lumbar surgery    6. Encounter for long-term opiate analgesic use        Treatment Plan:  DISCUSSION: Treatment options discussed with patient and all questions answered to patient's satisfaction. OARRS Review: Reviewed and acceptable for medications prescribed.   TREATMENT OPTIONS:     Discussed different treatment options including

## 2020-09-24 ENCOUNTER — HOSPITAL ENCOUNTER (OUTPATIENT)
Age: 55
Setting detail: SPECIMEN
Discharge: HOME | End: 2020-09-24
Payer: MEDICAID

## 2020-09-24 ENCOUNTER — INITIAL CONSULT (OUTPATIENT)
Dept: PAIN MANAGEMENT | Age: 55
End: 2020-09-24
Payer: MEDICAID

## 2020-09-24 VITALS — BODY MASS INDEX: 22.96 KG/M2 | WEIGHT: 155 LBS | HEIGHT: 69 IN | TEMPERATURE: 98.2 F

## 2020-09-24 PROCEDURE — 99214 OFFICE O/P EST MOD 30 MIN: CPT | Performed by: PAIN MEDICINE

## 2020-09-29 LAB
6-ACETYLMORPHINE, UR: NOT DETECTED
7-AMINOCLONAZEPAM, URINE: NOT DETECTED
ALPHA-OH-ALPRAZ, URINE: NOT DETECTED
ALPRAZOLAM, URINE: NOT DETECTED
AMPHETAMINES, URINE: NOT DETECTED
BARBITURATES, URINE: NOT DETECTED
BENZOYLECGONINE, UR: NOT DETECTED
BUPRENORPHINE URINE: NOT DETECTED
CARISOPRODOL, UR: NOT DETECTED
CLONAZEPAM, URINE: NOT DETECTED
CODEINE, URINE: NOT DETECTED
CREATININE URINE: 38.4 MG/DL (ref 20–400)
DIAZEPAM, URINE: NOT DETECTED
DRUGS EXPECTED, UR: NORMAL
EER HI RES INTERP UR: NORMAL
ETHYL GLUCURONIDE UR: NOT DETECTED
FENTANYL URINE: NOT DETECTED
HYDROCODONE, URINE: NOT DETECTED
HYDROMORPHONE, URINE: NOT DETECTED
LORAZEPAM, URINE: NOT DETECTED
MARIJUANA METAB, UR: NOT DETECTED
MDA, UR: NOT DETECTED
MDEA, EVE, UR: NOT DETECTED
MDMA URINE: NOT DETECTED
MEPERIDINE METAB, UR: NOT DETECTED
METHADONE, URINE: NOT DETECTED
METHAMPHETAMINE, URINE: NOT DETECTED
METHYLPHENIDATE: NOT DETECTED
MIDAZOLAM, URINE: NOT DETECTED
MORPHINE URINE: NOT DETECTED
NORBUPRENORPHINE, URINE: NOT DETECTED
NORDIAZEPAM, URINE: NOT DETECTED
NORFENTANYL, URINE: NOT DETECTED
NORHYDROCODONE, URINE: NOT DETECTED
NOROXYCODONE, URINE: NOT DETECTED
NOROXYMORPHONE, URINE: NOT DETECTED
OXAZEPAM, URINE: NOT DETECTED
OXYCODONE URINE: NOT DETECTED
OXYMORPHONE, URINE: NOT DETECTED
PAIN MANAGEMENT DRUG PANEL INTERP, URINE: NORMAL
PAIN MGT DRUG PANEL, HI RES, UR: NORMAL
PCP,URINE: NOT DETECTED
PHENTERMINE, UR: NOT DETECTED
PROPOXYPHENE, URINE: NOT DETECTED
TAPENTADOL, URINE: NOT DETECTED
TAPENTADOL-O-SULFATE, URINE: NOT DETECTED
TEMAZEPAM, URINE: NOT DETECTED
TRAMADOL, URINE: NOT DETECTED
ZOLPIDEM, URINE: NOT DETECTED

## 2020-10-08 ENCOUNTER — HOSPITAL ENCOUNTER (OUTPATIENT)
Dept: MRI IMAGING | Age: 55
Discharge: HOME | End: 2020-10-10
Payer: MEDICAID

## 2020-10-08 LAB
CREAT SERPL-MCNC: 1.19 MG/DL (ref 0.7–1.2)
GFR AFRICAN AMERICAN: >60 ML/MIN
GFR NON-AFRICAN AMERICAN: >60 ML/MIN
GFR SERPL CREATININE-BSD FRML MDRD: NORMAL ML/MIN/{1.73_M2}
GFR SERPL CREATININE-BSD FRML MDRD: NORMAL ML/MIN/{1.73_M2}

## 2020-10-08 PROCEDURE — 82565 ASSAY OF CREATININE: CPT

## 2020-10-08 PROCEDURE — 72158 MRI LUMBAR SPINE W/O & W/DYE: CPT

## 2020-10-08 PROCEDURE — 36415 COLL VENOUS BLD VENIPUNCTURE: CPT

## 2020-10-08 PROCEDURE — A9579 GAD-BASE MR CONTRAST NOS,1ML: HCPCS | Performed by: PAIN MEDICINE

## 2020-10-08 PROCEDURE — 6360000004 HC RX CONTRAST MEDICATION: Performed by: PAIN MEDICINE

## 2020-10-08 RX ADMIN — GADOTERIDOL 14 ML: 279.3 INJECTION, SOLUTION INTRAVENOUS at 13:17

## 2020-10-14 ASSESSMENT — ENCOUNTER SYMPTOMS
BACK PAIN: 1
RESPIRATORY NEGATIVE: 1
BOWEL INCONTINENCE: 0

## 2020-10-14 NOTE — PROGRESS NOTES
HPI:     Back Pain   This is a chronic problem. The current episode started more than 1 year ago. The problem occurs constantly. The problem has been rapidly worsening since onset. The pain is present in the lumbar spine. The quality of the pain is described as aching. The pain radiates to the left foot, left thigh, left knee, right foot, right knee and right thigh. The pain is at a severity of 10/10. The pain is severe. The pain is the same all the time. The symptoms are aggravated by standing, sitting, lying down, position, coughing, bending, stress and twisting. Stiffness is present in the morning. Associated symptoms include leg pain, tingling and weakness. Pertinent negatives include no bladder incontinence, bowel incontinence or headaches. He has tried heat for the symptoms. The treatment provided no relief. Chronic back pain. Previous decompression. Repeat MRI with multilevel degenerative change and disc bulging. He was seen pain management at [address] [place] [address] however no longer seeing them, unclear, states was not discharged but moved out of the area for some time. Has recently seen Dr. Annel Myers from neurosurgery with nothing surgical.  He was sent for second opinion at the [facility]. Urine drug screen appropriate. Patient denies any new neurological symptoms. Nobowel or bladder incontinence, no weakness, and no falling. Review of OARRS does not show any aberrant prescription behavior.      Past Medical History:   Diagnosis Date    Chronic back pain     Diabetes mellitus ([address]     Diabetic neuropathy ([address]     DM (diabetes mellitus) ([address]     Hepatitis C     Hypertension     MDRO (multiple drug resistant organisms) resistance     MRSA IN RIGHT FOOT    Murmur     Osteoarthritis        Past Surgical History:   Procedure Laterality Date    BUNIONECTOMY Right     BUNIONECTOMY Left 2/19/2020    LEFT FOOT MODIFIED LAPIDUS WITH AKIN OSTEOTOMY performed by Lucy Puga DPM at [facility] CHOLECYSTECTOMY  9/8/15    laprascopic    COLONOSCOPY N/A 11/19/2018    COLONOSCOPY DIAGNOSTIC performed by Darylene Kava Canos IV, DO at [facility] Right     FOOT SURGERY      hardware removed    FOOT SURGERY Right 6/26/14    delayed closure    FOOT SURGERY Right 10/15/14    removal of hardware    FOOT SURGERY Left 02/19/2020    Left Foot Modified Lapidus With Akin Osteotomy    OSTEOTOMY Right 2/6/2015    Akin Osteotomy right       Allergies   Allergen Reactions    Morphine Hives         Current Outpatient Medications:     insulin glargine (BASAGLAR KWIKPEN) 100 UNIT/ML injection pen, INJECT 35 UNITS SUBCUTANEOUSLY 2 TIMES DAILY, Disp: 5 pen, Rfl: 5    blood glucose test strips (ONE TOUCH ULTRA TEST) strip, TESTING TWICE A DAY, Disp: 100 each, Rfl: 5    chlorthalidone (HYGROTON) 25 MG tablet, TAKE ONE TABLET BY MOUTH DAILY, Disp: 30 tablet, Rfl: 3    docusate sodium (COLACE) 100 MG capsule, Take 1 capsule by mouth daily as needed for Constipation, Disp: 60 capsule, Rfl: 3    lisinopril (PRINIVIL;ZESTRIL) 40 MG tablet, TAKE 1 TAB BY MOUTH ONCE A DAY, Disp: 30 tablet, Rfl: 5    metFORMIN (GLUCOPHAGE) 1000 MG tablet, TAKE ONE TABLET BY MOUTH TWICE DAILY WITH MEALS, Disp: 60 tablet, Rfl: 3    ondansetron (ZOFRAN) 4 MG tablet, Take 1 tablet by mouth every 8 hours as needed for Nausea or Vomiting, Disp: 12 tablet, Rfl: 1    sildenafil (VIAGRA) 50 MG tablet, Take 1 tablet by mouth as needed for Erectile Dysfunction, Disp: 10 tablet, Rfl: 3    atorvastatin (LIPITOR) 20 MG tablet, Take 1 tablet by mouth daily, Disp: 30 tablet, Rfl: 5    aspirin (ASPIRIN LOW DOSE) 81 MG EC tablet, TAKE ONE TABLET BY MOUTH ONCE DAILY, Disp: 30 tablet, Rfl: 3    amLODIPine (NORVASC) 10 MG tablet, TAKE 1 TAB BY MOUTH ONCE A DAY, Disp: 30 tablet, Rfl: 5    lidocaine (LIDODERM) 5 %, APPLY 1 PATCH DAILY AS DIRECTED (12 HOURS ON, 12 HOURS OFF), Disp: 30 patch, Rfl: 3    raNITIdine (ZANTAC) 150 MG tablet, Take 0.5 tablets by mouth 2 times daily, Disp: 180 tablet, Rfl: 3    Misc. Devices (WALL GRAB BAR) MISC, Use as required, Disp: 2 each, Rfl: 0    naloxone 4 MG/0.1ML LIQD nasal spray, 1 spray by Nasal route as needed for Opioid Reversal, Disp: 1 each, Rfl: 3    ibuprofen (ADVIL;MOTRIN) 800 MG tablet, Take 1 tablet by mouth every 8 hours as needed for Pain, Disp: 30 tablet, Rfl: 2    Insulin Syringe-Needle U-100 (INSULIN SYRINGE .5CC/30GX1/2\") 30G X 1/2\" 0.5 ML MISC, Use 3 times daily. Dx: 250.00  Prescribe this syringe for insulin dosages under  50 units per injection. , Disp: 100 Syringe, Rfl: 5    Blood Pressure KIT, 1 kit by Does not apply route three times daily Please replace with any kit that is covered by insurance., Disp: 1 kit, Rfl: 0    Insulin Syringe-Needle U-100 (B-D INS SYR ULTRAFINE 1CC/30G) 30G X 1/2\" 1 ML MISC, AS DIRECTED WITH LANTUS, Disp: 10 each, Rfl: 3    gabapentin (NEURONTIN) 300 MG capsule, TAKE TWO CAPSULES BY MOUTH THREE TIMES DAILY, Disp: 90 capsule, Rfl: 3    ONETOUCH DELICA LANCETS 49O MISC, USE AS DIRECTED TWICE A DAY, Disp: 100 each, Rfl: 0    glucose monitoring kit (FREESTYLE) monitoring kit, 1 kit by Does not apply route daily Whichever the insurance approves, Disp: 1 kit, Rfl: 0    Family History   Problem Relation Age of Onset    High Blood Pressure Mother     High Blood Pressure Father        Social History     Socioeconomic History    Marital status: Single     Spouse name: Not on file    Number of children: Not on file    Years of education: Not on file    Highest education level: Not on file   Occupational History     Employer: N/A   Social Needs    Financial resource strain: Not on file    Food insecurity     Worry: Not on file     Inability: Not on file    Transportation needs     Medical: Not on file     Non-medical: Not on file   Tobacco Use    Smoking status: Former Smoker     Packs/day: 0.25     Years: 15.00     Pack years: 3.75 Types: Cigarettes    Smokeless tobacco: Former User     Types: Chew    Tobacco comment: stopped chew 9 years ago . pt states he smokes 4-5 ciggs daily   Substance and Sexual Activity    Alcohol use: Yes     Comment: pt reports that he was a daily drinker approx. 1 year ago. Pt reports that now he just drinks occasionally    Drug use: No    Sexual activity: Not on file   Lifestyle    Physical activity     Days per week: Not on file     Minutes per session: Not on file    Stress: Not on file   Relationships    Social connections     Talks on phone: Not on file     Gets together: Not on file     Attends Sabianism service: Not on file     Active member of club or organization: Not on file     Attends meetings of clubs or organizations: Not on file     Relationship status: Not on file    Intimate partner violence     Fear of current or ex partner: Not on file     Emotionally abused: Not on file     Physically abused: Not on file     Forced sexual activity: Not on file   Other Topics Concern    Not on file   Social History Narrative    ** Merged History Encounter **            Review of Systems:  Review of Systems   Constitution: Negative. Respiratory: Negative. Musculoskeletal: Positive for back pain. Gastrointestinal: Negative for bowel incontinence. Genitourinary: Negative for bladder incontinence. Neurological: Positive for tingling and weakness. Negative for headaches. Physical Exam:  BP (!) 153/97   Pulse 82   Temp 97.9 °F (36.6 °C) (Temporal)   Ht 5' 9\" (1.753 m)   Wt 150 lb (68 kg)   BMI 22.15 kg/m²     Physical Exam  Constitutional:       Appearance: Normal appearance. HENT:      Head: Normocephalic and atraumatic. Eyes:      General: Lids are normal.   Neck:      Musculoskeletal: Normal range of motion. Pulmonary:      Effort: Pulmonary effort is normal.   Skin:     Comments: Visualized skin with no rash   Neurological:      Mental Status: He is alert.    Psychiatric: Attention and Perception: Attention and perception normal.         Mood and Affect: Mood and affect normal.         Record/Diagnostics Review:    As above, I did review the imaging      Assessment:  1. Postlaminectomy syndrome, lumbar region    2. Other chronic pain    3. Lumbosacral spondylosis without myelopathy        Treatment Plan:  DISCUSSION: Treatment options discussed with patient and all questions answered to patient's satisfaction. OARRS Review: Reviewed and acceptable for medications prescribed. TREATMENT OPTIONS:     Discussed different treatment options including continued conservative care such as physical therapy, chiropractic care, acupuncture. Discussed different interventional options such as epidural steroids or medial branch blocks. Also discussed neuromodulation in the form of spinal cord stimulation. Also discussed surgical evaluation    [facility] for second surgical opinion. States is very far for him and not understood and repeat surgery. May be candidate for spinal cord stimulation. We will have him see stress care pain psychology for opioid stratification. Also obtain records from Dr. Divina Pizano at [facility] to clarify if he was discharged or not. Manisha Kelsey M.D. I have reviewed the chief complaint and history of present illness (including ROS and PFSH) and vital documentation by my staff and I agree with their documentation and have added where applicable.

## 2020-10-15 ENCOUNTER — OFFICE VISIT (OUTPATIENT)
Dept: PAIN MANAGEMENT | Age: 55
End: 2020-10-15
Payer: MEDICAID

## 2020-10-15 VITALS
DIASTOLIC BLOOD PRESSURE: 97 MMHG | WEIGHT: 150 LBS | TEMPERATURE: 97.9 F | BODY MASS INDEX: 22.22 KG/M2 | HEIGHT: 69 IN | SYSTOLIC BLOOD PRESSURE: 153 MMHG | HEART RATE: 82 BPM

## 2020-10-15 PROCEDURE — 99213 OFFICE O/P EST LOW 20 MIN: CPT | Performed by: PAIN MEDICINE

## 2020-10-20 ENCOUNTER — TELEPHONE (OUTPATIENT)
Dept: PAIN MANAGEMENT | Age: 55
End: 2020-10-20

## 2020-10-20 NOTE — TELEPHONE ENCOUNTER
Patients previous Pain Management provider was Dr. Salty Bowers. Patient was discharged from the practice a year ago due to a positive drug screen. It was positive for cocaine and THC.

## 2020-10-28 ENCOUNTER — TELEPHONE (OUTPATIENT)
Dept: PAIN MANAGEMENT | Age: 55
End: 2020-10-28

## 2020-10-28 NOTE — TELEPHONE ENCOUNTER
Patient has questions concerning his next appointment. His last appointment was on 10/15/20 with Dr. Garcia. He does not have a follow-up appointment scheduled with Dr. Garcia at this time. Please return his call. Thank you.

## 2020-11-03 PROBLEM — I10 HYPERTENSION: Status: RESOLVED | Noted: 2020-11-03 | Resolved: 2020-11-03

## 2020-11-12 ENCOUNTER — TELEPHONE (OUTPATIENT)
Dept: FAMILY MEDICINE CLINIC | Age: 55
End: 2020-11-12

## 2020-11-12 NOTE — TELEPHONE ENCOUNTER
Received call from law office, spoke with Gonzalo Mullen. States she is requesting an office note from [facility] 08/19/20. Request that it be faxed to [phone]. Routed to

## 2020-12-02 ENCOUNTER — TELEPHONE (OUTPATIENT)
Dept: PAIN MANAGEMENT | Age: 55
End: 2020-12-02

## 2020-12-02 ASSESSMENT — ENCOUNTER SYMPTOMS
BOWEL INCONTINENCE: 0
RESPIRATORY NEGATIVE: 1
BACK PAIN: 1

## 2020-12-02 NOTE — PROGRESS NOTES
HPI:     Back Pain   This is a chronic problem. The current episode started more than 1 year ago. The problem occurs daily. The problem has been gradually worsening since onset. The pain is present in the lumbar spine. The quality of the pain is described as aching and burning. The pain radiates to the right knee, right foot and right thigh. The pain is at a severity of 9/10. The pain is severe. The pain is the same all the time. The symptoms are aggravated by standing, sitting and lying down. Stiffness is present all day. Pertinent negatives include no bladder incontinence, bowel incontinence, numbness, tingling or weakness. He has tried heat, ice and NSAIDs for the symptoms. The treatment provided no relief. Chronic low back pain. Previous back surgery. Continues to have lingering low back pain. He was seen pain management in the past [place] and had injections with some benefit. He also is opioid dependent for pain control however he was discharged for cocaine and marijuana. He states this was a mistake. Patient denies any new neurological symptoms. Nobowel or bladder incontinence, no weakness, and no falling. Review of OARRS does not show any aberrant prescription behavior.      Past Medical History:   Diagnosis Date    Chronic back pain     Diabetes mellitus ([address]     Diabetic neuropathy ([address]     DM (diabetes mellitus) ([address]     Hepatitis C     Hypertension     MDRO (multiple drug resistant organisms) resistance     MRSA IN RIGHT FOOT    Murmur     Osteoarthritis        Past Surgical History:   Procedure Laterality Date    BUNIONECTOMY Right     BUNIONECTOMY Left 2/19/2020    LEFT FOOT MODIFIED LAPIDUS WITH AKIN OSTEOTOMY performed by Naomy Kinney DPM at [address]  9/8/15    laprascopic    COLONOSCOPY N/A 11/19/2018    COLONOSCOPY DIAGNOSTIC performed by Pamela Esparza IV, DO at [facility] 83 Right     FOOT SURGERY Nasal route as needed for Opioid Reversal, Disp: 1 each, Rfl: 3    ibuprofen (ADVIL;MOTRIN) 800 MG tablet, Take 1 tablet by mouth every 8 hours as needed for Pain, Disp: 30 tablet, Rfl: 2    Insulin Syringe-Needle U-100 (INSULIN SYRINGE .5CC/30GX1/2\") 30G X 1/2\" 0.5 ML MISC, Use 3 times daily. Dx: 250.00  Prescribe this syringe for insulin dosages under  50 units per injection. , Disp: 100 Syringe, Rfl: 5    Blood Pressure KIT, 1 kit by Does not apply route three times daily Please replace with any kit that is covered by insurance., Disp: 1 kit, Rfl: 0    Insulin Syringe-Needle U-100 (B-D INS SYR ULTRAFINE 1CC/30G) 30G X 1/2\" 1 ML MISC, AS DIRECTED WITH LANTUS, Disp: 10 each, Rfl: 3    gabapentin (NEURONTIN) 300 MG capsule, TAKE TWO CAPSULES BY MOUTH THREE TIMES DAILY, Disp: 90 capsule, Rfl: 3    ONETOUCH DELICA LANCETS 39T MISC, USE AS DIRECTED TWICE A DAY, Disp: 100 each, Rfl: 0    glucose monitoring kit (FREESTYLE) monitoring kit, 1 kit by Does not apply route daily Whichever the insurance approves, Disp: 1 kit, Rfl: 0    Family History   Problem Relation Age of Onset    High Blood Pressure Mother     High Blood Pressure Father        Social History     Socioeconomic History    Marital status: Single     Spouse name: Not on file    Number of children: Not on file    Years of education: Not on file    Highest education level: Not on file   Occupational History     Employer: N/A   Social Needs    Financial resource strain: Not on file    Food insecurity     Worry: Not on file     Inability: Not on file   [employer] needs     Medical: Not on file     Non-medical: Not on file   Tobacco Use    Smoking status: Former Smoker     Packs/day: 0.25     Years: 15.00     Pack years: 3.75     Types: Cigarettes    Smokeless tobacco: Former User     Types: Chew    Tobacco comment: stopped chew 9 years ago . pt states he smokes 4-5 ciggs daily   Substance and Sexual Activity    Alcohol use:  Yes Procedures    RI INJECT SI JOINT ARTHRGRPHY&/ANES/STEROID W/IMAGE       Assessment:  1. Lumbosacral spondylosis without myelopathy    2. Postlaminectomy syndrome, lumbar region    3. Chronic bilateral low back pain, unspecified whether sciatica present    4. Other chronic pain    5. Encounter for long-term opiate analgesic use        Treatment Plan:  DISCUSSION: Treatment options discussed with patient and all questions answered to patient's satisfaction. OARRS Review: Reviewed and acceptable for medications prescribed. TREATMENT OPTIONS:     Discussed different treatment options including continued conservative care such as physical therapy, chiropractic care, acupuncture. Discussed different interventional options such as epidural steroids or medial branch blocks. Also discussed neuromodulation in the form of spinal cord stimulation. Also discussed surgical evaluation. Wishes to proceed with injections. Axial low back pain the worst of complaints, has failed conservative measures and the pain continues, pain over the bilateral sacroiliac joints with positive provocative test, may benefit from bilateral sacroiliac joint injections for both diagnostic and therapeutic purposes. In regards to his chronic opioid therapy, previously discharged from pain management at [facility] for cocaine and marijuana, understands that chronic opioid therapy would not be part of my plan for him. Cindy Rios M.D. I have reviewed the chief complaint and history of present illness (including ROS and PFSH) and vital documentation by my staff and I agree with their documentation and have added where applicable.

## 2020-12-03 ENCOUNTER — OFFICE VISIT (OUTPATIENT)
Dept: PAIN MANAGEMENT | Age: 55
End: 2020-12-03
Payer: MEDICAID

## 2020-12-03 VITALS
DIASTOLIC BLOOD PRESSURE: 85 MMHG | BODY MASS INDEX: 23.4 KG/M2 | WEIGHT: 158 LBS | HEIGHT: 69 IN | SYSTOLIC BLOOD PRESSURE: 135 MMHG | HEART RATE: 85 BPM

## 2020-12-03 PROCEDURE — G8427 DOCREV CUR MEDS BY ELIG CLIN: HCPCS | Performed by: PAIN MEDICINE

## 2020-12-03 PROCEDURE — 99214 OFFICE O/P EST MOD 30 MIN: CPT | Performed by: PAIN MEDICINE

## 2020-12-03 PROCEDURE — 3017F COLORECTAL CA SCREEN DOC REV: CPT | Performed by: PAIN MEDICINE

## 2020-12-03 PROCEDURE — 1036F TOBACCO NON-USER: CPT | Performed by: PAIN MEDICINE

## 2020-12-03 PROCEDURE — G8420 CALC BMI NORM PARAMETERS: HCPCS | Performed by: PAIN MEDICINE

## 2020-12-03 PROCEDURE — G8484 FLU IMMUNIZE NO ADMIN: HCPCS | Performed by: PAIN MEDICINE

## 2020-12-16 ENCOUNTER — TELEPHONE (OUTPATIENT)
Dept: FAMILY MEDICINE CLINIC | Age: 55
End: 2020-12-16

## 2020-12-17 NOTE — TELEPHONE ENCOUNTER
Received call from Dondra Schirmer from law office requesting release of medical records advised release of medical information has to go through our medical records department  sent email

## 2020-12-31 ENCOUNTER — OFFICE VISIT (OUTPATIENT)
Dept: FAMILY MEDICINE CLINIC | Age: 55
End: 2020-12-31
Payer: MEDICAID

## 2020-12-31 VITALS
DIASTOLIC BLOOD PRESSURE: 86 MMHG | BODY MASS INDEX: 21.77 KG/M2 | HEIGHT: 69 IN | TEMPERATURE: 97.4 F | WEIGHT: 147 LBS | HEART RATE: 84 BPM | SYSTOLIC BLOOD PRESSURE: 130 MMHG

## 2020-12-31 LAB — HBA1C MFR BLD: 6.7 %

## 2020-12-31 PROCEDURE — 90686 IIV4 VACC NO PRSV 0.5 ML IM: CPT | Performed by: STUDENT IN AN ORGANIZED HEALTH CARE EDUCATION/TRAINING PROGRAM

## 2020-12-31 PROCEDURE — 83036 HEMOGLOBIN GLYCOSYLATED A1C: CPT | Performed by: STUDENT IN AN ORGANIZED HEALTH CARE EDUCATION/TRAINING PROGRAM

## 2020-12-31 PROCEDURE — 3017F COLORECTAL CA SCREEN DOC REV: CPT | Performed by: STUDENT IN AN ORGANIZED HEALTH CARE EDUCATION/TRAINING PROGRAM

## 2020-12-31 PROCEDURE — G8482 FLU IMMUNIZE ORDER/ADMIN: HCPCS | Performed by: STUDENT IN AN ORGANIZED HEALTH CARE EDUCATION/TRAINING PROGRAM

## 2020-12-31 PROCEDURE — 3044F HG A1C LEVEL LT 7.0%: CPT | Performed by: STUDENT IN AN ORGANIZED HEALTH CARE EDUCATION/TRAINING PROGRAM

## 2020-12-31 PROCEDURE — 1036F TOBACCO NON-USER: CPT | Performed by: STUDENT IN AN ORGANIZED HEALTH CARE EDUCATION/TRAINING PROGRAM

## 2020-12-31 PROCEDURE — G8420 CALC BMI NORM PARAMETERS: HCPCS | Performed by: STUDENT IN AN ORGANIZED HEALTH CARE EDUCATION/TRAINING PROGRAM

## 2020-12-31 PROCEDURE — 99213 OFFICE O/P EST LOW 20 MIN: CPT | Performed by: STUDENT IN AN ORGANIZED HEALTH CARE EDUCATION/TRAINING PROGRAM

## 2020-12-31 PROCEDURE — 2022F DILAT RTA XM EVC RTNOPTHY: CPT | Performed by: STUDENT IN AN ORGANIZED HEALTH CARE EDUCATION/TRAINING PROGRAM

## 2020-12-31 PROCEDURE — G8427 DOCREV CUR MEDS BY ELIG CLIN: HCPCS | Performed by: STUDENT IN AN ORGANIZED HEALTH CARE EDUCATION/TRAINING PROGRAM

## 2020-12-31 RX ORDER — LIDOCAINE 50 MG/G
OINTMENT TOPICAL
Qty: 1 TUBE | Refills: 3 | Status: SHIPPED | OUTPATIENT
Start: 2020-12-31 | End: 2021-04-19 | Stop reason: SDUPTHER

## 2020-12-31 RX ORDER — ASPIRIN 81 MG/1
TABLET ORAL
Qty: 30 TABLET | Refills: 3 | Status: SHIPPED | OUTPATIENT
Start: 2020-12-31 | End: 2021-04-19 | Stop reason: SDUPTHER

## 2020-12-31 RX ORDER — INSULIN GLARGINE 100 [IU]/ML
INJECTION, SOLUTION SUBCUTANEOUS
Qty: 5 PEN | Refills: 5 | Status: SHIPPED | OUTPATIENT
Start: 2020-12-31 | End: 2021-04-19 | Stop reason: SDUPTHER

## 2020-12-31 RX ORDER — LISINOPRIL 40 MG/1
TABLET ORAL
Qty: 30 TABLET | Refills: 5 | Status: SHIPPED | OUTPATIENT
Start: 2020-12-31 | End: 2021-01-26

## 2020-12-31 RX ORDER — RANITIDINE 150 MG/1
75 TABLET ORAL 2 TIMES DAILY
Qty: 180 TABLET | Refills: 3 | Status: SHIPPED | OUTPATIENT
Start: 2020-12-31 | End: 2021-04-19 | Stop reason: SDUPTHER

## 2020-12-31 RX ORDER — IBUPROFEN 800 MG/1
800 TABLET ORAL EVERY 8 HOURS PRN
Qty: 30 TABLET | Refills: 2 | Status: SHIPPED | OUTPATIENT
Start: 2020-12-31 | End: 2021-04-19 | Stop reason: SDUPTHER

## 2020-12-31 RX ORDER — SYRINGE-NEEDLE,INSULIN,0.5 ML 28GX1/2"
SYRINGE, EMPTY DISPOSABLE MISCELLANEOUS
Qty: 100 SYRINGE | Refills: 5 | Status: SHIPPED | OUTPATIENT
Start: 2020-12-31 | End: 2021-04-19 | Stop reason: SDUPTHER

## 2020-12-31 RX ORDER — SILDENAFIL 50 MG/1
50 TABLET, FILM COATED ORAL PRN
Qty: 10 TABLET | Refills: 3 | Status: SHIPPED | OUTPATIENT
Start: 2020-12-31 | End: 2021-03-24

## 2020-12-31 RX ORDER — ATORVASTATIN CALCIUM 20 MG/1
20 TABLET, FILM COATED ORAL DAILY
Qty: 30 TABLET | Refills: 5 | Status: SHIPPED | OUTPATIENT
Start: 2020-12-31 | End: 2021-01-26

## 2020-12-31 RX ORDER — FAMOTIDINE 40 MG/1
40 TABLET, FILM COATED ORAL EVERY EVENING
Qty: 30 TABLET | Refills: 3 | Status: SHIPPED | OUTPATIENT
Start: 2020-12-31 | End: 2021-04-19 | Stop reason: SDUPTHER

## 2020-12-31 RX ORDER — AMLODIPINE BESYLATE 10 MG/1
TABLET ORAL
Qty: 30 TABLET | Refills: 5 | Status: SHIPPED | OUTPATIENT
Start: 2020-12-31 | End: 2021-01-26

## 2020-12-31 RX ORDER — CHLORTHALIDONE 25 MG/1
TABLET ORAL
Qty: 30 TABLET | Refills: 3 | Status: SHIPPED | OUTPATIENT
Start: 2020-12-31 | End: 2021-04-19 | Stop reason: SDUPTHER

## 2020-12-31 RX ORDER — ONDANSETRON 4 MG/1
4 TABLET, FILM COATED ORAL EVERY 8 HOURS PRN
Qty: 12 TABLET | Refills: 1 | Status: SHIPPED | OUTPATIENT
Start: 2020-12-31 | End: 2021-04-19 | Stop reason: SDUPTHER

## 2020-12-31 RX ORDER — GABAPENTIN 300 MG/1
CAPSULE ORAL
Qty: 90 CAPSULE | Refills: 3 | Status: SHIPPED | OUTPATIENT
Start: 2020-12-31 | End: 2021-03-25

## 2020-12-31 NOTE — PROGRESS NOTES
I have reviewed and discussed key elements of [address] [place] [address] with the resident including plan of care and follow up and agree with the care rosemarie plan.

## 2020-12-31 NOTE — PROGRESS NOTES
Subjective: José Luis Yang is a 63 y.o. male with  has a past medical history of Chronic back pain, Diabetes mellitus ([address] Diabetic neuropathy ([address] DM (diabetes mellitus) ([facility] Hepatitis C, Hypertension, MDRO (multiple drug resistant organisms) resistance, Murmur, and Osteoarthritis. Presented to the office today for:  Chief Complaint   Patient presents with    Diabetes     f/u       HPI    Patient is presenting today for diabetes and hypertension follow-up and medication refills. Patient denies any chest pain trouble breathing today. Patient is compliant with his hypertensive medications and blood pressure is controlled today. For his diabetes he takes Metformin as well as 36 units twice daily of long-acting insulin. Blood pressures at home are between 160 and 200. Review of Systems   Constitutional: Negative for fatigue, fever and unexpected weight change. HENT: Negative for sinus pressure, sinus pain and voice change. Eyes: Negative for visual disturbance. Respiratory: Negative for cough, shortness of breath and wheezing. Cardiovascular: Negative for chest pain, palpitations and leg swelling. Gastrointestinal: Negative for abdominal distention, abdominal pain, nausea and vomiting. Endocrine: Negative for polydipsia, polyphagia and polyuria. Genitourinary: Negative for difficulty urinating, flank pain and frequency. Skin: Negative for color change, rash and wound. Neurological: Negative for dizziness, light-headedness, numbness and headaches. Psychiatric/Behavioral: Negative for confusion and decreased concentration. The patient is not nervous/anxious.                   The patient has a   Family History   Problem Relation Age of Onset    High Blood Pressure Mother     High Blood Pressure Father        Objective:    /86 (Site: Right Upper Arm, Position: Sitting, Cuff Size: Medium Adult)   Pulse 84   Temp 97.4 °F (36.3 °C) (Temporal)   Ht 5' 9\" (1.753 m)   Wt 147 lb (66.7 kg)   BMI 21.71 kg/m²    BP Readings from Last 3 Encounters:   12/31/20 130/86   12/03/20 135/85   10/15/20 (!) 153/97       Physical Exam  Constitutional:       Appearance: He is well-developed. HENT:      Head: Normocephalic and atraumatic. Eyes:      Pupils: Pupils are equal, round, and reactive to light. Cardiovascular:      Rate and Rhythm: Normal rate and regular rhythm. Heart sounds: Normal heart sounds. No murmur. No friction rub. No gallop. Pulmonary:      Effort: Pulmonary effort is normal. No respiratory distress. Breath sounds: Normal breath sounds. No wheezing or rales. Chest:      Chest wall: No tenderness. Abdominal:      General: Bowel sounds are normal. There is no distension. Palpations: Abdomen is soft. Tenderness: There is no abdominal tenderness. Skin:     General: Skin is warm. Findings: No erythema or rash. Neurological:      Mental Status: He is alert and oriented to person, place, and time. Lab Results   Component Value Date    WBC 6.6 01/05/2020    HGB 17.5 (H) 01/05/2020    HCT 52.4 (H) 01/05/2020     01/05/2020    CHOL 216 (H) 08/15/2019    TRIG 165 (H) 08/15/2019    HDL 71 08/15/2019    ALT 34 02/19/2020    AST 36 02/19/2020     (L) 01/05/2020    K 3.7 01/05/2020    CL 97 (L) 01/05/2020    CREATININE 1.19 10/08/2020    BUN 16 01/05/2020    CO2 24 01/05/2020    TSH 1.05 01/04/2020    INR 1.1 02/19/2020    LABA1C 6.6 (H) 01/05/2020    LABMICR 99 (H) 08/15/2019     Lab Results   Component Value Date    CALCIUM 8.8 01/05/2020     Lab Results   Component Value Date    LDLCHOLESTEROL 112 08/15/2019       Assessment and Plan:    1. Erectile dysfunction associated with type 2 diabetes mellitus (HCC)  - sildenafil (VIAGRA) 50 MG tablet; Take 1 tablet by mouth as needed for Erectile Dysfunction  Dispense: 10 tablet; Refill: 3    2.  Gastroesophageal reflux disease without esophagitis  - raNITIdine (ZANTAC) 150 MG tablet; Take 0.5 tablets by mouth 2 times daily  Dispense: 180 tablet; Refill: 3    3. Uncontrolled type 2 diabetes mellitus with diabetic mononeuropathy, with long-term current use of insulin (HCC)  -Continue with current medication regimen  - metFORMIN (GLUCOPHAGE) 1000 MG tablet  Dispense: 60 tablet; Refill: 3  - insulin glargine (BASAGLAR KWIKPEN) 100 UNIT/ML injection pen; INJECT 35 UNITS SUBCUTANEOUSLY 2 TIMES DAILY  Dispense: 5 pen; Refill: 5  - blood glucose test strips (ONE TOUCH ULTRA TEST) strip; TESTING TWICE A DAY  Dispense: 100 each; Refill: 5  - aspirin (ASPIRIN LOW DOSE) 81 MG EC tablet; TAKE ONE TABLET BY MOUTH ONCE DAILY  Dispense: 30 tablet; Refill: 3    4. Essential hypertension  -Controlled  - lisinopril (PRINIVIL;ZESTRIL) 40 MG tablet; TAKE 1 TAB BY MOUTH ONCE A DAY  Dispense: 30 tablet; Refill: 5  - chlorthalidone (HYGROTON) 25 MG tablet  Dispense: 30 tablet; Refill: 3  - amLODIPine (NORVASC) 10 MG tablet; TAKE 1 TAB BY MOUTH ONCE A DAY  Dispense: 30 tablet; Refill: 5    5. Dyslipidemia  - atorvastatin (LIPITOR) 20 MG tablet; Take 1 tablet by mouth daily  Dispense: 30 tablet; Refill: 5    6. Screening for hyperlipidemia  - Lipid Panel; Future          Requested Prescriptions     Pending Prescriptions Disp Refills    sildenafil (VIAGRA) 50 MG tablet 10 tablet 3     Sig: Take 1 tablet by mouth as needed for Erectile Dysfunction    raNITIdine (ZANTAC) 150 MG tablet 180 tablet 3     Sig: Take 0.5 tablets by mouth 2 times daily    ondansetron (ZOFRAN) 4 MG tablet 12 tablet 1     Sig: Take 1 tablet by mouth every 8 hours as needed for Nausea or Vomiting    metFORMIN (GLUCOPHAGE) 1000 MG tablet 60 tablet 3     Sig: TAKE ONE TABLET BY MOUTH TWICE DAILY WITH MEALS    lisinopril (PRINIVIL;ZESTRIL) 40 MG tablet 30 tablet 5     Sig: TAKE 1 TAB BY MOUTH ONCE A DAY    Insulin Syringe-Needle U-100 (INSULIN SYRINGE .5CC/30GX1/2\") 30G X 1/2\" 0.5 ML MISC 100 Syringe 5     Sig: Use 3 times daily.   Dx: 250.00 Prescribe this syringe for insulin dosages under  50 units per injection.  insulin glargine (BASAGLAR KWIKPEN) 100 UNIT/ML injection pen 5 pen 5     Sig: INJECT 35 UNITS SUBCUTANEOUSLY 2 TIMES DAILY    ibuprofen (ADVIL;MOTRIN) 800 MG tablet 30 tablet 2     Sig: Take 1 tablet by mouth every 8 hours as needed for Pain    gabapentin (NEURONTIN) 300 MG capsule 90 capsule 3     Sig: TAKE 2 CAPSULES BY MOUTH 3 TIMES DAILY    chlorthalidone (HYGROTON) 25 MG tablet 30 tablet 3     Sig: TAKE ONE TABLET BY MOUTH DAILY    blood glucose test strips (ONE TOUCH ULTRA TEST) strip 100 each 5     Sig: TESTING TWICE A DAY    atorvastatin (LIPITOR) 20 MG tablet 30 tablet 5     Sig: Take 1 tablet by mouth daily    aspirin (ASPIRIN LOW DOSE) 81 MG EC tablet 30 tablet 3     Sig: TAKE ONE TABLET BY MOUTH ONCE DAILY    amLODIPine (NORVASC) 10 MG tablet 30 tablet 5     Sig: TAKE 1 TAB BY MOUTH ONCE A DAY       There are no discontinued medications. Krystina Claude received counseling on the following healthy behaviors: nutrition, exercise and medication adherence    Discussed use,benefit, and side effects of prescribed medications. Barriers to medication compliance addressed. All patient questions answered. Pt voiced understanding. No follow-ups on file. Disclaimer: Some orall of this note was transcribed using voice-recognition software. This may cause typographical errors occasionally. Although all effort is made to fix these errors, please do not hesitate to contact our office if there Arnulfo Hook concern with the understanding of this note.

## 2021-01-11 ENCOUNTER — HOSPITAL ENCOUNTER (OUTPATIENT)
Dept: LAB | Age: 56
Setting detail: SPECIMEN
Discharge: HOME | End: 2021-01-11
Payer: MEDICAID

## 2021-01-11 DIAGNOSIS — Z01.818 PREOP TESTING: Primary | ICD-10-CM

## 2021-03-24 DIAGNOSIS — E11.69 ERECTILE DYSFUNCTION ASSOCIATED WITH TYPE 2 DIABETES MELLITUS (HCC): ICD-10-CM

## 2021-03-24 DIAGNOSIS — N52.1 ERECTILE DYSFUNCTION ASSOCIATED WITH TYPE 2 DIABETES MELLITUS (HCC): ICD-10-CM

## 2021-03-24 RX ORDER — SILDENAFIL 50 MG/1
50 TABLET, FILM COATED ORAL PRN
Qty: 10 TABLET | Refills: 3 | Status: SHIPPED | OUTPATIENT
Start: 2021-03-24 | End: 2021-04-19 | Stop reason: SDUPTHER

## 2021-03-25 RX ORDER — GABAPENTIN 300 MG/1
CAPSULE ORAL
Qty: 180 CAPSULE | Refills: 0 | Status: SHIPPED | OUTPATIENT
Start: 2021-03-25 | End: 2021-04-22

## 2021-03-25 NOTE — TELEPHONE ENCOUNTER
type diabetes mellitus without mention of complication, not stated as uncontrolled     Chronic bilateral low back pain with bilateral sciatica     Thoracic or lumbosacral neuritis or radiculitis, unspecified     Degeneration of lumbar or lumbosacral intervertebral disc     DM (diabetes mellitus) ([facility])     Hep C w/o coma, chronic ([facility])     Back pain     Back pain, chronic     HTN (hypertension)     Osteoarthritis of lumbar spine     Diabetes mellitus ([address]     Needs flu shot     Smoking     Chronic back pain     Spinal stenosis, lumbar region, without neurogenic claudication     Foot drop, right     Neuritis or radiculitis due to displacement of lumbar intervertebral disc     Lumbar disc herniation     Polyneuropathic pain     Displacement of lumbar intervertebral disc without myelopathy     Uncontrolled type 2 diabetes mellitus with diabetic mononeuropathy, with long-term current use of insulin ([facility])     Smoker     Cellulitis and abscess     Exposed orthopaedic hardware ([address]     Hallux abducto valgus     Diabetes ([facility])     Positional vertigo     Abdominal pain, right upper quadrant     Cellulitis     Esophagitis determined by endoscopy     Cholelithiasis     Diabetes type 2, uncontrolled ([address]     Diabetic mononeuropathy associated with diabetes mellitus due to underlying condition ([address]     Lumbar spondylolysis     Erectile dysfunction associated with type 2 diabetes mellitus ([address]     Encounter for smoking cessation counseling     Bloating     Family history of breast cancer     Dyslipidemia     Acute bacterial sinusitis     Blurry vision, bilateral     Dizziness     Syncope and collapse     Diabetic neuropathy with neurologic complication ([address]     History of lumbar surgery

## 2021-04-19 ENCOUNTER — OFFICE VISIT (OUTPATIENT)
Dept: FAMILY MEDICINE CLINIC | Age: 56
End: 2021-04-19
Payer: MEDICAID

## 2021-04-19 ENCOUNTER — TELEPHONE (OUTPATIENT)
Dept: FAMILY MEDICINE CLINIC | Age: 56
End: 2021-04-19

## 2021-04-19 VITALS
DIASTOLIC BLOOD PRESSURE: 105 MMHG | BODY MASS INDEX: 22.22 KG/M2 | WEIGHT: 150 LBS | SYSTOLIC BLOOD PRESSURE: 158 MMHG | OXYGEN SATURATION: 99 % | TEMPERATURE: 97.2 F | HEART RATE: 71 BPM | HEIGHT: 69 IN

## 2021-04-19 DIAGNOSIS — Z13.220 SCREENING FOR HYPERLIPIDEMIA: ICD-10-CM

## 2021-04-19 DIAGNOSIS — E11.69 ERECTILE DYSFUNCTION ASSOCIATED WITH TYPE 2 DIABETES MELLITUS (HCC): ICD-10-CM

## 2021-04-19 DIAGNOSIS — E78.5 DYSLIPIDEMIA: ICD-10-CM

## 2021-04-19 DIAGNOSIS — F11.93 OPIOID WITHDRAWAL (HCC): ICD-10-CM

## 2021-04-19 DIAGNOSIS — K21.9 GASTROESOPHAGEAL REFLUX DISEASE WITHOUT ESOPHAGITIS: ICD-10-CM

## 2021-04-19 DIAGNOSIS — Z79.4 CONTROLLED TYPE 2 DIABETES MELLITUS WITHOUT COMPLICATION, WITH LONG-TERM CURRENT USE OF INSULIN (HCC): Primary | ICD-10-CM

## 2021-04-19 DIAGNOSIS — I10 ESSENTIAL HYPERTENSION: ICD-10-CM

## 2021-04-19 DIAGNOSIS — G89.29 CHRONIC BILATERAL LOW BACK PAIN WITH BILATERAL SCIATICA: ICD-10-CM

## 2021-04-19 DIAGNOSIS — E11.9 CONTROLLED TYPE 2 DIABETES MELLITUS WITHOUT COMPLICATION, WITH LONG-TERM CURRENT USE OF INSULIN (HCC): Primary | ICD-10-CM

## 2021-04-19 DIAGNOSIS — G89.29 CHRONIC BILATERAL LOW BACK PAIN, UNSPECIFIED WHETHER SCIATICA PRESENT: ICD-10-CM

## 2021-04-19 DIAGNOSIS — M54.42 CHRONIC BILATERAL LOW BACK PAIN WITH BILATERAL SCIATICA: ICD-10-CM

## 2021-04-19 DIAGNOSIS — N52.1 ERECTILE DYSFUNCTION ASSOCIATED WITH TYPE 2 DIABETES MELLITUS (HCC): ICD-10-CM

## 2021-04-19 DIAGNOSIS — M54.50 CHRONIC BILATERAL LOW BACK PAIN, UNSPECIFIED WHETHER SCIATICA PRESENT: ICD-10-CM

## 2021-04-19 DIAGNOSIS — M54.41 CHRONIC BILATERAL LOW BACK PAIN WITH BILATERAL SCIATICA: ICD-10-CM

## 2021-04-19 LAB — HBA1C MFR BLD: 6.5 %

## 2021-04-19 PROCEDURE — G8427 DOCREV CUR MEDS BY ELIG CLIN: HCPCS | Performed by: STUDENT IN AN ORGANIZED HEALTH CARE EDUCATION/TRAINING PROGRAM

## 2021-04-19 PROCEDURE — 99213 OFFICE O/P EST LOW 20 MIN: CPT | Performed by: STUDENT IN AN ORGANIZED HEALTH CARE EDUCATION/TRAINING PROGRAM

## 2021-04-19 PROCEDURE — 2022F DILAT RTA XM EVC RTNOPTHY: CPT | Performed by: STUDENT IN AN ORGANIZED HEALTH CARE EDUCATION/TRAINING PROGRAM

## 2021-04-19 PROCEDURE — 83036 HEMOGLOBIN GLYCOSYLATED A1C: CPT | Performed by: STUDENT IN AN ORGANIZED HEALTH CARE EDUCATION/TRAINING PROGRAM

## 2021-04-19 PROCEDURE — 3017F COLORECTAL CA SCREEN DOC REV: CPT | Performed by: STUDENT IN AN ORGANIZED HEALTH CARE EDUCATION/TRAINING PROGRAM

## 2021-04-19 PROCEDURE — 1036F TOBACCO NON-USER: CPT | Performed by: STUDENT IN AN ORGANIZED HEALTH CARE EDUCATION/TRAINING PROGRAM

## 2021-04-19 PROCEDURE — 3044F HG A1C LEVEL LT 7.0%: CPT | Performed by: STUDENT IN AN ORGANIZED HEALTH CARE EDUCATION/TRAINING PROGRAM

## 2021-04-19 PROCEDURE — G8420 CALC BMI NORM PARAMETERS: HCPCS | Performed by: STUDENT IN AN ORGANIZED HEALTH CARE EDUCATION/TRAINING PROGRAM

## 2021-04-19 RX ORDER — BLOOD PRESSURE TEST KIT
1 KIT MISCELLANEOUS 3 TIMES DAILY
Qty: 1 KIT | Refills: 0 | Status: SHIPPED | OUTPATIENT
Start: 2021-04-19

## 2021-04-19 RX ORDER — IBUPROFEN 800 MG/1
800 TABLET ORAL EVERY 8 HOURS PRN
Qty: 30 TABLET | Refills: 2 | Status: SHIPPED | OUTPATIENT
Start: 2021-04-19 | End: 2021-06-21

## 2021-04-19 RX ORDER — ATORVASTATIN CALCIUM 20 MG/1
20 TABLET, FILM COATED ORAL DAILY
Qty: 30 TABLET | Refills: 5 | Status: SHIPPED | OUTPATIENT
Start: 2021-04-19 | End: 2021-08-05 | Stop reason: SDUPTHER

## 2021-04-19 RX ORDER — SYRINGE-NEEDLE,INSULIN,0.5 ML 28GX1/2"
SYRINGE, EMPTY DISPOSABLE MISCELLANEOUS
Qty: 100 SYRINGE | Refills: 5 | Status: SHIPPED | OUTPATIENT
Start: 2021-04-19 | End: 2021-08-05 | Stop reason: SDUPTHER

## 2021-04-19 RX ORDER — METFORMIN HYDROCHLORIDE 750 MG/1
750 TABLET, EXTENDED RELEASE ORAL
Qty: 30 TABLET | Refills: 3 | Status: SHIPPED | OUTPATIENT
Start: 2021-04-19 | End: 2021-07-26

## 2021-04-19 RX ORDER — AMLODIPINE BESYLATE 10 MG/1
TABLET ORAL
Qty: 30 TABLET | Refills: 5 | Status: SHIPPED | OUTPATIENT
Start: 2021-04-19 | End: 2021-08-05 | Stop reason: SDUPTHER

## 2021-04-19 RX ORDER — SILDENAFIL 50 MG/1
50 TABLET, FILM COATED ORAL PRN
Qty: 10 TABLET | Refills: 3 | Status: SHIPPED | OUTPATIENT
Start: 2021-04-19 | End: 2021-06-21 | Stop reason: SDUPTHER

## 2021-04-19 RX ORDER — FAMOTIDINE 40 MG/1
40 TABLET, FILM COATED ORAL EVERY EVENING
Qty: 30 TABLET | Refills: 3 | Status: SHIPPED | OUTPATIENT
Start: 2021-04-19 | End: 2021-07-26

## 2021-04-19 RX ORDER — ONDANSETRON 4 MG/1
4 TABLET, FILM COATED ORAL EVERY 8 HOURS PRN
Qty: 12 TABLET | Refills: 1 | Status: SHIPPED | OUTPATIENT
Start: 2021-04-19 | End: 2021-08-05 | Stop reason: SDUPTHER

## 2021-04-19 RX ORDER — CHLORTHALIDONE 25 MG/1
TABLET ORAL
Qty: 30 TABLET | Refills: 3 | Status: SHIPPED | OUTPATIENT
Start: 2021-04-19 | End: 2021-07-26

## 2021-04-19 RX ORDER — NALOXONE HYDROCHLORIDE 4 MG/.1ML
1 SPRAY NASAL PRN
Qty: 1 EACH | Refills: 3 | Status: SHIPPED | OUTPATIENT
Start: 2021-04-19 | End: 2021-12-07 | Stop reason: SDUPTHER

## 2021-04-19 RX ORDER — RANITIDINE 150 MG/1
75 TABLET ORAL 2 TIMES DAILY
Qty: 180 TABLET | Refills: 3 | Status: SHIPPED | OUTPATIENT
Start: 2021-04-19 | End: 2021-08-05 | Stop reason: SDUPTHER

## 2021-04-19 RX ORDER — PEN NEEDLE, DIABETIC 29 G X1/2"
NEEDLE, DISPOSABLE MISCELLANEOUS
Qty: 10 EACH | Refills: 3 | Status: SHIPPED | OUTPATIENT
Start: 2021-04-19 | End: 2021-12-07 | Stop reason: SDUPTHER

## 2021-04-19 RX ORDER — ASPIRIN 81 MG/1
TABLET ORAL
Qty: 30 TABLET | Refills: 3 | Status: SHIPPED | OUTPATIENT
Start: 2021-04-19 | End: 2021-07-26

## 2021-04-19 RX ORDER — LISINOPRIL 40 MG/1
TABLET ORAL
Qty: 30 TABLET | Refills: 5 | Status: SHIPPED | OUTPATIENT
Start: 2021-04-19 | End: 2021-08-05 | Stop reason: SDUPTHER

## 2021-04-19 RX ORDER — INSULIN GLARGINE 100 [IU]/ML
INJECTION, SOLUTION SUBCUTANEOUS
Qty: 5 PEN | Refills: 5 | Status: SHIPPED | OUTPATIENT
Start: 2021-04-19 | End: 2021-08-05 | Stop reason: SDUPTHER

## 2021-04-19 RX ORDER — CYCLOBENZAPRINE HCL 10 MG
10 TABLET ORAL 2 TIMES DAILY PRN
COMMUNITY
Start: 2020-12-05

## 2021-04-19 RX ORDER — LANCETS 33 GAUGE
EACH MISCELLANEOUS
Qty: 100 EACH | Refills: 0 | Status: SHIPPED | OUTPATIENT
Start: 2021-04-19 | End: 2021-12-07 | Stop reason: SDUPTHER

## 2021-04-19 RX ORDER — LIDOCAINE 50 MG/G
OINTMENT TOPICAL
Qty: 1 TUBE | Refills: 3 | Status: SHIPPED | OUTPATIENT
Start: 2021-04-19

## 2021-04-19 ASSESSMENT — ENCOUNTER SYMPTOMS
SINUS PRESSURE: 0
NAUSEA: 0
WHEEZING: 0
VOMITING: 0
ABDOMINAL PAIN: 0
VOICE CHANGE: 0
SHORTNESS OF BREATH: 0
ABDOMINAL DISTENTION: 0
COLOR CHANGE: 0
SINUS PAIN: 0
COUGH: 0

## 2021-04-19 NOTE — PROGRESS NOTES
Subjective: Nicky Anderson is a 61 y.o. male with  has a past medical history of Chronic back pain, Diabetes mellitus ([facility] Diabetic neuropathy ([facility] DM (diabetes mellitus) ([facility] Hepatitis C, Hypertension, MDRO (multiple drug resistant organisms) resistance, Murmur, and Osteoarthritis. Presented to the office today for:  Chief Complaint   Patient presents with    Hypertension    Diabetes    Health Maintenance       HPI    Patient is presenting today for a BP and DMT2 check. He is currently taking Norvasc 10 mg, Hygroton 25 mg, lisinopril 40 mg. Patient is compliant with his blood pressure meds however states that he went out of refills and missed his doses today. Currently on Metformin 1000 mg twice daily as well as 30 notes twice daily. Patient is also requesting refills during this visit today. Denies any chest pain or shortness of breath. Patient also states that he restarted smoking is currently smoking 1 pack a day as previously of 2 packs a day. Is actively trying to cut down on smoking. Patient would also like to come off of Metformin as it is giving him GI upset. Review of Systems   Constitutional: Negative for fatigue, fever and unexpected weight change. HENT: Negative for sinus pressure, sinus pain and voice change. Eyes: Negative for visual disturbance. Respiratory: Negative for cough, shortness of breath and wheezing. Cardiovascular: Negative for chest pain, palpitations and leg swelling. Gastrointestinal: Negative for abdominal distention, abdominal pain, nausea and vomiting. Endocrine: Negative for polydipsia, polyphagia and polyuria. Genitourinary: Negative for difficulty urinating, flank pain and frequency. Skin: Negative for color change, rash and wound. Neurological: Negative for dizziness, light-headedness, numbness and headaches. Psychiatric/Behavioral: Negative for confusion and decreased concentration. The patient is not nervous/anxious. The patient has a   Family History   Problem Relation Age of Onset    High Blood Pressure Mother     High Blood Pressure Father        Objective:    BP (!) 158/105   Pulse 71   Temp 97.2 °F (36.2 °C)   Ht 5' 9\" (1.753 m)   Wt 150 lb (68 kg)   SpO2 99%   BMI 22.15 kg/m²    BP Readings from Last 3 Encounters:   04/19/21 (!) 158/105   12/31/20 130/86   12/03/20 135/85       Physical Exam  Constitutional:       Appearance: He is well-developed. HENT:      Head: Normocephalic and atraumatic. Eyes:      Pupils: Pupils are equal, round, and reactive to light. Cardiovascular:      Rate and Rhythm: Normal rate and regular rhythm. Heart sounds: Normal heart sounds. No murmur. No friction rub. No gallop. Pulmonary:      Effort: Pulmonary effort is normal. No respiratory distress. Breath sounds: Normal breath sounds. No wheezing or rales. Chest:      Chest wall: No tenderness. Abdominal:      General: Bowel sounds are normal. There is no distension. Palpations: Abdomen is soft. Tenderness: There is no abdominal tenderness. Skin:     General: Skin is warm. Findings: No erythema or rash. Neurological:      Mental Status: He is alert and oriented to person, place, and time. Lab Results   Component Value Date    WBC 6.6 01/05/2020    HGB 17.5 (H) 01/05/2020    HCT 52.4 (H) 01/05/2020     01/05/2020    CHOL 216 (H) 08/15/2019    TRIG 165 (H) 08/15/2019    HDL 71 08/15/2019    ALT 34 02/19/2020    AST 36 02/19/2020     (L) 01/05/2020    K 3.7 01/05/2020    CL 97 (L) 01/05/2020    CREATININE 1.19 10/08/2020    BUN 16 01/05/2020    CO2 24 01/05/2020    TSH 1.05 01/04/2020    INR 1.1 02/19/2020    LABA1C 6.5 04/19/2021    LABMICR 99 (H) 08/15/2019     Lab Results   Component Value Date    CALCIUM 8.8 01/05/2020     Lab Results   Component Value Date    LDLCHOLESTEROL 112 08/15/2019       Assessment and Plan:    1.  Uncontrolled type 2 diabetes mellitus with diabetic mononeuropathy, with long-term current use of insulin (HCC)  - Microalbumin, Ur; Future  - POCT glycosylated hemoglobin (Hb A1C) - 6.7  - aspirin (ASPIRIN LOW DOSE) 81 MG EC tablet; TAKE ONE TABLET BY MOUTH ONCE DAILY  Dispense: 30 tablet; Refill: 3  - blood glucose test strips (ONE TOUCH ULTRA TEST) strip; TESTING TWICE A DAY  Dispense: 100 each; Refill: 5  - insulin glargine (BASAGLAR KWIKPEN) 100 UNIT/ML injection pen; INJECT 35 UNITS SUBCUTANEOUSLY 2 TIMES DAILY  Dispense: 5 pen; Refill: 5  - Insulin Syringe-Needle U-100 (B-D INS SYR ULTRAFINE 1CC/30G) 30G X 1/2\" 1 ML MISC; AS DIRECTED WITH LANTUS  Dispense: 10 each; Refill: 3  - Insulin Syringe-Needle U-100 (INSULIN SYRINGE .5CC/30GX1/2\") 30G X 1/2\" 0.5 ML MISC; Use 3 times daily. Dx: 250.00   Prescribe this syringe for insulin dosages under  50 units per injection. Dispense: 100 Syringe; Refill: 5  - ondansetron (ZOFRAN) 4 MG tablet; Take 1 tablet by mouth every 8 hours as needed for Nausea or Vomiting  Dispense: 12 tablet; Refill: 1  - OneTouch Delica Lancets 64N MISC; USE AS DIRECTED TWICE A DAY  Dispense: 100 each; Refill: 0  - will cut down metformin and change to XR to reduce side effect profile.   - metFORMIN (GLUCOPHAGE XR) 750 MG extended release tablet; Take 1 tablet by mouth daily (with breakfast)  Dispense: 30 tablet; Refill: 3    2. Essential hypertension  - recheck 158/105  - amLODIPine (NORVASC) 10 MG tablet; TAKE 1 TABLET BY MOUTH ONCE DAILY  Dispense: 30 tablet; Refill: 5  - Blood Pressure KIT; 1 kit by Does not apply route three times daily Please replace with any kit that is covered by insurance. Dispense: 1 kit; Refill: 0  - chlorthalidone (HYGROTON) 25 MG tablet; TAKE ONE TABLET BY MOUTH DAILY  Dispense: 30 tablet; Refill: 3  - lisinopril (PRINIVIL;ZESTRIL) 40 MG tablet; TAKE 1 TAB BY MOUTH ONCE A DAY  Dispense: 30 tablet; Refill: 5  - Basic Metabolic Panel; Future  - nurse visit in one week for BP check    3.  Screening for hyperlipidemia  - Lipid Panel; Future    4. Dyslipidemia  - atorvastatin (LIPITOR) 20 MG tablet; Take 1 tablet by mouth daily  Dispense: 30 tablet; Refill: 5    5. Gastroesophageal reflux disease without esophagitis  - famotidine (PEPCID) 40 MG tablet; Take 1 tablet by mouth every evening  Dispense: 30 tablet; Refill: 3  - raNITIdine (ZANTAC) 150 MG tablet; Take 0.5 tablets by mouth 2 times daily  Dispense: 180 tablet; Refill: 3    6. Chronic bilateral low back pain, unspecified whether sciatica present  - lidocaine (XYLOCAINE) 5 % ointment; Apply topically as needed. Dispense: 1 Tube; Refill: 3    7. Chronic bilateral low back pain with bilateral sciatica  - ibuprofen (ADVIL;MOTRIN) 800 MG tablet; Take 1 tablet by mouth every 8 hours as needed for Pain  Dispense: 30 tablet; Refill: 2    8. Opioid withdrawal (HCC)  - naloxone 4 MG/0.1ML LIQD nasal spray; 1 spray by Nasal route as needed for Opioid Reversal  Dispense: 1 each; Refill: 3    9. Erectile dysfunction associated with type 2 diabetes mellitus (HCC)  - sildenafil (VIAGRA) 50 MG tablet; Take 1 tablet by mouth as needed for Erectile Dysfunction  Dispense: 10 tablet; Refill: 3          Requested Prescriptions     Signed Prescriptions Disp Refills    amLODIPine (NORVASC) 10 MG tablet 30 tablet 5     Sig: TAKE 1 TABLET BY MOUTH ONCE DAILY    aspirin (ASPIRIN LOW DOSE) 81 MG EC tablet 30 tablet 3     Sig: TAKE ONE TABLET BY MOUTH ONCE DAILY    atorvastatin (LIPITOR) 20 MG tablet 30 tablet 5     Sig: Take 1 tablet by mouth daily    blood glucose test strips (ONE TOUCH ULTRA TEST) strip 100 each 5     Sig: TESTING TWICE A DAY    Blood Pressure KIT 1 kit 0     Si kit by Does not apply route three times daily Please replace with any kit that is covered by insurance.     chlorthalidone (HYGROTON) 25 MG tablet 30 tablet 3     Sig: TAKE ONE TABLET BY MOUTH DAILY    famotidine (PEPCID) 40 MG tablet 30 tablet 3     Sig: Take 1 tablet by mouth every evening    ibuprofen (ADVIL;MOTRIN) 800 MG tablet 30 tablet 2     Sig: Take 1 tablet by mouth every 8 hours as needed for Pain    insulin glargine (BASAGLAR KWIKPEN) 100 UNIT/ML injection pen 5 pen 5     Sig: INJECT 35 UNITS SUBCUTANEOUSLY 2 TIMES DAILY    Insulin Syringe-Needle U-100 (B-D INS SYR ULTRAFINE 1CC/30G) 30G X 1/2\" 1 ML MISC 10 each 3     Sig: AS DIRECTED WITH LANTUS    Insulin Syringe-Needle U-100 (INSULIN SYRINGE .5CC/30GX1/2\") 30G X 1/2\" 0.5 ML MISC 100 Syringe 5     Sig: Use 3 times daily. Dx: 250.00   Prescribe this syringe for insulin dosages under  50 units per injection.  lidocaine (XYLOCAINE) 5 % ointment 1 Tube 3     Sig: Apply topically as needed.     lisinopril (PRINIVIL;ZESTRIL) 40 MG tablet 30 tablet 5     Sig: TAKE 1 TAB BY MOUTH ONCE A DAY    naloxone 4 MG/0.1ML LIQD nasal spray 1 each 3     Si spray by Nasal route as needed for Opioid Reversal    ondansetron (ZOFRAN) 4 MG tablet 12 tablet 1     Sig: Take 1 tablet by mouth every 8 hours as needed for Nausea or Vomiting    OneTouch Delica Lancets 16C MISC 100 each 0     Sig: USE AS DIRECTED TWICE A DAY    raNITIdine (ZANTAC) 150 MG tablet 180 tablet 3     Sig: Take 0.5 tablets by mouth 2 times daily    sildenafil (VIAGRA) 50 MG tablet 10 tablet 3     Sig: Take 1 tablet by mouth as needed for Erectile Dysfunction    metFORMIN (GLUCOPHAGE XR) 750 MG extended release tablet 30 tablet 3     Sig: Take 1 tablet by mouth daily (with breakfast)       Medications Discontinued During This Encounter   Medication Reason    Insulin Syringe-Needle U-100 (B-D INS SYR ULTRAFINE 1CC/30G) 30G X 1/2\" 1 ML MISC REORDER    ONETOUCH DELICA LANCETS 42L MISC REORDER    naloxone 4 MG/0.1ML LIQD nasal spray REORDER    Blood Pressure KIT REORDER    raNITIdine (ZANTAC) 150 MG tablet REORDER    ondansetron (ZOFRAN) 4 MG tablet REORDER    metFORMIN (GLUCOPHAGE) 1000 MG tablet REORDER    Insulin Syringe-Needle U-100 (INSULIN SYRINGE .5CC/30GX1/2\") 30G X 1/2\" 0.5 ML MISC REORDER    insulin glargine (BASAGLAR KWIKPEN) 100 UNIT/ML injection pen REORDER    ibuprofen (ADVIL;MOTRIN) 800 MG tablet REORDER    chlorthalidone (HYGROTON) 25 MG tablet REORDER    blood glucose test strips (ONE TOUCH ULTRA TEST) strip REORDER    aspirin (ASPIRIN LOW DOSE) 81 MG EC tablet REORDER    famotidine (PEPCID) 40 MG tablet REORDER    lidocaine (XYLOCAINE) 5 % ointment REORDER    atorvastatin (LIPITOR) 20 MG tablet REORDER    amLODIPine (NORVASC) 10 MG tablet REORDER    lisinopril (PRINIVIL;ZESTRIL) 40 MG tablet REORDER    sildenafil (VIAGRA) 50 MG tablet REORDER    metFORMIN (GLUCOPHAGE) 1000 MG tablet LIST CLEANUP       Jose Guadalupe received counseling on the following healthy behaviors: nutrition, exercise and medication adherence    Discussed use,benefit, and side effects of prescribed medications. Barriers to medication compliance addressed. All patient questions answered. Pt voiced understanding. Return in about 1 week (around 4/26/2021) for BP recheck with nurses, otherwise 3 month follow up. Disclaimer: Some orall of this note was transcribed using voice-recognition software. This may cause typographical errors occasionally. Although all effort is made to fix these errors, please do not hesitate to contact our office if there Carlo Asters concern with the understanding of this note.

## 2021-04-19 NOTE — PROGRESS NOTES
Attending Physician Statement  I have discussed the care of Fuentes Wilson 61 y.o. male, including pertinent history and exam findings, with the resident Dr. Ciarra Langley MD.    History and Exam:   Chief Complaint   Patient presents with    Hypertension    Diabetes    Health Maintenance     Past Medical History:   Diagnosis Date    Chronic back pain     Diabetes mellitus ([address]     Diabetic neuropathy ([facility]     DM (diabetes mellitus) ([facility]     Hepatitis C     Hypertension     MDRO (multiple drug resistant organisms) resistance     MRSA IN RIGHT FOOT    Murmur     Osteoarthritis      Allergies   Allergen Reactions    Morphine Hives      I have seen and examined the patient and the key elements of the encounter have been performed by me. BP Readings from Last 3 Encounters:   04/19/21 (!) 158/105   12/31/20 130/86   12/03/20 135/85     BP (!) 158/105   Pulse 71   Temp 97.2 °F (36.2 °C)   Ht 5' 9\" (1.753 m)   Wt 150 lb (68 kg)   SpO2 99%   BMI 22.15 kg/m²   Lab Results   Component Value Date    WBC 6.6 01/05/2020    HGB 17.5 (H) 01/05/2020    HCT 52.4 (H) 01/05/2020     01/05/2020    CHOL 216 (H) 08/15/2019    TRIG 165 (H) 08/15/2019    HDL 71 08/15/2019    ALT 34 02/19/2020    AST 36 02/19/2020     (L) 01/05/2020    K 3.7 01/05/2020    CL 97 (L) 01/05/2020    CREATININE 1.19 10/08/2020    BUN 16 01/05/2020    CO2 24 01/05/2020    TSH 1.05 01/04/2020    INR 1.1 02/19/2020    LABA1C 6.5 04/19/2021    LABMICR 99 (H) 08/15/2019     Lab Results   Component Value Date    LABPROT 6.1 (L) 07/13/2012    LABALBU 3.9 02/19/2020     Lab Results   Component Value Date    IRON 147 07/27/2018    TIBC 385 07/27/2018    FERRITIN 82 07/27/2018     Lab Results   Component Value Date    LDLCHOLESTEROL 112 08/15/2019     I agree with the assessment, plan and the diagnosis of    Diagnosis Orders   1.  Controlled type 2 diabetes mellitus without complication, with long-term current use of insulin ([facility] nurses, otherwise 3 month follow up.    (GC Modifier)-Dr. Amy Reddy MD

## 2021-04-19 NOTE — TELEPHONE ENCOUNTER
Pharmacy calling stating that they recived 2 scripts for metformin; 1,000mg er and 750mg er  Is patient suppose to be on both or one or the other?  Please clarify       Pharmacy would like a call back at [phone]

## 2021-04-20 DIAGNOSIS — Z79.4 CONTROLLED TYPE 2 DIABETES MELLITUS WITHOUT COMPLICATION, WITH LONG-TERM CURRENT USE OF INSULIN (HCC): ICD-10-CM

## 2021-04-20 DIAGNOSIS — E11.9 CONTROLLED TYPE 2 DIABETES MELLITUS WITHOUT COMPLICATION, WITH LONG-TERM CURRENT USE OF INSULIN (HCC): ICD-10-CM

## 2021-04-22 RX ORDER — GABAPENTIN 300 MG/1
CAPSULE ORAL
Qty: 90 CAPSULE | Refills: 0 | Status: SHIPPED | OUTPATIENT
Start: 2021-04-22 | End: 2021-04-27

## 2021-04-22 NOTE — TELEPHONE ENCOUNTER
Gabapentin pending for refill     Health Maintenance   Topic Date Due    COVID-19 Vaccine (1) Never done    Diabetic retinal exam  03/10/2016    Diabetic microalbuminuria test  08/15/2020    Lipid screen  08/15/2020    Potassium monitoring  01/05/2021    Shingles Vaccine (1 of 2) 06/23/2021 (Originally 2/11/2014)    Diabetic foot exam  08/19/2021    Creatinine monitoring  10/08/2021    A1C test (Diabetic or Prediabetic)  04/19/2022    DTaP/Tdap/Td vaccine (2 - Td) 02/22/2026    Colon cancer screen colonoscopy  11/19/2028    Hepatitis A vaccine  Completed    Hepatitis B vaccine  Completed    Flu vaccine  Completed    Pneumococcal 0-64 years Vaccine  Completed    HIV screen  Completed    Hib vaccine  Aged Out    Meningococcal (ACWY) vaccine  Aged Out             (applicable per patient's age: Cancer Screenings, Depression Screening, Fall Risk Screening, Immunizations)    Hemoglobin A1C (%)   Date Value   04/19/2021 6.5   12/31/2020 6.7   01/05/2020 6.6 (H)     Microalb/Crt.  Ratio (mcg/mg creat)   Date Value   08/15/2019 99 (H)     LDL Cholesterol (mg/dL)   Date Value   08/15/2019 112     AST (U/L)   Date Value   02/19/2020 36     ALT (U/L)   Date Value   02/19/2020 34     BUN (mg/dL)   Date Value   01/05/2020 16      (goal A1C is < 7)   (goal LDL is <100) need 30-50% reduction from baseline     BP Readings from Last 3 Encounters:   04/19/21 (!) 158/105   12/31/20 130/86   12/03/20 135/85    (goal /80)      All Future Testing planned in [facility]:  Lab Frequency Next Occurrence   COVID-19 Once 01/10/2021   Microalbumin, Ur Once 05/19/2021   Lipid Panel Once 47/35/9589   Basic Metabolic Panel Once 15/41/5130       Next Visit Date:  Future Appointments   Date Time Provider Maryana Chairez   4/26/2021  9:30 AM [facility]            Patient Active Problem List:     Liver disease     Type II or unspecified type diabetes mellitus without mention of complication, not stated as uncontrolled     Chronic bilateral low back pain with bilateral sciatica     Thoracic or lumbosacral neuritis or radiculitis, unspecified     Degeneration of lumbar or lumbosacral intervertebral disc     DM (diabetes mellitus) ([facility])     Hep C w/o coma, chronic ([facility])     Back pain     Back pain, chronic     HTN (hypertension)     Osteoarthritis of lumbar spine     Diabetes mellitus ([address]     Needs flu shot     Smoking     Chronic back pain     Spinal stenosis, lumbar region, without neurogenic claudication     Foot drop, right     Neuritis or radiculitis due to displacement of lumbar intervertebral disc     Lumbar disc herniation     Polyneuropathic pain     Displacement of lumbar intervertebral disc without myelopathy     Uncontrolled type 2 diabetes mellitus with diabetic mononeuropathy, with long-term current use of insulin ([facility])     Smoker     Cellulitis and abscess     Exposed orthopaedic hardware ([address]     Hallux abducto valgus     Diabetes ([facility])     Positional vertigo     Abdominal pain, right upper quadrant     Cellulitis     Esophagitis determined by endoscopy     Cholelithiasis     Diabetes type 2, uncontrolled ([address]     Diabetic mononeuropathy associated with diabetes mellitus due to underlying condition ([address]     Lumbar spondylolysis     Erectile dysfunction associated with type 2 diabetes mellitus ([address]     Encounter for smoking cessation counseling     Bloating     Family history of breast cancer     Dyslipidemia     Acute bacterial sinusitis     Blurry vision, bilateral     Dizziness     Syncope and collapse     Diabetic neuropathy with neurologic complication ([address]     History of lumbar surgery

## 2021-04-27 RX ORDER — GABAPENTIN 300 MG/1
CAPSULE ORAL
Qty: 90 CAPSULE | Refills: 3 | Status: SHIPPED | OUTPATIENT
Start: 2021-04-27 | End: 2021-07-27

## 2021-04-29 ENCOUNTER — TELEPHONE (OUTPATIENT)
Dept: FAMILY MEDICINE CLINIC | Age: 56
End: 2021-04-29

## 2021-04-29 NOTE — TELEPHONE ENCOUNTER
PA request for BP Kit     PA processed and submitted to pt insurance, waiting for response in regards to coverage

## 2021-06-21 ENCOUNTER — HOSPITAL ENCOUNTER (OUTPATIENT)
Age: 56
Setting detail: SPECIMEN
Discharge: HOME | End: 2021-06-21
Payer: MEDICAID

## 2021-06-21 ENCOUNTER — OFFICE VISIT (OUTPATIENT)
Dept: FAMILY MEDICINE CLINIC | Age: 56
End: 2021-06-21
Payer: MEDICAID

## 2021-06-21 VITALS
WEIGHT: 156 LBS | BODY MASS INDEX: 23.11 KG/M2 | DIASTOLIC BLOOD PRESSURE: 102 MMHG | HEIGHT: 69 IN | SYSTOLIC BLOOD PRESSURE: 148 MMHG | HEART RATE: 71 BPM

## 2021-06-21 DIAGNOSIS — Z79.4 CONTROLLED TYPE 2 DIABETES MELLITUS WITHOUT COMPLICATION, WITH LONG-TERM CURRENT USE OF INSULIN (HCC): ICD-10-CM

## 2021-06-21 DIAGNOSIS — E11.69 ERECTILE DYSFUNCTION ASSOCIATED WITH TYPE 2 DIABETES MELLITUS (HCC): ICD-10-CM

## 2021-06-21 DIAGNOSIS — I10 ESSENTIAL HYPERTENSION: ICD-10-CM

## 2021-06-21 DIAGNOSIS — M54.41 ACUTE MIDLINE LOW BACK PAIN WITH RIGHT-SIDED SCIATICA: Primary | ICD-10-CM

## 2021-06-21 DIAGNOSIS — M54.41 CHRONIC BILATERAL LOW BACK PAIN WITH BILATERAL SCIATICA: ICD-10-CM

## 2021-06-21 DIAGNOSIS — Z13.220 SCREENING FOR HYPERLIPIDEMIA: ICD-10-CM

## 2021-06-21 DIAGNOSIS — M54.42 CHRONIC BILATERAL LOW BACK PAIN WITH BILATERAL SCIATICA: ICD-10-CM

## 2021-06-21 DIAGNOSIS — E11.9 CONTROLLED TYPE 2 DIABETES MELLITUS WITHOUT COMPLICATION, WITH LONG-TERM CURRENT USE OF INSULIN (HCC): ICD-10-CM

## 2021-06-21 DIAGNOSIS — G89.29 CHRONIC BILATERAL LOW BACK PAIN WITH BILATERAL SCIATICA: ICD-10-CM

## 2021-06-21 DIAGNOSIS — N52.1 ERECTILE DYSFUNCTION ASSOCIATED WITH TYPE 2 DIABETES MELLITUS (HCC): ICD-10-CM

## 2021-06-21 LAB
ANION GAP SERPL CALCULATED.3IONS-SCNC: 14 MMOL/L (ref 9–17)
BUN BLDV-MCNC: 17 MG/DL (ref 6–20)
BUN/CREAT BLD: ABNORMAL (ref 9–20)
CALCIUM SERPL-MCNC: 10.1 MG/DL (ref 8.6–10.4)
CHLORIDE BLD-SCNC: 100 MMOL/L (ref 98–107)
CHOLESTEROL/HDL RATIO: 2.7
CHOLESTEROL: 197 MG/DL
CO2: 25 MMOL/L (ref 20–31)
CREAT SERPL-MCNC: 1 MG/DL (ref 0.7–1.2)
CREATININE URINE: 206.3 MG/DL (ref 39–259)
GFR AFRICAN AMERICAN: >60 ML/MIN
GFR NON-AFRICAN AMERICAN: >60 ML/MIN
GFR SERPL CREATININE-BSD FRML MDRD: ABNORMAL ML/MIN/{1.73_M2}
GFR SERPL CREATININE-BSD FRML MDRD: ABNORMAL ML/MIN/{1.73_M2}
GLUCOSE BLD-MCNC: 120 MG/DL (ref 70–99)
HDLC SERPL-MCNC: 72 MG/DL
LDL CHOLESTEROL: 100 MG/DL (ref 0–130)
MICROALBUMIN/CREAT 24H UR: 781 MG/L
MICROALBUMIN/CREAT UR-RTO: 379 MCG/MG CREAT
POTASSIUM SERPL-SCNC: 4.6 MMOL/L (ref 3.7–5.3)
SODIUM BLD-SCNC: 139 MMOL/L (ref 135–144)
TRIGL SERPL-MCNC: 123 MG/DL
VLDLC SERPL CALC-MCNC: NORMAL MG/DL (ref 1–30)

## 2021-06-21 PROCEDURE — 1036F TOBACCO NON-USER: CPT | Performed by: STUDENT IN AN ORGANIZED HEALTH CARE EDUCATION/TRAINING PROGRAM

## 2021-06-21 PROCEDURE — G8420 CALC BMI NORM PARAMETERS: HCPCS | Performed by: STUDENT IN AN ORGANIZED HEALTH CARE EDUCATION/TRAINING PROGRAM

## 2021-06-21 PROCEDURE — G8427 DOCREV CUR MEDS BY ELIG CLIN: HCPCS | Performed by: STUDENT IN AN ORGANIZED HEALTH CARE EDUCATION/TRAINING PROGRAM

## 2021-06-21 PROCEDURE — 99213 OFFICE O/P EST LOW 20 MIN: CPT | Performed by: STUDENT IN AN ORGANIZED HEALTH CARE EDUCATION/TRAINING PROGRAM

## 2021-06-21 PROCEDURE — 3017F COLORECTAL CA SCREEN DOC REV: CPT | Performed by: STUDENT IN AN ORGANIZED HEALTH CARE EDUCATION/TRAINING PROGRAM

## 2021-06-21 PROCEDURE — 3044F HG A1C LEVEL LT 7.0%: CPT | Performed by: STUDENT IN AN ORGANIZED HEALTH CARE EDUCATION/TRAINING PROGRAM

## 2021-06-21 PROCEDURE — 2022F DILAT RTA XM EVC RTNOPTHY: CPT | Performed by: STUDENT IN AN ORGANIZED HEALTH CARE EDUCATION/TRAINING PROGRAM

## 2021-06-21 RX ORDER — IBUPROFEN 800 MG/1
800 TABLET ORAL EVERY 8 HOURS PRN
Qty: 30 TABLET | Refills: 2 | Status: SHIPPED | OUTPATIENT
Start: 2021-06-21 | End: 2021-10-25 | Stop reason: SDUPTHER

## 2021-06-21 RX ORDER — SILDENAFIL 50 MG/1
50 TABLET, FILM COATED ORAL PRN
Qty: 10 TABLET | Refills: 3 | Status: SHIPPED | OUTPATIENT
Start: 2021-06-21 | End: 2021-08-05 | Stop reason: SDUPTHER

## 2021-06-21 ASSESSMENT — PATIENT HEALTH QUESTIONNAIRE - PHQ9
SUM OF ALL RESPONSES TO PHQ QUESTIONS 1-9: 0
SUM OF ALL RESPONSES TO PHQ QUESTIONS 1-9: 0
SUM OF ALL RESPONSES TO PHQ9 QUESTIONS 1 & 2: 0
2. FEELING DOWN, DEPRESSED OR HOPELESS: 0
1. LITTLE INTEREST OR PLEASURE IN DOING THINGS: 0
SUM OF ALL RESPONSES TO PHQ QUESTIONS 1-9: 0

## 2021-06-21 ASSESSMENT — ENCOUNTER SYMPTOMS
SHORTNESS OF BREATH: 0
ABDOMINAL PAIN: 0
BACK PAIN: 1
CHEST TIGHTNESS: 0

## 2021-06-21 NOTE — PROGRESS NOTES
Subjective: Jefferey Kanner is a 61 y.o. male with  has a past medical history of Chronic back pain, Diabetes mellitus ([address] Diabetic neuropathy ([address] DM (diabetes mellitus) ([address] Hepatitis C, Hypertension, MDRO (multiple drug resistant organisms) resistance, Murmur, and Osteoarthritis. Family History   Problem Relation Age of Onset    High Blood Pressure Mother     High Blood Pressure Father        Presented tothe office today for:  Chief Complaint   Patient presents with    Diabetes     follow up/ reprint labs    Back Pain    Medication Refill     needs all medications       HPI  Is a 48-year-old male with past medical history of chronic back pain  Been having acute thoracic midline back pain for the past 2 weeks  As noted a cyst there as well  Denies any alarm symptoms; bowel/bladder incontinence, saddle anesthesia  Requesting refill on viagra    Review of Systems   Constitutional: Negative for chills, fatigue and fever. Eyes: Negative for visual disturbance. Respiratory: Negative for chest tightness and shortness of breath. Cardiovascular: Negative for chest pain and leg swelling. Gastrointestinal: Negative for abdominal pain. Musculoskeletal: Positive for back pain. Neurological: Negative for dizziness, light-headedness and headaches. Objective:    BP (!) 148/102   Pulse 71   Ht 5' 9\" (1.753 m)   Wt 156 lb (70.8 kg)   BMI 23.04 kg/m²    BP Readings from Last 3 Encounters:   06/21/21 (!) 148/102   04/19/21 (!) 158/105   12/31/20 130/86     Physical Exam  Vitals and nursing note reviewed. Constitutional:       Appearance: Normal appearance. He is well-developed. HENT:      Head: Normocephalic and atraumatic. Cardiovascular:      Rate and Rhythm: Normal rate and regular rhythm. Heart sounds: Normal heart sounds. Pulmonary:      Effort: Pulmonary effort is normal. No respiratory distress. Breath sounds: Normal breath sounds. No wheezing.    Skin:     General: Skin is warm and dry. Comments: 2 cm cyst midline thoracic, fixed   Neurological:      Mental Status: He is alert. Lab Results   Component Value Date    WBC 6.6 01/05/2020    HGB 17.5 (H) 01/05/2020    HCT 52.4 (H) 01/05/2020     01/05/2020    CHOL 216 (H) 08/15/2019    TRIG 165 (H) 08/15/2019    HDL 71 08/15/2019    ALT 34 02/19/2020    AST 36 02/19/2020     (L) 01/05/2020    K 3.7 01/05/2020    CL 97 (L) 01/05/2020    CREATININE 1.19 10/08/2020    BUN 16 01/05/2020    CO2 24 01/05/2020    TSH 1.05 01/04/2020    INR 1.1 02/19/2020    LABA1C 6.5 04/19/2021    LABMICR 99 (H) 08/15/2019     Lab Results   Component Value Date    CALCIUM 8.8 01/05/2020     Lab Results   Component Value Date    LDLCHOLESTEROL 112 08/15/2019       Assessment and Plan:    1. Acute midline low back pain with right-sided sciatica    - XR THORACIC SPINE (3 VIEWS); Future  - US UNLISTED PROCEDURE DIAG; Future    2. Erectile dysfunction associated with type 2 diabetes mellitus (HCC)  - sildenafil (VIAGRA) 50 MG tablet; Take 1 tablet by mouth as needed for Erectile Dysfunction  Dispense: 10 tablet; Refill: 3          Requested Prescriptions     Signed Prescriptions Disp Refills    sildenafil (VIAGRA) 50 MG tablet 10 tablet 3     Sig: Take 1 tablet by mouth as needed for Erectile Dysfunction       Medications Discontinued During This Encounter   Medication Reason    sildenafil (VIAGRA) 50 MG tablet REORDER       Return in about 1 week (around 6/28/2021).

## 2021-06-21 NOTE — PATIENT INSTRUCTIONS
Schedule a Vaccine  When you qualify to receive the vaccine, call the [facility]) COVID-19 Vaccination Hotline to schedule your appointment or to get additional information about the [facility]) locations which are offering the COVID-19 vaccine. To be 94% effective, it's important that you receive two doses of one of the COVID-19 vaccines. -If you are receiving the [employer] vaccine, your second shot will be scheduled as close to 21 days after the first shot as possible. -If you are receiving the Moderna vaccine, your second shot will be scheduled as close to 28 days after the first shot as possible. [facility]) COVID-19 Vaccination Hotline: [phone]    Links to [facility]) website and [facility] website:    [email or web address]    [email or web address]

## 2021-06-21 NOTE — PROGRESS NOTES
Visit Information    Have you changed or started any medications since your last visit including any over-the-counter medicines, vitamins, or herbal medicines? no   Are you having any side effects from any of your medications? -  no  Have you stopped taking any of your medications? Is so, why? -  no    Have you seen any other physician or provider since your last visit? No  Have you had any other diagnostic tests since your last visit? No  Have you been seen in the emergency room and/or had an admission to a hospital since we last saw you? No  Have you had your routine dental cleaning in the past 6 months? No    Have you activated your [employer] account? If not, what are your barriers?  No: declined     Patient Care Team:  Rossy Oliveira MD as PCP - General (Family Medicine)  Shellie Cochran MD as Orthopedic Surgeon (Orthopedic Surgery)  Irwin Rodriugez MD as Consulting Physician (Neurology)  Tere Pedroza DPM as Surgeon (Podiatry)  Andi Ortez DO as Consulting Physician (General Surgery)  Julio Kelly MD as Consulting Physician (Gastroenterology)    Medical History Review  Past Medical, Family, and Social History reviewed and does not contribute to the patient presenting condition    Health Maintenance   Topic Date Due    COVID-19 Vaccine (1) Never done    Diabetic retinal exam  03/10/2016    Diabetic microalbuminuria test  08/15/2020    Lipid screen  08/15/2020    Potassium monitoring  01/05/2021    Shingles Vaccine (1 of 2) 06/23/2021 (Originally 2/11/2014)    Diabetic foot exam  08/19/2021    Creatinine monitoring  10/08/2021    A1C test (Diabetic or Prediabetic)  04/19/2022    DTaP/Tdap/Td vaccine (2 - Td or Tdap) 02/22/2026    Colon cancer screen colonoscopy  11/19/2028    Pneumococcal 0-64 years Vaccine (2 of 2) 02/11/2029    Hepatitis A vaccine  Completed    Hepatitis B vaccine  Completed    Flu vaccine  Completed    HIV screen  Completed    Hib vaccine  Aged C/ Garry Jesus 18 Meningococcal (ACWY) vaccine  Aged Out

## 2021-06-21 NOTE — TELEPHONE ENCOUNTER
Please address the medication refill and close the encounter. If I can be of assistance, please route to the applicable pool. Thank you. Last visit: 06/21/21  Last Med refill: 05/28/21  Does patient have enough medication for 72 hours: Yes    Next Visit Date:  Future Appointments   Date Time Provider Maryana Mihaela   6/28/2021  1:45 PM Jennie Laws MD [facility] Maintenance   Topic Date Due    COVID-19 Vaccine (1) Never done    Diabetic retinal exam  03/10/2016    Lipid screen  08/15/2020    Potassium monitoring  01/05/2021    Shingles Vaccine (1 of 2) 06/23/2021 (Originally 2/11/2014)    Diabetic foot exam  08/19/2021    Creatinine monitoring  10/08/2021    A1C test (Diabetic or Prediabetic)  04/19/2022    Diabetic microalbuminuria test  06/21/2022    DTaP/Tdap/Td vaccine (2 - Td or Tdap) 02/22/2026    Colon cancer screen colonoscopy  11/19/2028    Pneumococcal 0-64 years Vaccine (2 of 2) 02/11/2029    Hepatitis A vaccine  Completed    Hepatitis B vaccine  Completed    Flu vaccine  Completed    HIV screen  Completed    Hib vaccine  Aged Out    Meningococcal (ACWY) vaccine  Aged Out       Hemoglobin A1C (%)   Date Value   04/19/2021 6.5   12/31/2020 6.7   01/05/2020 6.6 (H)             ( goal A1C is < 7)   Microalb/Crt.  Ratio (mcg/mg creat)   Date Value   06/21/2021 379 (H)     LDL Cholesterol (mg/dL)   Date Value   08/15/2019 112   04/03/2017 63       (goal LDL is <100)   AST (U/L)   Date Value   02/19/2020 36     ALT (U/L)   Date Value   02/19/2020 34     BUN (mg/dL)   Date Value   01/05/2020 16     BP Readings from Last 3 Encounters:   06/21/21 (!) 148/102   04/19/21 (!) 158/105   12/31/20 130/86          (goal 120/80)    All Future Testing planned in [facility]  Lab Frequency Next Occurrence   COVID-19 Once 01/10/2021   XR THORACIC SPINE (3 VIEWS) Once 06/21/2021    [facility] Once 07/21/2021               Patient Active Problem List:     Liver disease Type II or unspecified type diabetes mellitus without mention of complication, not stated as uncontrolled     Chronic bilateral low back pain with bilateral sciatica     Thoracic or lumbosacral neuritis or radiculitis, unspecified     Degeneration of lumbar or lumbosacral intervertebral disc     DM (diabetes mellitus) ([facility])     Hep C w/o coma, chronic ([facility])     Back pain     Back pain, chronic     HTN (hypertension)     Osteoarthritis of lumbar spine     Diabetes mellitus ([address]     Needs flu shot     Smoking     Chronic back pain     Spinal stenosis, lumbar region, without neurogenic claudication     Foot drop, right     Neuritis or radiculitis due to displacement of lumbar intervertebral disc     Lumbar disc herniation     Polyneuropathic pain     Displacement of lumbar intervertebral disc without myelopathy     Uncontrolled type 2 diabetes mellitus with diabetic mononeuropathy, with long-term current use of insulin ([facility])     Smoker     Cellulitis and abscess     Exposed orthopaedic hardware ([address]     Hallux abducto valgus     Diabetes ([facility])     Positional vertigo     Abdominal pain, right upper quadrant     Cellulitis     Esophagitis determined by endoscopy     Cholelithiasis     Diabetes type 2, uncontrolled ([address]     Diabetic mononeuropathy associated with diabetes mellitus due to underlying condition ([address]     Lumbar spondylolysis     Erectile dysfunction associated with type 2 diabetes mellitus ([address]     Encounter for smoking cessation counseling     Bloating     Family history of breast cancer     Dyslipidemia     Acute bacterial sinusitis     Blurry vision, bilateral     Dizziness     Syncope and collapse     Diabetic neuropathy with neurologic complication ([address]     History of lumbar surgery

## 2021-06-21 NOTE — PROGRESS NOTES
UNLISTED PROCEDURE DIAG   2. Erectile dysfunction associated with type 2 diabetes mellitus (HCC)  sildenafil (VIAGRA) 50 MG tablet    . I agree with orders as documented by the resident. More than 25 minutes spent  in face to face encounter with the patient and more than half in counseling. Patient's questions were answered. Patient Voiced understanding to the counseling. Return in about 1 week (around 6/28/2021).    (GC Modifier)-Dr. Zackery Cabral MD

## 2021-06-23 ENCOUNTER — HOSPITAL ENCOUNTER (OUTPATIENT)
Age: 56
Discharge: HOME | End: 2021-06-25
Payer: MEDICAID

## 2021-06-23 ENCOUNTER — HOSPITAL ENCOUNTER (OUTPATIENT)
Dept: GENERAL RADIOLOGY | Age: 56
Discharge: HOME | End: 2021-06-25
Payer: MEDICAID

## 2021-06-23 DIAGNOSIS — M54.41 ACUTE MIDLINE LOW BACK PAIN WITH RIGHT-SIDED SCIATICA: ICD-10-CM

## 2021-06-23 PROCEDURE — 72072 X-RAY EXAM THORAC SPINE 3VWS: CPT

## 2021-07-01 ENCOUNTER — OFFICE VISIT (OUTPATIENT)
Dept: FAMILY MEDICINE CLINIC | Age: 56
End: 2021-07-01
Payer: MEDICAID

## 2021-07-01 VITALS
WEIGHT: 153 LBS | DIASTOLIC BLOOD PRESSURE: 80 MMHG | BODY MASS INDEX: 22.59 KG/M2 | SYSTOLIC BLOOD PRESSURE: 132 MMHG | HEART RATE: 88 BPM

## 2021-07-01 DIAGNOSIS — M54.41 CHRONIC BILATERAL LOW BACK PAIN WITH BILATERAL SCIATICA: Primary | ICD-10-CM

## 2021-07-01 DIAGNOSIS — G89.29 CHRONIC BILATERAL LOW BACK PAIN WITH BILATERAL SCIATICA: Primary | ICD-10-CM

## 2021-07-01 DIAGNOSIS — M54.42 CHRONIC BILATERAL LOW BACK PAIN WITH BILATERAL SCIATICA: Primary | ICD-10-CM

## 2021-07-01 PROCEDURE — 3017F COLORECTAL CA SCREEN DOC REV: CPT | Performed by: STUDENT IN AN ORGANIZED HEALTH CARE EDUCATION/TRAINING PROGRAM

## 2021-07-01 PROCEDURE — 99213 OFFICE O/P EST LOW 20 MIN: CPT | Performed by: STUDENT IN AN ORGANIZED HEALTH CARE EDUCATION/TRAINING PROGRAM

## 2021-07-01 PROCEDURE — G8427 DOCREV CUR MEDS BY ELIG CLIN: HCPCS | Performed by: STUDENT IN AN ORGANIZED HEALTH CARE EDUCATION/TRAINING PROGRAM

## 2021-07-01 PROCEDURE — 1036F TOBACCO NON-USER: CPT | Performed by: STUDENT IN AN ORGANIZED HEALTH CARE EDUCATION/TRAINING PROGRAM

## 2021-07-01 PROCEDURE — G8420 CALC BMI NORM PARAMETERS: HCPCS | Performed by: STUDENT IN AN ORGANIZED HEALTH CARE EDUCATION/TRAINING PROGRAM

## 2021-07-01 SDOH — ECONOMIC STABILITY: FOOD INSECURITY: WITHIN THE PAST 12 MONTHS, YOU WORRIED THAT YOUR FOOD WOULD RUN OUT BEFORE YOU GOT MONEY TO BUY MORE.: NEVER TRUE

## 2021-07-01 SDOH — ECONOMIC STABILITY: FOOD INSECURITY: WITHIN THE PAST 12 MONTHS, THE FOOD YOU BOUGHT JUST DIDN'T LAST AND YOU DIDN'T HAVE MONEY TO GET MORE.: NEVER TRUE

## 2021-07-01 ASSESSMENT — SOCIAL DETERMINANTS OF HEALTH (SDOH): HOW HARD IS IT FOR YOU TO PAY FOR THE VERY BASICS LIKE FOOD, HOUSING, MEDICAL CARE, AND HEATING?: PATIENT DECLINED

## 2021-07-01 ASSESSMENT — ENCOUNTER SYMPTOMS
CHEST TIGHTNESS: 0
ABDOMINAL PAIN: 0
SHORTNESS OF BREATH: 0

## 2021-07-01 NOTE — PROGRESS NOTES
Diabetic visit information    BP Readings from Last 3 Encounters:   06/21/21 (!) 148/102   04/19/21 (!) 158/105   12/31/20 130/86       Hemoglobin A1C (%)   Date Value   04/19/2021 6.5   12/31/2020 6.7   01/05/2020 6.6 (H)     Microalb/Crt. Ratio (mcg/mg creat)   Date Value   06/21/2021 379 (H)     LDL Cholesterol (mg/dL)   Date Value   06/21/2021 100               Have you changed or started any medications since your last visit including any over-the-counter medicines, vitamins, or herbal medicines? no   Have you stopped taking any of your medications? Is so, why? -  no  Are you having any side effects from any of your medications? - no    Have you seen any other physician or provider since your last visit?  no   Have you had any other diagnostic tests since your last visit?  no   Have you been seen in the emergency room and/or had an admission in a hospital since we last saw you?  no     Have you had your annual diabetic retinal (eye) exam? No   (ensure copy of exam is in the chart)    Have you had your routine dental cleaning in the past 6 months? no    Do you have an active MyChart account? If not, what are your barriers? Yes    Patient Care Team:  Shelia Walden MD as PCP - General (Family Medicine)  Aleta Elder MD as Orthopedic Surgeon (Orthopedic Surgery)  Asad Gomez MD as Consulting Physician (Neurology)  Elaine Wright DPM as Surgeon (Podiatry)  Orlando Vicente DO as Consulting Physician (General Surgery)  Geetha Hodges MD as Consulting Physician (Gastroenterology)    Medical history Review  Past Medical, Family, and Social History reviewed and does not contribute to the patient presenting condition.     Health Maintenance   Topic Date Due    COVID-19 Vaccine (1) Never done    Shingles Vaccine (1 of 2) Never done    Diabetic retinal exam  03/10/2016    Diabetic foot exam  08/19/2021    Flu vaccine (1) 09/01/2021    A1C test (Diabetic or Prediabetic)  04/19/2022    Diabetic microalbuminuria test  06/21/2022    Lipid screen  06/21/2022    Potassium monitoring  06/21/2022    Creatinine monitoring  06/21/2022    DTaP/Tdap/Td vaccine (2 - Td or Tdap) 02/22/2026    Colon cancer screen colonoscopy  11/19/2028    Pneumococcal 0-64 years Vaccine (2 of 2 - PPSV23) 02/11/2029    Hepatitis A vaccine  Completed    Hepatitis B vaccine  Completed    HIV screen  Completed    Hib vaccine  Aged Out    Meningococcal (ACWY) vaccine  Aged Out

## 2021-07-01 NOTE — PROGRESS NOTES
Breath sounds: Normal breath sounds. No wheezing. Skin:     General: Skin is warm and dry. Neurological:      Mental Status: He is alert. Lab Results   Component Value Date    WBC 6.6 01/05/2020    HGB 17.5 (H) 01/05/2020    HCT 52.4 (H) 01/05/2020     01/05/2020    CHOL 197 06/21/2021    TRIG 123 06/21/2021    HDL 72 06/21/2021    ALT 34 02/19/2020    AST 36 02/19/2020     06/21/2021    K 4.6 06/21/2021     06/21/2021    CREATININE 1.00 06/21/2021    BUN 17 06/21/2021    CO2 25 06/21/2021    TSH 1.05 01/04/2020    INR 1.1 02/19/2020    LABA1C 6.5 04/19/2021    LABMICR 379 (H) 06/21/2021     Lab Results   Component Value Date    CALCIUM 10.1 06/21/2021     Lab Results   Component Value Date    LDLCHOLESTEROL 100 06/21/2021       Assessment and Plan:    1. Chronic bilateral low back pain with bilateral sciatica  - Ta Foote MD, Orthopedic Surgery, [place]  - diclofenac sodium (VOLTAREN) 1 % GEL; Apply 4 g topically 2 times daily  Dispense: 150 g; Refill: 1          Requested Prescriptions     Signed Prescriptions Disp Refills    diclofenac sodium (VOLTAREN) 1 %  g 1     Sig: Apply 4 g topically 2 times daily       There are no discontinued medications. Return in about 4 weeks (around 7/29/2021), or if symptoms worsen or fail to improve, for HTN.

## 2021-07-20 ENCOUNTER — OFFICE VISIT (OUTPATIENT)
Dept: ORTHOPEDIC SURGERY | Age: 56
End: 2021-07-20
Payer: MEDICAID

## 2021-07-20 VITALS — HEIGHT: 69 IN | BODY MASS INDEX: 23.4 KG/M2 | RESPIRATION RATE: 12 BRPM | WEIGHT: 158 LBS

## 2021-07-20 DIAGNOSIS — M54.41 CHRONIC BILATERAL LOW BACK PAIN WITH BILATERAL SCIATICA: Primary | ICD-10-CM

## 2021-07-20 DIAGNOSIS — M51.37 DEGENERATION OF LUMBAR OR LUMBOSACRAL INTERVERTEBRAL DISC: ICD-10-CM

## 2021-07-20 DIAGNOSIS — G89.29 CHRONIC BILATERAL LOW BACK PAIN WITH BILATERAL SCIATICA: Primary | ICD-10-CM

## 2021-07-20 DIAGNOSIS — M54.42 CHRONIC BILATERAL LOW BACK PAIN WITH BILATERAL SCIATICA: Primary | ICD-10-CM

## 2021-07-20 DIAGNOSIS — M48.061 SPINAL STENOSIS OF LUMBAR REGION, UNSPECIFIED WHETHER NEUROGENIC CLAUDICATION PRESENT: ICD-10-CM

## 2021-07-20 DIAGNOSIS — M41.50 DEGENERATIVE SCOLIOSIS IN ADULT PATIENT: ICD-10-CM

## 2021-07-20 DIAGNOSIS — D17.9 LIPOMA, UNSPECIFIED SITE: ICD-10-CM

## 2021-07-20 PROCEDURE — 3017F COLORECTAL CA SCREEN DOC REV: CPT | Performed by: ORTHOPAEDIC SURGERY

## 2021-07-20 PROCEDURE — G8427 DOCREV CUR MEDS BY ELIG CLIN: HCPCS | Performed by: ORTHOPAEDIC SURGERY

## 2021-07-20 PROCEDURE — G8420 CALC BMI NORM PARAMETERS: HCPCS | Performed by: ORTHOPAEDIC SURGERY

## 2021-07-20 PROCEDURE — 4004F PT TOBACCO SCREEN RCVD TLK: CPT | Performed by: ORTHOPAEDIC SURGERY

## 2021-07-20 PROCEDURE — 99203 OFFICE O/P NEW LOW 30 MIN: CPT | Performed by: ORTHOPAEDIC SURGERY
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
## 2021-07-26 DIAGNOSIS — Z79.4 CONTROLLED TYPE 2 DIABETES MELLITUS WITHOUT COMPLICATION, WITH LONG-TERM CURRENT USE OF INSULIN (HCC): ICD-10-CM

## 2021-07-26 DIAGNOSIS — K21.9 GASTROESOPHAGEAL REFLUX DISEASE WITHOUT ESOPHAGITIS: ICD-10-CM

## 2021-07-26 DIAGNOSIS — E11.9 CONTROLLED TYPE 2 DIABETES MELLITUS WITHOUT COMPLICATION, WITH LONG-TERM CURRENT USE OF INSULIN (HCC): ICD-10-CM

## 2021-07-26 DIAGNOSIS — I10 ESSENTIAL HYPERTENSION: ICD-10-CM

## 2021-07-26 RX ORDER — FAMOTIDINE 40 MG/1
40 TABLET, FILM COATED ORAL EVERY EVENING
Qty: 30 TABLET | Refills: 3 | Status: SHIPPED | OUTPATIENT
Start: 2021-07-26 | End: 2021-08-05 | Stop reason: SDUPTHER

## 2021-07-26 RX ORDER — METFORMIN HYDROCHLORIDE 750 MG/1
750 TABLET, EXTENDED RELEASE ORAL
Qty: 30 TABLET | Refills: 3 | Status: SHIPPED | OUTPATIENT
Start: 2021-07-26 | End: 2021-08-05 | Stop reason: SDUPTHER

## 2021-07-26 RX ORDER — CHLORTHALIDONE 25 MG/1
TABLET ORAL
Qty: 30 TABLET | Refills: 3 | Status: SHIPPED | OUTPATIENT
Start: 2021-07-26 | End: 2021-08-05 | Stop reason: SDUPTHER

## 2021-07-26 RX ORDER — ASPIRIN 81 MG/1
TABLET ORAL
Qty: 30 TABLET | Refills: 3 | Status: SHIPPED | OUTPATIENT
Start: 2021-07-26 | End: 2021-08-05 | Stop reason: SDUPTHER

## 2021-07-26 NOTE — TELEPHONE ENCOUNTER
Metformin pending for refill     Health Maintenance   Topic Date Due    COVID-19 Vaccine (1) Never done    Shingles Vaccine (1 of 2) Never done    Diabetic retinal exam  03/10/2016    Diabetic foot exam  08/19/2021    Flu vaccine (1) 09/01/2021    A1C test (Diabetic or Prediabetic)  04/19/2022    Diabetic microalbuminuria test  06/21/2022    Lipid screen  06/21/2022    Potassium monitoring  06/21/2022    Creatinine monitoring  06/21/2022    DTaP/Tdap/Td vaccine (2 - Td or Tdap) 02/22/2026    Colon cancer screen colonoscopy  11/19/2028    Pneumococcal 0-64 years Vaccine (2 of 2 - PPSV23) 02/11/2029    Hepatitis A vaccine  Completed    Hepatitis B vaccine  Completed    HIV screen  Completed    Hib vaccine  Aged Out    Meningococcal (ACWY) vaccine  Aged Out             (applicable per patient's age: Cancer Screenings, Depression Screening, Fall Risk Screening, Immunizations)    Hemoglobin A1C (%)   Date Value   04/19/2021 6.5   12/31/2020 6.7   01/05/2020 6.6 (H)     Microalb/Crt.  Ratio (mcg/mg creat)   Date Value   06/21/2021 379 (H)     LDL Cholesterol (mg/dL)   Date Value   06/21/2021 100     AST (U/L)   Date Value   02/19/2020 36     ALT (U/L)   Date Value   02/19/2020 34     BUN (mg/dL)   Date Value   06/21/2021 17      (goal A1C is < 7)   (goal LDL is <100) need 30-50% reduction from baseline     BP Readings from Last 3 Encounters:   07/01/21 132/80   06/21/21 (!) 148/102   04/19/21 (!) 158/105    (goal /80)      All Future Testing planned in [facility]:  Lab Frequency Next Occurrence   COVID-19 Once 01/10/2021   [address] Once 07/21/2021       Next Visit Date:  Future Appointments   Date Time Provider Maryana Chairez   8/4/2021  7:00 AM SANIA Regalado   8/5/2021  8:45 AM Nino Fang MD [id number]            Patient Active Problem List:     Liver disease     Type II or unspecified type diabetes mellitus without mention of complication, not stated as uncontrolled     Chronic bilateral low back pain with bilateral sciatica     Thoracic or lumbosacral neuritis or radiculitis, unspecified     Degeneration of lumbar or lumbosacral intervertebral disc     DM (diabetes mellitus) ([facility])     Hep C w/o coma, chronic ([facility])     Back pain     Back pain, chronic     HTN (hypertension)     Osteoarthritis of lumbar spine     Diabetes mellitus ([address]     Needs flu shot     Smoking     Chronic back pain     Spinal stenosis of lumbar region     Foot drop, right     Neuritis or radiculitis due to displacement of lumbar intervertebral disc     Lumbar disc herniation     Polyneuropathic pain     Displacement of lumbar intervertebral disc without myelopathy     Uncontrolled type 2 diabetes mellitus with diabetic mononeuropathy, with long-term current use of insulin ([facility])     Smoker     Cellulitis and abscess     Exposed orthopaedic hardware ([address]     Hallux abducto valgus     Diabetes ([facility])     Positional vertigo     Abdominal pain, right upper quadrant     Cellulitis     Esophagitis determined by endoscopy     Cholelithiasis     Diabetes type 2, uncontrolled ([address]     Diabetic mononeuropathy associated with diabetes mellitus due to underlying condition ([address]     Lumbar spondylolysis     Erectile dysfunction associated with type 2 diabetes mellitus ([address]     Encounter for smoking cessation counseling     Bloating     Family history of breast cancer     Dyslipidemia     Acute bacterial sinusitis     Blurry vision, bilateral     Dizziness     Syncope and collapse     Diabetic neuropathy with neurologic complication ([address]     History of lumbar surgery

## 2021-07-26 NOTE — TELEPHONE ENCOUNTER
Medications pending for refill     Health Maintenance   Topic Date Due    COVID-19 Vaccine (1) Never done    Shingles Vaccine (1 of 2) Never done    Diabetic retinal exam  03/10/2016    Diabetic foot exam  08/19/2021    Flu vaccine (1) 09/01/2021    A1C test (Diabetic or Prediabetic)  04/19/2022    Diabetic microalbuminuria test  06/21/2022    Lipid screen  06/21/2022    Potassium monitoring  06/21/2022    Creatinine monitoring  06/21/2022    DTaP/Tdap/Td vaccine (2 - Td or Tdap) 02/22/2026    Colon cancer screen colonoscopy  11/19/2028    Pneumococcal 0-64 years Vaccine (2 of 2 - PPSV23) 02/11/2029    Hepatitis A vaccine  Completed    Hepatitis B vaccine  Completed    HIV screen  Completed    Hib vaccine  Aged Out    Meningococcal (ACWY) vaccine  Aged Out             (applicable per patient's age: Cancer Screenings, Depression Screening, Fall Risk Screening, Immunizations)    Hemoglobin A1C (%)   Date Value   04/19/2021 6.5   12/31/2020 6.7   01/05/2020 6.6 (H)     Microalb/Crt.  Ratio (mcg/mg creat)   Date Value   06/21/2021 379 (H)     LDL Cholesterol (mg/dL)   Date Value   06/21/2021 100     AST (U/L)   Date Value   02/19/2020 36     ALT (U/L)   Date Value   02/19/2020 34     BUN (mg/dL)   Date Value   06/21/2021 17      (goal A1C is < 7)   (goal LDL is <100) need 30-50% reduction from baseline     BP Readings from Last 3 Encounters:   07/01/21 132/80   06/21/21 (!) 148/102   04/19/21 (!) 158/105    (goal /80)      All Future Testing planned in [facility]:  Lab Frequency Next Occurrence   COVID-19 Once 01/10/2021   [address] Once 07/21/2021       Next Visit Date:  Future Appointments   Date Time Provider Maryana Chairez   8/4/2021  7:00 AM SANIA Cox   8/5/2021  8:45 AM Qi Norwood MD [facility]            Patient Active Problem List:     Liver disease     Type II or unspecified type diabetes mellitus without mention of complication, not stated as uncontrolled     Chronic bilateral low back pain with bilateral sciatica     Thoracic or lumbosacral neuritis or radiculitis, unspecified     Degeneration of lumbar or lumbosacral intervertebral disc     DM (diabetes mellitus) ([facility])     Hep C w/o coma, chronic ([facility])     Back pain     Back pain, chronic     HTN (hypertension)     Osteoarthritis of lumbar spine     Diabetes mellitus ([address]     Needs flu shot     Smoking     Chronic back pain     Spinal stenosis of lumbar region     Foot drop, right     Neuritis or radiculitis due to displacement of lumbar intervertebral disc     Lumbar disc herniation     Polyneuropathic pain     Displacement of lumbar intervertebral disc without myelopathy     Uncontrolled type 2 diabetes mellitus with diabetic mononeuropathy, with long-term current use of insulin ([facility])     Smoker     Cellulitis and abscess     Exposed orthopaedic hardware ([address]     Hallux abducto valgus     Diabetes ([facility])     Positional vertigo     Abdominal pain, right upper quadrant     Cellulitis     Esophagitis determined by endoscopy     Cholelithiasis     Diabetes type 2, uncontrolled ([address]     Diabetic mononeuropathy associated with diabetes mellitus due to underlying condition ([address]     Lumbar spondylolysis     Erectile dysfunction associated with type 2 diabetes mellitus ([address]     Encounter for smoking cessation counseling     Bloating     Family history of breast cancer     Dyslipidemia     Acute bacterial sinusitis     Blurry vision, bilateral     Dizziness     Syncope and collapse     Diabetic neuropathy with neurologic complication ([address]     History of lumbar surgery

## 2021-08-04 ENCOUNTER — HOSPITAL ENCOUNTER (OUTPATIENT)
Dept: PHYSICAL THERAPY | Age: 56
Setting detail: THERAPIES SERIES
Discharge: HOME | End: 2021-08-04
Payer: MEDICAID

## 2021-08-04 PROCEDURE — 97162 PT EVAL MOD COMPLEX 30 MIN: CPT
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
## 2021-08-05 ENCOUNTER — OFFICE VISIT (OUTPATIENT)
Dept: FAMILY MEDICINE CLINIC | Age: 56
End: 2021-08-05
Payer: MEDICAID

## 2021-08-05 VITALS
HEIGHT: 69 IN | WEIGHT: 158 LBS | SYSTOLIC BLOOD PRESSURE: 135 MMHG | BODY MASS INDEX: 23.4 KG/M2 | TEMPERATURE: 98.1 F | HEART RATE: 76 BPM | DIASTOLIC BLOOD PRESSURE: 85 MMHG

## 2021-08-05 DIAGNOSIS — Z79.4 CONTROLLED TYPE 2 DIABETES MELLITUS WITHOUT COMPLICATION, WITH LONG-TERM CURRENT USE OF INSULIN (HCC): Primary | ICD-10-CM

## 2021-08-05 DIAGNOSIS — E11.69 ERECTILE DYSFUNCTION ASSOCIATED WITH TYPE 2 DIABETES MELLITUS (HCC): ICD-10-CM

## 2021-08-05 DIAGNOSIS — G89.29 CHRONIC BILATERAL LOW BACK PAIN WITH BILATERAL SCIATICA: ICD-10-CM

## 2021-08-05 DIAGNOSIS — K21.9 GASTROESOPHAGEAL REFLUX DISEASE WITHOUT ESOPHAGITIS: ICD-10-CM

## 2021-08-05 DIAGNOSIS — E11.9 CONTROLLED TYPE 2 DIABETES MELLITUS WITHOUT COMPLICATION, WITH LONG-TERM CURRENT USE OF INSULIN (HCC): Primary | ICD-10-CM

## 2021-08-05 DIAGNOSIS — E78.5 DYSLIPIDEMIA: ICD-10-CM

## 2021-08-05 DIAGNOSIS — I10 ESSENTIAL HYPERTENSION: ICD-10-CM

## 2021-08-05 DIAGNOSIS — N52.1 ERECTILE DYSFUNCTION ASSOCIATED WITH TYPE 2 DIABETES MELLITUS (HCC): ICD-10-CM

## 2021-08-05 DIAGNOSIS — M54.41 CHRONIC BILATERAL LOW BACK PAIN WITH BILATERAL SCIATICA: ICD-10-CM

## 2021-08-05 DIAGNOSIS — M54.42 CHRONIC BILATERAL LOW BACK PAIN WITH BILATERAL SCIATICA: ICD-10-CM

## 2021-08-05 LAB — HBA1C MFR BLD: 9.3 %

## 2021-08-05 PROCEDURE — 3017F COLORECTAL CA SCREEN DOC REV: CPT | Performed by: STUDENT IN AN ORGANIZED HEALTH CARE EDUCATION/TRAINING PROGRAM

## 2021-08-05 PROCEDURE — 2022F DILAT RTA XM EVC RTNOPTHY: CPT | Performed by: STUDENT IN AN ORGANIZED HEALTH CARE EDUCATION/TRAINING PROGRAM

## 2021-08-05 PROCEDURE — G8420 CALC BMI NORM PARAMETERS: HCPCS | Performed by: STUDENT IN AN ORGANIZED HEALTH CARE EDUCATION/TRAINING PROGRAM

## 2021-08-05 PROCEDURE — G8427 DOCREV CUR MEDS BY ELIG CLIN: HCPCS | Performed by: STUDENT IN AN ORGANIZED HEALTH CARE EDUCATION/TRAINING PROGRAM

## 2021-08-05 PROCEDURE — 99213 OFFICE O/P EST LOW 20 MIN: CPT | Performed by: STUDENT IN AN ORGANIZED HEALTH CARE EDUCATION/TRAINING PROGRAM

## 2021-08-05 PROCEDURE — 83036 HEMOGLOBIN GLYCOSYLATED A1C: CPT | Performed by: STUDENT IN AN ORGANIZED HEALTH CARE EDUCATION/TRAINING PROGRAM

## 2021-08-05 PROCEDURE — 3046F HEMOGLOBIN A1C LEVEL >9.0%: CPT | Performed by: STUDENT IN AN ORGANIZED HEALTH CARE EDUCATION/TRAINING PROGRAM

## 2021-08-05 PROCEDURE — 4004F PT TOBACCO SCREEN RCVD TLK: CPT | Performed by: STUDENT IN AN ORGANIZED HEALTH CARE EDUCATION/TRAINING PROGRAM

## 2021-08-05 RX ORDER — FAMOTIDINE 40 MG/1
40 TABLET, FILM COATED ORAL EVERY EVENING
Qty: 30 TABLET | Refills: 3 | Status: SHIPPED | OUTPATIENT
Start: 2021-08-05 | End: 2021-12-07 | Stop reason: SDUPTHER

## 2021-08-05 RX ORDER — INSULIN GLARGINE 100 [IU]/ML
INJECTION, SOLUTION SUBCUTANEOUS
Qty: 5 PEN | Refills: 5 | Status: SHIPPED | OUTPATIENT
Start: 2021-08-05 | End: 2021-12-07 | Stop reason: SDUPTHER

## 2021-08-05 RX ORDER — SYRINGE-NEEDLE,INSULIN,0.5 ML 28GX1/2"
SYRINGE, EMPTY DISPOSABLE MISCELLANEOUS
Qty: 100 SYRINGE | Refills: 5 | Status: SHIPPED | OUTPATIENT
Start: 2021-08-05 | End: 2021-08-05 | Stop reason: SDUPTHER

## 2021-08-05 RX ORDER — AMLODIPINE BESYLATE 10 MG/1
TABLET ORAL
Qty: 30 TABLET | Refills: 5 | Status: SHIPPED | OUTPATIENT
Start: 2021-08-05 | End: 2021-12-07 | Stop reason: SDUPTHER

## 2021-08-05 RX ORDER — LISINOPRIL 40 MG/1
TABLET ORAL
Qty: 30 TABLET | Refills: 5 | Status: SHIPPED | OUTPATIENT
Start: 2021-08-05 | End: 2021-12-07 | Stop reason: SDUPTHER

## 2021-08-05 RX ORDER — CHLORTHALIDONE 25 MG/1
TABLET ORAL
Qty: 30 TABLET | Refills: 3 | Status: SHIPPED | OUTPATIENT
Start: 2021-08-05 | End: 2021-12-07 | Stop reason: SDUPTHER

## 2021-08-05 RX ORDER — SYRINGE-NEEDLE,INSULIN,0.5 ML 28GX1/2"
SYRINGE, EMPTY DISPOSABLE MISCELLANEOUS
Qty: 100 SYRINGE | Refills: 5 | Status: SHIPPED | OUTPATIENT
Start: 2021-08-05 | End: 2021-12-07 | Stop reason: SDUPTHER

## 2021-08-05 RX ORDER — GABAPENTIN 300 MG/1
CAPSULE ORAL
Qty: 180 CAPSULE | Refills: 3 | Status: SHIPPED | OUTPATIENT
Start: 2021-08-05 | End: 2021-12-07 | Stop reason: SDUPTHER

## 2021-08-05 RX ORDER — ATORVASTATIN CALCIUM 20 MG/1
20 TABLET, FILM COATED ORAL DAILY
Qty: 30 TABLET | Refills: 5 | Status: SHIPPED | OUTPATIENT
Start: 2021-08-05 | End: 2021-12-07 | Stop reason: SDUPTHER

## 2021-08-05 RX ORDER — SILDENAFIL 50 MG/1
50 TABLET, FILM COATED ORAL PRN
Qty: 10 TABLET | Refills: 3 | Status: SHIPPED | OUTPATIENT
Start: 2021-08-05 | End: 2021-12-07 | Stop reason: SDUPTHER

## 2021-08-05 RX ORDER — RANITIDINE 150 MG/1
75 TABLET ORAL 2 TIMES DAILY
Qty: 180 TABLET | Refills: 3 | Status: SHIPPED | OUTPATIENT
Start: 2021-08-05 | End: 2021-12-07

## 2021-08-05 RX ORDER — METFORMIN HYDROCHLORIDE 750 MG/1
750 TABLET, EXTENDED RELEASE ORAL
Qty: 30 TABLET | Refills: 3 | Status: SHIPPED | OUTPATIENT
Start: 2021-08-05 | End: 2021-08-05

## 2021-08-05 RX ORDER — ASPIRIN 81 MG/1
TABLET ORAL
Qty: 30 TABLET | Refills: 3 | Status: SHIPPED | OUTPATIENT
Start: 2021-08-05 | End: 2021-12-07 | Stop reason: SDUPTHER

## 2021-08-05 RX ORDER — ONDANSETRON 4 MG/1
4 TABLET, FILM COATED ORAL EVERY 8 HOURS PRN
Qty: 12 TABLET | Refills: 5 | Status: SHIPPED | OUTPATIENT
Start: 2021-08-05 | End: 2021-12-07 | Stop reason: SDUPTHER

## 2021-08-05 ASSESSMENT — ENCOUNTER SYMPTOMS
ABDOMINAL DISTENTION: 0
VOMITING: 0
COUGH: 0
SINUS PAIN: 0
NAUSEA: 0
COLOR CHANGE: 0
SHORTNESS OF BREATH: 0
WHEEZING: 0
VOICE CHANGE: 0
ABDOMINAL PAIN: 0
SINUS PRESSURE: 0

## 2021-08-05 NOTE — PROGRESS NOTES
Subjective: Thanh Collins is a 61 y.o. male with  has a past medical history of Chronic back pain, Diabetes mellitus ([address] Diabetic neuropathy ([address] DM (diabetes mellitus) ([address] Hepatitis C, Hypertension, MDRO (multiple drug resistant organisms) resistance, Murmur, and Osteoarthritis. Presented to the office today for:  Chief Complaint   Patient presents with    Diabetes     patient states he take his dm medication everyday       HPI    Patient is a 58-year-old male with past medical history of hypertension, diabetes. Today is presenting for follow-up of his diabetes. Patient also has chronic bilateral lower back pain for which he is seeing physical therapy and also has seen orthopedics. Patient is awaiting insurance approval for getting lumbar epidural steroid injections. Patient states that he has been measuring his blood glucose immediately after eating in the morning and after dinner at nighttime and states that they are over 200 most of the time. Currently taking Basaglar 36 units twice daily. Review of Systems   Constitutional: Negative for fatigue, fever and unexpected weight change. HENT: Negative for sinus pressure, sinus pain and voice change. Eyes: Negative for visual disturbance. Respiratory: Negative for cough, shortness of breath and wheezing. Cardiovascular: Negative for chest pain, palpitations and leg swelling. Gastrointestinal: Negative for abdominal distention, abdominal pain, nausea and vomiting. Endocrine: Negative for polydipsia, polyphagia and polyuria. Genitourinary: Negative for difficulty urinating, flank pain and frequency. Skin: Negative for color change, rash and wound. Neurological: Negative for dizziness, light-headedness, numbness and headaches. Psychiatric/Behavioral: Negative for confusion and decreased concentration. The patient is not nervous/anxious.                   The patient has a   Family History   Problem Relation Age of Onset  High Blood Pressure Mother     High Blood Pressure Father        Objective:    /85   Pulse 76   Temp 98.1 °F (36.7 °C) (Temporal)   Ht 5' 9.02\" (1.753 m)   Wt 158 lb (71.7 kg)   BMI 23.32 kg/m²    BP Readings from Last 3 Encounters:   08/05/21 135/85   07/01/21 132/80   06/21/21 (!) 148/102       Physical Exam  Constitutional:       Appearance: He is well-developed. HENT:      Head: Normocephalic and atraumatic. Eyes:      Pupils: Pupils are equal, round, and reactive to light. Cardiovascular:      Rate and Rhythm: Normal rate and regular rhythm. Heart sounds: Normal heart sounds. No murmur heard. No friction rub. No gallop. Pulmonary:      Effort: Pulmonary effort is normal. No respiratory distress. Breath sounds: Normal breath sounds. No wheezing or rales. Chest:      Chest wall: No tenderness. Abdominal:      General: Bowel sounds are normal. There is no distension. Palpations: Abdomen is soft. Tenderness: There is no abdominal tenderness. Skin:     General: Skin is warm. Findings: No erythema or rash. Neurological:      Mental Status: He is alert and oriented to person, place, and time. Lab Results   Component Value Date    WBC 6.6 01/05/2020    HGB 17.5 (H) 01/05/2020    HCT 52.4 (H) 01/05/2020     01/05/2020    CHOL 197 06/21/2021    TRIG 123 06/21/2021    HDL 72 06/21/2021    ALT 34 02/19/2020    AST 36 02/19/2020     06/21/2021    K 4.6 06/21/2021     06/21/2021    CREATININE 1.00 06/21/2021    BUN 17 06/21/2021    CO2 25 06/21/2021    TSH 1.05 01/04/2020    INR 1.1 02/19/2020    LABA1C 9.3 08/05/2021    LABMICR 379 (H) 06/21/2021     Lab Results   Component Value Date    CALCIUM 10.1 06/21/2021     Lab Results   Component Value Date    LDLCHOLESTEROL 100 06/21/2021       Assessment and Plan:    1.  Controlled type 2 diabetes mellitus without complication, with long-term current use of insulin (HCC)  - POCT glycosylated hemoglobin (Hb A1C) - 9.3  - aspirin (ASPIRIN LOW DOSE) 81 MG EC tablet; TAKE ONE TABLET BY MOUTH ONCE DAILY  Dispense: 30 tablet; Refill: 3  - blood glucose test strips (ONE TOUCH ULTRA TEST) strip; TESTING TWICE A DAY  Dispense: 100 each; Refill: 5  - insulin glargine (BASAGLAR KWIKPEN) 100 UNIT/ML injection pen; INJECT 35 UNITS SUBCUTANEOUSLY 2 TIMES DAILY  Dispense: 5 pen; Refill: 5  - ondansetron (ZOFRAN) 4 MG tablet; Take 1 tablet by mouth every 8 hours as needed for Nausea or Vomiting  Dispense: 12 tablet; Refill: 5  -We will increase metformin from 750 mg daily to 1000 mg twice daily. - metFORMIN (GLUCOPHAGE) 1000 MG tablet; Take 1 tablet by mouth 2 times daily (with meals)  Dispense: 60 tablet; Refill: 5  - Insulin Syringe-Needle U-100 (INSULIN SYRINGE .5CC/30GX1/2\") 30G X 1/2\" 0.5 ML MISC; Use 3 times daily. Dx: 250.00   Prescribe this syringe for insulin dosages under  50 units per injection. Dispense: 100 Syringe; Refill: 5  -Patient instructed to check his blood glucose first thing in the morning prior to having any breakfast.  Patient to bring glucose log to next visit in 1 month. 2. Essential hypertension  -Controlled, 135/85 today  - lisinopril (PRINIVIL;ZESTRIL) 40 MG tablet; TAKE 1 TAB BY MOUTH ONCE A DAY  Dispense: 30 tablet; Refill: 5  - amLODIPine (NORVASC) 10 MG tablet; TAKE 1 TABLET BY MOUTH ONCE DAILY  Dispense: 30 tablet; Refill: 5  - chlorthalidone (HYGROTON) 25 MG tablet; TAKE ONE TABLET BY MOUTH DAILY  Dispense: 30 tablet; Refill: 3    3. Dyslipidemia  - atorvastatin (LIPITOR) 20 MG tablet; Take 1 tablet by mouth daily  Dispense: 30 tablet; Refill: 5    4. Gastroesophageal reflux disease without esophagitis  - famotidine (PEPCID) 40 MG tablet; Take 1 tablet by mouth every evening  Dispense: 30 tablet; Refill: 3  - raNITIdine (ZANTAC) 150 MG tablet; Take 0.5 tablets by mouth 2 times daily  Dispense: 180 tablet; Refill: 3    5.  Chronic bilateral low back pain with bilateral Sig: Use 3 times daily. Dx: 250.00   Prescribe this syringe for insulin dosages under  50 units per injection. Medications Discontinued During This Encounter   Medication Reason    amLODIPine (NORVASC) 10 MG tablet REORDER    atorvastatin (LIPITOR) 20 MG tablet REORDER    lisinopril (PRINIVIL;ZESTRIL) 40 MG tablet REORDER    metFORMIN (GLUCOPHAGE-XR) 750 MG extended release tablet REORDER    blood glucose test strips (ONE TOUCH ULTRA TEST) strip REORDER    insulin glargine (BASAGLAR KWIKPEN) 100 UNIT/ML injection pen REORDER    Insulin Syringe-Needle U-100 (INSULIN SYRINGE .5CC/30GX1/2\") 30G X 1/2\" 0.5 ML MISC REORDER    ondansetron (ZOFRAN) 4 MG tablet REORDER    raNITIdine (ZANTAC) 150 MG tablet REORDER    sildenafil (VIAGRA) 50 MG tablet REORDER    diclofenac sodium (VOLTAREN) 1 % GEL REORDER    aspirin (ASPIRIN LOW DOSE) 81 MG EC tablet REORDER    chlorthalidone (HYGROTON) 25 MG tablet REORDER    famotidine (PEPCID) 40 MG tablet REORDER    gabapentin (NEURONTIN) 300 MG capsule REORDER    metFORMIN (GLUCOPHAGE-XR) 750 MG extended release tablet LIST CLEANUP    Insulin Syringe-Needle U-100 (INSULIN SYRINGE .5CC/30GX1/2\") 30G X 1/2\" 0.5 ML MISC REORDER       Jose Guadalupe received counseling on the following healthy behaviors: nutrition, exercise and medication adherence    Discussed use,benefit, and side effects of prescribed medications. Barriers to medication compliance addressed. All patient questions answered. Pt voiced understanding. Return in about 1 month (around 9/5/2021) for DMT2. Disclaimer: Some orall of this note was transcribed using voice-recognition software. This may cause typographical errors occasionally. Although all effort is made to fix these errors, please do not hesitate to contact our office if there Felix Caban concern with the understanding of this note.

## 2021-08-05 NOTE — PROGRESS NOTES
Diabetic visit information    BP Readings from Last 3 Encounters:   07/01/21 132/80   06/21/21 (!) 148/102   04/19/21 (!) 158/105       Hemoglobin A1C (%)   Date Value   04/19/2021 6.5   12/31/2020 6.7   01/05/2020 6.6 (H)     Microalb/Crt. Ratio (mcg/mg creat)   Date Value   06/21/2021 379 (H)     LDL Cholesterol (mg/dL)   Date Value   06/21/2021 100               Have you changed or started any medications since your last visit including any over-the-counter medicines, vitamins, or herbal medicines? no   Have you stopped taking any of your medications? Is so, why? -  no  Are you having any side effects from any of your medications? - no    Have you seen any other physician or provider since your last visit?  no   Have you had any other diagnostic tests since your last visit?  no   Have you been seen in the emergency room and/or had an admission in a hospital since we last saw you?  no     Have you had your annual diabetic retinal (eye) exam? No   (ensure copy of exam is in the chart)    Have you had your routine dental cleaning in the past 6 months? no    Do you have an active MyChart account? If not, what are your barriers? No:     Patient Care Team:  Nino Fang MD as PCP - General (Family Medicine)  Deejay Conde MD as Orthopedic Surgeon (Orthopedic Surgery)  Kesha Mackey MD as Consulting Physician (Neurology)  Isa Escalera DPM as Surgeon (Podiatry)  Renae Pedersen DO as Consulting Physician (General Surgery)  Alina Keller MD as Consulting Physician (Gastroenterology)    Medical history Review  Past Medical, Family, and Social History reviewed and does not contribute to the patient presenting condition.     Health Maintenance   Topic Date Due    COVID-19 Vaccine (1) Never done    Shingles Vaccine (1 of 2) Never done    Diabetic retinal exam  03/10/2016    Diabetic foot exam  08/19/2021    Flu vaccine (1) 09/01/2021    A1C test (Diabetic or Prediabetic)  04/19/2022    Diabetic microalbuminuria test  06/21/2022    Lipid screen  06/21/2022    Potassium monitoring  06/21/2022    Creatinine monitoring  06/21/2022    DTaP/Tdap/Td vaccine (2 - Td or Tdap) 02/22/2026    Colon cancer screen colonoscopy  11/19/2028    Pneumococcal 0-64 years Vaccine (2 of 2 - PPSV23) 02/11/2029    Hepatitis A vaccine  Completed    Hepatitis B vaccine  Completed    HIV screen  Completed    Hib vaccine  Aged Out    Meningococcal (ACWY) vaccine  Aged Out

## 2021-08-05 NOTE — PATIENT INSTRUCTIONS
Thank you for letting us take care of you today. We hope all your questions were addressed. If a question was overlooked or something else comes to mind after you return home, please contact a member of your Care Team listed below. Your Care Team at [place] [address] is Team #4  Giovanna Raygoza MD (Faculty)  Pilar Mg MD (Resident)  Susan Barth MD (Resident)  Deepa Toribio MD (Resident)  Cherl Frankel, MD (Resident)  LUCA CallowaySARI Erickson, [facility] office)  June Retana, [address] (Clinical Practice Manager)  Karolina Hernandez [facility] (Clinical Pharmacist)       Office phone number: [phone]    If you need to get in right away due to illness, please be advised we have \"Same Day\" appointments available Monday-Friday. Please call us at [phone] option #3 to schedule your \"Same Day\" appointment.

## 2021-08-10 ENCOUNTER — TELEPHONE (OUTPATIENT)
Dept: PAIN MANAGEMENT | Age: 56
End: 2021-08-10

## 2021-08-10 NOTE — TELEPHONE ENCOUNTER
Unable to reach pt at the number given, for smart phrases for visit witl Dr Ari Shell. . I could hear person answering phone but they were unable to hear me,  Called back x 4 remains the same.

## 2021-08-10 NOTE — TELEPHONE ENCOUNTER
[facility]. Attempted to outreach patient to review medical record. Phone answered by family member, Remi Agarwal, who informed writer she left patient a message to return [facility] call to review medical record. Writer will await for patient to return call.

## 2021-08-10 NOTE — PROGRESS NOTES
Attending Physician Statement    Wt Readings from Last 3 Encounters:   08/05/21 158 lb (71.7 kg)   07/20/21 158 lb (71.7 kg)   07/01/21 153 lb (69.4 kg)     Temp Readings from Last 3 Encounters:   08/05/21 98.1 °F (36.7 °C) (Temporal)   04/19/21 97.2 °F (36.2 °C)   12/31/20 97.4 °F (36.3 °C) (Temporal)     BP Readings from Last 3 Encounters:   08/05/21 135/85   07/01/21 132/80   06/21/21 (!) 148/102     Pulse Readings from Last 3 Encounters:   08/05/21 76   07/01/21 88   06/21/21 71         I have discussed the care of Talita Vivas, including pertinent history and exam findings with the resident. I have reviewed the key elements of all parts of the encounter with the resident. I agree with the assessment, plan and orders as documented by the resident.   (GE Modifier)

## 2021-08-11 ENCOUNTER — TELEPHONE (OUTPATIENT)
Dept: PAIN MANAGEMENT | Age: 56
End: 2021-08-11

## 2021-08-11 ENCOUNTER — HOSPITAL ENCOUNTER (OUTPATIENT)
Dept: PAIN MANAGEMENT | Age: 56
Discharge: HOME | End: 2021-08-11
Payer: MEDICAID

## 2021-11-02 ENCOUNTER — CLINICAL DOCUMENTATION (OUTPATIENT)
Dept: PHYSICAL THERAPY | Age: 56
End: 2021-11-02

## 2021-11-02 NOTE — PROGRESS NOTES
[] [facility]) @ [facility]  [address]  [place], [address] [email or web address]  Phone [phone]  Fax [phone]    Physical Therapy Discharge Note    Date: 2021      Patient: Kendy Camp  : 1965  MRN: [id number]    Physician: Harlan Lomax MD                    Insurance: [place] needed after eval   Medical Diagnosis:   M54.42, M54.41, G89.29 (ICD-10-CM) - Chronic bilateral low back pain with bilateral sciatica   M51.37 (ICD-10-CM) - Degeneration of lumbar or lumbosacral intervertebral disc   M41.80 (ICD-10-CM) - Degenerative scoliosis in adult patient   M48.061 (ICD-10-CM) - Spinal stenosis of lumbar region, unspecified whether neurogenic claudication present      Rehab Codes: R25 pain , M25.60 stiffness, R53.1 weakness  Onset Date: 2021                       Next 's appt: TBD    Total visits attended: eval only   Date of initial visit: 21                     [x] Other: Pt only completed initial eval  and did not return after authorization was received. Left multiple messages with patient , , and 9/15. He was to return phone call to schedule PT but at this time (21) has not returned phone call. This episode of care will now be closed. Pt will require a new referral if he wishes to return to physical therapy. It Is My Understanding That The:  [] Patient returned to work. [] Patient demonstrated improved level of function. [] Patient returned to previous functional level. [] Patient's current functional status is unknown due to no-shows  [x] Other: Unable to determine status as pt only showed for evaluation   recommendations/Comments:     Treatment Included:  No treatment, eval only                If you have any questions or concerns regarding this patient's care, please contact us.    Thank you for your referral.      Electronically signed by: Aniket Capone, PT

## 2021-12-07 ENCOUNTER — OFFICE VISIT (OUTPATIENT)
Dept: FAMILY MEDICINE CLINIC | Age: 56
End: 2021-12-07
Payer: MEDICAID

## 2021-12-07 VITALS
BODY MASS INDEX: 22.29 KG/M2 | SYSTOLIC BLOOD PRESSURE: 157 MMHG | DIASTOLIC BLOOD PRESSURE: 99 MMHG | HEART RATE: 87 BPM | WEIGHT: 151 LBS

## 2021-12-07 DIAGNOSIS — N52.1 ERECTILE DYSFUNCTION ASSOCIATED WITH TYPE 2 DIABETES MELLITUS (HCC): ICD-10-CM

## 2021-12-07 DIAGNOSIS — Z79.4 CONTROLLED TYPE 2 DIABETES MELLITUS WITHOUT COMPLICATION, WITH LONG-TERM CURRENT USE OF INSULIN (HCC): Primary | ICD-10-CM

## 2021-12-07 DIAGNOSIS — E11.69 ERECTILE DYSFUNCTION ASSOCIATED WITH TYPE 2 DIABETES MELLITUS (HCC): ICD-10-CM

## 2021-12-07 DIAGNOSIS — G89.29 CHRONIC BILATERAL LOW BACK PAIN WITH BILATERAL SCIATICA: ICD-10-CM

## 2021-12-07 DIAGNOSIS — E78.5 DYSLIPIDEMIA: ICD-10-CM

## 2021-12-07 DIAGNOSIS — Z23 NEED FOR INFLUENZA VACCINATION: ICD-10-CM

## 2021-12-07 DIAGNOSIS — F11.93 OPIOID WITHDRAWAL (HCC): ICD-10-CM

## 2021-12-07 DIAGNOSIS — M54.41 CHRONIC BILATERAL LOW BACK PAIN WITH BILATERAL SCIATICA: ICD-10-CM

## 2021-12-07 DIAGNOSIS — E11.9 CONTROLLED TYPE 2 DIABETES MELLITUS WITHOUT COMPLICATION, WITH LONG-TERM CURRENT USE OF INSULIN (HCC): Primary | ICD-10-CM

## 2021-12-07 DIAGNOSIS — M54.42 CHRONIC BILATERAL LOW BACK PAIN WITH BILATERAL SCIATICA: ICD-10-CM

## 2021-12-07 DIAGNOSIS — K21.9 GASTROESOPHAGEAL REFLUX DISEASE WITHOUT ESOPHAGITIS: ICD-10-CM

## 2021-12-07 DIAGNOSIS — I10 ESSENTIAL HYPERTENSION: ICD-10-CM

## 2021-12-07 LAB — HBA1C MFR BLD: 7.2 %

## 2021-12-07 PROCEDURE — 3051F HG A1C>EQUAL 7.0%<8.0%: CPT | Performed by: STUDENT IN AN ORGANIZED HEALTH CARE EDUCATION/TRAINING PROGRAM

## 2021-12-07 PROCEDURE — G8482 FLU IMMUNIZE ORDER/ADMIN: HCPCS | Performed by: STUDENT IN AN ORGANIZED HEALTH CARE EDUCATION/TRAINING PROGRAM

## 2021-12-07 PROCEDURE — G8427 DOCREV CUR MEDS BY ELIG CLIN: HCPCS | Performed by: STUDENT IN AN ORGANIZED HEALTH CARE EDUCATION/TRAINING PROGRAM

## 2021-12-07 PROCEDURE — 3017F COLORECTAL CA SCREEN DOC REV: CPT | Performed by: STUDENT IN AN ORGANIZED HEALTH CARE EDUCATION/TRAINING PROGRAM

## 2021-12-07 PROCEDURE — 83036 HEMOGLOBIN GLYCOSYLATED A1C: CPT | Performed by: STUDENT IN AN ORGANIZED HEALTH CARE EDUCATION/TRAINING PROGRAM

## 2021-12-07 PROCEDURE — G8420 CALC BMI NORM PARAMETERS: HCPCS | Performed by: STUDENT IN AN ORGANIZED HEALTH CARE EDUCATION/TRAINING PROGRAM

## 2021-12-07 PROCEDURE — 99213 OFFICE O/P EST LOW 20 MIN: CPT | Performed by: STUDENT IN AN ORGANIZED HEALTH CARE EDUCATION/TRAINING PROGRAM

## 2021-12-07 PROCEDURE — 4004F PT TOBACCO SCREEN RCVD TLK: CPT | Performed by: STUDENT IN AN ORGANIZED HEALTH CARE EDUCATION/TRAINING PROGRAM

## 2021-12-07 PROCEDURE — 90686 IIV4 VACC NO PRSV 0.5 ML IM: CPT | Performed by: STUDENT IN AN ORGANIZED HEALTH CARE EDUCATION/TRAINING PROGRAM

## 2021-12-07 PROCEDURE — 2022F DILAT RTA XM EVC RTNOPTHY: CPT | Performed by: STUDENT IN AN ORGANIZED HEALTH CARE EDUCATION/TRAINING PROGRAM

## 2021-12-07 RX ORDER — SILDENAFIL 50 MG/1
50 TABLET, FILM COATED ORAL PRN
Qty: 10 TABLET | Refills: 3 | Status: SHIPPED | OUTPATIENT
Start: 2021-12-07 | End: 2022-05-19 | Stop reason: SDUPTHER

## 2021-12-07 RX ORDER — LISINOPRIL 40 MG/1
TABLET ORAL
Qty: 30 TABLET | Refills: 5 | Status: SHIPPED | OUTPATIENT
Start: 2021-12-07 | End: 2022-04-19

## 2021-12-07 RX ORDER — GABAPENTIN 300 MG/1
CAPSULE ORAL
Qty: 180 CAPSULE | Refills: 5 | Status: SHIPPED | OUTPATIENT
Start: 2021-12-07 | End: 2022-02-21

## 2021-12-07 RX ORDER — SYRINGE-NEEDLE,INSULIN,0.5 ML 28GX1/2"
SYRINGE, EMPTY DISPOSABLE MISCELLANEOUS
Qty: 100 EACH | Refills: 5 | Status: SHIPPED | OUTPATIENT
Start: 2021-12-07 | End: 2022-05-19 | Stop reason: SDUPTHER

## 2021-12-07 RX ORDER — ONDANSETRON 4 MG/1
4 TABLET, FILM COATED ORAL EVERY 8 HOURS PRN
Qty: 12 TABLET | Refills: 5 | Status: SHIPPED | OUTPATIENT
Start: 2021-12-07

## 2021-12-07 RX ORDER — IBUPROFEN 800 MG/1
800 TABLET ORAL EVERY 8 HOURS PRN
Qty: 30 TABLET | Refills: 2 | Status: SHIPPED | OUTPATIENT
Start: 2021-12-07 | End: 2022-05-19 | Stop reason: SDUPTHER

## 2021-12-07 RX ORDER — CHLORTHALIDONE 25 MG/1
TABLET ORAL
Qty: 30 TABLET | Refills: 5 | Status: SHIPPED | OUTPATIENT
Start: 2021-12-07 | End: 2022-05-19 | Stop reason: SDUPTHER

## 2021-12-07 RX ORDER — NALOXONE HYDROCHLORIDE 4 MG/.1ML
1 SPRAY NASAL PRN
Qty: 1 EACH | Refills: 3 | Status: SHIPPED | OUTPATIENT
Start: 2021-12-07

## 2021-12-07 RX ORDER — AMLODIPINE BESYLATE 10 MG/1
TABLET ORAL
Qty: 30 TABLET | Refills: 5 | Status: SHIPPED | OUTPATIENT
Start: 2021-12-07 | End: 2022-04-19

## 2021-12-07 RX ORDER — INSULIN GLARGINE 100 [IU]/ML
INJECTION, SOLUTION SUBCUTANEOUS
Qty: 5 PEN | Refills: 5 | Status: SHIPPED | OUTPATIENT
Start: 2021-12-07 | End: 2022-02-07

## 2021-12-07 RX ORDER — BLOOD-GLUCOSE METER
1 EACH MISCELLANEOUS DAILY
Qty: 1 KIT | Refills: 0 | Status: SHIPPED | OUTPATIENT
Start: 2021-12-07

## 2021-12-07 RX ORDER — ASPIRIN 81 MG/1
TABLET ORAL
Qty: 30 TABLET | Refills: 5 | Status: SHIPPED | OUTPATIENT
Start: 2021-12-07 | End: 2022-05-19 | Stop reason: SDUPTHER

## 2021-12-07 RX ORDER — ATORVASTATIN CALCIUM 20 MG/1
20 TABLET, FILM COATED ORAL DAILY
Qty: 30 TABLET | Refills: 5 | Status: SHIPPED | OUTPATIENT
Start: 2021-12-07 | End: 2022-04-19

## 2021-12-07 RX ORDER — FAMOTIDINE 40 MG/1
40 TABLET, FILM COATED ORAL EVERY EVENING
Qty: 30 TABLET | Refills: 5 | Status: SHIPPED | OUTPATIENT
Start: 2021-12-07 | End: 2022-05-19 | Stop reason: SDUPTHER

## 2021-12-07 RX ORDER — LANCETS 33 GAUGE
EACH MISCELLANEOUS
Qty: 100 EACH | Refills: 0 | Status: SHIPPED | OUTPATIENT
Start: 2021-12-07 | End: 2022-05-19 | Stop reason: SDUPTHER

## 2021-12-07 RX ORDER — PEN NEEDLE, DIABETIC 29 G X1/2"
NEEDLE, DISPOSABLE MISCELLANEOUS
Qty: 10 EACH | Refills: 3 | Status: SHIPPED | OUTPATIENT
Start: 2021-12-07 | End: 2022-05-19 | Stop reason: SDUPTHER

## 2021-12-07 RX ORDER — BLOOD-GLUCOSE METER
1 KIT MISCELLANEOUS DAILY
Qty: 1 KIT | Refills: 0 | Status: SHIPPED | OUTPATIENT
Start: 2021-12-07 | End: 2021-12-07

## 2021-12-07 ASSESSMENT — ENCOUNTER SYMPTOMS
CONSTIPATION: 0
SHORTNESS OF BREATH: 0
CHEST TIGHTNESS: 0
ABDOMINAL PAIN: 0
CHOKING: 0
BACK PAIN: 0
ABDOMINAL DISTENTION: 0

## 2021-12-07 NOTE — PROGRESS NOTES
I have reviewed and discussed key elements of [address] [place] [address] with the resident including plan of care and follow up and agree with the care rosemarei plan. improavement in A1C after metformin increased at last visit. And pt worked hard at dietary changes. Diagnosis Orders   1. Controlled type 2 diabetes mellitus without complication, with long-term current use of insulin (HCC)  POCT glycosylated hemoglobin (Hb T0T)    OneTouch Delica Lancets 30O MISC    ondansetron (ZOFRAN) 4 MG tablet    metFORMIN (GLUCOPHAGE) 1000 MG tablet    Insulin Syringe-Needle U-100 (INSULIN SYRINGE .5CC/30GX1/2\") 30G X 1/2\" 0.5 ML MISC    Insulin Syringe-Needle U-100 (B-D INS SYR ULTRAFINE 1CC/30G) 30G X 1/2\" 1 ML MISC    insulin glargine (BASAGLAR KWIKPEN) 100 UNIT/ML injection pen    blood glucose test strips (ONE TOUCH ULTRA TEST) strip    aspirin (ASPIRIN LOW DOSE) 81 MG EC tablet    Alcohol prep pad    Blood Glucose Monitoring Suppl (ONE TOUCH ULTRA 2) w/Device KIT   2. Need for influenza vaccination  INFLUENZA, QUADV, 3 YRS AND OLDER, IM PF, PREFILL SYR OR SDV, 0.5ML (AFLURIA QUADV, PF)    OH IMMUNIZ ADMIN,1 SINGLE/COMB VAC/TOXOID   3. Erectile dysfunction associated with type 2 diabetes mellitus (HCC)  sildenafil (VIAGRA) 50 MG tablet   4. Opioid withdrawal (HCC)  naloxone 4 MG/0.1ML LIQD nasal spray   5. Essential hypertension  lisinopril (PRINIVIL;ZESTRIL) 40 MG tablet    chlorthalidone (HYGROTON) 25 MG tablet    amLODIPine (NORVASC) 10 MG tablet   6. Chronic bilateral low back pain with bilateral sciatica  ibuprofen (ADVIL;MOTRIN) 800 MG tablet    diclofenac sodium (VOLTAREN) 1 % GEL   7. Uncontrolled type 2 diabetes mellitus with diabetic mononeuropathy, with long-term current use of insulin (HCC)  DISCONTINUED: glucose monitoring (FREESTYLE) kit   8. Gastroesophageal reflux disease without esophagitis  famotidine (PEPCID) 40 MG tablet   9.  Dyslipidemia  atorvastatin (LIPITOR) 20 MG tablet

## 2021-12-07 NOTE — PROGRESS NOTES
Subjective: Josias Will is a 61 y.o. male with  has a past medical history of Chronic back pain, Diabetes mellitus ([address] Diabetic neuropathy ([address] DM (diabetes mellitus) ([facility] [address] Hepatitis C, Hypertension, MDRO (multiple drug resistant organisms) resistance, Murmur, and Osteoarthritis. Presented to the office today for:  Chief Complaint   Patient presents with    Diabetes     has been running so so     Back Pain       HPI    Patient is a 79-year-old male with past medical history of hypertension, diabetes. He is presenting today for diabetes follow-up. During last visit in August patient's Metformin was increased to 1000 mg twice daily. Patient has been taking this increased dose and has been tolerating it well. Patient has not been checking his glucose levels since then as is glucose machine broke. Is requesting referral for new blood glucose monitor today. A1c is rechecked and 7.2 down from 9.6 in August.    Patient is also due for an eye exam this year. Review of Systems   Constitutional: Negative for chills, diaphoresis and fatigue. Respiratory: Negative for choking, chest tightness and shortness of breath. Cardiovascular: Negative for chest pain, palpitations and leg swelling. Gastrointestinal: Negative for abdominal distention, abdominal pain and constipation. Genitourinary: Negative for difficulty urinating, enuresis, flank pain and frequency. Musculoskeletal: Negative for arthralgias, back pain and joint swelling. Neurological: Negative for speech difficulty. The patient has a   Family History   Problem Relation Age of Onset    High Blood Pressure Mother     High Blood Pressure Father        Objective:    BP (!) 157/99   Pulse 87   Wt 151 lb (68.5 kg)   BMI 22.29 kg/m²    BP Readings from Last 3 Encounters:   12/07/21 (!) 157/99   08/05/21 135/85   07/01/21 132/80       Physical Exam  Constitutional:       Appearance: Normal appearance. Cardiovascular:      Rate and Rhythm: Normal rate and regular rhythm. Heart sounds: No murmur heard. No friction rub. No gallop. Pulmonary:      Effort: Pulmonary effort is normal. No respiratory distress. Breath sounds: Normal breath sounds. No wheezing or rales. Abdominal:      General: Bowel sounds are normal. There is no distension. Tenderness: There is no abdominal tenderness. Musculoskeletal:         General: No swelling or tenderness. Skin:     General: Skin is warm. Neurological:      General: No focal deficit present. Mental Status: He is alert and oriented to person, place, and time. Lab Results   Component Value Date    WBC 6.6 01/05/2020    HGB 17.5 (H) 01/05/2020    HCT 52.4 (H) 01/05/2020     01/05/2020    CHOL 197 06/21/2021    TRIG 123 06/21/2021    HDL 72 06/21/2021    ALT 34 02/19/2020    AST 36 02/19/2020     06/21/2021    K 4.6 06/21/2021     06/21/2021    CREATININE 1.00 06/21/2021    BUN 17 06/21/2021    CO2 25 06/21/2021    TSH 1.05 01/04/2020    INR 1.1 02/19/2020    LABA1C 7.2 12/07/2021    LABMICR 379 (H) 06/21/2021     Lab Results   Component Value Date    CALCIUM 10.1 06/21/2021     Lab Results   Component Value Date    LDLCHOLESTEROL 100 06/21/2021       Assessment and Plan:    1. Controlled type 2 diabetes mellitus without complication, with long-term current use of insulin ([facility])  - POCT glycosylated hemoglobin (Hb A1C) - 7.2  - OneTouch Delica Lancets 11S MISC; USE AS DIRECTED TWICE A DAY  Dispense: 100 each; Refill: 0  - ondansetron (ZOFRAN) 4 MG tablet; Take 1 tablet by mouth every 8 hours as needed for Nausea or Vomiting  Dispense: 12 tablet; Refill: 5  - metFORMIN (GLUCOPHAGE) 1000 MG tablet; Take 1 tablet by mouth 2 times daily (with meals)  Dispense: 60 tablet; Refill: 5  - Insulin Syringe-Needle U-100 (INSULIN SYRINGE .5CC/30GX1/2\") 30G X 1/2\" 0.5 ML MISC; Use 3 times daily.   Dx: 250.00   Prescribe this syringe for insulin dosages under  50 units per injection. Dispense: 100 each; Refill: 5  - Insulin Syringe-Needle U-100 (B-D INS SYR ULTRAFINE 1CC/30G) 30G X 1/2\" 1 ML MISC; AS DIRECTED WITH LANTUS  Dispense: 10 each; Refill: 3  - insulin glargine (BASAGLAR KWIKPEN) 100 UNIT/ML injection pen; INJECT 36 UNITS SUBCUTANEOUSLY 2 TIMES DAILY  Dispense: 5 pen; Refill: 5  - blood glucose test strips (ONE TOUCH ULTRA TEST) strip; TESTING TWICE A DAY  Dispense: 100 each; Refill: 5  - aspirin (ASPIRIN LOW DOSE) 81 MG EC tablet; TAKE ONE TABLET BY MOUTH ONCE DAILY  Dispense: 30 tablet; Refill: 5  - Alcohol prep pad  - Blood Glucose Monitoring Suppl (ONE TOUCH ULTRA 2) w/Device KIT; 1 kit by Does not apply route daily  Dispense: 1 kit; Refill: 0    2. Need for influenza vaccination  - INFLUENZA, QUADV, 3 YRS AND OLDER, IM PF, PREFILL SYR OR SDV, 0.5ML (AFLURIA QUADV, PF)  - TN IMMUNIZ ADMIN,1 SINGLE/COMB VAC/TOXOID    3. Erectile dysfunction associated with type 2 diabetes mellitus (HCC)  - sildenafil (VIAGRA) 50 MG tablet; Take 1 tablet by mouth as needed for Erectile Dysfunction  Dispense: 10 tablet; Refill: 3    4. Opioid withdrawal (HCC)  - naloxone 4 MG/0.1ML LIQD nasal spray; 1 spray by Nasal route as needed for Opioid Reversal  Dispense: 1 each; Refill: 3    5. Essential hypertension  -157/99 today. We will continue to monitor and increase medications at next visit if still greater than 140/90  - lisinopril (PRINIVIL;ZESTRIL) 40 MG tablet; TAKE 1 TAB BY MOUTH ONCE A DAY  Dispense: 30 tablet; Refill: 5  - chlorthalidone (HYGROTON) 25 MG tablet; TAKE ONE TABLET BY MOUTH DAILY  Dispense: 30 tablet; Refill: 5  - amLODIPine (NORVASC) 10 MG tablet; TAKE 1 TABLET BY MOUTH ONCE DAILY  Dispense: 30 tablet; Refill: 5    6. Chronic bilateral low back pain with bilateral sciatica  - ibuprofen (ADVIL;MOTRIN) 800 MG tablet; Take 1 tablet by mouth every 8 hours as needed for Pain  Dispense: 30 tablet;  Refill: 2  - diclofenac sodium (VOLTAREN) 1 % GEL; APPLY 4 GM TOPICALLY 2 TIMES DAILY  Dispense: 150 g; Refill: 3    7. Uncontrolled type 2 diabetes mellitus with diabetic mononeuropathy, with long-term current use of insulin ([address]    8. Gastroesophageal reflux disease without esophagitis  - famotidine (PEPCID) 40 MG tablet; Take 1 tablet by mouth every evening  Dispense: 30 tablet; Refill: 5    9. Dyslipidemia  - atorvastatin (LIPITOR) 20 MG tablet; Take 1 tablet by mouth daily  Dispense: 30 tablet; Refill: 5          Requested Prescriptions     Signed Prescriptions Disp Refills    sildenafil (VIAGRA) 50 MG tablet 10 tablet 3     Sig: Take 1 tablet by mouth as needed for Erectile Dysfunction    OneTouch Delica Lancets 54I MISC 100 each 0     Sig: USE AS DIRECTED TWICE A DAY    ondansetron (ZOFRAN) 4 MG tablet 12 tablet 5     Sig: Take 1 tablet by mouth every 8 hours as needed for Nausea or Vomiting    naloxone 4 MG/0.1ML LIQD nasal spray 1 each 3     Si spray by Nasal route as needed for Opioid Reversal    metFORMIN (GLUCOPHAGE) 1000 MG tablet 60 tablet 5     Sig: Take 1 tablet by mouth 2 times daily (with meals)    lisinopril (PRINIVIL;ZESTRIL) 40 MG tablet 30 tablet 5     Sig: TAKE 1 TAB BY MOUTH ONCE A DAY    Insulin Syringe-Needle U-100 (INSULIN SYRINGE .5CC/30GX1/2\") 30G X 1/2\" 0.5 ML MISC 100 each 5     Sig: Use 3 times daily. Dx: 250.00   Prescribe this syringe for insulin dosages under  50 units per injection.     Insulin Syringe-Needle U-100 (B-D INS SYR ULTRAFINE 1CC/30G) 30G X 1/2\" 1 ML MISC 10 each 3     Sig: AS DIRECTED WITH LANTUS    insulin glargine (BASAGLAR KWIKPEN) 100 UNIT/ML injection pen 5 pen 5     Sig: INJECT 36 UNITS SUBCUTANEOUSLY 2 TIMES DAILY    ibuprofen (ADVIL;MOTRIN) 800 MG tablet 30 tablet 2     Sig: Take 1 tablet by mouth every 8 hours as needed for Pain    famotidine (PEPCID) 40 MG tablet 30 tablet 5     Sig: Take 1 tablet by mouth every evening    gabapentin (NEURONTIN) 300 MG capsule 180 capsule 5     Sig: TAKE 2 CAPSULES BY MOUTH 3 TIMES DAILY    diclofenac sodium (VOLTAREN) 1 %  g 3     Sig: APPLY 4 GM TOPICALLY 2 TIMES DAILY    chlorthalidone (HYGROTON) 25 MG tablet 30 tablet 5     Sig: TAKE ONE TABLET BY MOUTH DAILY    blood glucose test strips (ONE TOUCH ULTRA TEST) strip 100 each 5     Sig: TESTING TWICE A DAY    atorvastatin (LIPITOR) 20 MG tablet 30 tablet 5     Sig: Take 1 tablet by mouth daily    amLODIPine (NORVASC) 10 MG tablet 30 tablet 5     Sig: TAKE 1 TABLET BY MOUTH ONCE DAILY    aspirin (ASPIRIN LOW DOSE) 81 MG EC tablet 30 tablet 5     Sig: TAKE ONE TABLET BY MOUTH ONCE DAILY    Blood Glucose Monitoring Suppl (ONE TOUCH ULTRA 2) w/Device KIT 1 kit 0     Si kit by Does not apply route daily       Medications Discontinued During This Encounter   Medication Reason    raNITIdine (ZANTAC) 150 MG tablet LIST CLEANUP    docusate sodium (COLACE) 100 MG capsule LIST CLEANUP    glucose monitoring kit (FREESTYLE) monitoring kit REORDER    Insulin Syringe-Needle U-100 (B-D INS SYR ULTRAFINE 1CC/30G) 30G X 1/2\" 1 ML MISC REORDER    naloxone 4 MG/0.1ML LIQD nasal spray REORDER    OneTouch Delica Lancets 27U MISC REORDER    lisinopril (PRINIVIL;ZESTRIL) 40 MG tablet REORDER    amLODIPine (NORVASC) 10 MG tablet REORDER    atorvastatin (LIPITOR) 20 MG tablet REORDER    gabapentin (NEURONTIN) 300 MG capsule REORDER    aspirin (ASPIRIN LOW DOSE) 81 MG EC tablet REORDER    chlorthalidone (HYGROTON) 25 MG tablet REORDER    famotidine (PEPCID) 40 MG tablet REORDER    sildenafil (VIAGRA) 50 MG tablet REORDER    blood glucose test strips (ONE TOUCH ULTRA TEST) strip REORDER    insulin glargine (BASAGLAR KWIKPEN) 100 UNIT/ML injection pen REORDER    ondansetron (ZOFRAN) 4 MG tablet REORDER    metFORMIN (GLUCOPHAGE) 1000 MG tablet REORDER    Insulin Syringe-Needle U-100 (INSULIN SYRINGE .5CC/30GX1/2\") 30G X 1/2\" 0.5 ML MISC REORDER    diclofenac sodium (VOLTAREN) 1 % GEL REORDER    ibuprofen (ADVIL;MOTRIN) 800 MG tablet REORDER    glucose monitoring (FREESTYLE) kit LIST CLEANUP       Jose Guadalupe received counseling on the following healthy behaviors: nutrition, exercise and medication adherence    Discussed use,benefit, and side effects of prescribed medications. Barriers to medication compliance addressed. All patient questions answered. Pt voiced understanding. No follow-ups on file. Disclaimer: Some orall of this note was transcribed using voice-recognition software. This may cause typographical errors occasionally. Although all effort is made to fix these errors, please do not hesitate to contact our office if there Vonnie Crooks concern with the understanding of this note.

## 2022-02-07 DIAGNOSIS — E11.9 CONTROLLED TYPE 2 DIABETES MELLITUS WITHOUT COMPLICATION, WITH LONG-TERM CURRENT USE OF INSULIN (HCC): ICD-10-CM

## 2022-02-07 DIAGNOSIS — Z79.4 CONTROLLED TYPE 2 DIABETES MELLITUS WITHOUT COMPLICATION, WITH LONG-TERM CURRENT USE OF INSULIN (HCC): ICD-10-CM

## 2022-02-07 RX ORDER — INSULIN GLARGINE 100 [IU]/ML
INJECTION, SOLUTION SUBCUTANEOUS
Qty: 5 PEN | Refills: 5 | Status: SHIPPED | OUTPATIENT
Start: 2022-02-07 | End: 2022-05-19 | Stop reason: SDUPTHER

## 2022-02-21 DIAGNOSIS — E11.9 CONTROLLED TYPE 2 DIABETES MELLITUS WITHOUT COMPLICATION, WITH LONG-TERM CURRENT USE OF INSULIN (HCC): ICD-10-CM

## 2022-02-21 DIAGNOSIS — Z79.4 CONTROLLED TYPE 2 DIABETES MELLITUS WITHOUT COMPLICATION, WITH LONG-TERM CURRENT USE OF INSULIN (HCC): ICD-10-CM

## 2022-02-21 RX ORDER — GABAPENTIN 300 MG/1
CAPSULE ORAL
Qty: 180 CAPSULE | Refills: 5 | Status: SHIPPED | OUTPATIENT
Start: 2022-02-21 | End: 2022-05-19 | Stop reason: SDUPTHER

## 2022-02-21 NOTE — TELEPHONE ENCOUNTER
Last visit: 12//7/21   Last Med refill:   Does patient have enough medication for 72 hours: Yes    Next Visit Date:  Future Appointments   Date Time Provider Maryana Mihaela   3/28/2022  9:00 AM Sunny Gusman MD [address] [place] [address] Maintenance   Topic Date Due    COVID-19 Vaccine (1) Never done    Shingles Vaccine (1 of 2) Never done    Diabetic retinal exam  03/10/2016    Diabetic foot exam  12/07/2022 (Originally 8/19/2021)    Diabetic microalbuminuria test  06/21/2022    Lipid screen  06/21/2022    Depression Screen  06/21/2022    Potassium monitoring  06/21/2022    Creatinine monitoring  06/21/2022    A1C test (Diabetic or Prediabetic)  12/07/2022    DTaP/Tdap/Td vaccine (2 - Td or Tdap) 02/22/2026    Colorectal Cancer Screen  11/19/2028    Pneumococcal 0-64 years Vaccine (2 of 2 - PPSV23) 02/11/2029    Hepatitis A vaccine  Completed    Hepatitis B vaccine  Completed    Flu vaccine  Completed    HIV screen  Completed    Hib vaccine  Aged Out    Meningococcal (ACWY) vaccine  Aged Out       Hemoglobin A1C (%)   Date Value   12/07/2021 7.2   08/05/2021 9.3   04/19/2021 6.5             ( goal A1C is < 7)   Microalb/Crt.  Ratio (mcg/mg creat)   Date Value   06/21/2021 379 (H)     LDL Cholesterol (mg/dL)   Date Value   06/21/2021 100   08/15/2019 112       (goal LDL is <100)   AST (U/L)   Date Value   02/19/2020 36     ALT (U/L)   Date Value   02/19/2020 34     BUN (mg/dL)   Date Value   06/21/2021 17     BP Readings from Last 3 Encounters:   12/07/21 (!) 157/99   08/05/21 135/85   07/01/21 132/80          (goal 120/80)    All Future Testing planned in [facility]  Lab Frequency Next Occurrence   US UNLISTED PROCEDURE DIAG Once 06/21/2022               Patient Active Problem List:     Liver disease     Type II or unspecified type diabetes mellitus without mention of complication, not stated as uncontrolled     Chronic bilateral low back pain with bilateral sciatica     Thoracic or lumbosacral neuritis or radiculitis, unspecified     Degeneration of lumbar or lumbosacral intervertebral disc     DM (diabetes mellitus) ([facility])     Hep C w/o coma, chronic (HCC)     Back pain     Back pain, chronic     HTN (hypertension)     Osteoarthritis of lumbar spine     Diabetes mellitus ([address]     Needs flu shot     Smoking     Chronic back pain     Spinal stenosis of lumbar region     Foot drop, right     Neuritis or radiculitis due to displacement of lumbar intervertebral disc     Lumbar disc herniation     Polyneuropathic pain     Displacement of lumbar intervertebral disc without myelopathy     Uncontrolled type 2 diabetes mellitus with diabetic mononeuropathy, with long-term current use of insulin ([facility])     Smoker     Cellulitis and abscess     Exposed orthopaedic hardware ([address]     Hallux abducto valgus     Diabetes ([facility])     Positional vertigo     Abdominal pain, right upper quadrant     Cellulitis     Esophagitis determined by endoscopy     Cholelithiasis     Diabetes type 2, uncontrolled ([address]     Diabetic mononeuropathy associated with diabetes mellitus due to underlying condition ([address]     Lumbar spondylolysis     Erectile dysfunction associated with type 2 diabetes mellitus ([address]     Encounter for smoking cessation counseling     Bloating     Family history of breast cancer     Dyslipidemia     Acute bacterial sinusitis     Blurry vision, bilateral     Dizziness     Syncope and collapse     Diabetic neuropathy with neurologic complication ([address]     History of lumbar surgery

## 2022-04-19 DIAGNOSIS — E78.5 DYSLIPIDEMIA: ICD-10-CM

## 2022-04-19 DIAGNOSIS — I10 ESSENTIAL HYPERTENSION: ICD-10-CM

## 2022-04-19 RX ORDER — LISINOPRIL 40 MG/1
TABLET ORAL
Qty: 30 TABLET | Refills: 4 | Status: SHIPPED | OUTPATIENT
Start: 2022-04-19 | End: 2022-05-19 | Stop reason: SDUPTHER

## 2022-04-19 RX ORDER — AMLODIPINE BESYLATE 10 MG/1
TABLET ORAL
Qty: 30 TABLET | Refills: 4 | Status: SHIPPED | OUTPATIENT
Start: 2022-04-19 | End: 2022-05-19 | Stop reason: SDUPTHER

## 2022-04-19 RX ORDER — ATORVASTATIN CALCIUM 20 MG/1
20 TABLET, FILM COATED ORAL DAILY
Qty: 30 TABLET | Refills: 4 | Status: SHIPPED | OUTPATIENT
Start: 2022-04-19 | End: 2022-05-19 | Stop reason: SDUPTHER

## 2022-04-19 NOTE — TELEPHONE ENCOUNTER
E-scribe request for med refills. Please review and e-scribe if applicable. Last Visit Date:  12/7/21  Next Visit Date:  Visit date not found    Hemoglobin A1C (%)   Date Value   12/07/2021 7.2   08/05/2021 9.3   04/19/2021 6.5             ( goal A1C is < 7)   Microalb/Crt.  Ratio (mcg/mg creat)   Date Value   06/21/2021 379 (H)     LDL Cholesterol (mg/dL)   Date Value   06/21/2021 100       (goal LDL is <100)   AST (U/L)   Date Value   02/19/2020 36     ALT (U/L)   Date Value   02/19/2020 34     BUN (mg/dL)   Date Value   06/21/2021 17     BP Readings from Last 3 Encounters:   12/07/21 (!) 157/99   08/05/21 135/85   07/01/21 132/80          (goal 120/80)        Patient Active Problem List:     Liver disease     Type II or unspecified type diabetes mellitus without mention of complication, not stated as uncontrolled     Chronic bilateral low back pain with bilateral sciatica     Thoracic or lumbosacral neuritis or radiculitis, unspecified     Degeneration of lumbar or lumbosacral intervertebral disc     DM (diabetes mellitus) (HCC)     Hep C w/o coma, chronic (HCC)     Back pain     Back pain, chronic     HTN (hypertension)     Osteoarthritis of lumbar spine     Diabetes mellitus ([address]     Needs flu shot     Smoking     Chronic back pain     Spinal stenosis of lumbar region     Foot drop, right     Neuritis or radiculitis due to displacement of lumbar intervertebral disc     Lumbar disc herniation     Polyneuropathic pain     Displacement of lumbar intervertebral disc without myelopathy     Uncontrolled type 2 diabetes mellitus with diabetic mononeuropathy, with long-term current use of insulin (HCC)     Smoker     Cellulitis and abscess     Exposed orthopaedic hardware ([address]     Hallux abducto valgus     Diabetes (HCC)     Positional vertigo     Abdominal pain, right upper quadrant     Cellulitis     Esophagitis determined by endoscopy     Cholelithiasis     Diabetes type 2, uncontrolled ([address]     Diabetic

## 2022-05-19 ENCOUNTER — OFFICE VISIT (OUTPATIENT)
Dept: FAMILY MEDICINE CLINIC | Age: 57
End: 2022-05-19
Payer: MEDICAID

## 2022-05-19 VITALS
TEMPERATURE: 96.8 F | SYSTOLIC BLOOD PRESSURE: 150 MMHG | BODY MASS INDEX: 21.15 KG/M2 | WEIGHT: 142.8 LBS | HEIGHT: 69 IN | DIASTOLIC BLOOD PRESSURE: 106 MMHG | HEART RATE: 92 BPM

## 2022-05-19 DIAGNOSIS — E78.5 DYSLIPIDEMIA: ICD-10-CM

## 2022-05-19 DIAGNOSIS — I10 ESSENTIAL HYPERTENSION: ICD-10-CM

## 2022-05-19 DIAGNOSIS — E11.69 ERECTILE DYSFUNCTION ASSOCIATED WITH TYPE 2 DIABETES MELLITUS (HCC): ICD-10-CM

## 2022-05-19 DIAGNOSIS — M54.41 CHRONIC BILATERAL LOW BACK PAIN WITH BILATERAL SCIATICA: ICD-10-CM

## 2022-05-19 DIAGNOSIS — G89.29 CHRONIC BILATERAL LOW BACK PAIN WITH BILATERAL SCIATICA: ICD-10-CM

## 2022-05-19 DIAGNOSIS — M54.42 CHRONIC BILATERAL LOW BACK PAIN WITH BILATERAL SCIATICA: ICD-10-CM

## 2022-05-19 DIAGNOSIS — E11.9 CONTROLLED TYPE 2 DIABETES MELLITUS WITHOUT COMPLICATION, WITH LONG-TERM CURRENT USE OF INSULIN (HCC): Primary | ICD-10-CM

## 2022-05-19 DIAGNOSIS — N52.1 ERECTILE DYSFUNCTION ASSOCIATED WITH TYPE 2 DIABETES MELLITUS (HCC): ICD-10-CM

## 2022-05-19 DIAGNOSIS — K21.9 GASTROESOPHAGEAL REFLUX DISEASE WITHOUT ESOPHAGITIS: ICD-10-CM

## 2022-05-19 DIAGNOSIS — Z79.4 CONTROLLED TYPE 2 DIABETES MELLITUS WITHOUT COMPLICATION, WITH LONG-TERM CURRENT USE OF INSULIN (HCC): Primary | ICD-10-CM

## 2022-05-19 LAB — HBA1C MFR BLD: 6.9 %

## 2022-05-19 PROCEDURE — 3044F HG A1C LEVEL LT 7.0%: CPT | Performed by: STUDENT IN AN ORGANIZED HEALTH CARE EDUCATION/TRAINING PROGRAM

## 2022-05-19 PROCEDURE — G8427 DOCREV CUR MEDS BY ELIG CLIN: HCPCS | Performed by: STUDENT IN AN ORGANIZED HEALTH CARE EDUCATION/TRAINING PROGRAM

## 2022-05-19 PROCEDURE — 83036 HEMOGLOBIN GLYCOSYLATED A1C: CPT | Performed by: STUDENT IN AN ORGANIZED HEALTH CARE EDUCATION/TRAINING PROGRAM

## 2022-05-19 PROCEDURE — 3017F COLORECTAL CA SCREEN DOC REV: CPT | Performed by: STUDENT IN AN ORGANIZED HEALTH CARE EDUCATION/TRAINING PROGRAM

## 2022-05-19 PROCEDURE — 2022F DILAT RTA XM EVC RTNOPTHY: CPT | Performed by: STUDENT IN AN ORGANIZED HEALTH CARE EDUCATION/TRAINING PROGRAM

## 2022-05-19 PROCEDURE — 99213 OFFICE O/P EST LOW 20 MIN: CPT | Performed by: STUDENT IN AN ORGANIZED HEALTH CARE EDUCATION/TRAINING PROGRAM

## 2022-05-19 PROCEDURE — G8420 CALC BMI NORM PARAMETERS: HCPCS | Performed by: STUDENT IN AN ORGANIZED HEALTH CARE EDUCATION/TRAINING PROGRAM

## 2022-05-19 PROCEDURE — 4004F PT TOBACCO SCREEN RCVD TLK: CPT | Performed by: STUDENT IN AN ORGANIZED HEALTH CARE EDUCATION/TRAINING PROGRAM

## 2022-05-19 RX ORDER — PEN NEEDLE, DIABETIC 29 G X1/2"
NEEDLE, DISPOSABLE MISCELLANEOUS
Qty: 10 EACH | Refills: 3 | Status: SHIPPED | OUTPATIENT
Start: 2022-05-19

## 2022-05-19 RX ORDER — GLUCOSAMINE HCL/CHONDROITIN SU 500-400 MG
1 CAPSULE ORAL DAILY PRN
Qty: 100 EACH | Refills: 2 | Status: SHIPPED | OUTPATIENT
Start: 2022-05-19 | End: 2022-06-20

## 2022-05-19 RX ORDER — GABAPENTIN 300 MG/1
CAPSULE ORAL
Qty: 180 CAPSULE | Refills: 5 | Status: SHIPPED | OUTPATIENT
Start: 2022-05-19 | End: 2023-05-19

## 2022-05-19 RX ORDER — INSULIN GLARGINE 100 [IU]/ML
INJECTION, SOLUTION SUBCUTANEOUS
Qty: 5 PEN | Refills: 5 | Status: SHIPPED | OUTPATIENT
Start: 2022-05-19 | End: 2022-09-12

## 2022-05-19 RX ORDER — FAMOTIDINE 40 MG/1
40 TABLET, FILM COATED ORAL EVERY EVENING
Qty: 30 TABLET | Refills: 5 | Status: SHIPPED | OUTPATIENT
Start: 2022-05-19

## 2022-05-19 RX ORDER — IBUPROFEN 800 MG/1
800 TABLET ORAL EVERY 8 HOURS PRN
Qty: 30 TABLET | Refills: 2 | Status: SHIPPED | OUTPATIENT
Start: 2022-05-19

## 2022-05-19 RX ORDER — LANCETS 33 GAUGE
EACH MISCELLANEOUS
Qty: 100 EACH | Refills: 0 | Status: SHIPPED | OUTPATIENT
Start: 2022-05-19 | End: 2022-07-08

## 2022-05-19 RX ORDER — GLUCOSAMINE HCL/CHONDROITIN SU 500-400 MG
1 CAPSULE ORAL DAILY PRN
Qty: 100 EACH | Refills: 0 | Status: SHIPPED | OUTPATIENT
Start: 2022-05-19 | End: 2022-05-19

## 2022-05-19 RX ORDER — AMLODIPINE BESYLATE 10 MG/1
TABLET ORAL
Qty: 30 TABLET | Refills: 4 | Status: SHIPPED | OUTPATIENT
Start: 2022-05-19 | End: 2022-09-06

## 2022-05-19 RX ORDER — ASPIRIN 81 MG/1
TABLET ORAL
Qty: 30 TABLET | Refills: 5 | Status: SHIPPED | OUTPATIENT
Start: 2022-05-19

## 2022-05-19 RX ORDER — SYRINGE-NEEDLE,INSULIN,0.5 ML 28GX1/2"
SYRINGE, EMPTY DISPOSABLE MISCELLANEOUS
Qty: 100 EACH | Refills: 5 | Status: SHIPPED | OUTPATIENT
Start: 2022-05-19

## 2022-05-19 RX ORDER — ATORVASTATIN CALCIUM 20 MG/1
20 TABLET, FILM COATED ORAL DAILY
Qty: 30 TABLET | Refills: 4 | Status: SHIPPED | OUTPATIENT
Start: 2022-05-19 | End: 2022-09-06

## 2022-05-19 RX ORDER — LISINOPRIL 40 MG/1
TABLET ORAL
Qty: 30 TABLET | Refills: 4 | Status: SHIPPED | OUTPATIENT
Start: 2022-05-19 | End: 2022-09-06

## 2022-05-19 RX ORDER — CHLORTHALIDONE 25 MG/1
TABLET ORAL
Qty: 30 TABLET | Refills: 5 | Status: SHIPPED | OUTPATIENT
Start: 2022-05-19 | End: 2022-11-03

## 2022-05-19 RX ORDER — SILDENAFIL 50 MG/1
50 TABLET, FILM COATED ORAL PRN
Qty: 10 TABLET | Refills: 1 | Status: SHIPPED | OUTPATIENT
Start: 2022-05-19

## 2022-05-19 ASSESSMENT — ENCOUNTER SYMPTOMS
ABDOMINAL DISTENTION: 0
SINUS PAIN: 0
NAUSEA: 0
SINUS PRESSURE: 0
WHEEZING: 0
ABDOMINAL PAIN: 0
VOMITING: 0
COLOR CHANGE: 0
BACK PAIN: 1
VOICE CHANGE: 0
SHORTNESS OF BREATH: 0
COUGH: 0

## 2022-05-19 ASSESSMENT — PATIENT HEALTH QUESTIONNAIRE - PHQ9
2. FEELING DOWN, DEPRESSED OR HOPELESS: 0
1. LITTLE INTEREST OR PLEASURE IN DOING THINGS: 0
SUM OF ALL RESPONSES TO PHQ QUESTIONS 1-9: 0
DEPRESSION UNABLE TO ASSESS: PT REFUSES
SUM OF ALL RESPONSES TO PHQ QUESTIONS 1-9: 0
SUM OF ALL RESPONSES TO PHQ QUESTIONS 1-9: 0
SUM OF ALL RESPONSES TO PHQ9 QUESTIONS 1 & 2: 0
SUM OF ALL RESPONSES TO PHQ QUESTIONS 1-9: 0

## 2022-05-19 NOTE — PROGRESS NOTES
Subjective: Mervat Valente is a 61 y.o. male with  has a past medical history of Chronic back pain, Diabetes mellitus ([address] Diabetic neuropathy ([address] DM (diabetes mellitus) ([facility] [address] Hepatitis C, Hypertension, MDRO (multiple drug resistant organisms) resistance, Murmur, and Osteoarthritis. Presented to the office today for:  Chief Complaint   Patient presents with    Diabetes     follow up and nees refills       HPI    Patient is a 50-year-old male with past medical history of hypertension, diabetes. He is presenting today for follow-up. Patient is requesting medication refills today. He states that he continues to have bilateral lower back pain. He takes over-the-counter pain meds which provide some mild relief in the past.  Denies any red flag symptoms. Patient has also been off all medications for the last 2 weeks As he ran out of refills. Review of Systems   Constitutional: Negative for fatigue, fever and unexpected weight change. HENT: Negative for sinus pressure, sinus pain and voice change. Eyes: Negative for visual disturbance. Respiratory: Negative for cough, shortness of breath and wheezing. Cardiovascular: Negative for chest pain, palpitations and leg swelling. Gastrointestinal: Negative for abdominal distention, abdominal pain, nausea and vomiting. Endocrine: Negative for polydipsia, polyphagia and polyuria. Genitourinary: Negative for difficulty urinating, flank pain and frequency. Musculoskeletal: Positive for back pain. Skin: Negative for color change, rash and wound. Neurological: Negative for dizziness, light-headedness, numbness and headaches. Psychiatric/Behavioral: Negative for confusion and decreased concentration. The patient is not nervous/anxious.                   The patient has a   Family History   Problem Relation Age of Onset    High Blood Pressure Mother     High Blood Pressure Father        Objective:    BP (!) 150/106 (Site: Right Upper Arm, Position: Sitting, Cuff Size: Medium Adult)   Pulse 92   Temp 96.8 °F (36 °C) (Temporal)   Ht 5' 9.02\" (1.753 m)   Wt 142 lb 12.8 oz (64.8 kg)   BMI 21.08 kg/m²    BP Readings from Last 3 Encounters:   05/19/22 (!) 150/106   12/07/21 (!) 157/99   08/05/21 135/85       Physical Exam  Constitutional:       Appearance: He is well-developed. HENT:      Head: Normocephalic and atraumatic. Eyes:      Pupils: Pupils are equal, round, and reactive to light. Cardiovascular:      Rate and Rhythm: Normal rate and regular rhythm. Heart sounds: Normal heart sounds. No murmur heard. No friction rub. No gallop. Pulmonary:      Effort: Pulmonary effort is normal. No respiratory distress. Breath sounds: Normal breath sounds. No wheezing or rales. Chest:      Chest wall: No tenderness. Abdominal:      General: Bowel sounds are normal. There is no distension. Palpations: Abdomen is soft. Tenderness: There is no abdominal tenderness. Skin:     General: Skin is warm. Findings: No erythema or rash. Neurological:      Mental Status: He is alert and oriented to person, place, and time. Lab Results   Component Value Date    WBC 6.6 01/05/2020    HGB 17.5 (H) 01/05/2020    HCT 52.4 (H) 01/05/2020     01/05/2020    CHOL 197 06/21/2021    TRIG 123 06/21/2021    HDL 72 06/21/2021    ALT 34 02/19/2020    AST 36 02/19/2020     06/21/2021    K 4.6 06/21/2021     06/21/2021    CREATININE 1.00 06/21/2021    BUN 17 06/21/2021    CO2 25 06/21/2021    TSH 1.05 01/04/2020    INR 1.1 02/19/2020    LABA1C 6.9 05/19/2022    LABMICR 379 (H) 06/21/2021     Lab Results   Component Value Date    CALCIUM 10.1 06/21/2021     Lab Results   Component Value Date    LDLCHOLESTEROL 100 06/21/2021       Assessment and Plan:    1.  Controlled type 2 diabetes mellitus without complication, with long-term current use of insulin (HCC)  - POCT glycosylated hemoglobin (Hb A1C) - 6.9  - metFORMIN sodium (VOLTAREN) 1 % GEL; APPLY 4 GM TOPICALLY 2 TIMES DAILY  Dispense: 150 g; Refill: 3    6. Gastroesophageal reflux disease without esophagitis  - famotidine (PEPCID) 40 MG tablet; Take 1 tablet by mouth every evening  Dispense: 30 tablet; Refill: 5          Requested Prescriptions     Signed Prescriptions Disp Refills    amLODIPine (NORVASC) 10 MG tablet 30 tablet 4     Sig: TAKE 1 TABLET BY MOUTH ONCE DAILY    lisinopril (PRINIVIL;ZESTRIL) 40 MG tablet 30 tablet 4     Sig: TAKE 1 TAB BY MOUTH ONCE A DAY    atorvastatin (LIPITOR) 20 MG tablet 30 tablet 4     Sig: Take 1 tablet by mouth daily    gabapentin (NEURONTIN) 300 MG capsule 180 capsule 5     Sig: TAKE 2 CAPSULES BY MOUTH 3 TIMES DAILY    metFORMIN (GLUCOPHAGE) 1000 MG tablet 60 tablet 5     Sig: Take 1 tablet by mouth 2 times daily (with meals)    sildenafil (VIAGRA) 50 MG tablet 10 tablet 1     Sig: Take 1 tablet by mouth as needed for Erectile Dysfunction    Insulin Syringe-Needle U-100 (INSULIN SYRINGE .5CC/30GX1/2\") 30G X 1/2\" 0.5 ML MISC 100 each 5     Sig: Use 3 times daily. Dx: 250.00   Prescribe this syringe for insulin dosages under  50 units per injection.     Insulin Syringe-Needle U-100 (B-D INS SYR ULTRAFINE 1CC/30G) 30G X 1/2\" 1 ML MISC 10 each 3     Sig: AS DIRECTED WITH LANTUS    ibuprofen (ADVIL;MOTRIN) 800 MG tablet 30 tablet 2     Sig: Take 1 tablet by mouth every 8 hours as needed for Pain    famotidine (PEPCID) 40 MG tablet 30 tablet 5     Sig: Take 1 tablet by mouth every evening    diclofenac sodium (VOLTAREN) 1 %  g 3     Sig: APPLY 4 GM TOPICALLY 2 TIMES DAILY    chlorthalidone (HYGROTON) 25 MG tablet 30 tablet 5     Sig: TAKE ONE TABLET BY MOUTH DAILY    blood glucose test strips (ONE TOUCH ULTRA TEST) strip 100 each 5     Sig: TESTING TWICE A DAY    OneTouch Delica Lancets 68M MISC 100 each 0     Sig: USE AS DIRECTED TWICE A DAY    aspirin (ASPIRIN LOW DOSE) 81 MG EC tablet 30 tablet 5     Sig: TAKE ONE TABLET BY MOUTH ONCE DAILY    insulin glargine (BASAGLAR KWIKPEN) 100 UNIT/ML injection pen 5 pen 5     Sig: INJECT 35 UNITS SUBCUTANEOUSLY 2 TIMES DAILY    Alcohol Swabs 70 % PADS 100 each 2     Si each by Does not apply route daily as needed (use daily as needed)       Medications Discontinued During This Encounter   Medication Reason    sildenafil (VIAGRA) 50 MG tablet REORDER    OneTouch Delica Lancets 33N MISC REORDER    Insulin Syringe-Needle U-100 (INSULIN SYRINGE .5CC/30GX1/2\") 30G X 1/2\" 0.5 ML MISC REORDER    Insulin Syringe-Needle U-100 (B-D INS SYR ULTRAFINE 1CC/30G) 30G X 1/2\" 1 ML MISC REORDER    ibuprofen (ADVIL;MOTRIN) 800 MG tablet REORDER    famotidine (PEPCID) 40 MG tablet REORDER    diclofenac sodium (VOLTAREN) 1 % GEL REORDER    chlorthalidone (HYGROTON) 25 MG tablet REORDER    blood glucose test strips (ONE TOUCH ULTRA TEST) strip REORDER    aspirin (ASPIRIN LOW DOSE) 81 MG EC tablet REORDER    insulin glargine (BASAGLAR KWIKPEN) 100 UNIT/ML injection pen REORDER    gabapentin (NEURONTIN) 300 MG capsule REORDER    metFORMIN (GLUCOPHAGE) 1000 MG tablet REORDER    amLODIPine (NORVASC) 10 MG tablet REORDER    lisinopril (PRINIVIL;ZESTRIL) 40 MG tablet REORDER    atorvastatin (LIPITOR) 20 MG tablet REORDER    Alcohol Swabs 70 % PADS        Jose Guadalupe received counseling on the following healthy behaviors: nutrition, exercise and medication adherence    Discussed use,benefit, and side effects of prescribed medications. Barriers to medication compliance addressed. All patient questions answered. Pt voiced understanding. Return in about 2 weeks (around 2022) for BP, dizziness. Disclaimer: Some orall of this note was transcribed using voice-recognition software. This may cause typographical errors occasionally.  Although all effort is made to fix these errors, please do not hesitate to contact our office if there isany concern with the understanding of this note.

## 2022-05-19 NOTE — PROGRESS NOTES
Diabetic visit information    BP Readings from Last 3 Encounters:   12/07/21 (!) 157/99   08/05/21 135/85   07/01/21 132/80       Hemoglobin A1C (%)   Date Value   12/07/2021 7.2   08/05/2021 9.3   04/19/2021 6.5     Microalb/Crt. Ratio (mcg/mg creat)   Date Value   06/21/2021 379 (H)     LDL Cholesterol (mg/dL)   Date Value   06/21/2021 100               Have you changed or started any medications since your last visit including any over-the-counter medicines, vitamins, or herbal medicines? no   Have you stopped taking any of your medications? Is so, why? -  no  Are you having any side effects from any of your medications? - no    Have you seen any other physician or provider since your last visit?  no   Have you had any other diagnostic tests since your last visit?  no   Have you been seen in the emergency room and/or had an admission in a hospital since we last saw you?  no     Have you had your annual diabetic retinal (eye) exam? No   (ensure copy of exam is in the chart)    Have you had your routine dental cleaning in the past 6 months? no    Do you have an active MyChart account? If not, what are your barriers? No: Declines    Patient Care Team:  Sherin Sepulveda MD as PCP - General (Family Medicine)  Neto Fine MD as Orthopedic Surgeon (Orthopedic Surgery)  Lenita Phoenix, MD as Consulting Physician (Neurology)  Dwight Wise DPM as Surgeon (Podiatry)  Lubna Dye DO as Consulting Physician (General Surgery)  Ilsa Aase, MD as Consulting Physician (Gastroenterology)    Medical history Review  Past Medical, Family, and Social History reviewed and does contribute to the patient presenting condition.     Health Maintenance   Topic Date Due    Shingles vaccine (1 of 2) Never done    Diabetic retinal exam  03/10/2016    Pneumococcal 0-64 years Vaccine (2 - PCV) 05/13/2016    COVID-19 Vaccine (2 - Pfizer 3-dose series) 01/26/2022    Diabetic foot exam  12/07/2022 (Originally 8/19/2021)    Diabetic microalbuminuria test  06/21/2022    Lipids  06/21/2022    Depression Screen  06/21/2022    A1C test (Diabetic or Prediabetic)  12/07/2022    DTaP/Tdap/Td vaccine (2 - Td or Tdap) 02/22/2026    Colorectal Cancer Screen  11/19/2028    Hepatitis A vaccine  Completed    Hepatitis B vaccine  Completed    Flu vaccine  Completed    HIV screen  Completed    Hib vaccine  Aged Out    Meningococcal (ACWY) vaccine  Aged Out

## 2022-05-19 NOTE — PROGRESS NOTES
I have reviewed and discussed key elements of [address] [place] [address] with the resident including plan of care and follow up and agree with the care rosemarie plan. States hasn't taken BP meds for a week or two. Close follow up after restarting/counseling today    Past Medical History:   Diagnosis Date    Chronic back pain     Diabetes mellitus [facility]     Diabetic neuropathy ([facility]     DM (diabetes mellitus) ([facility]     Hepatitis C     Hypertension     MDRO (multiple drug resistant organisms) resistance     MRSA IN RIGHT FOOT    Murmur     Osteoarthritis         Diagnosis Orders   1. Controlled type 2 diabetes mellitus without complication, with long-term current use of insulin ([facility])  POCT glycosylated hemoglobin (Hb A1C)    metFORMIN (GLUCOPHAGE) 1000 MG tablet    Insulin Syringe-Needle U-100 (INSULIN SYRINGE .5CC/30GX1/2\") 30G X 1/2\" 0.5 ML MISC    Insulin Syringe-Needle U-100 (B-D INS SYR ULTRAFINE 1CC/30G) 30G X 1/2\" 1 ML MISC    blood glucose test strips (ONE TOUCH ULTRA TEST) strip    OneTouch Delica Lancets 95G MISC    aspirin (ASPIRIN LOW DOSE) 81 MG EC tablet    insulin glargine (BASAGLAR KWIKPEN) 100 UNIT/ML injection pen    Alcohol prep pad   2. Essential hypertension  amLODIPine (NORVASC) 10 MG tablet    lisinopril (PRINIVIL;ZESTRIL) 40 MG tablet    chlorthalidone (HYGROTON) 25 MG tablet   3. Dyslipidemia  atorvastatin (LIPITOR) 20 MG tablet   4. Erectile dysfunction associated with type 2 diabetes mellitus (HCC)  sildenafil (VIAGRA) 50 MG tablet   5. Chronic bilateral low back pain with bilateral sciatica  ibuprofen (ADVIL;MOTRIN) 800 MG tablet    diclofenac sodium (VOLTAREN) 1 % GEL   6.  Gastroesophageal reflux disease without esophagitis  famotidine (PEPCID) 40 MG tablet

## 2022-06-20 RX ORDER — ISOPROPYL ALCOHOL 70 ML/100ML
SWAB TOPICAL
Qty: 100 EACH | Refills: 0 | Status: SHIPPED | OUTPATIENT
Start: 2022-06-20 | End: 2022-09-12 | Stop reason: SDUPTHER

## 2022-06-20 NOTE — TELEPHONE ENCOUNTER
E-scribe request for med refill. Please review and e-scribe if applicable. Last Visit Date:  05/19/2022  Next Visit Date:  Visit date not found    Hemoglobin A1C (%)   Date Value   05/19/2022 6.9   12/07/2021 7.2   08/05/2021 9.3             ( goal A1C is < 7)   Microalb/Crt.  Ratio (mcg/mg creat)   Date Value   06/21/2021 379 (H)     LDL Cholesterol (mg/dL)   Date Value   06/21/2021 100       (goal LDL is <100)   AST (U/L)   Date Value   02/19/2020 36     ALT (U/L)   Date Value   02/19/2020 34     BUN (mg/dL)   Date Value   06/21/2021 17     BP Readings from Last 3 Encounters:   05/19/22 (!) 150/106   12/07/21 (!) 157/99   08/05/21 135/85          (goal 120/80)        Patient Active Problem List:     Liver disease     Type II or unspecified type diabetes mellitus without mention of complication, not stated as uncontrolled     Chronic bilateral low back pain with bilateral sciatica     Thoracic or lumbosacral neuritis or radiculitis, unspecified     Degeneration of lumbar or lumbosacral intervertebral disc     DM (diabetes mellitus) (HCC)     Hep C w/o coma, chronic (HCC)     Back pain     Back pain, chronic     HTN (hypertension)     Osteoarthritis of lumbar spine     Diabetes mellitus ([address]     Needs flu shot     Smoking     Chronic back pain     Spinal stenosis of lumbar region     Foot drop, right     Neuritis or radiculitis due to displacement of lumbar intervertebral disc     Lumbar disc herniation     Polyneuropathic pain     Displacement of lumbar intervertebral disc without myelopathy     Uncontrolled type 2 diabetes mellitus with diabetic mononeuropathy, with long-term current use of insulin (HCC)     Smoker     Cellulitis and abscess     Exposed orthopaedic hardware ([address]     Hallux abducto valgus     Diabetes (HCC)     Positional vertigo     Abdominal pain, right upper quadrant     Cellulitis     Esophagitis determined by endoscopy     Cholelithiasis     Diabetes type 2, uncontrolled ([address] Diabetic mononeuropathy associated with diabetes mellitus due to underlying condition ([address]     Lumbar spondylolysis     Erectile dysfunction associated with type 2 diabetes mellitus ([address]     Encounter for smoking cessation counseling     Bloating     Family history of breast cancer     Dyslipidemia     Acute bacterial sinusitis     Blurry vision, bilateral     Dizziness     Syncope and collapse     Diabetic neuropathy with neurologic complication (HCC)     History of lumbar surgery      ----Arlin Locke

## 2022-07-12 NOTE — TELEPHONE ENCOUNTER
Please address the medication refill and close the encounter. If I can be of assistance, please route to the applicable pool. Thank you. Last visit: 12/31/2020  Last Med refill: 12/31/2020  Does patient have enough medication for 72 hours: No:     Next Visit Date:  No future appointments. Health Maintenance   Topic Date Due    COVID-19 Vaccine (1) Never done    Diabetic retinal exam  03/10/2016    Diabetic microalbuminuria test  08/15/2020    Lipid screen  08/15/2020    Potassium monitoring  01/05/2021    Shingles Vaccine (1 of 2) 06/23/2021 (Originally 2/11/2014)    Diabetic foot exam  08/19/2021    Creatinine monitoring  10/08/2021    A1C test (Diabetic or Prediabetic)  12/31/2021    DTaP/Tdap/Td vaccine (2 - Td) 02/22/2026    Colon cancer screen colonoscopy  11/19/2028    Hepatitis A vaccine  Completed    Hepatitis B vaccine  Completed    Flu vaccine  Completed    Pneumococcal 0-64 years Vaccine  Completed    HIV screen  Completed    Hib vaccine  Aged Out    Meningococcal (ACWY) vaccine  Aged Out       Hemoglobin A1C (%)   Date Value   12/31/2020 6.7   01/05/2020 6.6 (H)   07/26/2019 6.7             ( goal A1C is < 7)   Microalb/Crt.  Ratio (mcg/mg creat)   Date Value   08/15/2019 99 (H)     LDL Cholesterol (mg/dL)   Date Value   08/15/2019 112   04/03/2017 63       (goal LDL is <100)   AST (U/L)   Date Value   02/19/2020 36     ALT (U/L)   Date Value   02/19/2020 34     BUN (mg/dL)   Date Value   01/05/2020 16     BP Readings from Last 3 Encounters:   12/31/20 130/86   12/03/20 135/85   10/15/20 (!) 153/97          (goal 120/80)    All Future Testing planned in [facility]  Lab Frequency Next Occurrence   Microalbumin, Ur Once 08/19/2021   Lipid Panel Once 08/19/2021   Microalbumin, Ur Once 12/31/2021   Lipid Panel Once 23/61/5404   Basic Metabolic Panel Once 50/14/0885   COVID-19 Once 01/10/2021               Patient Active Problem List:     Liver disease     Type II or unspecified
No

## 2022-08-23 ENCOUNTER — TELEPHONE (OUTPATIENT)
Dept: FAMILY MEDICINE CLINIC | Age: 57
End: 2022-08-23

## 2022-08-23 NOTE — TELEPHONE ENCOUNTER
Left message with patients sister asking patient to call office to schedule a f/u appointment for diabetes.

## 2022-09-06 DIAGNOSIS — I10 ESSENTIAL HYPERTENSION: ICD-10-CM

## 2022-09-06 DIAGNOSIS — E78.5 DYSLIPIDEMIA: ICD-10-CM

## 2022-09-06 RX ORDER — LISINOPRIL 40 MG/1
TABLET ORAL
Qty: 30 TABLET | Refills: 4 | Status: SHIPPED | OUTPATIENT
Start: 2022-09-06

## 2022-09-06 RX ORDER — AMLODIPINE BESYLATE 10 MG/1
TABLET ORAL
Qty: 30 TABLET | Refills: 1 | Status: SHIPPED | OUTPATIENT
Start: 2022-09-06

## 2022-09-06 RX ORDER — ATORVASTATIN CALCIUM 20 MG/1
20 TABLET, FILM COATED ORAL DAILY
Qty: 30 TABLET | Refills: 1 | Status: SHIPPED | OUTPATIENT
Start: 2022-09-06

## 2022-09-06 NOTE — TELEPHONE ENCOUNTER
E-scribe request for med refills. Please review and e-scribe if applicable. Last Visit Date:  05/19/2022  Next Visit Date:  Visit date not found    Hemoglobin A1C (%)   Date Value   05/19/2022 6.9   12/07/2021 7.2   08/05/2021 9.3             ( goal A1C is < 7)   Microalb/Crt.  Ratio (mcg/mg creat)   Date Value   06/21/2021 379 (H)     LDL Cholesterol (mg/dL)   Date Value   06/21/2021 100       (goal LDL is <100)   AST (U/L)   Date Value   02/19/2020 36     ALT (U/L)   Date Value   02/19/2020 34     BUN (mg/dL)   Date Value   06/21/2021 17     BP Readings from Last 3 Encounters:   05/19/22 (!) 150/106   12/07/21 (!) 157/99   08/05/21 135/85          (goal 120/80)        Patient Active Problem List:     Liver disease     Type II or unspecified type diabetes mellitus without mention of complication, not stated as uncontrolled     Chronic bilateral low back pain with bilateral sciatica     Thoracic or lumbosacral neuritis or radiculitis, unspecified     Degeneration of lumbar or lumbosacral intervertebral disc     DM (diabetes mellitus) (HCC)     Hep C w/o coma, chronic (HCC)     Back pain     Back pain, chronic     HTN (hypertension)     Osteoarthritis of lumbar spine     Diabetes mellitus ([address]     Needs flu shot     Smoking     Chronic back pain     Spinal stenosis of lumbar region     Foot drop, right     Neuritis or radiculitis due to displacement of lumbar intervertebral disc     Lumbar disc herniation     Polyneuropathic pain     Displacement of lumbar intervertebral disc without myelopathy     Uncontrolled type 2 diabetes mellitus with diabetic mononeuropathy, with long-term current use of insulin (HCC)     Smoker     Cellulitis and abscess     Exposed orthopaedic hardware ([address]     Hallux abducto valgus     Diabetes (HCC)     Positional vertigo     Abdominal pain, right upper quadrant     Cellulitis     Esophagitis determined by endoscopy     Cholelithiasis     Diabetes type 2, uncontrolled ([address]

## 2022-09-10 DIAGNOSIS — Z79.4 CONTROLLED TYPE 2 DIABETES MELLITUS WITHOUT COMPLICATION, WITH LONG-TERM CURRENT USE OF INSULIN (HCC): ICD-10-CM

## 2022-09-10 DIAGNOSIS — E11.9 CONTROLLED TYPE 2 DIABETES MELLITUS WITHOUT COMPLICATION, WITH LONG-TERM CURRENT USE OF INSULIN (HCC): ICD-10-CM

## 2022-09-12 RX ORDER — INSULIN GLARGINE 100 [IU]/ML
INJECTION, SOLUTION SUBCUTANEOUS
Qty: 1 ADJUSTABLE DOSE PRE-FILLED PEN SYRINGE | Refills: 5 | Status: SHIPPED | OUTPATIENT
Start: 2022-09-12

## 2022-09-12 RX ORDER — PEN NEEDLE, DIABETIC 31 GX3/16"
NEEDLE, DISPOSABLE MISCELLANEOUS
Qty: 100 EACH | Refills: 3 | Status: SHIPPED | OUTPATIENT
Start: 2022-09-12

## 2022-09-12 RX ORDER — BLOOD SUGAR DIAGNOSTIC
STRIP MISCELLANEOUS
Qty: 100 STRIP | Refills: 5 | Status: SHIPPED | OUTPATIENT
Start: 2022-09-12

## 2022-09-12 RX ORDER — ISOPROPYL ALCOHOL 70 ML/100ML
SWAB TOPICAL
Qty: 100 EACH | Refills: 0 | Status: SHIPPED | OUTPATIENT
Start: 2022-09-12 | End: 2022-09-29

## 2022-09-12 RX ORDER — ISOPROPYL ALCOHOL 70 ML/100ML
SWAB TOPICAL
Refills: 0 | OUTPATIENT
Start: 2022-09-12

## 2022-09-12 NOTE — TELEPHONE ENCOUNTER
Last visit: 5/19/22  Last Med refill: 6/20/22  Does patient have enough medication for 72 hours: No:     Next Visit Date:  No future appointments. Health Maintenance   Topic Date Due    Shingles vaccine (1 of 2) Never done    Diabetic retinal exam  03/10/2016    Pneumococcal 0-64 years Vaccine (2 - PCV) 05/13/2016    COVID-19 Vaccine (2 - Pfizer series) 01/26/2022    Diabetic microalbuminuria test  06/21/2022    Lipids  06/21/2022    Flu vaccine (1) 09/01/2022    Diabetic foot exam  12/07/2022 (Originally 8/19/2021)    A1C test (Diabetic or Prediabetic)  05/19/2023    Depression Screen  05/19/2023    DTaP/Tdap/Td vaccine (2 - Td or Tdap) 02/22/2026    Colorectal Cancer Screen  11/19/2028    Hepatitis A vaccine  Completed    Hepatitis B vaccine  Completed    HIV screen  Completed    Hib vaccine  Aged Out    Meningococcal (ACWY) vaccine  Aged Out       Hemoglobin A1C (%)   Date Value   05/19/2022 6.9   12/07/2021 7.2   08/05/2021 9.3             ( goal A1C is < 7)   Microalb/Crt.  Ratio (mcg/mg creat)   Date Value   06/21/2021 379 (H)     LDL Cholesterol (mg/dL)   Date Value   06/21/2021 100   08/15/2019 112       (goal LDL is <100)   AST (U/L)   Date Value   02/19/2020 36     ALT (U/L)   Date Value   02/19/2020 34     BUN (mg/dL)   Date Value   06/21/2021 17     BP Readings from Last 3 Encounters:   05/19/22 (!) 150/106   12/07/21 (!) 157/99   08/05/21 135/85          (goal 120/80)    All Future Testing planned in [facility]  Lab Frequency Next Occurrence               Patient Active Problem List:     Liver disease     Type II or unspecified type diabetes mellitus without mention of complication, not stated as uncontrolled     Chronic bilateral low back pain with bilateral sciatica     Thoracic or lumbosacral neuritis or radiculitis, unspecified     Degeneration of lumbar or lumbosacral intervertebral disc     DM (diabetes mellitus) (HCC)     Hep C w/o coma, chronic (HCC)     Back pain     Back pain, chronic     HTN

## 2022-09-29 RX ORDER — ISOPROPYL ALCOHOL 70 ML/100ML
SWAB TOPICAL
Qty: 1 EACH | Refills: 0 | Status: SHIPPED | OUTPATIENT
Start: 2022-09-29

## 2022-09-29 NOTE — TELEPHONE ENCOUNTER
E-scribe request for med refills. Please review and e-scribe if applicable. Last Visit Date:  5/19/22  Next Visit Date:  Visit date not found    Hemoglobin A1C (%)   Date Value   05/19/2022 6.9   12/07/2021 7.2   08/05/2021 9.3             ( goal A1C is < 7)   Microalb/Crt.  Ratio (mcg/mg creat)   Date Value   06/21/2021 379 (H)     LDL Cholesterol (mg/dL)   Date Value   06/21/2021 100       (goal LDL is <100)   AST (U/L)   Date Value   02/19/2020 36     ALT (U/L)   Date Value   02/19/2020 34     BUN (mg/dL)   Date Value   06/21/2021 17     BP Readings from Last 3 Encounters:   05/19/22 (!) 150/106   12/07/21 (!) 157/99   08/05/21 135/85          (goal 120/80)        Patient Active Problem List:     Liver disease     Type II or unspecified type diabetes mellitus without mention of complication, not stated as uncontrolled     Chronic bilateral low back pain with bilateral sciatica     Thoracic or lumbosacral neuritis or radiculitis, unspecified     Degeneration of lumbar or lumbosacral intervertebral disc     DM (diabetes mellitus) (HCC)     Hep C w/o coma, chronic (HCC)     Back pain     Back pain, chronic     HTN (hypertension)     Osteoarthritis of lumbar spine     Diabetes mellitus ([address]     Needs flu shot     Smoking     Chronic back pain     Spinal stenosis of lumbar region     Foot drop, right     Neuritis or radiculitis due to displacement of lumbar intervertebral disc     Lumbar disc herniation     Polyneuropathic pain     Displacement of lumbar intervertebral disc without myelopathy     Uncontrolled type 2 diabetes mellitus with diabetic mononeuropathy, with long-term current use of insulin (HCC)     Smoker     Cellulitis and abscess     Exposed orthopaedic hardware ([address]     Hallux abducto valgus     Diabetes (HCC)     Positional vertigo     Abdominal pain, right upper quadrant     Cellulitis     Esophagitis determined by endoscopy     Cholelithiasis     Diabetes type 2, uncontrolled ([address]     Diabetic mononeuropathy associated with diabetes mellitus due to underlying condition ([address]     Lumbar spondylolysis     Erectile dysfunction associated with type 2 diabetes mellitus ([address]     Encounter for smoking cessation counseling     Bloating     Family history of breast cancer     Dyslipidemia     Acute bacterial sinusitis     Blurry vision, bilateral     Dizziness     Syncope and collapse     Diabetic neuropathy with neurologic complication (HCC)     History of lumbar surgery      ----Priti Agrawal

## 2022-11-03 DIAGNOSIS — I10 ESSENTIAL HYPERTENSION: ICD-10-CM

## 2022-11-03 RX ORDER — CHLORTHALIDONE 25 MG/1
TABLET ORAL
Qty: 30 TABLET | Refills: 0 | Status: SHIPPED | OUTPATIENT
Start: 2022-11-03 | End: 2022-12-30

## 2022-11-03 NOTE — TELEPHONE ENCOUNTER
E-scribe request for med refill. Please review and e-scribe if applicable. Last Visit Date:  05/19/2022  Next Visit Date:  Visit date not found    Hemoglobin A1C (%)   Date Value   05/19/2022 6.9   12/07/2021 7.2   08/05/2021 9.3             ( goal A1C is < 7)   Microalb/Crt.  Ratio (mcg/mg creat)   Date Value   06/21/2021 379 (H)     LDL Cholesterol (mg/dL)   Date Value   06/21/2021 100       (goal LDL is <100)   AST (U/L)   Date Value   02/19/2020 36     ALT (U/L)   Date Value   02/19/2020 34     BUN (mg/dL)   Date Value   06/21/2021 17     BP Readings from Last 3 Encounters:   05/19/22 (!) 150/106   12/07/21 (!) 157/99   08/05/21 135/85          (goal 120/80)        Patient Active Problem List:     Liver disease     Type II or unspecified type diabetes mellitus without mention of complication, not stated as uncontrolled     Chronic bilateral low back pain with bilateral sciatica     Thoracic or lumbosacral neuritis or radiculitis, unspecified     Degeneration of lumbar or lumbosacral intervertebral disc     DM (diabetes mellitus) (HCC)     Hep C w/o coma, chronic (HCC)     Back pain     Back pain, chronic     HTN (hypertension)     Osteoarthritis of lumbar spine     Diabetes mellitus ([address]     Needs flu shot     Smoking     Chronic back pain     Spinal stenosis of lumbar region     Foot drop, right     Neuritis or radiculitis due to displacement of lumbar intervertebral disc     Lumbar disc herniation     Polyneuropathic pain     Displacement of lumbar intervertebral disc without myelopathy     Uncontrolled type 2 diabetes mellitus with diabetic mononeuropathy, with long-term current use of insulin     Smoker     Cellulitis and abscess     Exposed orthopaedic hardware ([address]     Hallux abducto valgus     Diabetes (HCC)     Positional vertigo     Abdominal pain, right upper quadrant     Cellulitis     Esophagitis determined by endoscopy     Cholelithiasis     Diabetes type 2, uncontrolled     Diabetic mononeuropathy associated with diabetes mellitus due to underlying condition ([address]     Lumbar spondylolysis     Erectile dysfunction associated with type 2 diabetes mellitus ([address]     Encounter for smoking cessation counseling     Bloating     Family history of breast cancer     Dyslipidemia     Acute bacterial sinusitis     Blurry vision, bilateral     Dizziness     Syncope and collapse     Diabetic neuropathy with neurologic complication (HCC)     History of lumbar surgery      ----Jatinder Giordano

## 2022-12-01 DIAGNOSIS — K21.9 GASTROESOPHAGEAL REFLUX DISEASE WITHOUT ESOPHAGITIS: ICD-10-CM

## 2022-12-01 DIAGNOSIS — E11.9 CONTROLLED TYPE 2 DIABETES MELLITUS WITHOUT COMPLICATION, WITH LONG-TERM CURRENT USE OF INSULIN (HCC): ICD-10-CM

## 2022-12-01 DIAGNOSIS — Z79.4 CONTROLLED TYPE 2 DIABETES MELLITUS WITHOUT COMPLICATION, WITH LONG-TERM CURRENT USE OF INSULIN (HCC): ICD-10-CM

## 2022-12-01 RX ORDER — FAMOTIDINE 40 MG/1
40 TABLET, FILM COATED ORAL EVERY EVENING
Qty: 30 TABLET | Refills: 1 | Status: SHIPPED | OUTPATIENT
Start: 2022-12-01

## 2022-12-01 RX ORDER — ASPIRIN 81 MG/1
TABLET ORAL
Qty: 30 TABLET | Refills: 1 | Status: SHIPPED | OUTPATIENT
Start: 2022-12-01

## 2022-12-01 NOTE — TELEPHONE ENCOUNTER
Writer tried to reach patient, subscriber is not in service phone number [phone] is not a working number, writer removed from acct. Pt need appt. Please address the medication refill and close the encounter. If I can be of assistance, please route to the applicable pool. Thank you. Last visit: 5-19-22  Last Med refill: 5-19-22  Does patient have enough medication for 72 hours: No:     Next Visit Date:  No future appointments. Health Maintenance   Topic Date Due    Shingles vaccine (1 of 2) Never done    Diabetic retinal exam  03/10/2016    Pneumococcal 0-64 years Vaccine (2 - PCV) 05/13/2016    COVID-19 Vaccine (2 - Pfizer series) 01/26/2022    Diabetic microalbuminuria test  06/21/2022    Lipids  06/21/2022    Flu vaccine (1) 08/01/2022    Diabetic foot exam  12/07/2022 (Originally 8/19/2021)    A1C test (Diabetic or Prediabetic)  05/19/2023    Depression Screen  05/19/2023    DTaP/Tdap/Td vaccine (2 - Td or Tdap) 02/22/2026    Colorectal Cancer Screen  11/19/2028    Hepatitis A vaccine  Completed    Hepatitis B vaccine  Completed    HIV screen  Completed    Hib vaccine  Aged Out    Meningococcal (ACWY) vaccine  Aged Out       Hemoglobin A1C (%)   Date Value   05/19/2022 6.9   12/07/2021 7.2   08/05/2021 9.3             ( goal A1C is < 7)   Microalb/Crt.  Ratio (mcg/mg creat)   Date Value   06/21/2021 379 (H)     LDL Cholesterol (mg/dL)   Date Value   06/21/2021 100   08/15/2019 112       (goal LDL is <100)   AST (U/L)   Date Value   02/19/2020 36     ALT (U/L)   Date Value   02/19/2020 34     BUN (mg/dL)   Date Value   06/21/2021 17     BP Readings from Last 3 Encounters:   05/19/22 (!) 150/106   12/07/21 (!) 157/99   08/05/21 135/85          (goal 120/80)    All Future Testing planned in [facility]  Lab Frequency Next Occurrence               Patient Active Problem List:     Liver disease     Type II or unspecified type diabetes mellitus without mention of complication, not stated as uncontrolled     Chronic bilateral low back pain with bilateral sciatica     Thoracic or lumbosacral neuritis or radiculitis, unspecified     Degeneration of lumbar or lumbosacral intervertebral disc     DM (diabetes mellitus) (HCC)     Hep C w/o coma, chronic (HCC)     Back pain     Back pain, chronic     HTN (hypertension)     Osteoarthritis of lumbar spine     Diabetes mellitus ([address]     Needs flu shot     Smoking     Chronic back pain     Spinal stenosis of lumbar region     Foot drop, right     Neuritis or radiculitis due to displacement of lumbar intervertebral disc     Lumbar disc herniation     Polyneuropathic pain     Displacement of lumbar intervertebral disc without myelopathy     Uncontrolled type 2 diabetes mellitus with diabetic mononeuropathy, with long-term current use of insulin     Smoker     Cellulitis and abscess     Exposed orthopaedic hardware ([address]     Hallux abducto valgus     Diabetes (HCC)     Positional vertigo     Abdominal pain, right upper quadrant     Cellulitis     Esophagitis determined by endoscopy     Cholelithiasis     Diabetes type 2, uncontrolled     Diabetic mononeuropathy associated with diabetes mellitus due to underlying condition ([address]     Lumbar spondylolysis     Erectile dysfunction associated with type 2 diabetes mellitus ([address]     Encounter for smoking cessation counseling     Bloating     Family history of breast cancer     Dyslipidemia     Acute bacterial sinusitis     Blurry vision, bilateral     Dizziness     Syncope and collapse     Diabetic neuropathy with neurologic complication ([address]     History of lumbar surgery

## 2022-12-30 DIAGNOSIS — I10 ESSENTIAL HYPERTENSION: ICD-10-CM

## 2022-12-30 RX ORDER — CHLORTHALIDONE 25 MG/1
TABLET ORAL
Qty: 30 TABLET | Refills: 0 | Status: SHIPPED | OUTPATIENT
Start: 2022-12-30

## 2022-12-30 NOTE — TELEPHONE ENCOUNTER
Writer reached out to patient, patient does not have a working number in the chart, other numbers are saying wrong number when called.

## 2022-12-30 NOTE — TELEPHONE ENCOUNTER
E-scribe request for med refill. Please review and e-scribe if applicable. Last Visit Date:  05/19/2022  Next Visit Date:  Visit date not found    Hemoglobin A1C (%)   Date Value   05/19/2022 6.9   12/07/2021 7.2   08/05/2021 9.3             ( goal A1C is < 7)   Microalb/Crt.  Ratio (mcg/mg creat)   Date Value   06/21/2021 379 (H)     LDL Cholesterol (mg/dL)   Date Value   06/21/2021 100       (goal LDL is <100)   AST (U/L)   Date Value   02/19/2020 36     ALT (U/L)   Date Value   02/19/2020 34     BUN (mg/dL)   Date Value   06/21/2021 17     BP Readings from Last 3 Encounters:   05/19/22 (!) 150/106   12/07/21 (!) 157/99   08/05/21 135/85          (goal 120/80)        Patient Active Problem List:     Liver disease     Type II or unspecified type diabetes mellitus without mention of complication, not stated as uncontrolled     Chronic bilateral low back pain with bilateral sciatica     Thoracic or lumbosacral neuritis or radiculitis, unspecified     Degeneration of lumbar or lumbosacral intervertebral disc     DM (diabetes mellitus) (HCC)     Hep C w/o coma, chronic (HCC)     Back pain     Back pain, chronic     HTN (hypertension)     Osteoarthritis of lumbar spine     Diabetes mellitus ([address]     Needs flu shot     Smoking     Chronic back pain     Spinal stenosis of lumbar region     Foot drop, right     Neuritis or radiculitis due to displacement of lumbar intervertebral disc     Lumbar disc herniation     Polyneuropathic pain     Displacement of lumbar intervertebral disc without myelopathy     Uncontrolled type 2 diabetes mellitus with diabetic mononeuropathy, with long-term current use of insulin     Smoker     Cellulitis and abscess     Exposed orthopaedic hardware ([address]     Hallux abducto valgus     Diabetes (HCC)     Positional vertigo     Abdominal pain, right upper quadrant     Cellulitis     Esophagitis determined by endoscopy     Cholelithiasis     Diabetes type 2, uncontrolled     Diabetic mononeuropathy associated with diabetes mellitus due to underlying condition ([address]     Lumbar spondylolysis     Erectile dysfunction associated with type 2 diabetes mellitus ([address]     Encounter for smoking cessation counseling     Bloating     Family history of breast cancer     Dyslipidemia     Acute bacterial sinusitis     Blurry vision, bilateral     Dizziness     Syncope and collapse     Diabetic neuropathy with neurologic complication (HCC)     History of lumbar surgery      ----Almas Willis

## 2023-01-25 DIAGNOSIS — Z79.4 CONTROLLED TYPE 2 DIABETES MELLITUS WITHOUT COMPLICATION, WITH LONG-TERM CURRENT USE OF INSULIN (HCC): ICD-10-CM

## 2023-01-25 DIAGNOSIS — E11.9 CONTROLLED TYPE 2 DIABETES MELLITUS WITHOUT COMPLICATION, WITH LONG-TERM CURRENT USE OF INSULIN (HCC): ICD-10-CM

## 2023-01-25 DIAGNOSIS — I10 ESSENTIAL HYPERTENSION: ICD-10-CM

## 2023-01-25 DIAGNOSIS — K21.9 GASTROESOPHAGEAL REFLUX DISEASE WITHOUT ESOPHAGITIS: ICD-10-CM

## 2023-01-25 RX ORDER — PEN NEEDLE, DIABETIC 32GX 5/32"
NEEDLE, DISPOSABLE MISCELLANEOUS
Qty: 100 EACH | Refills: 0 | Status: SHIPPED | OUTPATIENT
Start: 2023-01-25 | End: 2023-03-08 | Stop reason: SDUPTHER

## 2023-01-25 RX ORDER — LISINOPRIL 40 MG/1
TABLET ORAL
Qty: 30 TABLET | Refills: 1 | Status: SHIPPED | OUTPATIENT
Start: 2023-01-25 | End: 2023-01-31

## 2023-01-25 RX ORDER — FAMOTIDINE 40 MG/1
40 TABLET, FILM COATED ORAL EVERY EVENING
Qty: 30 TABLET | Refills: 1 | Status: SHIPPED | OUTPATIENT
Start: 2023-01-25

## 2023-01-25 RX ORDER — CHLORTHALIDONE 25 MG/1
TABLET ORAL
Qty: 30 TABLET | Refills: 0 | Status: SHIPPED | OUTPATIENT
Start: 2023-01-25 | End: 2023-01-31

## 2023-01-25 RX ORDER — ASPIRIN 81 MG/1
TABLET ORAL
Qty: 30 TABLET | Refills: 1 | Status: SHIPPED | OUTPATIENT
Start: 2023-01-25

## 2023-01-25 NOTE — TELEPHONE ENCOUNTER
Please address the medication refill and close the encounter. If I can be of assistance, please route to the applicable pool. Thank you. Last visit: 5-19-22  Last Med refill: 9-6-22  Does patient have enough medication for 72 hours: No:     Next Visit Date:  Future Appointments   Date Time Provider Maryana Chairez   1/31/2023  1:45 PM Chanell Deluna MD [address] [place] [address] Maintenance   Topic Date Due    Shingles vaccine (1 of 2) Never done    Diabetic retinal exam  03/10/2016    Pneumococcal 0-64 years Vaccine (2 - PCV) 05/13/2016    Diabetic foot exam  08/19/2021    COVID-19 Vaccine (2 - Pfizer series) 01/26/2022    Diabetic Alb to Cr ratio (uACR) test  06/21/2022    Lipids  06/21/2022    GFR test (Diabetes, CKD 3-4, OR last GFR 15-59)  06/21/2022    Flu vaccine (1) 08/01/2022    A1C test (Diabetic or Prediabetic)  05/19/2023    Depression Screen  05/19/2023    DTaP/Tdap/Td vaccine (2 - Td or Tdap) 02/22/2026    Colorectal Cancer Screen  11/19/2028    Hepatitis A vaccine  Completed    Hepatitis B vaccine  Completed    HIV screen  Completed    Hib vaccine  Aged Out    Meningococcal (ACWY) vaccine  Aged Out       Hemoglobin A1C (%)   Date Value   05/19/2022 6.9   12/07/2021 7.2   08/05/2021 9.3             ( goal A1C is < 7)   Microalb/Crt.  Ratio (mcg/mg creat)   Date Value   06/21/2021 379 (H)     LDL Cholesterol (mg/dL)   Date Value   06/21/2021 100   08/15/2019 112       (goal LDL is <100)   AST (U/L)   Date Value   02/19/2020 36     ALT (U/L)   Date Value   02/19/2020 34     BUN (mg/dL)   Date Value   06/21/2021 17     BP Readings from Last 3 Encounters:   05/19/22 (!) 150/106   12/07/21 (!) 157/99   08/05/21 135/85          (goal 120/80)    All Future Testing planned in [facility]  Lab Frequency Next Occurrence               Patient Active Problem List:     Liver disease     Type II or unspecified type diabetes mellitus without mention of complication, not stated as uncontrolled     Chronic

## 2023-01-25 NOTE — TELEPHONE ENCOUNTER
Please address the medication refill and close the encounter. If I can be of assistance, please route to the applicable pool. Thank you. Last visit: 5-19-22  Last Med refill: 12-1-22  Does patient have enough medication for 72 hours: No:     Next Visit Date:  Future Appointments   Date Time Provider Maryana Chairez   1/31/2023  1:45 PM Yudy Romo MD [address] [place] [address] Maintenance   Topic Date Due    Shingles vaccine (1 of 2) Never done    Diabetic retinal exam  03/10/2016    Pneumococcal 0-64 years Vaccine (2 - PCV) 05/13/2016    Diabetic foot exam  08/19/2021    COVID-19 Vaccine (2 - Pfizer series) 01/26/2022    Diabetic Alb to Cr ratio (uACR) test  06/21/2022    Lipids  06/21/2022    GFR test (Diabetes, CKD 3-4, OR last GFR 15-59)  06/21/2022    Flu vaccine (1) 08/01/2022    A1C test (Diabetic or Prediabetic)  05/19/2023    Depression Screen  05/19/2023    DTaP/Tdap/Td vaccine (2 - Td or Tdap) 02/22/2026    Colorectal Cancer Screen  11/19/2028    Hepatitis A vaccine  Completed    Hepatitis B vaccine  Completed    HIV screen  Completed    Hib vaccine  Aged Out    Meningococcal (ACWY) vaccine  Aged Out       Hemoglobin A1C (%)   Date Value   05/19/2022 6.9   12/07/2021 7.2   08/05/2021 9.3             ( goal A1C is < 7)   Microalb/Crt.  Ratio (mcg/mg creat)   Date Value   06/21/2021 379 (H)     LDL Cholesterol (mg/dL)   Date Value   06/21/2021 100   08/15/2019 112       (goal LDL is <100)   AST (U/L)   Date Value   02/19/2020 36     ALT (U/L)   Date Value   02/19/2020 34     BUN (mg/dL)   Date Value   06/21/2021 17     BP Readings from Last 3 Encounters:   05/19/22 (!) 150/106   12/07/21 (!) 157/99   08/05/21 135/85          (goal 120/80)    All Future Testing planned in [facility]  Lab Frequency Next Occurrence               Patient Active Problem List:     Liver disease     Type II or unspecified type diabetes mellitus without mention of complication, not stated as uncontrolled     Chronic bilateral low back pain with bilateral sciatica     Thoracic or lumbosacral neuritis or radiculitis, unspecified     Degeneration of lumbar or lumbosacral intervertebral disc     DM (diabetes mellitus) (HCC)     Hep C w/o coma, chronic (HCC)     Back pain     Back pain, chronic     HTN (hypertension)     Osteoarthritis of lumbar spine     Diabetes mellitus ([address]     Needs flu shot     Smoking     Chronic back pain     Spinal stenosis of lumbar region     Foot drop, right     Neuritis or radiculitis due to displacement of lumbar intervertebral disc     Lumbar disc herniation     Polyneuropathic pain     Displacement of lumbar intervertebral disc without myelopathy     Uncontrolled type 2 diabetes mellitus with diabetic mononeuropathy, with long-term current use of insulin     Smoker     Cellulitis and abscess     Exposed orthopaedic hardware ([address]     Hallux abducto valgus     Diabetes (HCC)     Positional vertigo     Abdominal pain, right upper quadrant     Cellulitis     Esophagitis determined by endoscopy     Cholelithiasis     Diabetes type 2, uncontrolled     Diabetic mononeuropathy associated with diabetes mellitus due to underlying condition ([address]     Lumbar spondylolysis     Erectile dysfunction associated with type 2 diabetes mellitus ([address]     Encounter for smoking cessation counseling     Bloating     Family history of breast cancer     Dyslipidemia     Acute bacterial sinusitis     Blurry vision, bilateral     Dizziness     Syncope and collapse     Diabetic neuropathy with neurologic complication ([address]     History of lumbar surgery

## 2023-01-25 NOTE — TELEPHONE ENCOUNTER
Please address the medication refill and close the encounter. If I can be of assistance, please route to the applicable pool. Thank you. Last visit: 5-19-22  Last Med refill: 5-19-22  Does patient have enough medication for 72 hours: No:     Next Visit Date:  Future Appointments   Date Time Provider Maryana Chairez   1/31/2023  1:45 PM Bernadette Hernandez MD [address] [place] [address] Maintenance   Topic Date Due    Shingles vaccine (1 of 2) Never done    Diabetic retinal exam  03/10/2016    Pneumococcal 0-64 years Vaccine (2 - PCV) 05/13/2016    Diabetic foot exam  08/19/2021    COVID-19 Vaccine (2 - Pfizer series) 01/26/2022    Diabetic Alb to Cr ratio (uACR) test  06/21/2022    Lipids  06/21/2022    GFR test (Diabetes, CKD 3-4, OR last GFR 15-59)  06/21/2022    Flu vaccine (1) 08/01/2022    A1C test (Diabetic or Prediabetic)  05/19/2023    Depression Screen  05/19/2023    DTaP/Tdap/Td vaccine (2 - Td or Tdap) 02/22/2026    Colorectal Cancer Screen  11/19/2028    Hepatitis A vaccine  Completed    Hepatitis B vaccine  Completed    HIV screen  Completed    Hib vaccine  Aged Out    Meningococcal (ACWY) vaccine  Aged Out       Hemoglobin A1C (%)   Date Value   05/19/2022 6.9   12/07/2021 7.2   08/05/2021 9.3             ( goal A1C is < 7)   Microalb/Crt.  Ratio (mcg/mg creat)   Date Value   06/21/2021 379 (H)     LDL Cholesterol (mg/dL)   Date Value   06/21/2021 100   08/15/2019 112       (goal LDL is <100)   AST (U/L)   Date Value   02/19/2020 36     ALT (U/L)   Date Value   02/19/2020 34     BUN (mg/dL)   Date Value   06/21/2021 17     BP Readings from Last 3 Encounters:   05/19/22 (!) 150/106   12/07/21 (!) 157/99   08/05/21 135/85          (goal 120/80)    All Future Testing planned in [facility]  Lab Frequency Next Occurrence               Patient Active Problem List:     Liver disease     Type II or unspecified type diabetes mellitus without mention of complication, not stated as uncontrolled     Chronic bilateral low back pain with bilateral sciatica     Thoracic or lumbosacral neuritis or radiculitis, unspecified     Degeneration of lumbar or lumbosacral intervertebral disc     DM (diabetes mellitus) (HCC)     Hep C w/o coma, chronic (HCC)     Back pain     Back pain, chronic     HTN (hypertension)     Osteoarthritis of lumbar spine     Diabetes mellitus ([address]     Needs flu shot     Smoking     Chronic back pain     Spinal stenosis of lumbar region     Foot drop, right     Neuritis or radiculitis due to displacement of lumbar intervertebral disc     Lumbar disc herniation     Polyneuropathic pain     Displacement of lumbar intervertebral disc without myelopathy     Uncontrolled type 2 diabetes mellitus with diabetic mononeuropathy, with long-term current use of insulin     Smoker     Cellulitis and abscess     Exposed orthopaedic hardware ([address]     Hallux abducto valgus     Diabetes (HCC)     Positional vertigo     Abdominal pain, right upper quadrant     Cellulitis     Esophagitis determined by endoscopy     Cholelithiasis     Diabetes type 2, uncontrolled     Diabetic mononeuropathy associated with diabetes mellitus due to underlying condition ([address]     Lumbar spondylolysis     Erectile dysfunction associated with type 2 diabetes mellitus ([address]     Encounter for smoking cessation counseling     Bloating     Family history of breast cancer     Dyslipidemia     Acute bacterial sinusitis     Blurry vision, bilateral     Dizziness     Syncope and collapse     Diabetic neuropathy with neurologic complication ([address]     History of lumbar surgery

## 2023-01-25 NOTE — TELEPHONE ENCOUNTER
Last visit:   Last Med refill:   Does patient have enough medication for 72 hours: No:     Next Visit Date:  Future Appointments   Date Time Provider Maryana Grajedai   1/31/2023  1:45 PM Radha Pedroza MD [address] [place] [address] Maintenance   Topic Date Due    Shingles vaccine (1 of 2) Never done    Diabetic retinal exam  03/10/2016    Pneumococcal 0-64 years Vaccine (2 - PCV) 05/13/2016    Diabetic foot exam  08/19/2021    COVID-19 Vaccine (2 - Pfizer series) 01/26/2022    Diabetic Alb to Cr ratio (uACR) test  06/21/2022    Lipids  06/21/2022    GFR test (Diabetes, CKD 3-4, OR last GFR 15-59)  06/21/2022    Flu vaccine (1) 08/01/2022    A1C test (Diabetic or Prediabetic)  05/19/2023    Depression Screen  05/19/2023    DTaP/Tdap/Td vaccine (2 - Td or Tdap) 02/22/2026    Colorectal Cancer Screen  11/19/2028    Hepatitis A vaccine  Completed    Hepatitis B vaccine  Completed    HIV screen  Completed    Hib vaccine  Aged Out    Meningococcal (ACWY) vaccine  Aged Out       Hemoglobin A1C (%)   Date Value   05/19/2022 6.9   12/07/2021 7.2   08/05/2021 9.3             ( goal A1C is < 7)   Microalb/Crt.  Ratio (mcg/mg creat)   Date Value   06/21/2021 379 (H)     LDL Cholesterol (mg/dL)   Date Value   06/21/2021 100   08/15/2019 112       (goal LDL is <100)   AST (U/L)   Date Value   02/19/2020 36     ALT (U/L)   Date Value   02/19/2020 34     BUN (mg/dL)   Date Value   06/21/2021 17     BP Readings from Last 3 Encounters:   05/19/22 (!) 150/106   12/07/21 (!) 157/99   08/05/21 135/85          (goal 120/80)    All Future Testing planned in [facility]  Lab Frequency Next Occurrence               Patient Active Problem List:     Liver disease     Type II or unspecified type diabetes mellitus without mention of complication, not stated as uncontrolled     Chronic bilateral low back pain with bilateral sciatica     Thoracic or lumbosacral neuritis or radiculitis, unspecified     Degeneration of lumbar or lumbosacral

## 2023-01-25 NOTE — TELEPHONE ENCOUNTER
Please call the patient to schedule an appointment.  No future refills wothout being seen in the office

## 2023-01-31 ENCOUNTER — OFFICE VISIT (OUTPATIENT)
Dept: FAMILY MEDICINE CLINIC | Age: 58
End: 2023-01-31
Payer: MEDICAID

## 2023-01-31 ENCOUNTER — HOSPITAL ENCOUNTER (OUTPATIENT)
Age: 58
Setting detail: SPECIMEN
Discharge: HOME | End: 2023-01-31

## 2023-01-31 VITALS
WEIGHT: 143.6 LBS | HEIGHT: 69 IN | HEART RATE: 89 BPM | BODY MASS INDEX: 21.27 KG/M2 | SYSTOLIC BLOOD PRESSURE: 140 MMHG | DIASTOLIC BLOOD PRESSURE: 97 MMHG

## 2023-01-31 DIAGNOSIS — Z79.4 TYPE 2 DIABETES MELLITUS WITHOUT COMPLICATION, WITH LONG-TERM CURRENT USE OF INSULIN (HCC): Primary | ICD-10-CM

## 2023-01-31 DIAGNOSIS — K21.9 GASTROESOPHAGEAL REFLUX DISEASE WITHOUT ESOPHAGITIS: ICD-10-CM

## 2023-01-31 DIAGNOSIS — G89.29 CHRONIC BILATERAL LOW BACK PAIN WITH BILATERAL SCIATICA: ICD-10-CM

## 2023-01-31 DIAGNOSIS — Z79.4 TYPE 2 DIABETES MELLITUS WITHOUT COMPLICATION, WITH LONG-TERM CURRENT USE OF INSULIN (HCC): ICD-10-CM

## 2023-01-31 DIAGNOSIS — M54.42 CHRONIC BILATERAL LOW BACK PAIN WITH BILATERAL SCIATICA: ICD-10-CM

## 2023-01-31 DIAGNOSIS — E11.9 TYPE 2 DIABETES MELLITUS WITHOUT COMPLICATION, WITH LONG-TERM CURRENT USE OF INSULIN (HCC): ICD-10-CM

## 2023-01-31 DIAGNOSIS — Z23 NEED FOR VACCINATION: ICD-10-CM

## 2023-01-31 DIAGNOSIS — I10 ESSENTIAL HYPERTENSION: ICD-10-CM

## 2023-01-31 DIAGNOSIS — M54.41 CHRONIC BILATERAL LOW BACK PAIN WITH BILATERAL SCIATICA: ICD-10-CM

## 2023-01-31 DIAGNOSIS — E11.9 TYPE 2 DIABETES MELLITUS WITHOUT COMPLICATION, WITH LONG-TERM CURRENT USE OF INSULIN (HCC): Primary | ICD-10-CM

## 2023-01-31 DIAGNOSIS — Z91.14 HX OF MEDICATION NONCOMPLIANCE: ICD-10-CM

## 2023-01-31 LAB
CHOLEST SERPL-MCNC: 184 MG/DL
CHOLESTEROL/HDL RATIO: 2.9
HBA1C MFR BLD: 12.1 %
HDLC SERPL-MCNC: 64 MG/DL
LDLC SERPL CALC-MCNC: 86 MG/DL (ref 0–130)
TRIGL SERPL-MCNC: 170 MG/DL

## 2023-01-31 PROCEDURE — 3046F HEMOGLOBIN A1C LEVEL >9.0%: CPT | Performed by: STUDENT IN AN ORGANIZED HEALTH CARE EDUCATION/TRAINING PROGRAM

## 2023-01-31 PROCEDURE — 99213 OFFICE O/P EST LOW 20 MIN: CPT | Performed by: STUDENT IN AN ORGANIZED HEALTH CARE EDUCATION/TRAINING PROGRAM

## 2023-01-31 PROCEDURE — 4004F PT TOBACCO SCREEN RCVD TLK: CPT | Performed by: STUDENT IN AN ORGANIZED HEALTH CARE EDUCATION/TRAINING PROGRAM

## 2023-01-31 PROCEDURE — 83036 HEMOGLOBIN GLYCOSYLATED A1C: CPT | Performed by: STUDENT IN AN ORGANIZED HEALTH CARE EDUCATION/TRAINING PROGRAM

## 2023-01-31 PROCEDURE — G8427 DOCREV CUR MEDS BY ELIG CLIN: HCPCS | Performed by: STUDENT IN AN ORGANIZED HEALTH CARE EDUCATION/TRAINING PROGRAM

## 2023-01-31 PROCEDURE — 3077F SYST BP >= 140 MM HG: CPT | Performed by: STUDENT IN AN ORGANIZED HEALTH CARE EDUCATION/TRAINING PROGRAM

## 2023-01-31 PROCEDURE — 2022F DILAT RTA XM EVC RTNOPTHY: CPT | Performed by: STUDENT IN AN ORGANIZED HEALTH CARE EDUCATION/TRAINING PROGRAM

## 2023-01-31 PROCEDURE — G8482 FLU IMMUNIZE ORDER/ADMIN: HCPCS | Performed by: STUDENT IN AN ORGANIZED HEALTH CARE EDUCATION/TRAINING PROGRAM

## 2023-01-31 PROCEDURE — 3080F DIAST BP >= 90 MM HG: CPT | Performed by: STUDENT IN AN ORGANIZED HEALTH CARE EDUCATION/TRAINING PROGRAM

## 2023-01-31 PROCEDURE — G0008 ADMIN INFLUENZA VIRUS VAC: HCPCS | Performed by: STUDENT IN AN ORGANIZED HEALTH CARE EDUCATION/TRAINING PROGRAM

## 2023-01-31 PROCEDURE — 3017F COLORECTAL CA SCREEN DOC REV: CPT | Performed by: STUDENT IN AN ORGANIZED HEALTH CARE EDUCATION/TRAINING PROGRAM

## 2023-01-31 PROCEDURE — G8420 CALC BMI NORM PARAMETERS: HCPCS | Performed by: STUDENT IN AN ORGANIZED HEALTH CARE EDUCATION/TRAINING PROGRAM

## 2023-01-31 RX ORDER — FAMOTIDINE 40 MG/1
40 TABLET, FILM COATED ORAL EVERY EVENING
Qty: 30 TABLET | Refills: 1 | Status: SHIPPED | OUTPATIENT
Start: 2023-01-31

## 2023-01-31 RX ORDER — AMLODIPINE BESYLATE 10 MG/1
TABLET ORAL
Qty: 30 TABLET | Refills: 1 | Status: SHIPPED | OUTPATIENT
Start: 2023-01-31

## 2023-01-31 RX ORDER — INSULIN GLARGINE 100 [IU]/ML
INJECTION, SOLUTION SUBCUTANEOUS
Qty: 1 ADJUSTABLE DOSE PRE-FILLED PEN SYRINGE | Refills: 5 | Status: SHIPPED | OUTPATIENT
Start: 2023-01-31

## 2023-01-31 RX ORDER — BLOOD SUGAR DIAGNOSTIC
STRIP MISCELLANEOUS
Qty: 100 STRIP | Refills: 5 | Status: SHIPPED | OUTPATIENT
Start: 2023-01-31

## 2023-01-31 SDOH — ECONOMIC STABILITY: FOOD INSECURITY: WITHIN THE PAST 12 MONTHS, YOU WORRIED THAT YOUR FOOD WOULD RUN OUT BEFORE YOU GOT MONEY TO BUY MORE.: NEVER TRUE

## 2023-01-31 SDOH — ECONOMIC STABILITY: FOOD INSECURITY: WITHIN THE PAST 12 MONTHS, THE FOOD YOU BOUGHT JUST DIDN'T LAST AND YOU DIDN'T HAVE MONEY TO GET MORE.: NEVER TRUE

## 2023-01-31 ASSESSMENT — ENCOUNTER SYMPTOMS
COUGH: 0
PHOTOPHOBIA: 0
APNEA: 0
CONSTIPATION: 0
BACK PAIN: 1
SHORTNESS OF BREATH: 0
DIARRHEA: 0
NAUSEA: 0
ABDOMINAL DISTENTION: 0
WHEEZING: 0
ABDOMINAL PAIN: 0
COLOR CHANGE: 0
VOMITING: 0

## 2023-01-31 ASSESSMENT — PATIENT HEALTH QUESTIONNAIRE - PHQ9
1. LITTLE INTEREST OR PLEASURE IN DOING THINGS: 0
2. FEELING DOWN, DEPRESSED OR HOPELESS: 0
SUM OF ALL RESPONSES TO PHQ9 QUESTIONS 1 & 2: 0
SUM OF ALL RESPONSES TO PHQ QUESTIONS 1-9: 0

## 2023-01-31 ASSESSMENT — SOCIAL DETERMINANTS OF HEALTH (SDOH): HOW HARD IS IT FOR YOU TO PAY FOR THE VERY BASICS LIKE FOOD, HOUSING, MEDICAL CARE, AND HEATING?: NOT HARD AT ALL

## 2023-01-31 NOTE — PROGRESS NOTES
Subjective: Colten Solano is a 61 y.o. male with  has a past medical history of Chronic back pain, Diabetes mellitus ([address] Diabetic neuropathy ([facility] DM (diabetes mellitus) ([facility] Hepatitis C, Hypertension, MDRO (multiple drug resistant organisms) resistance, Murmur, and Osteoarthritis. Family History   Problem Relation Age of Onset    High Blood Pressure Mother     High Blood Pressure Father        Presented tothe office today for:  Chief Complaint   Patient presents with    Back Pain     Refills on medications       HPI    Randy Heredia is a 58-year-old male with a PMH significant for type 2 diabetes mellitus and essential hypertension. Patient is here today for type 2 diabetes mellitus follow-up. Type 2 diabetes: Last HbA1c 6.9 on 5/19. Repeat 12.1 today. Patient is currently on metformin 1000 mg twice daily and takes Lantus 36 units twice daily. Patient denies any hypoglycemic events. As per patient, he recently moved back to the [facility] area after living in [place] for 3 months. Questionable compliance. Upon further review, patient states he has not been taking any of his medications for about 3 months. Denies any fever, chills, headaches, dizziness, blurred vision, chest pain, SOB, or palpitations. Essential hypertension: Per chart review, it appears patient was on 3 agents for blood pressure control. However, upon further questioning patient states that he only takes amlodipine 10 mg daily. Per chart, it appears patient was also on chlorthalidone 25 mg daily and lisinopril 40 mg daily. Patient vehemently denies using chlorthalidone and lisinopril. States he has been out of his blood pressure medication amlodipine for the past 3 months. /97. We will discontinue lisinopril 40 mg daily and chlorthalidone 25 mg daily as patient has not been taking this medication. He denies any headaches, dizziness, blurred vision, chest pain, SOB, or palpitations.     Patient also has chronic back pain. Patient states that he was following with pain management. He is asking for a referral.  He denies any red flag symptoms such as saddle anesthesia, numbness, tingling, or fecal/urinary incontinence. Review of Systems   Constitutional:  Negative for activity change, appetite change and fatigue. HENT:  Negative for congestion. Eyes:  Negative for photophobia and visual disturbance. Respiratory:  Negative for apnea, cough, shortness of breath and wheezing. Cardiovascular:  Negative for chest pain, palpitations and leg swelling. Gastrointestinal:  Negative for abdominal distention, abdominal pain, constipation, diarrhea, nausea and vomiting. Endocrine: Negative for polyuria. Genitourinary:  Negative for difficulty urinating and urgency. Musculoskeletal:  Positive for arthralgias and back pain. Skin:  Negative for color change. Neurological:  Negative for dizziness, syncope and headaches. Psychiatric/Behavioral:  Negative for agitation, behavioral problems, confusion, decreased concentration and dysphoric mood. Objective:    BP (!) 140/97 (Site: Left Upper Arm, Position: Sitting, Cuff Size: Medium Adult)   Pulse 89   Ht 5' 9\" (1.753 m)   Wt 143 lb 9.6 oz (65.1 kg)   BMI 21.21 kg/m²    BP Readings from Last 3 Encounters:   01/31/23 (!) 140/97   05/19/22 (!) 150/106   12/07/21 (!) 157/99     Physical Exam  Constitutional:       General: He is not in acute distress. Appearance: Normal appearance. He is not ill-appearing. HENT:      Head: Normocephalic and atraumatic. Eyes:      Extraocular Movements: Extraocular movements intact. Cardiovascular:      Rate and Rhythm: Normal rate and regular rhythm. Pulses: Normal pulses. Pulmonary:      Effort: Pulmonary effort is normal. No respiratory distress. Breath sounds: No wheezing, rhonchi or rales. Abdominal:      General: Bowel sounds are normal. There is no distension. Palpations: Abdomen is soft. Tenderness: There is no abdominal tenderness. There is no guarding. Skin:     General: Skin is warm. Neurological:      General: No focal deficit present. Mental Status: He is alert and oriented to person, place, and time. Psychiatric:         Mood and Affect: Mood normal.         Lab Results   Component Value Date    WBC 6.6 01/05/2020    HGB 17.5 (H) 01/05/2020    HCT 52.4 (H) 01/05/2020     01/05/2020    CHOL 197 06/21/2021    TRIG 123 06/21/2021    HDL 72 06/21/2021    ALT 34 02/19/2020    AST 36 02/19/2020     06/21/2021    K 4.6 06/21/2021     06/21/2021    CREATININE 1.00 06/21/2021    BUN 17 06/21/2021    CO2 25 06/21/2021    TSH 1.05 01/04/2020    INR 1.1 02/19/2020    LABA1C 12.1 01/31/2023    LABMICR 379 (H) 06/21/2021     Lab Results   Component Value Date    CALCIUM 10.1 06/21/2021     Lab Results   Component Value Date    LDLCHOLESTEROL 100 06/21/2021       Assessment and Plan:    1. Type 2 diabetes mellitus without complication, with long-term current use of insulin ([facility])  -HbA1c 12.1 today  -Patient has not been taking his medications for 3-month  -Lengthy discussion was held with patient  -Risks such as CVA, CAD, and kidney disease discussed with patient  -Ordered lipid panel  -Continue Lantus 36 units twice daily and metformin does not get better  - Lipid Panel; Future  -  DIABETES FOOT EXAM  - POCT glycosylated hemoglobin (Hb A1C)  - insulin glargine (BASAGLAR KWIKPEN) 100 UNIT/ML injection pen; INJECT 36 UNITS SUBCUTANEOUSLY 2 TIMES DAILY  Dispense: 1 Adjustable Dose Pre-filled Pen Syringe; Refill: 5  - blood glucose test strips (ONETOUCH ULTRA) strip; TESTING TWICE A DAY  Dispense: 100 strip; Refill: 5  - metFORMIN (GLUCOPHAGE) 1000 MG tablet; Take 1 tablet by mouth 2 times daily (with meals)  Dispense: 60 tablet; Refill: 5    2. Need for vaccination  -Received flu shot  - Influenza, AFLURIA, (age 1 y+), IM, Preservative Free, 0.5 mL    3.  Essential hypertension  -/97  -Patient has been off medications for at least a  -We will discontinue chlorthalidone 25 mg once daily and lisinopril 40 mg  -Continue amlodipine 10 mg daily  -F/u 4 weeks  - amLODIPine (NORVASC) 10 MG tablet; TAKE 1 TABLET BY MOUTH ONCE DAILY  Dispense: 30 tablet; Refill: 1    4. Gastroesophageal reflux disease without esophagitis  -Stable  - famotidine (PEPCID) 40 MG tablet; Take 1 tablet by mouth every evening  Dispense: 30 tablet; Refill: 1    5. Chronic bilateral low back pain with bilateral sciatica    - [facility]    6.  Hx of medication noncompliance  -It appears patient has a history of medication noncompliance  -Lengthy discussion was held with patient        Requested Prescriptions     Signed Prescriptions Disp Refills    amLODIPine (NORVASC) 10 MG tablet 30 tablet 1     Sig: TAKE 1 TABLET BY MOUTH ONCE DAILY    insulin glargine (BASAGLAR KWIKPEN) 100 UNIT/ML injection pen 1 Adjustable Dose Pre-filled Pen Syringe 5     Sig: INJECT 36 UNITS SUBCUTANEOUSLY 2 TIMES DAILY    blood glucose test strips (ONETOUCH ULTRA) strip 100 strip 5     Sig: TESTING TWICE A DAY    metFORMIN (GLUCOPHAGE) 1000 MG tablet 60 tablet 5     Sig: Take 1 tablet by mouth 2 times daily (with meals)    famotidine (PEPCID) 40 MG tablet 30 tablet 1     Sig: Take 1 tablet by mouth every evening       Medications Discontinued During This Encounter   Medication Reason    cyclobenzaprine (FLEXERIL) 10 MG tablet LIST CLEANUP    ibuprofen (ADVIL;MOTRIN) 800 MG tablet LIST CLEANUP    chlorthalidone (HYGROTON) 25 MG tablet LIST CLEANUP    lisinopril (PRINIVIL;ZESTRIL) 40 MG tablet LIST CLEANUP    amLODIPine (NORVASC) 10 MG tablet REORDER    BASAGLAR KWIKPEN 100 UNIT/ML injection pen REORDER    blood glucose test strips (ONETOUCH ULTRA) strip REORDER    metFORMIN (GLUCOPHAGE) 1000 MG tablet REORDER    famotidine (PEPCID) 40 MG tablet REORDER       Return in about 4 weeks (around 2/28/2023). Nimco Estes received counseling on the following healthy behaviors:   Reviewed prior labs and health maintenance  Continue current medications, diet and exercise. Discussed use, benefit, and side effects of prescribed medications. Barriers to medication compliance addressed. Patient given educational materials - see patient instructions  Was a self-tracking handout given in paper form or via [employer]? No:     Requested Prescriptions     Signed Prescriptions Disp Refills    amLODIPine (NORVASC) 10 MG tablet 30 tablet 1     Sig: TAKE 1 TABLET BY MOUTH ONCE DAILY    insulin glargine (BASAGLAR KWIKPEN) 100 UNIT/ML injection pen 1 Adjustable Dose Pre-filled Pen Syringe 5     Sig: INJECT 36 UNITS SUBCUTANEOUSLY 2 TIMES DAILY    blood glucose test strips (ONETOUCH ULTRA) strip 100 strip 5     Sig: TESTING TWICE A DAY    metFORMIN (GLUCOPHAGE) 1000 MG tablet 60 tablet 5     Sig: Take 1 tablet by mouth 2 times daily (with meals)    famotidine (PEPCID) 40 MG tablet 30 tablet 1     Sig: Take 1 tablet by mouth every evening       All patient questions answered. Patient voiced understanding. Quality Measures    Body mass index is 21.21 kg/m². Normal. Weight control planned discussed Healthy diet and regular exercise. BP: (!) 140/97 Blood pressure is normal. Treatment plan consists of No treatment change needed.     Lab Results   Component Value Date    LDLCHOLESTEROL 100 06/21/2021    (goal LDL reduction with dx if diabetes is 50% LDL reduction)      PHQ Scores 1/31/2023 5/19/2022 6/21/2021 1/9/2020 7/26/2019 3/29/2018 3/17/2017   PHQ2 Score 0 0 0 0 0 0 0   PHQ9 Score 0 0 0 0 0 0 0     Interpretation of Total Score Depression Severity: 1-4 = Minimal depression, 5-9 = Mild depression, 10-14 = Moderate depression, 15-19 = Moderately severe depression, 20-27 = Severe depression

## 2023-01-31 NOTE — PROGRESS NOTES
Visit Information    Have you changed or started any medications since your last visit including any over-the-counter medicines, vitamins, or herbal medicines? no   Are you having any side effects from any of your medications? -  no  Have you stopped taking any of your medications? Is so, why? -  no    Have you seen any other physician or provider since your last visit? No  Have you had any other diagnostic tests since your last visit? No  Have you been seen in the emergency room and/or had an admission to a hospital since we last saw you? No  Have you had your routine dental cleaning in the past 6 months? no    Have you activated your [employer] account? If not, what are your barriers?  No:      Patient Care Team:  Lewis Mendoza MD as PCP - General (Family Medicine)  Veronika Garcia MD as Orthopedic Surgeon (Orthopedic Surgery)  Nhi Michelle MD as Consulting Physician (Neurology)  Jonathan Trejo DPM as Surgeon (Podiatry)  Allie Ferrera DO as Consulting Physician (General Surgery)  Dipika Grimes MD as Consulting Physician (Gastroenterology)    Medical History Review  Past Medical, Family, and Social History reviewed and does not contribute to the patient presenting condition    Health Maintenance   Topic Date Due    Shingles vaccine (1 of 2) Never done    Diabetic retinal exam  03/10/2016    Pneumococcal 0-64 years Vaccine (2 - PCV) 05/13/2016    COVID-19 Vaccine (2 - Pfizer series) 01/26/2022    Diabetic Alb to Cr ratio (uACR) test  06/21/2022    Lipids  06/21/2022    GFR test (Diabetes, CKD 3-4, OR last GFR 15-59)  06/21/2022    Flu vaccine (1) 08/01/2022    A1C test (Diabetic or Prediabetic)  05/19/2023    Depression Screen  05/19/2023    Diabetic foot exam  01/31/2024    DTaP/Tdap/Td vaccine (2 - Td or Tdap) 02/22/2026    Colorectal Cancer Screen  11/19/2028    Hepatitis A vaccine  Completed    Hepatitis B vaccine  Completed    HIV screen  Completed    Hib vaccine  Aged Out Meningococcal (ACWY) vaccine  Aged Out

## 2023-01-31 NOTE — PATIENT INSTRUCTIONS
Thank you for letting us take care of you today. We hope all your questions were addressed. If a question was overlooked or something else comes to mind after you return home, please contact a member of your Care Team listed below. Your Care Team at [place] [address] is Team #3  Mary Nugent MD (Faculty)  Juliocesar Naranjo MD (Faculty  Katherinearthur Arreaga MD (Resident)  [facility] (Resident)   Teo Kinney MD (Resident)  Zac Grady., BILL Martinez., BILL Fleming., LUCA Espinal., Seema Harris., Bertell Rinne ([address])  George Navarro, [address] (Clinical Practice Manager)  Blaine Tan [facility] (Clinical Pharmacist)     Office phone number: [phone]    If you need to get in right away due to illness, please be advised we have \"Same Day\" appointments available Monday-Friday. Please call us at [phone] option #3 to schedule your \"Same Day\" appointment.

## 2023-01-31 NOTE — PROGRESS NOTES
Attending Physician Statement  I  have discussed the care of Haider Moore including pertinent history and exam findings with the resident. I agree with the assessment, plan and orders as documented by the resident. DM and HTN  Ran out of meds  Worsening Hba1c but BP isn't bad   Will restart diabetic meds and adjust the HTN medications      BP (!) 140/97 (Site: Left Upper Arm, Position: Sitting, Cuff Size: Medium Adult)   Pulse 89   Ht 5' 9\" (1.753 m)   Wt 143 lb 9.6 oz (65.1 kg)   BMI 21.21 kg/m²    BP Readings from Last 3 Encounters:   01/31/23 (!) 140/97   05/19/22 (!) 150/106   12/07/21 (!) 157/99     Wt Readings from Last 3 Encounters:   01/31/23 143 lb 9.6 oz (65.1 kg)   05/19/22 142 lb 12.8 oz (64.8 kg)   12/07/21 151 lb (68.5 kg)          Diagnosis Orders   1. Type 2 diabetes mellitus without complication, with long-term current use of insulin (HCC)  Lipid Panel    HM DIABETES FOOT EXAM    POCT glycosylated hemoglobin (Hb A1C)    insulin glargine (BASAGLAR KWIKPEN) 100 UNIT/ML injection pen    blood glucose test strips (ONETOUCH ULTRA) strip    metFORMIN (GLUCOPHAGE) 1000 MG tablet      2. Need for vaccination  Influenza, AFLURIA, (age 1 y+), IM, Preservative Free, 0.5 mL      3. Essential hypertension  amLODIPine (NORVASC) 10 MG tablet      4. Gastroesophageal reflux disease without esophagitis  famotidine (PEPCID) 40 MG tablet      5. Chronic bilateral low back pain with bilateral sciatica  [facility]      6.  Hx of medication noncompliance            Ashwin Kahn MD 1/31/2023 3:59 PM

## 2023-02-01 RX ORDER — INSULIN GLARGINE 100 [IU]/ML
INJECTION, SOLUTION SUBCUTANEOUS
Refills: 11 | OUTPATIENT
Start: 2023-02-01

## 2023-02-16 ENCOUNTER — HOSPITAL ENCOUNTER (OUTPATIENT)
Dept: PAIN MANAGEMENT | Age: 58
Discharge: HOME | End: 2023-02-16
Payer: MEDICAID

## 2023-02-16 VITALS
HEIGHT: 69 IN | SYSTOLIC BLOOD PRESSURE: 137 MMHG | WEIGHT: 143.6 LBS | BODY MASS INDEX: 21.27 KG/M2 | HEART RATE: 96 BPM | DIASTOLIC BLOOD PRESSURE: 95 MMHG | OXYGEN SATURATION: 98 %

## 2023-02-16 DIAGNOSIS — M96.1 FAILED BACK SYNDROME: ICD-10-CM

## 2023-02-16 DIAGNOSIS — E11.65 POORLY CONTROLLED DIABETES MELLITUS (HCC): Primary | ICD-10-CM

## 2023-02-16 DIAGNOSIS — Z98.890 HISTORY OF LUMBAR SURGERY: ICD-10-CM

## 2023-02-16 DIAGNOSIS — E11.9 TYPE 2 DIABETES MELLITUS WITHOUT COMPLICATION, WITH LONG-TERM CURRENT USE OF INSULIN (HCC): ICD-10-CM

## 2023-02-16 DIAGNOSIS — Z79.4 TYPE 2 DIABETES MELLITUS WITHOUT COMPLICATION, WITH LONG-TERM CURRENT USE OF INSULIN (HCC): ICD-10-CM

## 2023-02-16 DIAGNOSIS — E10.65 POOR CONTROL TYPE I DIABETES MELLITUS (HCC): ICD-10-CM

## 2023-02-16 LAB
AMPHETAMINE SCREEN URINE: NEGATIVE
BARBITURATE SCREEN URINE: NEGATIVE
BENZODIAZEPINE SCREEN, URINE: NEGATIVE
CANNABINOID SCREEN URINE: NEGATIVE
COCAINE METABOLITE, URINE: NEGATIVE
FENTANYL URINE: NEGATIVE
METHADONE SCREEN, URINE: NEGATIVE
OPIATES, URINE: NEGATIVE
OXYCODONE SCREEN URINE: NEGATIVE
PHENCYCLIDINE, URINE: NEGATIVE
TEST INFORMATION: NORMAL

## 2023-02-16 PROCEDURE — 99215 OFFICE O/P EST HI 40 MIN: CPT | Performed by: ANESTHESIOLOGY

## 2023-02-16 PROCEDURE — 99215 OFFICE O/P EST HI 40 MIN: CPT

## 2023-02-16 PROCEDURE — 80307 DRUG TEST PRSMV CHEM ANLYZR: CPT

## 2023-02-16 RX ORDER — INSULIN GLARGINE 100 [IU]/ML
INJECTION, SOLUTION SUBCUTANEOUS
Qty: 3 ADJUSTABLE DOSE PRE-FILLED PEN SYRINGE | Refills: 5 | Status: SHIPPED | OUTPATIENT
Start: 2023-02-16

## 2023-02-16 ASSESSMENT — PAIN DESCRIPTION - DESCRIPTORS: DESCRIPTORS: ACHING;BURNING;SHOOTING;STABBING

## 2023-02-16 ASSESSMENT — ENCOUNTER SYMPTOMS
NAUSEA: 0
DIARRHEA: 0
CHEST TIGHTNESS: 0
CONSTIPATION: 0
WHEEZING: 0
SHORTNESS OF BREATH: 0
VOMITING: 0
BACK PAIN: 1

## 2023-02-16 ASSESSMENT — PAIN SCALES - GENERAL: PAINLEVEL_OUTOF10: 10

## 2023-02-16 ASSESSMENT — PAIN DESCRIPTION - LOCATION: LOCATION: BACK

## 2023-02-16 ASSESSMENT — PAIN DESCRIPTION - PAIN TYPE: TYPE: CHRONIC PAIN

## 2023-02-16 ASSESSMENT — PAIN DESCRIPTION - FREQUENCY: FREQUENCY: CONTINUOUS

## 2023-02-16 NOTE — TELEPHONE ENCOUNTER
Last visit: 1/21/23  Last Med refill:   Does patient have enough medication for 72 hours: No:     Next Visit Date:  Future Appointments   Date Time Provider Maryana Mihaela   2/28/2023  1:30 PM Shanel Crain MD [address] [place] [address] Maintenance   Topic Date Due    Shingles vaccine (1 of 2) Never done    Diabetic retinal exam  03/10/2016    Pneumococcal 0-64 years Vaccine (2 - PCV) 05/13/2016    COVID-19 Vaccine (2 - Pfizer series) 01/26/2022    Diabetic Alb to Cr ratio (uACR) test  06/21/2022    GFR test (Diabetes, CKD 3-4, OR last GFR 15-59)  06/21/2022    A1C test (Diabetic or Prediabetic)  04/30/2023    Diabetic foot exam  01/31/2024    Lipids  01/31/2024    Depression Screen  01/31/2024    DTaP/Tdap/Td vaccine (2 - Td or Tdap) 02/22/2026    Colorectal Cancer Screen  11/19/2028    Hepatitis A vaccine  Completed    Hepatitis B vaccine  Completed    Flu vaccine  Completed    HIV screen  Completed    Hib vaccine  Aged Out    Meningococcal (ACWY) vaccine  Aged Out       Hemoglobin A1C (%)   Date Value   01/31/2023 12.1   05/19/2022 6.9   12/07/2021 7.2             ( goal A1C is < 7)   Microalb/Crt.  Ratio (mcg/mg creat)   Date Value   06/21/2021 379 (H)     LDL Cholesterol (mg/dL)   Date Value   01/31/2023 86   06/21/2021 100       (goal LDL is <100)   AST (U/L)   Date Value   02/19/2020 36     ALT (U/L)   Date Value   02/19/2020 34     BUN (mg/dL)   Date Value   06/21/2021 17     BP Readings from Last 3 Encounters:   02/16/23 (!) 137/95   01/31/23 (!) 140/97   05/19/22 (!) 150/106          (goal 120/80)    All Future Testing planned in [facility]  Lab Frequency Next Occurrence   EMG Once 02/16/2023               Patient Active Problem List:     Liver disease     Type II or unspecified type diabetes mellitus without mention of complication, not stated as uncontrolled     Chronic bilateral low back pain with bilateral sciatica     Thoracic or lumbosacral neuritis or radiculitis, unspecified Degeneration of lumbar or lumbosacral intervertebral disc     DM (diabetes mellitus) (HCC)     Hep C w/o coma, chronic (HCC)     Back pain     Back pain, chronic     HTN (hypertension)     Osteoarthritis of lumbar spine     Diabetes mellitus ([address]     Needs flu shot     Smoking     Chronic back pain     Spinal stenosis of lumbar region     Foot drop, right     Neuritis or radiculitis due to displacement of lumbar intervertebral disc     Lumbar disc herniation     Polyneuropathic pain     Displacement of lumbar intervertebral disc without myelopathy     Uncontrolled type 2 diabetes mellitus with diabetic mononeuropathy, with long-term current use of insulin     Smoker     Cellulitis and abscess     Exposed orthopaedic hardware ([address]     Hallux abducto valgus     Diabetes (HCC)     Positional vertigo     Abdominal pain, right upper quadrant     Cellulitis     Esophagitis determined by endoscopy     Cholelithiasis     Diabetes type 2, uncontrolled     Diabetic mononeuropathy associated with diabetes mellitus due to underlying condition ([address]     Lumbar spondylolysis     Erectile dysfunction associated with type 2 diabetes mellitus ([address]     Encounter for smoking cessation counseling     Bloating     Family history of breast cancer     Dyslipidemia     Acute bacterial sinusitis     Blurry vision, bilateral     Dizziness     Syncope and collapse     Diabetic neuropathy with neurologic complication ([address]     History of lumbar surgery

## 2023-02-16 NOTE — PROGRESS NOTES
The patient is a 60 y. o. Non- / non  male.     Chief Complaint   Patient presents with    Back Pain      -79-year-old man with history of chronic low back pain going on for more than 5 years located in the lumbar area with radiation down both legs he reports numbness and tingling in both legs  No changes in bladder or bowel control  Pain is constant aggravates with routine activity and excessive activity and weather changes  Recalls 2 previous lumbar spine surgeries by Dr. Fabi Ho at [facility]  Had a lumbar spine MRI in 2022 that did not show any surgical pathology  No physical therapy for last 5 years  States that he was seeing pain clinic at [facility] and had received injections there and opioid medications  He was discharged from their care for failed urine toxicology but patient stated that there was a confusion between the 2 patients of similar name    He is currently taking gabapentin  Past medical history significant for diabetes mellitus  Diabetes is poorly controlled, last hemoglobin A1c above 12      Patient is here today for: Back pain / discuss inj  Pain level: 10  Character: aching, burning, shooting & stabbing  Radiating: yes  Weakness or numbness: yes  Aggravating Factors: walking, bending & twisting  Alleviating Factors: nothing  Constant or intermitting: constant  Bladder/bowel loss: no      Past Medical History:   Diagnosis Date    Chronic back pain     Diabetes mellitus ([address]     Diabetic neuropathy ([address]     DM (diabetes mellitus) ([address]     Hepatitis C     Hypertension     MDRO (multiple drug resistant organisms) resistance     MRSA IN RIGHT FOOT    Murmur     Osteoarthritis         Past Surgical History:   Procedure Laterality Date    BUNIONECTOMY Right     BUNIONECTOMY Left 2/19/2020    LEFT FOOT MODIFIED LAPIDUS WITH AKIN OSTEOTOMY performed by Tung Altamirano DPM at [address]  9/8/15    laprascopic    COLONOSCOPY N/A 11/19/2018 COLONOSCOPY DIAGNOSTIC performed by Ailyn Esparza IV, DO at [address] Right     FOOT SURGERY      hardware removed    FOOT SURGERY Right 6/26/14    delayed closure    FOOT SURGERY Right 10/15/14    removal of hardware    FOOT SURGERY Left 02/19/2020    Left Foot Modified Lapidus With Akin Osteotomy    OSTEOTOMY Right 2/6/2015    Akin Osteotomy right       Social History     Socioeconomic History    Marital status: Single     Spouse name: None    Number of children: None    Years of education: None    Highest education level: None   Occupational History     Employer: N/A   Tobacco Use    Smoking status: Some Days     Packs/day: 0.25     Years: 15.00     Pack years: 3.75     Types: Cigarettes    Smokeless tobacco: Former     Types: Chew    Tobacco comments:     stopped chew 9 years ago . pt states he smokes 4-5 ciggs daily   Substance and Sexual Activity    Alcohol use: Yes     Comment: pt reports that he was a daily drinker approx. 1 year ago.  Pt reports that now he just drinks occasionally    Drug use: No   Social History Narrative    ** Merged History Encounter **          Social Determinants of Health     Financial Resource Strain: Low Risk     Difficulty of Paying Living Expenses: Not hard at all   Food Insecurity: No Food Insecurity    Worried About Running Out of Food in the Last Year: Never true    Ran Out of Food in the Last Year: Never true       Family History   Problem Relation Age of Onset    High Blood Pressure Mother     High Blood Pressure Father        Allergies   Allergen Reactions    Morphine Hives       Vitals:    02/16/23 1305   BP: (!) 137/95   Pulse: 96   SpO2: 98%       Current Outpatient Medications   Medication Sig Dispense Refill    amLODIPine (NORVASC) 10 MG tablet TAKE 1 TABLET BY MOUTH ONCE DAILY 30 tablet 1    insulin glargine (BASAGLAR KWIKPEN) 100 UNIT/ML injection pen INJECT 36 UNITS SUBCUTANEOUSLY 2 TIMES DAILY 1 Adjustable Dose Pre-filled Pen Syringe 5    blood glucose test strips (ONETOUCH ULTRA) strip TESTING TWICE A  strip 5    metFORMIN (GLUCOPHAGE) 1000 MG tablet Take 1 tablet by mouth 2 times daily (with meals) 60 tablet 5    famotidine (PEPCID) 40 MG tablet Take 1 tablet by mouth every evening 30 tablet 1    BD PEN NEEDLE ROMY U/F 32G X 4 MM MISC AS DIRECTED WITH LANTUS 100 each 0    aspirin (ASPIRIN LOW DOSE) 81 MG EC tablet TAKE ONE TABLET BY MOUTH ONCE DAILY 30 tablet 1    Alcohol Swabs (EASY TOUCH ALCOHOL PREP MEDIUM) 70 % PADS 1 EACH BY DOES NOT APPLY ROUTE DAILY AS NEEDED (USE DAILY AS NEEDED) 1 each 0    atorvastatin (LIPITOR) 20 MG tablet TAKE 1 TABLET BY MOUTH DAILY 30 tablet 1    gabapentin (NEURONTIN) 300 MG capsule TAKE 2 CAPSULES BY MOUTH 3 TIMES DAILY 180 capsule 5    sildenafil (VIAGRA) 50 MG tablet Take 1 tablet by mouth as needed for Erectile Dysfunction 10 tablet 1    Insulin Syringe-Needle U-100 (INSULIN SYRINGE .5CC/30GX1/2\") 30G X 1/2\" 0.5 ML MISC Use 3 times daily. Dx: 250.00   Prescribe this syringe for insulin dosages under  50 units per injection. 100 each 5    Insulin Syringe-Needle U-100 (B-D INS SYR ULTRAFINE 1CC/30G) 30G X 1/2\" 1 ML MISC AS DIRECTED WITH LANTUS 10 each 3    diclofenac sodium (VOLTAREN) 1 % GEL APPLY 4 GM TOPICALLY 2 TIMES DAILY 150 g 3    OneTouch Delica Lancets 69L MISC USE AS DIRECTED TWICE A  each 0    ondansetron (ZOFRAN) 4 MG tablet Take 1 tablet by mouth every 8 hours as needed for Nausea or Vomiting 12 tablet 5    naloxone 4 MG/0.1ML LIQD nasal spray 1 spray by Nasal route as needed for Opioid Reversal 1 each 3    Blood Glucose Monitoring Suppl (ONE TOUCH ULTRA 2) w/Device KIT 1 kit by Does not apply route daily 1 kit 0    Blood Pressure KIT 1 kit by Does not apply route three times daily Please replace with any kit that is covered by insurance. 1 kit 0    lidocaine (XYLOCAINE) 5 % ointment Apply topically as needed. 1 Tube 3    Misc.  Devices (WALL GRAB BAR) MISC Use as required 2 each 0     No current facility-administered medications for this encounter. Review of Systems   Constitutional:  Negative for chills, fatigue and fever. Respiratory:  Negative for chest tightness, shortness of breath and wheezing. Cardiovascular:  Negative for leg swelling. Gastrointestinal:  Negative for constipation, diarrhea, nausea and vomiting. Musculoskeletal:  Positive for back pain. Neurological:  Positive for weakness and numbness. Negative for dizziness. Objective:  General Appearance:  Uncomfortable, in pain, well-appearing and in no acute distress. Vital signs: (most recent): Blood pressure (!) 137/95, pulse 96, height 5' 9\" (1.753 m), weight 143 lb 9.6 oz (65.1 kg), SpO2 98 %. Vital signs are normal.  No fever. Output: Producing urine and producing stool. HEENT: Normal HEENT exam.    Lungs:  Normal effort and normal respiratory rate. He is not in respiratory distress. Heart: Normal rate. Extremities: Normal range of motion. There is no deformity. Neurological: Patient is alert and oriented to person, place and time. Patient has normal coordination. Pupils:  Pupils are equal, round, and reactive to light. Pupils are equal.   Skin:  Warm and dry. No rash or cyanosis.        Assessment & Plan    down both legs he reports numbness and tingling in both legs  No changes in bladder or bowel control  Pain is constant aggravates with routine activity and excessive activity and weather changes  Recalls 2 previous lumbar spine surgeries by Dr. Ravinder Dinh at [facility]  Had a lumbar spine MRI in 2022 that did not show any surgical pathology  No physical therapy for last 5 years  States that he was seeing pain clinic at [facility] [place] and had received injections there and opioid medications  He was discharged from their care for failed urine toxicology but patient stated that there was a confusion between the 2 patients of similar name    He is currently taking gabapentin  Past medical history significant for diabetes mellitus  Diabetes is poorly controlled, last hemoglobin A1c above 12  1. Poorly controlled diabetes mellitus ([address]    2. Poor control type I diabetes mellitus ([address]    3. History of lumbar surgery    4. Failed back syndrome        Orders Placed This Encounter   Procedures    Urine Drug Screen, Comprehensive    Ambulatory referral to Endocrinology    Ambulatory referral to Physical Therapy    EMG      No orders of the defined types were placed in this encounter.      No therapy for 5 years  Will refer for physical therapy    Poorly controlled diabetes, patient will be high risk for complication from steroid and even nonsteroid injection  Will refer for endocrinology  May consider for spine injection once blood sugar control improved and hemoglobin A1c drop below 8    Numbness tingling and pain in both legs suggestive of radiculopathy from previous back surgery and also from diabetic peripheral neuropathy  EMG can help establish clear etiology    We will collect urine for toxicology    Will avoid opioid therapy for lack of exertion of other modalities and concerned opioid history as documented in my note above    Will advised to continue gabapentin at this time          Electronically signed by Sara Olea MD on 2/16/2023 at 1:31 PM

## 2023-02-22 DIAGNOSIS — I10 ESSENTIAL HYPERTENSION: ICD-10-CM

## 2023-02-22 RX ORDER — CHLORTHALIDONE 25 MG/1
TABLET ORAL
Qty: 30 TABLET | Refills: 0 | OUTPATIENT
Start: 2023-02-22

## 2023-03-08 ENCOUNTER — OFFICE VISIT (OUTPATIENT)
Dept: FAMILY MEDICINE CLINIC | Age: 58
End: 2023-03-08
Payer: MEDICAID

## 2023-03-08 VITALS
WEIGHT: 142 LBS | BODY MASS INDEX: 21.03 KG/M2 | SYSTOLIC BLOOD PRESSURE: 148 MMHG | HEIGHT: 69 IN | HEART RATE: 90 BPM | DIASTOLIC BLOOD PRESSURE: 94 MMHG

## 2023-03-08 DIAGNOSIS — G89.29 CHRONIC MIDLINE LOW BACK PAIN, UNSPECIFIED WHETHER SCIATICA PRESENT: ICD-10-CM

## 2023-03-08 DIAGNOSIS — Z79.4 TYPE 2 DIABETES MELLITUS WITH OTHER SPECIFIED COMPLICATION, WITH LONG-TERM CURRENT USE OF INSULIN (HCC): Primary | ICD-10-CM

## 2023-03-08 DIAGNOSIS — M54.50 CHRONIC MIDLINE LOW BACK PAIN, UNSPECIFIED WHETHER SCIATICA PRESENT: ICD-10-CM

## 2023-03-08 DIAGNOSIS — E11.69 TYPE 2 DIABETES MELLITUS WITH OTHER SPECIFIED COMPLICATION, WITH LONG-TERM CURRENT USE OF INSULIN (HCC): Primary | ICD-10-CM

## 2023-03-08 DIAGNOSIS — E11.9 TYPE 2 DIABETES MELLITUS WITHOUT COMPLICATION, WITH LONG-TERM CURRENT USE OF INSULIN (HCC): ICD-10-CM

## 2023-03-08 DIAGNOSIS — I10 ESSENTIAL HYPERTENSION: ICD-10-CM

## 2023-03-08 DIAGNOSIS — Z79.4 TYPE 2 DIABETES MELLITUS WITHOUT COMPLICATION, WITH LONG-TERM CURRENT USE OF INSULIN (HCC): ICD-10-CM

## 2023-03-08 DIAGNOSIS — E78.2 MIXED HYPERLIPIDEMIA: ICD-10-CM

## 2023-03-08 LAB — HBA1C MFR BLD: 13.6 %

## 2023-03-08 PROCEDURE — 2022F DILAT RTA XM EVC RTNOPTHY: CPT | Performed by: STUDENT IN AN ORGANIZED HEALTH CARE EDUCATION/TRAINING PROGRAM

## 2023-03-08 PROCEDURE — 4004F PT TOBACCO SCREEN RCVD TLK: CPT | Performed by: STUDENT IN AN ORGANIZED HEALTH CARE EDUCATION/TRAINING PROGRAM

## 2023-03-08 PROCEDURE — G8420 CALC BMI NORM PARAMETERS: HCPCS | Performed by: STUDENT IN AN ORGANIZED HEALTH CARE EDUCATION/TRAINING PROGRAM

## 2023-03-08 PROCEDURE — 3046F HEMOGLOBIN A1C LEVEL >9.0%: CPT | Performed by: STUDENT IN AN ORGANIZED HEALTH CARE EDUCATION/TRAINING PROGRAM

## 2023-03-08 PROCEDURE — 3080F DIAST BP >= 90 MM HG: CPT | Performed by: STUDENT IN AN ORGANIZED HEALTH CARE EDUCATION/TRAINING PROGRAM

## 2023-03-08 PROCEDURE — 3077F SYST BP >= 140 MM HG: CPT | Performed by: STUDENT IN AN ORGANIZED HEALTH CARE EDUCATION/TRAINING PROGRAM

## 2023-03-08 PROCEDURE — 83036 HEMOGLOBIN GLYCOSYLATED A1C: CPT | Performed by: STUDENT IN AN ORGANIZED HEALTH CARE EDUCATION/TRAINING PROGRAM

## 2023-03-08 PROCEDURE — 99213 OFFICE O/P EST LOW 20 MIN: CPT | Performed by: STUDENT IN AN ORGANIZED HEALTH CARE EDUCATION/TRAINING PROGRAM

## 2023-03-08 PROCEDURE — G8427 DOCREV CUR MEDS BY ELIG CLIN: HCPCS | Performed by: STUDENT IN AN ORGANIZED HEALTH CARE EDUCATION/TRAINING PROGRAM

## 2023-03-08 PROCEDURE — 3017F COLORECTAL CA SCREEN DOC REV: CPT | Performed by: STUDENT IN AN ORGANIZED HEALTH CARE EDUCATION/TRAINING PROGRAM

## 2023-03-08 PROCEDURE — G8482 FLU IMMUNIZE ORDER/ADMIN: HCPCS | Performed by: STUDENT IN AN ORGANIZED HEALTH CARE EDUCATION/TRAINING PROGRAM

## 2023-03-08 RX ORDER — LISINOPRIL 40 MG/1
40 TABLET ORAL DAILY
Qty: 30 TABLET | Refills: 5 | Status: SHIPPED | OUTPATIENT
Start: 2023-03-08

## 2023-03-08 RX ORDER — LISINOPRIL 40 MG/1
TABLET ORAL
COMMUNITY
Start: 2023-02-22 | End: 2023-03-08 | Stop reason: SDUPTHER

## 2023-03-08 RX ORDER — LANCETS 33 GAUGE
EACH MISCELLANEOUS
Qty: 100 EACH | Refills: 5 | Status: SHIPPED | OUTPATIENT
Start: 2023-03-08 | End: 2023-04-27

## 2023-03-08 RX ORDER — GABAPENTIN 300 MG/1
CAPSULE ORAL
Qty: 90 CAPSULE | Refills: 2 | Status: SHIPPED | OUTPATIENT
Start: 2023-03-08 | End: 2024-03-07

## 2023-03-08 RX ORDER — INSULIN GLARGINE 100 [IU]/ML
45 INJECTION, SOLUTION SUBCUTANEOUS 2 TIMES DAILY
Qty: 3 ADJUSTABLE DOSE PRE-FILLED PEN SYRINGE | Refills: 5 | Status: SHIPPED | OUTPATIENT
Start: 2023-03-08

## 2023-03-08 RX ORDER — FLASH GLUCOSE SCANNING READER
EACH MISCELLANEOUS
Qty: 2 EACH | Refills: 1 | Status: SHIPPED | OUTPATIENT
Start: 2023-03-08

## 2023-03-08 RX ORDER — ATORVASTATIN CALCIUM 40 MG/1
40 TABLET, FILM COATED ORAL DAILY
Qty: 30 TABLET | Refills: 3 | Status: SHIPPED | OUTPATIENT
Start: 2023-03-08

## 2023-03-08 RX ORDER — LISINOPRIL 10 MG/1
10 TABLET ORAL DAILY
Qty: 90 TABLET | Refills: 1 | Status: CANCELLED | OUTPATIENT
Start: 2023-03-08

## 2023-03-08 RX ORDER — AMLODIPINE BESYLATE 10 MG/1
TABLET ORAL
Qty: 30 TABLET | Refills: 0 | Status: SHIPPED | OUTPATIENT
Start: 2023-03-08

## 2023-03-08 RX ORDER — FLASH GLUCOSE SENSOR
KIT MISCELLANEOUS
Qty: 2 EACH | Refills: 5 | Status: SHIPPED | OUTPATIENT
Start: 2023-03-08

## 2023-03-08 RX ORDER — ASPIRIN 81 MG/1
TABLET ORAL
Qty: 30 TABLET | Refills: 1 | Status: SHIPPED | OUTPATIENT
Start: 2023-03-08

## 2023-03-08 RX ORDER — BLOOD SUGAR DIAGNOSTIC
STRIP MISCELLANEOUS
Qty: 100 STRIP | Refills: 5 | Status: SHIPPED | OUTPATIENT
Start: 2023-03-08

## 2023-03-08 RX ORDER — PEN NEEDLE, DIABETIC 32GX 5/32"
NEEDLE, DISPOSABLE MISCELLANEOUS
Qty: 100 EACH | Refills: 5 | Status: SHIPPED | OUTPATIENT
Start: 2023-03-08

## 2023-03-08 RX ORDER — ISOPROPYL ALCOHOL 70 ML/100ML
SWAB TOPICAL
Qty: 1 EACH | Refills: 5 | Status: SHIPPED | OUTPATIENT
Start: 2023-03-08

## 2023-03-08 SDOH — ECONOMIC STABILITY: INCOME INSECURITY: HOW HARD IS IT FOR YOU TO PAY FOR THE VERY BASICS LIKE FOOD, HOUSING, MEDICAL CARE, AND HEATING?: SOMEWHAT HARD

## 2023-03-08 SDOH — ECONOMIC STABILITY: FOOD INSECURITY: WITHIN THE PAST 12 MONTHS, THE FOOD YOU BOUGHT JUST DIDN'T LAST AND YOU DIDN'T HAVE MONEY TO GET MORE.: SOMETIMES TRUE

## 2023-03-08 SDOH — ECONOMIC STABILITY: FOOD INSECURITY: WITHIN THE PAST 12 MONTHS, YOU WORRIED THAT YOUR FOOD WOULD RUN OUT BEFORE YOU GOT MONEY TO BUY MORE.: SOMETIMES TRUE

## 2023-03-08 SDOH — ECONOMIC STABILITY: HOUSING INSECURITY
IN THE LAST 12 MONTHS, WAS THERE A TIME WHEN YOU DID NOT HAVE A STEADY PLACE TO SLEEP OR SLEPT IN A SHELTER (INCLUDING NOW)?: NO

## 2023-03-08 NOTE — PATIENT INSTRUCTIONS
Medications were sent to [address] on [facility]. Printed scripts given to pt for [employer]. Blood work was ordered to be done before next appointment, no appointment is needed. Referral was placed for Opthalmology at [address] [facility] [id number] office, information provided. Referral was placed for Diabetic Services at [facility], 2nd floor office, information provided. Return to office in two weeks/months on Wednesday, March 31st at 2:00pm.     Thank you for letting us take care of you today. We hope all your questions were addressed. If a question was overlooked or something else comes to mind after you return home, please contact the office at the number listed below. Office phone number: [phone]    If you need to get in right away due to illness, please be advised we have \"Same Day\" appointments available Monday-Friday. Please call us at [phone] option #3 to schedule your \"Same Day\" appointment.

## 2023-03-08 NOTE — PROGRESS NOTES
Attending Physician Statement  I have discussed the care of Prosper Tracy 60 y.o. male, including pertinent history and exam findings, with the resident Dr. Patel Pompa MD.    History and Exam:   Chief Complaint   Patient presents with    Hypertension     Last /95 on 1/31/23    Diabetes     Last a1c 12.1 on 1/31/23    Medication Refill     Pt requesting refills on all medications, pharmacy confirmed    Back Pain     Lower Back, Exacerbation of chronic problem       Past Medical History:   Diagnosis Date    Chronic back pain     Diabetes mellitus ([facility]     Diabetic neuropathy ([facility]     DM (diabetes mellitus) ([facility]     Hepatitis C     Hypertension     MDRO (multiple drug resistant organisms) resistance     MRSA IN RIGHT FOOT    Murmur     Osteoarthritis      Allergies   Allergen Reactions    Morphine Hives      I have seen and examined the patient and the key elements of the encounter have been performed by me.   BP Readings from Last 3 Encounters:   03/08/23 (!) 148/94   02/16/23 (!) 137/95   01/31/23 (!) 140/97     BP (!) 148/94 (Site: Right Upper Arm, Position: Sitting, Cuff Size: Medium Adult)   Pulse 90   Ht 5' 9\" (1.753 m)   Wt 142 lb (64.4 kg)   BMI 20.97 kg/m²   Lab Results   Component Value Date    WBC 6.6 01/05/2020    HGB 17.5 (H) 01/05/2020    HCT 52.4 (H) 01/05/2020     01/05/2020    CHOL 184 01/31/2023    TRIG 170 (H) 01/31/2023    HDL 64 01/31/2023    ALT 34 02/19/2020    AST 36 02/19/2020     06/21/2021    K 4.6 06/21/2021     06/21/2021    CREATININE 1.00 06/21/2021    BUN 17 06/21/2021    CO2 25 06/21/2021    TSH 1.05 01/04/2020    INR 1.1 02/19/2020    LABA1C 13.6 03/08/2023    LABMICR 379 (H) 06/21/2021     Lab Results   Component Value Date    LABPROT 6.1 (L) 07/13/2012    LABALBU 3.9 02/19/2020     Lab Results   Component Value Date    IRON 147 07/27/2018    TIBC 385 07/27/2018    FERRITIN 82 07/27/2018     Lab Results   Component Value Date    LDLCHOLESTEROL 86 01/31/2023     I agree with the assessment, plan and the diagnosis of    Diagnosis Orders   1. Type 2 diabetes mellitus with other specified complication, with long-term current use of insulin ([address]  Comprehensive Metabolic Panel    CBC with Auto Differential    Lipid Panel    Microalbumin, Ur    [address] Diabetes Education [facility] Referral for Social Determinants of Health (Primary Care Practices)    Gareth Medina MD, Ophthalmology, [place]    Continuous Blood Gluc Sensor (FREESTYLE SAM 2 SENSOR) [facility]    Continuous Blood Gluc  (FREESTYLE SAM 2 READER) [facility]    POCT glycosylated hemoglobin (Hb P5L)    OneTouch Delica Lancets 87J MISC    Alcohol Swabs (EASY TOUCH ALCOHOL PREP MEDIUM) 70 % PADS    Insulin Pen Needle (BD PEN NEEDLE ROMY U/F) 32G X 4 MM MISC    blood glucose test strips (ONETOUCH ULTRA) strip      2. Essential hypertension  amLODIPine (NORVASC) 10 MG tablet    aspirin (ASPIRIN LOW DOSE) 81 MG EC tablet    lisinopril (PRINIVIL;ZESTRIL) 40 MG tablet      3. Mixed hyperlipidemia  atorvastatin (LIPITOR) 40 MG tablet      4. Type 2 diabetes mellitus without complication, with long-term current use of insulin (HCC)  metFORMIN (GLUCOPHAGE) 1000 MG tablet    insulin glargine (BASAGLAR KWIKPEN) 100 UNIT/ML injection pen      5. Chronic midline low back pain, unspecified whether sciatica present  gabapentin (NEURONTIN) 300 MG capsule       . I agree with orders as documented by the resident. More than 25 minutes spent  in face to face encounter with the patient and more than half in counseling. Patient's questions were answered. Patient Voiced understanding to the counseling. Return in about 2 weeks (around 3/22/2023).    (GC Modifier)-Dr. Mamie Hull MD

## 2023-03-08 NOTE — PROGRESS NOTES
[address] [facility] Medicine Residency Program - Outpatient Note      Subjective: Chandrakant Linder is a 60 y.o. male  presented to the office on 03/08/23 with complaints of: Diabetes follow-up    This is a 48-year-old gentleman with a history of chronic back pain established with pain management, history of poorly controlled diabetes is coming in today for diabetic follow-up. Cc: DM  Type I/II/Unknown: 2  HbA1c: 12.1---> 13.6 (T)  Insulin dependant: 36 units BID  Blood sugar numbers at home: 200-400  Hypoglycemic episodes after last visit: None  Hyperglycemia or hospital admission: None  Current medication: Insulin and Metformin 1000 mg BID  Urine Micro: Ordered  Lipid Profile: Ordered  Foot exam: Did not do  Eye Exam: Referred and encouraged to [facility] care  Neuropathy: Yes  Gastroparesis: No  CVA/CAD/CKD: NA  BP at goal: No         Underlying comorbidty ACC/AHA NICE   Established atherosclerotic cardiovascular disease* <130/80 <140/90   Heart failure <130/80 <140/90   Diabetes mellitus <130/80 <140/90   Chronic kidney disease <130/80 <140/90   High cardiovascular risk <130/80 <140/90   Older adults? <130/80 <140/90   No comorbidity <130/80 <140/90               Review of systems (Except what is mentioned in the HPI)  CONSTITUTIONAL: Negative  RESPIRATORY: Negative  CARDIOVASCULAR: Negative  GASTROINTESTINAL: Negative  GENITOURINARY: Negative   MUSCULOSKELETAL: Negative  NEUROLOGICAL: Negative  BEHAVIOR/PSYCH: Negative      Objective:      Vitals:    03/08/23 1402   BP: (!) 148/94   Pulse: 90       General Appearance - Alert and oriented x 3  HEENT - No obvious deformity  Lungs - Bilateral good air entry , no wheezes or rales  Cardiovascular - Regular rate and rhythm.  No murmur  Abdomen - Soft and nontender  Neurologic - No new focal motor or sensory deficits  Skin - No bruising or bleeding on exposed skin area  MSK - No new joint/bone pains   Psych - normal affect       Assessment: ICD-10-CM    1. Type 2 diabetes mellitus with other specified complication, with long-term current use of insulin ([facility] [address]  E11.69 Comprehensive Metabolic Panel    T15.2 CBC with Auto Differential     Lipid Panel     Microalbumin, Ur     [address] [facility] Referral for Social Determinants of Health (Primary Care Practices)     Melody Calvo MD, Ophthalmology, [place]     Continuous Blood Gluc Sensor (FREESTYLE SAM 2 SENSOR) [facility]     Continuous Blood Gluc  (FREESTYLE SAM 2 READER) [facility]     POCT glycosylated hemoglobin (Hb A1C)      2. Controlled type 2 diabetes mellitus without complication, with long-term current use of insulin (HCC)  E11.9     Z79.4       3. Essential hypertension  I10       4. Type 2 diabetes mellitus without complication, with long-term current use of insulin (HCC)  E11.9     Z79.4       5. Mixed hyperlipidemia  E78.2           Plan:    Poorly controlled diabetes, will start patient on continuous glucose monitor order basic labs. On Metformin 1000 mg BID, increase Lantus to 45 units twice daily, will discuss with pharmacist about adding a GLP that can further help in better glycemic control. We will do a basic CMP, CBC, urine micro for further assessment. Lantus . Referred pharmacist follow up and DM education, pt need helps in transportation, ordered SDOH. Continue amlodipine and lisinopril, will reassess as patient ran out of the medications. Smoke a cigar for more than 30 years, will take care of her screening care gaps in the next encounter. Chronic back pain established with pain management, since patient diabetes is poorly controlled the plan is to continue gabapentin once the diabetic control is better they might think about injections. Refilled gabapentin during this encounter. Return in about 2 weeks (around 3/22/2023).        Requested Prescriptions     Pending Prescriptions Disp Refills OneTouch Delica Lancets 71Z MISC 100 each 5     Sig: USE AS DIRECTED TWICE A DAY    Alcohol Swabs (EASY TOUCH ALCOHOL PREP MEDIUM) 70 % PADS 1 each 5     Sig: Use as directed    amLODIPine (NORVASC) 10 MG tablet 30 tablet 0     Sig: TAKE 1 TABLET BY MOUTH ONCE DAILY    atorvastatin (LIPITOR) 40 MG tablet 30 tablet 3     Sig: Take 1 tablet by mouth daily    aspirin (ASPIRIN LOW DOSE) 81 MG EC tablet 30 tablet 1     Sig: TAKE ONE TABLET BY MOUTH ONCE DAILY    Insulin Pen Needle (BD PEN NEEDLE ROMY U/F) 32G X 4 MM MISC 100 each 5     Sig: AS DIRECTED WITH LANTUS    blood glucose test strips (ONETOUCH ULTRA) strip 100 strip 5     Sig: TESTING TWICE A DAY    lisinopril (PRINIVIL;ZESTRIL) 10 MG tablet 90 tablet 1     Sig: Take 1 tablet by mouth daily     Signed Prescriptions Disp Refills    Continuous Blood Gluc Sensor (FREESTYLE SAM 2 SENSOR) MISC 2 each 5     Sig: Use as directed    Continuous Blood Gluc  (FREESTYLE SAM 2 READER) KEVAN 2 each 1     Sig: Use as directed       Medications Discontinued During This Encounter   Medication Reason    Misc. Devices (WALL GRAB BAR) MISC LIST CLEANUP    Blood Pressure KIT LIST CLEANUP    Blood Glucose Monitoring Suppl (ONE TOUCH ULTRA 2) w/Device KIT LIST CLEANUP    atorvastatin (LIPITOR) 20 MG tablet DOSE ADJUSTMENT       Jose Guadalupe received counseling on the following healthy behaviors: nutrition, exercise and medication adherence    Discussed use, benefit, and side effects of prescribed medications. Barriers to medication compliance addressed. All patient questions answered. Pt voiced understanding. Disclaimer: Some oral of this note was transcribed using voice-recognition software. This may cause typographical errors occasionally. Although all effort is made to fix these errors, please do not hesitate to contact our office if there [place] concern with the understanding of this note.     Hector Gupta  PGY-3  Family Medicine     3/8/2023  2:29 PM

## 2023-03-08 NOTE — PROGRESS NOTES
Visit Information    Have you changed or started any medications since your last visit including any over-the-counter medicines, vitamins, or herbal medicines? no   Have you stopped taking any of your medications? Is so, why? -  no  Are you having any side effects from any of your medications? - no    Have you seen any other physician or provider since your last visit? yes - Dr. Ban Krueger in [facility] on 2/16   Have you had any other diagnostic tests since your last visit? yes - Lipid Panel done on 1/31/23   Have you been seen in the emergency room and/or had an admission in a hospital since we last saw you?  no   Have you had your routine dental cleaning in the past 6 months?  no     Do you have an active MyChart account? If no, what is the barrier?   Yes    Patient Care Team:  James Vazquez MD as PCP - General (Family Medicine)  Krystian Mcdonnell MD as Orthopedic Surgeon (Orthopedic Surgery)  Gasper Grace MD as Consulting Physician (Neurology)  Real Klein DPM as Surgeon (Podiatry)  Kyung Lopez DO as Consulting Physician (General Surgery)  Edison Hernandez MD as Consulting Physician (Gastroenterology)    Medical History Review  Past Medical, Family, and Social History reviewed and does contribute to the patient presenting condition    Health Maintenance   Topic Date Due    Shingles vaccine (1 of 2) Never done    Diabetic retinal exam  03/10/2016    Pneumococcal 0-64 years Vaccine (2 - PCV) 05/13/2016    COVID-19 Vaccine (2 - Pfizer series) 01/26/2022    Diabetic Alb to Cr ratio (uACR) test  06/21/2022    GFR test (Diabetes, CKD 3-4, OR last GFR 15-59)  06/21/2022    A1C test (Diabetic or Prediabetic)  04/30/2023    Diabetic foot exam  01/31/2024    Lipids  01/31/2024    Depression Screen  01/31/2024    DTaP/Tdap/Td vaccine (2 - Td or Tdap) 02/22/2026    Colorectal Cancer Screen  11/19/2028    Hepatitis A vaccine  Completed    Hepatitis B vaccine  Completed    Flu vaccine Completed    HIV screen  Completed    Hib vaccine  Aged Out    Meningococcal (ACWY) vaccine  Aged Out

## 2023-03-09 DIAGNOSIS — N52.1 ERECTILE DYSFUNCTION ASSOCIATED WITH TYPE 2 DIABETES MELLITUS (HCC): ICD-10-CM

## 2023-03-09 DIAGNOSIS — E11.69 ERECTILE DYSFUNCTION ASSOCIATED WITH TYPE 2 DIABETES MELLITUS (HCC): ICD-10-CM

## 2023-03-09 RX ORDER — SILDENAFIL 50 MG/1
50 TABLET, FILM COATED ORAL PRN
Qty: 10 TABLET | Refills: 1 | OUTPATIENT
Start: 2023-03-09

## 2023-03-10 ENCOUNTER — HOSPITAL ENCOUNTER (OUTPATIENT)
Age: 58
Setting detail: SPECIMEN
Discharge: HOME | End: 2023-03-10

## 2023-03-10 DIAGNOSIS — Z79.4 TYPE 2 DIABETES MELLITUS WITH OTHER SPECIFIED COMPLICATION, WITH LONG-TERM CURRENT USE OF INSULIN (HCC): ICD-10-CM

## 2023-03-10 DIAGNOSIS — E11.69 TYPE 2 DIABETES MELLITUS WITH OTHER SPECIFIED COMPLICATION, WITH LONG-TERM CURRENT USE OF INSULIN (HCC): ICD-10-CM

## 2023-03-10 LAB
ABSOLUTE EOS #: 0.1 K/UL (ref 0–0.44)
ABSOLUTE IMMATURE GRANULOCYTE: <0.03 K/UL (ref 0–0.3)
ABSOLUTE LYMPH #: 1.34 K/UL (ref 1.1–3.7)
ABSOLUTE MONO #: 0.64 K/UL (ref 0.1–1.2)
ALBUMIN SERPL-MCNC: 4 G/DL (ref 3.5–5.2)
ALBUMIN/GLOBULIN RATIO: 1.3 (ref 1–2.5)
ALP SERPL-CCNC: 75 U/L (ref 40–129)
ALT SERPL-CCNC: 28 U/L (ref 5–41)
ANION GAP SERPL CALCULATED.3IONS-SCNC: 12 MMOL/L (ref 9–17)
AST SERPL-CCNC: 38 U/L
BASOPHILS # BLD: 1 % (ref 0–2)
BASOPHILS ABSOLUTE: 0.04 K/UL (ref 0–0.2)
BILIRUB SERPL-MCNC: 0.3 MG/DL (ref 0.3–1.2)
BUN SERPL-MCNC: 16 MG/DL (ref 6–20)
CALCIUM SERPL-MCNC: 9.6 MG/DL (ref 8.6–10.4)
CHLORIDE SERPL-SCNC: 94 MMOL/L (ref 98–107)
CHOLEST SERPL-MCNC: 170 MG/DL
CHOLESTEROL/HDL RATIO: 2.7
CO2 SERPL-SCNC: 27 MMOL/L (ref 20–31)
CREAT SERPL-MCNC: 1.11 MG/DL (ref 0.7–1.2)
CREATININE URINE: 86.8 MG/DL (ref 39–259)
EOSINOPHILS RELATIVE PERCENT: 1 % (ref 1–4)
GFR SERPL CREATININE-BSD FRML MDRD: >60 ML/MIN/1.73M2
GLUCOSE SERPL-MCNC: 368 MG/DL (ref 70–99)
HCT VFR BLD AUTO: 41.5 % (ref 40.7–50.3)
HDLC SERPL-MCNC: 64 MG/DL
HGB BLD-MCNC: 13.8 G/DL (ref 13–17)
IMMATURE GRANULOCYTES: 0 %
LDLC SERPL CALC-MCNC: 66 MG/DL (ref 0–130)
LYMPHOCYTES # BLD: 19 % (ref 24–43)
MCH RBC QN AUTO: 27.4 PG (ref 25.2–33.5)
MCHC RBC AUTO-ENTMCNC: 33.3 G/DL (ref 28.4–34.8)
MCV RBC AUTO: 82.3 FL (ref 82.6–102.9)
MICROALBUMIN/CREAT 24H UR: 330 MG/L
MICROALBUMIN/CREAT UR-RTO: 380 MCG/MG CREAT
MONOCYTES # BLD: 9 % (ref 3–12)
NRBC AUTOMATED: 0 PER 100 WBC
PDW BLD-RTO: 12.3 % (ref 11.8–14.4)
PLATELET # BLD AUTO: 328 K/UL (ref 138–453)
PMV BLD AUTO: 10.1 FL (ref 8.1–13.5)
POTASSIUM SERPL-SCNC: 4.6 MMOL/L (ref 3.7–5.3)
PROT SERPL-MCNC: 7 G/DL (ref 6.4–8.3)
RBC # BLD: 5.04 M/UL (ref 4.21–5.77)
RBC # BLD: ABNORMAL 10*6/UL
SEG NEUTROPHILS: 70 % (ref 36–65)
SEGMENTED NEUTROPHILS ABSOLUTE COUNT: 4.9 K/UL (ref 1.5–8.1)
SODIUM SERPL-SCNC: 133 MMOL/L (ref 135–144)
TRIGL SERPL-MCNC: 200 MG/DL
WBC # BLD AUTO: 7 K/UL (ref 3.5–11.3)

## 2023-03-31 ENCOUNTER — OFFICE VISIT (OUTPATIENT)
Dept: FAMILY MEDICINE CLINIC | Age: 58
End: 2023-03-31
Payer: MEDICAID

## 2023-03-31 VITALS
HEIGHT: 69 IN | DIASTOLIC BLOOD PRESSURE: 78 MMHG | SYSTOLIC BLOOD PRESSURE: 128 MMHG | WEIGHT: 143 LBS | BODY MASS INDEX: 21.18 KG/M2 | HEART RATE: 85 BPM

## 2023-03-31 DIAGNOSIS — E11.69 TYPE 2 DIABETES MELLITUS WITH OTHER SPECIFIED COMPLICATION, WITH LONG-TERM CURRENT USE OF INSULIN (HCC): Primary | ICD-10-CM

## 2023-03-31 DIAGNOSIS — Z79.4 TYPE 2 DIABETES MELLITUS WITH OTHER SPECIFIED COMPLICATION, WITH LONG-TERM CURRENT USE OF INSULIN (HCC): Primary | ICD-10-CM

## 2023-03-31 PROCEDURE — G8427 DOCREV CUR MEDS BY ELIG CLIN: HCPCS

## 2023-03-31 PROCEDURE — 3046F HEMOGLOBIN A1C LEVEL >9.0%: CPT

## 2023-03-31 PROCEDURE — G8482 FLU IMMUNIZE ORDER/ADMIN: HCPCS

## 2023-03-31 PROCEDURE — 4004F PT TOBACCO SCREEN RCVD TLK: CPT

## 2023-03-31 PROCEDURE — 3078F DIAST BP <80 MM HG: CPT

## 2023-03-31 PROCEDURE — 3074F SYST BP LT 130 MM HG: CPT

## 2023-03-31 PROCEDURE — 3017F COLORECTAL CA SCREEN DOC REV: CPT

## 2023-03-31 PROCEDURE — 2022F DILAT RTA XM EVC RTNOPTHY: CPT

## 2023-03-31 PROCEDURE — G8420 CALC BMI NORM PARAMETERS: HCPCS

## 2023-03-31 PROCEDURE — 99213 OFFICE O/P EST LOW 20 MIN: CPT

## 2023-03-31 RX ORDER — SILDENAFIL 50 MG/1
50 TABLET, FILM COATED ORAL PRN
Qty: 9 TABLET | Refills: 1 | Status: SHIPPED | OUTPATIENT
Start: 2023-03-31

## 2023-03-31 RX ORDER — SEMAGLUTIDE 1.34 MG/ML
0.25 INJECTION, SOLUTION SUBCUTANEOUS WEEKLY
Qty: 1.5 ML | Refills: 1 | Status: SHIPPED | OUTPATIENT
Start: 2023-03-31

## 2023-03-31 ASSESSMENT — ENCOUNTER SYMPTOMS
SHORTNESS OF BREATH: 0
ABDOMINAL PAIN: 0
DIARRHEA: 0
CHEST TIGHTNESS: 0
NAUSEA: 0
VOMITING: 0

## 2023-05-11 ENCOUNTER — APPOINTMENT (OUTPATIENT)
Dept: CT IMAGING | Age: 58
End: 2023-05-11
Payer: MEDICAID

## 2023-05-11 ENCOUNTER — APPOINTMENT (OUTPATIENT)
Dept: GENERAL RADIOLOGY | Age: 58
End: 2023-05-11
Payer: MEDICAID

## 2023-05-11 ENCOUNTER — HOSPITAL ENCOUNTER (INPATIENT)
Age: 58
LOS: 2 days | Discharge: HOME | End: 2023-05-13
Attending: EMERGENCY MEDICINE | Admitting: HOSPITALIST
Payer: MEDICAID

## 2023-05-11 DIAGNOSIS — M48.061 SPINAL STENOSIS OF LUMBAR REGION WITHOUT NEUROGENIC CLAUDICATION: ICD-10-CM

## 2023-05-11 DIAGNOSIS — E16.2 HYPOGLYCEMIA: ICD-10-CM

## 2023-05-11 DIAGNOSIS — Z79.4 TYPE 2 DIABETES MELLITUS WITHOUT COMPLICATION, WITH LONG-TERM CURRENT USE OF INSULIN (HCC): ICD-10-CM

## 2023-05-11 DIAGNOSIS — E11.69 TYPE 2 DIABETES MELLITUS WITH OTHER SPECIFIED COMPLICATION, WITH LONG-TERM CURRENT USE OF INSULIN (HCC): ICD-10-CM

## 2023-05-11 DIAGNOSIS — Z79.4 TYPE 2 DIABETES MELLITUS WITH OTHER SPECIFIED COMPLICATION, WITH LONG-TERM CURRENT USE OF INSULIN (HCC): ICD-10-CM

## 2023-05-11 DIAGNOSIS — M43.06 LUMBAR SPONDYLOLYSIS: ICD-10-CM

## 2023-05-11 DIAGNOSIS — E11.9 TYPE 2 DIABETES MELLITUS WITHOUT COMPLICATION, WITH LONG-TERM CURRENT USE OF INSULIN (HCC): ICD-10-CM
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
## 2023-05-11 DIAGNOSIS — M51.26 LUMBAR DISC HERNIATION: Primary | ICD-10-CM

## 2023-05-11 LAB
ABSOLUTE EOS #: 0.05 K/UL (ref 0–0.44)
ABSOLUTE IMMATURE GRANULOCYTE: 0.03 K/UL (ref 0–0.3)
ABSOLUTE LYMPH #: 1.69 K/UL (ref 1.1–3.7)
ABSOLUTE MONO #: 0.75 K/UL (ref 0.1–1.2)
ANION GAP SERPL CALCULATED.3IONS-SCNC: 15 MMOL/L (ref 9–17)
BASOPHILS # BLD: 0 % (ref 0–2)
BASOPHILS ABSOLUTE: 0.03 K/UL (ref 0–0.2)
BUN SERPL-MCNC: 13 MG/DL (ref 6–20)
CALCIUM SERPL-MCNC: 9.9 MG/DL (ref 8.6–10.4)
CHLORIDE SERPL-SCNC: 98 MMOL/L (ref 98–107)
CHP ED QC CHECK: NORMAL
CO2 SERPL-SCNC: 25 MMOL/L (ref 20–31)
CREAT SERPL-MCNC: 0.88 MG/DL (ref 0.7–1.2)
EOSINOPHILS RELATIVE PERCENT: 1 % (ref 1–4)
GFR SERPL CREATININE-BSD FRML MDRD: >60 ML/MIN/1.73M2
GLUCOSE BLD-MCNC: 59 MG/DL (ref 75–110)
GLUCOSE BLD-MCNC: 60 MG/DL (ref 75–110)
GLUCOSE BLD-MCNC: 74 MG/DL (ref 75–110)
GLUCOSE BLD-MCNC: 92 MG/DL
GLUCOSE BLD-MCNC: 92 MG/DL (ref 75–110)
GLUCOSE SERPL-MCNC: 63 MG/DL (ref 70–99)
HCT VFR BLD AUTO: 45.8 % (ref 40.7–50.3)
HGB BLD-MCNC: 15.7 G/DL (ref 13–17)
IMMATURE GRANULOCYTES: 0 %
LYMPHOCYTES # BLD: 18 % (ref 24–43)
MCH RBC QN AUTO: 28.3 PG (ref 25.2–33.5)
MCHC RBC AUTO-ENTMCNC: 34.3 G/DL (ref 28.4–34.8)
MCV RBC AUTO: 82.7 FL (ref 82.6–102.9)
MONOCYTES # BLD: 8 % (ref 3–12)
NRBC AUTOMATED: 0 PER 100 WBC
PDW BLD-RTO: 13 % (ref 11.8–14.4)
PLATELET # BLD AUTO: 306 K/UL (ref 138–453)
PMV BLD AUTO: 9.4 FL (ref 8.1–13.5)
POTASSIUM SERPL-SCNC: 4.1 MMOL/L (ref 3.7–5.3)
RBC # BLD: 5.54 M/UL (ref 4.21–5.77)
SEG NEUTROPHILS: 73 % (ref 36–65)
SEGMENTED NEUTROPHILS ABSOLUTE COUNT: 7.06 K/UL (ref 1.5–8.1)
SODIUM SERPL-SCNC: 138 MMOL/L (ref 135–144)
TROPONIN I SERPL DL<=0.01 NG/ML-MCNC: 15 NG/L (ref 0–22)
TROPONIN I SERPL DL<=0.01 NG/ML-MCNC: 18 NG/L (ref 0–22)
WBC # BLD AUTO: 10.1 K/UL (ref 3.5–11.3)

## 2023-05-11 PROCEDURE — 2580000003 HC RX 258

## 2023-05-11 PROCEDURE — 93005 ELECTROCARDIOGRAM TRACING: CPT | Performed by: STUDENT IN AN ORGANIZED HEALTH CARE EDUCATION/TRAINING PROGRAM

## 2023-05-11 PROCEDURE — 72131 CT LUMBAR SPINE W/O DYE: CPT

## 2023-05-11 PROCEDURE — 6360000002 HC RX W HCPCS: Performed by: STUDENT IN AN ORGANIZED HEALTH CARE EDUCATION/TRAINING PROGRAM

## 2023-05-11 PROCEDURE — 71045 X-RAY EXAM CHEST 1 VIEW: CPT

## 2023-05-11 PROCEDURE — 70450 CT HEAD/BRAIN W/O DYE: CPT

## 2023-05-11 PROCEDURE — 72125 CT NECK SPINE W/O DYE: CPT

## 2023-05-11 PROCEDURE — 84484 ASSAY OF TROPONIN QUANT: CPT

## 2023-05-11 PROCEDURE — 80048 BASIC METABOLIC PNL TOTAL CA: CPT

## 2023-05-11 PROCEDURE — 85025 COMPLETE CBC W/AUTO DIFF WBC: CPT

## 2023-05-11 PROCEDURE — 82947 ASSAY GLUCOSE BLOOD QUANT: CPT

## 2023-05-11 PROCEDURE — 72128 CT CHEST SPINE W/O DYE: CPT

## 2023-05-11 PROCEDURE — 96372 THER/PROPH/DIAG INJ SC/IM: CPT

## 2023-05-11 PROCEDURE — 99285 EMERGENCY DEPT VISIT HI MDM: CPT

## 2023-05-11 PROCEDURE — 2060000000 HC ICU INTERMEDIATE R&B

## 2023-05-11 RX ORDER — POTASSIUM CHLORIDE 20 MEQ/1
40 TABLET, EXTENDED RELEASE ORAL PRN
Status: DISCONTINUED | OUTPATIENT
Start: 2023-05-11 | End: 2023-05-13 | Stop reason: HOSPADM

## 2023-05-11 RX ORDER — POTASSIUM CHLORIDE 7.45 MG/ML
10 INJECTION INTRAVENOUS PRN
Status: DISCONTINUED | OUTPATIENT
Start: 2023-05-11 | End: 2023-05-13 | Stop reason: HOSPADM

## 2023-05-11 RX ORDER — INSULIN LISPRO 100 [IU]/ML
0-4 INJECTION, SOLUTION INTRAVENOUS; SUBCUTANEOUS NIGHTLY
Status: DISCONTINUED | OUTPATIENT
Start: 2023-05-12 | End: 2023-05-13 | Stop reason: HOSPADM

## 2023-05-11 RX ORDER — GABAPENTIN 400 MG/1
400 CAPSULE ORAL 3 TIMES DAILY
Status: DISCONTINUED | OUTPATIENT
Start: 2023-05-12 | End: 2023-05-13 | Stop reason: HOSPADM

## 2023-05-11 RX ORDER — SODIUM CHLORIDE 0.9 % (FLUSH) 0.9 %
5-40 SYRINGE (ML) INJECTION PRN
Status: DISCONTINUED | OUTPATIENT
Start: 2023-05-11 | End: 2023-05-13 | Stop reason: HOSPADM

## 2023-05-11 RX ORDER — AMLODIPINE BESYLATE 10 MG/1
10 TABLET ORAL DAILY
Status: DISCONTINUED | OUTPATIENT
Start: 2023-05-12 | End: 2023-05-13 | Stop reason: HOSPADM

## 2023-05-11 RX ORDER — LISINOPRIL 20 MG/1
40 TABLET ORAL DAILY
Status: DISCONTINUED | OUTPATIENT
Start: 2023-05-12 | End: 2023-05-13 | Stop reason: HOSPADM

## 2023-05-11 RX ORDER — OXYCODONE HYDROCHLORIDE AND ACETAMINOPHEN 5; 325 MG/1; MG/1
1 TABLET ORAL EVERY 6 HOURS PRN
Status: DISCONTINUED | OUTPATIENT
Start: 2023-05-11 | End: 2023-05-13 | Stop reason: HOSPADM

## 2023-05-11 RX ORDER — DEXTROSE MONOHYDRATE 50 MG/ML
INJECTION, SOLUTION INTRAVENOUS CONTINUOUS
Status: DISCONTINUED | OUTPATIENT
Start: 2023-05-11 | End: 2023-05-13

## 2023-05-11 RX ORDER — ACETAMINOPHEN 325 MG/1
650 TABLET ORAL EVERY 6 HOURS PRN
Status: DISCONTINUED | OUTPATIENT
Start: 2023-05-11 | End: 2023-05-13 | Stop reason: HOSPADM

## 2023-05-11 RX ORDER — ATORVASTATIN CALCIUM 40 MG/1
40 TABLET, FILM COATED ORAL DAILY
Status: DISCONTINUED | OUTPATIENT
Start: 2023-05-12 | End: 2023-05-13 | Stop reason: HOSPADM

## 2023-05-11 RX ORDER — ACETAMINOPHEN 650 MG/1
650 SUPPOSITORY RECTAL EVERY 6 HOURS PRN
Status: DISCONTINUED | OUTPATIENT
Start: 2023-05-11 | End: 2023-05-13 | Stop reason: HOSPADM

## 2023-05-11 RX ORDER — ENOXAPARIN SODIUM 100 MG/ML
40 INJECTION SUBCUTANEOUS DAILY
Status: DISCONTINUED | OUTPATIENT
Start: 2023-05-12 | End: 2023-05-13 | Stop reason: HOSPADM

## 2023-05-11 RX ORDER — ASPIRIN 81 MG/1
81 TABLET ORAL DAILY
Status: DISCONTINUED | OUTPATIENT
Start: 2023-05-12 | End: 2023-05-13 | Stop reason: HOSPADM

## 2023-05-11 RX ORDER — DEXTROSE MONOHYDRATE 100 MG/ML
INJECTION, SOLUTION INTRAVENOUS CONTINUOUS PRN
Status: DISCONTINUED | OUTPATIENT
Start: 2023-05-11 | End: 2023-05-13 | Stop reason: HOSPADM

## 2023-05-11 RX ORDER — INSULIN LISPRO 100 [IU]/ML
0-8 INJECTION, SOLUTION INTRAVENOUS; SUBCUTANEOUS
Status: DISCONTINUED | OUTPATIENT
Start: 2023-05-12 | End: 2023-05-13 | Stop reason: HOSPADM

## 2023-05-11 RX ORDER — DEXTROSE MONOHYDRATE 50 MG/ML
INJECTION, SOLUTION INTRAVENOUS
Status: COMPLETED
Start: 2023-05-11 | End: 2023-05-11

## 2023-05-11 RX ORDER — SODIUM CHLORIDE 9 MG/ML
INJECTION, SOLUTION INTRAVENOUS PRN
Status: DISCONTINUED | OUTPATIENT
Start: 2023-05-11 | End: 2023-05-13 | Stop reason: HOSPADM

## 2023-05-11 RX ORDER — POLYETHYLENE GLYCOL 3350 17 G/17G
17 POWDER, FOR SOLUTION ORAL DAILY PRN
Status: DISCONTINUED | OUTPATIENT
Start: 2023-05-11 | End: 2023-05-13 | Stop reason: HOSPADM

## 2023-05-11 RX ORDER — ONDANSETRON 2 MG/ML
4 INJECTION INTRAMUSCULAR; INTRAVENOUS EVERY 6 HOURS PRN
Status: DISCONTINUED | OUTPATIENT
Start: 2023-05-11 | End: 2023-05-13 | Stop reason: HOSPADM

## 2023-05-11 RX ORDER — SODIUM CHLORIDE 0.9 % (FLUSH) 0.9 %
5-40 SYRINGE (ML) INJECTION EVERY 12 HOURS SCHEDULED
Status: DISCONTINUED | OUTPATIENT
Start: 2023-05-12 | End: 2023-05-13 | Stop reason: HOSPADM

## 2023-05-11 RX ORDER — MAGNESIUM SULFATE IN WATER 40 MG/ML
2000 INJECTION, SOLUTION INTRAVENOUS PRN
Status: DISCONTINUED | OUTPATIENT
Start: 2023-05-11 | End: 2023-05-13 | Stop reason: HOSPADM

## 2023-05-11 RX ORDER — KETOROLAC TROMETHAMINE 15 MG/ML
15 INJECTION, SOLUTION INTRAMUSCULAR; INTRAVENOUS ONCE
Status: COMPLETED | OUTPATIENT
Start: 2023-05-11 | End: 2023-05-11

## 2023-05-11 RX ORDER — ONDANSETRON 4 MG/1
4 TABLET, ORALLY DISINTEGRATING ORAL EVERY 8 HOURS PRN
Status: DISCONTINUED | OUTPATIENT
Start: 2023-05-11 | End: 2023-05-13 | Stop reason: HOSPADM

## 2023-05-11 RX ADMIN — DEXTROSE MONOHYDRATE: 50 INJECTION, SOLUTION INTRAVENOUS at 22:44

## 2023-05-11 RX ADMIN — KETOROLAC TROMETHAMINE 15 MG: 15 INJECTION, SOLUTION INTRAMUSCULAR; INTRAVENOUS at 19:39

## 2023-05-11 ASSESSMENT — ENCOUNTER SYMPTOMS
WHEEZING: 0
PHOTOPHOBIA: 0
SHORTNESS OF BREATH: 0
VOMITING: 0
STRIDOR: 0
EYE PAIN: 0
DIARRHEA: 0
SINUS PAIN: 0
SINUS PRESSURE: 0
NAUSEA: 0
RHINORRHEA: 0
TROUBLE SWALLOWING: 0
SORE THROAT: 0
ABDOMINAL PAIN: 0
BACK PAIN: 1
VOICE CHANGE: 0
COUGH: 0

## 2023-05-11 ASSESSMENT — PAIN - FUNCTIONAL ASSESSMENT: PAIN_FUNCTIONAL_ASSESSMENT: 0-10

## 2023-05-11 ASSESSMENT — PAIN SCALES - GENERAL
PAINLEVEL_OUTOF10: 10
PAINLEVEL_OUTOF10: 10

## 2023-05-11 NOTE — ED NOTES
Pt came to ed via triage w complaint of back pain and hyperglycemia. States his sugar has been over 300, back pain started over a month ago. VSS, NAD, AOx4. Patient resting in bed, respirations even and unlabored. Call light in reach.        Jeremiah Chowdary RN  05/11/23 1935

## 2023-05-11 NOTE — ED NOTES
Pt came to ed d/t back pain longer than a month and hyperglycemia. States sugars have been over 300 at home. 59 in assigned room, given 2 ogs. VSS, NAD, AOx4. Patient resting in bed, respirations even and unlabored. Call light in reach.        French Juan RN  05/11/23 9001
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
## 2023-05-12 LAB
ABSOLUTE EOS #: 0.2 K/UL (ref 0–0.44)
ABSOLUTE IMMATURE GRANULOCYTE: <0.03 K/UL (ref 0–0.3)
ABSOLUTE LYMPH #: 2.24 K/UL (ref 1.1–3.7)
ABSOLUTE MONO #: 0.89 K/UL (ref 0.1–1.2)
ALBUMIN SERPL-MCNC: 3.6 G/DL (ref 3.5–5.2)
ALBUMIN/GLOBULIN RATIO: 1.1 (ref 1–2.5)
ALP SERPL-CCNC: 77 U/L (ref 40–129)
ALT SERPL-CCNC: 27 U/L (ref 5–41)
ANION GAP SERPL CALCULATED.3IONS-SCNC: 10 MMOL/L (ref 9–17)
AST SERPL-CCNC: 31 U/L
BASOPHILS # BLD: 1 % (ref 0–2)
BASOPHILS ABSOLUTE: 0.04 K/UL (ref 0–0.2)
BILIRUB DIRECT SERPL-MCNC: 0.1 MG/DL
BILIRUB INDIRECT SERPL-MCNC: 0.3 MG/DL (ref 0–1)
BILIRUB SERPL-MCNC: 0.4 MG/DL (ref 0.3–1.2)
BUN SERPL-MCNC: 16 MG/DL (ref 6–20)
CALCIUM SERPL-MCNC: 9.4 MG/DL (ref 8.6–10.4)
CHLORIDE SERPL-SCNC: 99 MMOL/L (ref 98–107)
CO2 SERPL-SCNC: 27 MMOL/L (ref 20–31)
CREAT SERPL-MCNC: 1.08 MG/DL (ref 0.7–1.2)
EKG ATRIAL RATE: 72 BPM
EKG P AXIS: 83 DEGREES
EKG P-R INTERVAL: 126 MS
EKG Q-T INTERVAL: 442 MS
EKG QRS DURATION: 74 MS
EKG QTC CALCULATION (BAZETT): 483 MS
EKG R AXIS: 35 DEGREES
EKG T AXIS: 81 DEGREES
EKG VENTRICULAR RATE: 72 BPM
EOSINOPHILS RELATIVE PERCENT: 3 % (ref 1–4)
EST. AVERAGE GLUCOSE BLD GHB EST-MCNC: 140 MG/DL
GFR SERPL CREATININE-BSD FRML MDRD: >60 ML/MIN/1.73M2
GLUCOSE BLD-MCNC: 109 MG/DL (ref 75–110)
GLUCOSE BLD-MCNC: 119 MG/DL (ref 75–110)
GLUCOSE BLD-MCNC: 162 MG/DL (ref 75–110)
GLUCOSE BLD-MCNC: 195 MG/DL (ref 75–110)
GLUCOSE BLD-MCNC: 225 MG/DL (ref 75–110)
GLUCOSE BLD-MCNC: 74 MG/DL (ref 75–110)
GLUCOSE BLD-MCNC: 89 MG/DL (ref 75–110)
GLUCOSE BLD-MCNC: 92 MG/DL (ref 75–110)
GLUCOSE SERPL-MCNC: 69 MG/DL (ref 70–99)
HBA1C MFR BLD: 6.5 % (ref 4–6)
HCT VFR BLD AUTO: 45.8 % (ref 40.7–50.3)
HGB BLD-MCNC: 15.1 G/DL (ref 13–17)
IMMATURE GRANULOCYTES: 0 %
LYMPHOCYTES # BLD: 28 % (ref 24–43)
MCH RBC QN AUTO: 27.9 PG (ref 25.2–33.5)
MCHC RBC AUTO-ENTMCNC: 33 G/DL (ref 28.4–34.8)
MCV RBC AUTO: 84.7 FL (ref 82.6–102.9)
MONOCYTES # BLD: 11 % (ref 3–12)
NRBC AUTOMATED: 0 PER 100 WBC
PDW BLD-RTO: 12.7 % (ref 11.8–14.4)
PLATELET # BLD AUTO: 321 K/UL (ref 138–453)
PMV BLD AUTO: 8.9 FL (ref 8.1–13.5)
POTASSIUM SERPL-SCNC: 4 MMOL/L (ref 3.7–5.3)
PROT SERPL-MCNC: 6.8 G/DL (ref 6.4–8.3)
RBC # BLD: 5.41 M/UL (ref 4.21–5.77)
SEG NEUTROPHILS: 57 % (ref 36–65)
SEGMENTED NEUTROPHILS ABSOLUTE COUNT: 4.67 K/UL (ref 1.5–8.1)
SODIUM SERPL-SCNC: 136 MMOL/L (ref 135–144)
WBC # BLD AUTO: 8.1 K/UL (ref 3.5–11.3)

## 2023-05-12 PROCEDURE — 80076 HEPATIC FUNCTION PANEL: CPT

## 2023-05-12 PROCEDURE — G0108 DIAB MANAGE TRN  PER INDIV: HCPCS

## 2023-05-12 PROCEDURE — 85025 COMPLETE CBC W/AUTO DIFF WBC: CPT

## 2023-05-12 PROCEDURE — 82947 ASSAY GLUCOSE BLOOD QUANT: CPT

## 2023-05-12 PROCEDURE — 36415 COLL VENOUS BLD VENIPUNCTURE: CPT

## 2023-05-12 PROCEDURE — 2580000003 HC RX 258

## 2023-05-12 PROCEDURE — 83036 HEMOGLOBIN GLYCOSYLATED A1C: CPT

## 2023-05-12 PROCEDURE — 1200000000 HC SEMI PRIVATE

## 2023-05-12 PROCEDURE — 99222 1ST HOSP IP/OBS MODERATE 55: CPT | Performed by: HOSPITALIST

## 2023-05-12 PROCEDURE — 6370000000 HC RX 637 (ALT 250 FOR IP)

## 2023-05-12 PROCEDURE — 6360000002 HC RX W HCPCS

## 2023-05-12 PROCEDURE — 80048 BASIC METABOLIC PNL TOTAL CA: CPT

## 2023-05-12 RX ADMIN — DESMOPRESSIN ACETATE 40 MG: 0.2 TABLET ORAL at 08:43

## 2023-05-12 RX ADMIN — DEXTROSE MONOHYDRATE: 50 INJECTION, SOLUTION INTRAVENOUS at 17:35

## 2023-05-12 RX ADMIN — GABAPENTIN 400 MG: 400 CAPSULE ORAL at 20:17

## 2023-05-12 RX ADMIN — LISINOPRIL 40 MG: 20 TABLET ORAL at 08:42

## 2023-05-12 RX ADMIN — Medication 81 MG: at 08:43

## 2023-05-12 RX ADMIN — OXYCODONE HYDROCHLORIDE AND ACETAMINOPHEN 1 TABLET: 5; 325 TABLET ORAL at 09:57

## 2023-05-12 RX ADMIN — GABAPENTIN 400 MG: 400 CAPSULE ORAL at 14:12

## 2023-05-12 RX ADMIN — ENOXAPARIN SODIUM 40 MG: 40 INJECTION SUBCUTANEOUS at 08:42

## 2023-05-12 RX ADMIN — OXYCODONE HYDROCHLORIDE AND ACETAMINOPHEN 1 TABLET: 5; 325 TABLET ORAL at 21:26

## 2023-05-12 RX ADMIN — SODIUM CHLORIDE, PRESERVATIVE FREE 10 ML: 5 INJECTION INTRAVENOUS at 08:43

## 2023-05-12 RX ADMIN — SODIUM CHLORIDE, PRESERVATIVE FREE 10 ML: 5 INJECTION INTRAVENOUS at 20:17

## 2023-05-12 RX ADMIN — DEXTROSE MONOHYDRATE: 50 INJECTION, SOLUTION INTRAVENOUS at 22:58

## 2023-05-12 RX ADMIN — OXYCODONE HYDROCHLORIDE AND ACETAMINOPHEN 1 TABLET: 5; 325 TABLET ORAL at 16:11

## 2023-05-12 RX ADMIN — GABAPENTIN 400 MG: 400 CAPSULE ORAL at 00:21

## 2023-05-12 RX ADMIN — AMLODIPINE BESYLATE 10 MG: 10 TABLET ORAL at 08:43

## 2023-05-12 RX ADMIN — OXYCODONE HYDROCHLORIDE AND ACETAMINOPHEN 1 TABLET: 5; 325 TABLET ORAL at 03:23

## 2023-05-12 RX ADMIN — GABAPENTIN 400 MG: 400 CAPSULE ORAL at 08:43

## 2023-05-12 ASSESSMENT — ENCOUNTER SYMPTOMS
BACK PAIN: 1
SORE THROAT: 0
SHORTNESS OF BREATH: 0
WHEEZING: 0
DIARRHEA: 0
CHEST TIGHTNESS: 0
ABDOMINAL PAIN: 0
COLOR CHANGE: 0
VOMITING: 0
NAUSEA: 0
VOMITING: 1
ABDOMINAL DISTENTION: 0
COUGH: 0

## 2023-05-12 ASSESSMENT — PAIN DESCRIPTION - DESCRIPTORS
DESCRIPTORS: ACHING
DESCRIPTORS: ACHING;STABBING
DESCRIPTORS: ACHING
DESCRIPTORS: ACHING
DESCRIPTORS: ACHING;STABBING
DESCRIPTORS: ACHING;STABBING

## 2023-05-12 ASSESSMENT — PAIN DESCRIPTION - LOCATION
LOCATION: BACK

## 2023-05-12 ASSESSMENT — PAIN SCALES - GENERAL
PAINLEVEL_OUTOF10: 6
PAINLEVEL_OUTOF10: 0
PAINLEVEL_OUTOF10: 9
PAINLEVEL_OUTOF10: 10
PAINLEVEL_OUTOF10: 9

## 2023-05-12 ASSESSMENT — PAIN DESCRIPTION - PAIN TYPE: TYPE: NEUROPATHIC PAIN;ACUTE PAIN

## 2023-05-12 ASSESSMENT — PAIN DESCRIPTION - ORIENTATION
ORIENTATION: LOWER

## 2023-05-12 ASSESSMENT — PAIN DESCRIPTION - FREQUENCY: FREQUENCY: INTERMITTENT

## 2023-05-12 ASSESSMENT — PAIN - FUNCTIONAL ASSESSMENT
PAIN_FUNCTIONAL_ASSESSMENT: ACTIVITIES ARE NOT PREVENTED
PAIN_FUNCTIONAL_ASSESSMENT: PREVENTS OR INTERFERES SOME ACTIVE ACTIVITIES AND ADLS

## 2023-05-12 ASSESSMENT — PAIN DESCRIPTION - ONSET: ONSET: ON-GOING

## 2023-05-12 NOTE — CARE COORDINATION
Potential DME:    Patient expects to discharge to: House  Plan for transportation at discharge: Self    Financial    Payor: Ubaldo 58 / Plan: Eric 51 / Product Type: *No Product type* /     Does insurance require precert for SNF: Yes, but plan is home    Potential assistance Purchasing Medications: No  Meds-to-Beds request: Yes      [address], [address] [facility] [address] [phone]  [address] [id number]  Phone: [phone] Fax: [phone]      Notes:    Factors facilitating achievement of predicted outcomes: Family support, Motivated, Cooperative, and Pleasant    Barriers to discharge: Pain    Additional Case Management Notes: Transitional planning-talked with patient. Plan is to go home with his girlfriend Jorje Gordon and son. Drove self to hospital. Verified address, emergency contacts and insurance with patient. The Plan for Transition of Care is related to the following treatment goals of Hypoglycemia [E16.2]  Lumbar disc herniation [L24.71]    IF APPLICABLE: The Patient and/or patient representative Macie Godinez and his family were provided with a choice of provider and agrees with the discharge plan. Freedom of choice list with basic dialogue that supports the patient's individualized plan of care/goals and shares the quality data associated with the providers was provided to: (P) Patient   Patient Representative Name:       The Patient and/or Patient Representative Agree with the Discharge Plan?  (P) Yes    Shahriar Stoner RN  Case Management Department  Ph: [phone] Fax: [phone]

## 2023-05-12 NOTE — H&P
Glucose 92 75 - 110 mg/dL   Troponin    Collection Time: 05/11/23  9:05 PM   Result Value Ref Range    Troponin, High Sensitivity 18 0 - 22 ng/L   Basic Metabolic Panel    Collection Time: 05/11/23  9:05 PM   Result Value Ref Range    Glucose 63 (L) 70 - 99 mg/dL    BUN 13 6 - 20 mg/dL    Creatinine 0.88 0.70 - 1.20 mg/dL    Est, Glom Filt Rate >60 >60 mL/min/1.73m2    Calcium 9.9 8.6 - 10.4 mg/dL    Sodium 138 135 - 144 mmol/L    Potassium 4.1 3.7 - 5.3 mmol/L    Chloride 98 98 - 107 mmol/L    CO2 25 20 - 31 mmol/L    Anion Gap 15 9 - 17 mmol/L   CBC with Auto Differential    Collection Time: 05/11/23  9:05 PM   Result Value Ref Range    WBC 10.1 3.5 - 11.3 k/uL    RBC 5.54 4.21 - 5.77 m/uL    Hemoglobin 15.7 13.0 - 17.0 g/dL    Hematocrit 45.8 40.7 - 50.3 %    MCV 82.7 82.6 - 102.9 fL    MCH 28.3 25.2 - 33.5 pg    MCHC 34.3 28.4 - 34.8 g/dL    RDW 13.0 11.8 - 14.4 %    Platelets 188 578 - 339 k/uL    MPV 9.4 8.1 - 13.5 fL    NRBC Automated 0.0 0.0 per 100 WBC    Seg Neutrophils 73 (H) 36 - 65 %    Lymphocytes 18 (L) 24 - 43 %    Monocytes 8 3 - 12 %    Eosinophils % 1 1 - 4 %    Basophils 0 0 - 2 %    Immature Granulocytes 0 0 %    Segs Absolute 7.06 1.50 - 8.10 k/uL    Absolute Lymph # 1.69 1.10 - 3.70 k/uL    Absolute Mono # 0.75 0.10 - 1.20 k/uL    Absolute Eos # 0.05 0.00 - 0.44 k/uL    Basophils Absolute 0.03 0.00 - 0.20 k/uL    Absolute Immature Granulocyte 0.03 0.00 - 0.30 k/uL   POC Glucose Fingerstick    Collection Time: 05/11/23  9:59 PM   Result Value Ref Range    POC Glucose 60 (L) 75 - 110 mg/dL         Imaging/Diagonstics:  CT HEAD WO CONTRAST    Result Date: 5/11/2023  EXAMINATION: CT OF THE CERVICAL SPINE WITHOUT CONTRAST; CT OF THE HEAD WITHOUT CONTRAST; CT OF THE THORACIC SPINE WITHOUT CONTRAST 5/11/2023 8:30 pm TECHNIQUE: CT of the cervical spine was performed without the administration of intravenous contrast. Multiplanar reformatted images are provided for review.  Automated exposure control,

## 2023-05-12 NOTE — ED NOTES
The following labs were labeled with appropriate pt sticker and tubed to lab:     [] Blue     [] Lavender   [] on ice  [x] Green/yellow  [] Green/black [] on ice  [] Reche Osborne  [] on ice  [] Yellow  [] Red  [] Type/ Screen  [] ABG  [] VBG    [] COVID-19 swab    [] Rapid  [] PCR  [] Flu swab  [] Peds Viral Panel     [] Urine Sample  [] Fecal Sample  [] Pelvic Cultures  [] Blood Cultures  [] X 2  [] STREP Cultures      Mitzy Licea LPN  85/75/57 [id number]

## 2023-05-12 NOTE — ED NOTES
ED to inpatient nurses report      Chief Complaint:  Chief Complaint   Patient presents with    Back Pain     Sx 1 yr ago; pain increasing     Hyperglycemia     Reports having high reading of 300s at home     Present to ED from: homw    MOA:     LOC: alert and orientated to name, place, date  Mobility: Independent  Oxygen Baseline: room air     Current needs required: n/a   Pending ED orders: n/a  Present condition: aox    Pertinent event(s): n/a      Mental Status:  Level of Consciousness: Alert (0)    Psych Assessment:      Vital signs   Vitals:    05/11/23 1818   BP: (!) 167/99   Pulse: 88   Resp: 16   Temp: 98.1 °F (36.7 °C)   TempSrc: Oral   SpO2: 98%        Vitals:  Patient Vitals for the past 24 hrs:   BP Temp Temp src Pulse Resp SpO2   05/11/23 1818 (!) 167/99 98.1 °F (36.7 °C) Oral 88 16 98 %      Visit Vitals  BP (!) 167/99   Pulse 88   Temp 98.1 °F (36.7 °C) (Oral)   Resp 16   SpO2 98%        LDAs:   Peripheral IV 05/11/23 Right;Ventral Forearm (Active)       Ambulatory Status:  No data recorded    Diagnosis:  DISPOSITION Decision To Admit 05/11/2023 10:31:08 PM   Final diagnoses:   Lumbar disc herniation   Hypoglycemia        Code Status: [unfilled]     Consults:  []  Hospitalist  Completed  [] yes [] no  []  Medicine  Completed  [] yes [] No  []  Cardiology  Completed  [] yes [] No  []  GI   Completed  [] yes [] No  []  Neurology  Completed  [] yes [] No  []  Nephrology Completed  [] yes [] No  []  Vascular  Completed  [] yes [] No   []  Surgery  Completed  [] yes [] No   []  Urology  Completed  [] yes [] No   []  Plastics  Completed  [] yes [] No   []  ENT  Completed  [] yes [] No   []  Other:  Completed  [] yes [] No    Consults:  IP CONSULT TO FAMILY MEDICINE     Treatment Team:   Treatment Team: Attending Provider: Mark Barnett DO; Registered Nurse: Kavitha Valdez RN; LPN: Ruben Reid LPN; Resident:  Basil Sim DO    Treatment:  ED Course as of 05/11/23 2234 Thu May 11, 2023

## 2023-05-12 NOTE — CONSULTS
Department of Neurosurgery                                                             Consult Note      Reason for Consult:  Back pain   Requesting Physician:     Neurosurgeon: Flaquita Bautista  [] Dr. Rima Barrios   [] Dr. Anastasiia Wise  [] Armando Hess    History Obtained From:  patient, electronic medical record    CHIEF COMPLAINT:         Back pain     HISTORY OF PRESENT ILLNESS:       The patient is a 60 y.o. male history of hypertension, DM, chronic back pain with history of laminectomy who presents with exacerbation of his back pain after having fall. Patient came to ER because his home glucose was in 300s, also was complaining of lightheadedness, and exacerbation of his chronic back pain, patient had a fall couple of days ago down the stairs. Patient states pain is 10 out of 10, radiating down the leg. CT lumbar spine: L3-L4 disc protrusion.       PAST MEDICAL HISTORY :       Past Medical History:        Diagnosis Date    Chronic back pain     Diabetes mellitus ([address]     Diabetic neuropathy ([facility] [address]     DM (diabetes mellitus) ([facility] [address]     Hepatitis C     Hypertension     MDRO (multiple drug resistant organisms) resistance     MRSA IN RIGHT FOOT    Murmur     Osteoarthritis        Past Surgical History:        Procedure Laterality Date    BUNIONECTOMY Right     BUNIONECTOMY Left 2/19/2020    LEFT FOOT MODIFIED LAPIDUS WITH AKIN OSTEOTOMY performed by Erik Melo DPM at [address]  9/8/15    laprascopic    COLONOSCOPY N/A 11/19/2018    COLONOSCOPY DIAGNOSTIC performed by Teresa Esparza IV, DO at [address] Right     FOOT SURGERY      hardware removed    FOOT SURGERY Right 6/26/14    delayed closure    FOOT SURGERY Right 10/15/14    removal of hardware    FOOT SURGERY Left 02/19/2020    Left Foot Modified Lapidus With Akin Osteotomy    OSTEOTOMY Right 2/6/2015    Akin Osteotomy right       Social History:   Social History     Socioeconomic History

## 2023-05-12 NOTE — ED NOTES
Pts bg 60. Pt given box lunch and orange juice.  Dr Billy Salamanca notified     Luly Mccain, DEBRA  05/11/23 4049

## 2023-05-12 NOTE — CONSULTS
TRAUMA CONSULT    PATIENT NAME: Hao Henderson  YOB: 1965  MEDICAL RECORD NO. [id number]   DATE: 5/11/2023  PRIMARY CARE PHYSICIAN: Humble Ortega MD  PATIENT EVALUATED AT THE REQUEST OF : Harper Schreiber    ACTIVATION   []Trauma Alert     [] Trauma Priority     [x]Trauma Consult.      Patient Active Problem List   Diagnosis    Liver disease    Type II or unspecified type diabetes mellitus without mention of complication, not stated as uncontrolled    Chronic bilateral low back pain with bilateral sciatica    Thoracic or lumbosacral neuritis or radiculitis, unspecified    Degeneration of lumbar or lumbosacral intervertebral disc    DM (diabetes mellitus) (HCC)    Hep C w/o coma, chronic (HCC)    Back pain    Back pain, chronic    HTN (hypertension)    Osteoarthritis of lumbar spine    Diabetes mellitus ([address]    Needs flu shot    Smoking    Chronic back pain    Spinal stenosis of lumbar region    Foot drop, right    Neuritis or radiculitis due to displacement of lumbar intervertebral disc    Lumbar disc herniation    Polyneuropathic pain    Displacement of lumbar intervertebral disc without myelopathy    Uncontrolled type 2 diabetes mellitus with diabetic mononeuropathy, with long-term current use of insulin    Smoker    Cellulitis and abscess    Exposed orthopaedic hardware ([address]    Hallux abducto valgus    Diabetes (HCC)    Positional vertigo    Abdominal pain, right upper quadrant    Cellulitis    Esophagitis determined by endoscopy    Cholelithiasis    Diabetes type 2, uncontrolled    Diabetic mononeuropathy associated with diabetes mellitus due to underlying condition ([address]    Lumbar spondylolysis    Erectile dysfunction associated with type 2 diabetes mellitus ([address]    Encounter for smoking cessation counseling    Bloating    Family history of breast cancer    Dyslipidemia    Acute bacterial sinusitis    Blurry vision, bilateral    Dizziness    Syncope and collapse    Diabetic neuropathy with neurologic

## 2023-05-12 NOTE — ED PROVIDER NOTES
[facility]  ED  Emergency Department  Emergency Medicine Resident Turn-Over     Care of Rachael Faulkner was assumed from Dr. Marleen Najera and is being seen for Back Pain (Sx 1 yr ago; pain increasing ) and Hyperglycemia (Reports having high reading of 300s at home)  . The patient's initial evaluation and plan have been discussed with the prior provider who initially evaluated the patient. EMERGENCY DEPARTMENT COURSE / MEDICAL DECISION MAKING:       MEDICATIONS GIVEN:  Orders Placed This Encounter   Medications    ketorolac (TORADOL) injection 15 mg       LABS / RADIOLOGY:     Labs Reviewed   BASIC METABOLIC PANEL - Abnormal; Notable for the following components:       Result Value    Glucose 63 (*)     All other components within normal limits   CBC WITH AUTO DIFFERENTIAL - Abnormal; Notable for the following components:    Seg Neutrophils 73 (*)     Lymphocytes 18 (*)     All other components within normal limits   POC GLUCOSE FINGERSTICK - Abnormal; Notable for the following components:    POC Glucose 59 (*)     All other components within normal limits   POC GLUCOSE FINGERSTICK - Abnormal; Notable for the following components:    POC Glucose 74 (*)     All other components within normal limits   POC GLUCOSE FINGERSTICK - Abnormal; Notable for the following components:    POC Glucose 60 (*)     All other components within normal limits   POCT GLUCOSE - Normal   TROPONIN   TROPONIN   POC GLUCOSE FINGERSTICK       CT HEAD WO CONTRAST    Result Date: 5/11/2023  EXAMINATION: CT OF THE CERVICAL SPINE WITHOUT CONTRAST; CT OF THE HEAD WITHOUT CONTRAST; CT OF THE THORACIC SPINE WITHOUT CONTRAST 5/11/2023 8:30 pm TECHNIQUE: CT of the cervical spine was performed without the administration of intravenous contrast. Multiplanar reformatted images are provided for review.  Automated exposure control, iterative reconstruction, and/or weight based adjustment of the mA/kV was utilized to reduce the radiation
room.  His glucose was 59 in triage, and patient was given some orange juice. Patient denies any previous cardiac issues, denied any chest pain or shortness of breath but does describe ongoing dizziness episodes. Patient on exam is well-appearing alert awake oriented to person place and time no signs of facial trauma. He has significant tenderness to his lumbar mid Tylenol, as well as paraspinal.  Significant pain with hip flexion bilaterally of his lower extremities. Patient has neuropathy to his bilateral lower extremities and does not have sensation bilaterally. Abdomen is soft nondistended nontender to palpation all quadrants no rebound or guarding noted    Patient with dizziness, falling down stairs, history of chronic back pain worsening today, significant pain to his back we will plan on pain control, CT imaging to rule out any traumatic injuries. And given his dizziness we will plan on cardiac labs. His glucose was 59 initially so provided with orange juice, is awake alert at this time will monitor glucose, check EKG and troponins    EKG shows normal sinus rhythm normal axis ventricular rate of 72 no ST elevations or depressions noted.   QTc is prolonged at 483, QRS of 74    Adonis Melo D.O, M.P.H  Attending Emergency Medicine Physician         Adonis Melo DO  05/11/23 2029
emergency department with hypoglycemia and back pain after a fall down 12 stairs where he lost consciousness. He has had trouble with controlling his blood glucose levels with highs and lows at home. Prior to the emergency department he described hyperglycemia for which he gave himself 36 units of basal insulin. He is hypoglycemic in the emergency department waiting room of 59 and is now drinking orange juice. A&O x3 and well-appearing. Regarding his back, he does have a significant headache, C/T/L-spine midline tenderness with radiation around his right flank to his right groin. He is hypertensive secondary to pain. All other vital signs normal.  Plan to treat pain with IM Toradol and obtain CT head, C/T/L-spine without contrast.  We will also perform cardiac work-up with BMP, CBC with diff, EKG, CXR, and troponin x2 due to the recurrent falls that have been described. Amount and/or Complexity of Data Reviewed  Labs: ordered. Decision-making details documented in ED Course. Radiology: ordered. Decision-making details documented in ED Course. ECG/medicine tests: ordered. Risk  Prescription drug management. EKG  EKG Interpretation    Interpreted by emergency department physician    Rhythm: normal sinus   Rate: normal  Axis: normal  Ectopy: none  Conduction: normal  ST Segments: normal  T Waves: normal  Q Waves: none    Clinical Impression: NSR, normal EKG    Heather Garcia DO      All EKG's are interpreted by the Emergency Department Physician who either signs or Co-signs this chart in the absence of a cardiologist.    EMERGENCY DEPARTMENT COURSE:    ED Course as of 05/11/23 2229   Thu May 11, 2023   2041 POC Glucose: 92 [GC]   2052 XR CHEST PORTABLE  IMPRESSION:  No acute cardiopulmonary abnormality. [GC]   2202 Troponin, High Sensitivity: 18 [GC]   2202 BMP with glucose level of 63. Giving another orange juice and a box lunch. We will plan to place a peripheral IV.   Potential admission due

## 2023-05-12 NOTE — ED NOTES
Upon further triage pt states he dosed himself insulin at home before arriving and recently had a fall which he attributes to his sugars.  Resident notified     Sadia Allen RN  05/11/23 9375

## 2023-05-12 NOTE — ED NOTES
Resident perfect served regarding bg of 194.  Awaiting orders/response     Delia England RN  05/12/23 0000

## 2023-05-13 ENCOUNTER — APPOINTMENT (OUTPATIENT)
Dept: MRI IMAGING | Age: 58
End: 2023-05-13
Payer: MEDICAID

## 2023-05-13 VITALS
SYSTOLIC BLOOD PRESSURE: 143 MMHG | BODY MASS INDEX: 20.7 KG/M2 | HEART RATE: 59 BPM | TEMPERATURE: 97.1 F | OXYGEN SATURATION: 96 % | DIASTOLIC BLOOD PRESSURE: 108 MMHG | HEIGHT: 69 IN | RESPIRATION RATE: 18 BRPM | WEIGHT: 139.77 LBS

## 2023-05-13 LAB
ABSOLUTE EOS #: 0.26 K/UL (ref 0–0.44)
ABSOLUTE IMMATURE GRANULOCYTE: 0.03 K/UL (ref 0–0.3)
ABSOLUTE LYMPH #: 1.86 K/UL (ref 1.1–3.7)
ABSOLUTE MONO #: 0.93 K/UL (ref 0.1–1.2)
ANION GAP SERPL CALCULATED.3IONS-SCNC: 8 MMOL/L (ref 9–17)
BASOPHILS # BLD: 0 % (ref 0–2)
BASOPHILS ABSOLUTE: 0.03 K/UL (ref 0–0.2)
BUN SERPL-MCNC: 17 MG/DL (ref 6–20)
CALCIUM SERPL-MCNC: 9.2 MG/DL (ref 8.6–10.4)
CHLORIDE SERPL-SCNC: 99 MMOL/L (ref 98–107)
CO2 SERPL-SCNC: 27 MMOL/L (ref 20–31)
CREAT SERPL-MCNC: 1.06 MG/DL (ref 0.7–1.2)
EOSINOPHILS RELATIVE PERCENT: 3 % (ref 1–4)
GFR SERPL CREATININE-BSD FRML MDRD: >60 ML/MIN/1.73M2
GLUCOSE BLD-MCNC: 157 MG/DL (ref 75–110)
GLUCOSE BLD-MCNC: 190 MG/DL (ref 75–110)
GLUCOSE SERPL-MCNC: 156 MG/DL (ref 70–99)
HCT VFR BLD AUTO: 41.5 % (ref 40.7–50.3)
HGB BLD-MCNC: 14 G/DL (ref 13–17)
IMMATURE GRANULOCYTES: 0 %
LYMPHOCYTES # BLD: 24 % (ref 24–43)
MCH RBC QN AUTO: 28.2 PG (ref 25.2–33.5)
MCHC RBC AUTO-ENTMCNC: 33.7 G/DL (ref 28.4–34.8)
MCV RBC AUTO: 83.5 FL (ref 82.6–102.9)
MONOCYTES # BLD: 12 % (ref 3–12)
NRBC AUTOMATED: 0 PER 100 WBC
PDW BLD-RTO: 12.8 % (ref 11.8–14.4)
PLATELET # BLD AUTO: 268 K/UL (ref 138–453)
PMV BLD AUTO: 9.7 FL (ref 8.1–13.5)
POTASSIUM SERPL-SCNC: 3.8 MMOL/L (ref 3.7–5.3)
RBC # BLD: 4.97 M/UL (ref 4.21–5.77)
SEG NEUTROPHILS: 61 % (ref 36–65)
SEGMENTED NEUTROPHILS ABSOLUTE COUNT: 4.5 K/UL (ref 1.5–8.1)
SODIUM SERPL-SCNC: 134 MMOL/L (ref 135–144)
WBC # BLD AUTO: 7.6 K/UL (ref 3.5–11.3)

## 2023-05-13 PROCEDURE — 80048 BASIC METABOLIC PNL TOTAL CA: CPT

## 2023-05-13 PROCEDURE — 6370000000 HC RX 637 (ALT 250 FOR IP)

## 2023-05-13 PROCEDURE — 82947 ASSAY GLUCOSE BLOOD QUANT: CPT

## 2023-05-13 PROCEDURE — 99238 HOSP IP/OBS DSCHRG MGMT 30/<: CPT | Performed by: HOSPITALIST

## 2023-05-13 PROCEDURE — 36415 COLL VENOUS BLD VENIPUNCTURE: CPT

## 2023-05-13 PROCEDURE — 85025 COMPLETE CBC W/AUTO DIFF WBC: CPT

## 2023-05-13 PROCEDURE — 2580000003 HC RX 258

## 2023-05-13 PROCEDURE — 6360000002 HC RX W HCPCS

## 2023-05-13 RX ORDER — GABAPENTIN 400 MG/1
400 CAPSULE ORAL 3 TIMES DAILY
Qty: 90 CAPSULE | Refills: 3 | Status: SHIPPED | OUTPATIENT
Start: 2023-05-13 | End: 2023-06-12

## 2023-05-13 RX ORDER — INSULIN GLARGINE 100 [IU]/ML
10 INJECTION, SOLUTION SUBCUTANEOUS NIGHTLY
Qty: 3 ADJUSTABLE DOSE PRE-FILLED PEN SYRINGE | Refills: 5 | Status: SHIPPED | OUTPATIENT
Start: 2023-05-13

## 2023-05-13 RX ORDER — INSULIN GLARGINE 100 [IU]/ML
10 INJECTION, SOLUTION SUBCUTANEOUS NIGHTLY
Qty: 3 ADJUSTABLE DOSE PRE-FILLED PEN SYRINGE | Refills: 5 | Status: SHIPPED | OUTPATIENT
Start: 2023-05-13 | End: 2023-05-13 | Stop reason: SDUPTHER

## 2023-05-13 RX ORDER — LANCETS 33 GAUGE
EACH MISCELLANEOUS
Qty: 100 EACH | Refills: 3 | Status: SHIPPED | OUTPATIENT
Start: 2023-05-13 | End: 2023-07-02

## 2023-05-13 RX ORDER — OXYCODONE HYDROCHLORIDE AND ACETAMINOPHEN 5; 325 MG/1; MG/1
1 TABLET ORAL EVERY 6 HOURS PRN
Qty: 20 TABLET | Refills: 0 | Status: SHIPPED | OUTPATIENT
Start: 2023-05-13 | End: 2023-05-18

## 2023-05-13 RX ORDER — BLOOD SUGAR DIAGNOSTIC
STRIP MISCELLANEOUS
Qty: 100 STRIP | Refills: 5 | Status: SHIPPED | OUTPATIENT
Start: 2023-05-13

## 2023-05-13 RX ADMIN — DEXTROSE MONOHYDRATE: 50 INJECTION, SOLUTION INTRAVENOUS at 03:35

## 2023-05-13 RX ADMIN — GABAPENTIN 400 MG: 400 CAPSULE ORAL at 08:14

## 2023-05-13 RX ADMIN — SODIUM CHLORIDE, PRESERVATIVE FREE 10 ML: 5 INJECTION INTRAVENOUS at 08:16

## 2023-05-13 RX ADMIN — Medication 81 MG: at 08:14

## 2023-05-13 RX ADMIN — ENOXAPARIN SODIUM 40 MG: 40 INJECTION SUBCUTANEOUS at 08:14

## 2023-05-13 RX ADMIN — AMLODIPINE BESYLATE 10 MG: 10 TABLET ORAL at 08:14

## 2023-05-13 RX ADMIN — LISINOPRIL 40 MG: 20 TABLET ORAL at 08:14

## 2023-05-13 RX ADMIN — OXYCODONE HYDROCHLORIDE AND ACETAMINOPHEN 1 TABLET: 5; 325 TABLET ORAL at 02:52

## 2023-05-13 RX ADMIN — OXYCODONE HYDROCHLORIDE AND ACETAMINOPHEN 1 TABLET: 5; 325 TABLET ORAL at 09:27

## 2023-05-13 RX ADMIN — DESMOPRESSIN ACETATE 40 MG: 0.2 TABLET ORAL at 08:16

## 2023-05-13 ASSESSMENT — PAIN SCALES - GENERAL
PAINLEVEL_OUTOF10: 5
PAINLEVEL_OUTOF10: 10
PAINLEVEL_OUTOF10: 5
PAINLEVEL_OUTOF10: 10

## 2023-05-13 ASSESSMENT — PAIN DESCRIPTION - LOCATION
LOCATION: BACK

## 2023-05-13 NOTE — DISCHARGE SUMMARY
EC tablet  Commonly known as: Aspirin Low Dose  TAKE ONE TABLET BY MOUTH ONCE DAILY     atorvastatin 40 MG tablet  Commonly known as: Lipitor  Take 1 tablet by mouth daily     BD Pen Needle Rachael U/F 32G X 4 MM Misc  Generic drug: Insulin Pen Needle  AS DIRECTED WITH LANTUS     diclofenac sodium 1 % Gel  Commonly known as: VOLTAREN  APPLY 4 GM TOPICALLY 2 TIMES DAILY     Easy Touch Alcohol Prep Medium 70 % Pads  Use as directed     FreeStyle Jerad 2 [employer]  Use as directed     . [employer] 2 Sensor Misc  Use as directed     * INSULIN SYRINGE .5CC/30GX1/2\" 30G X 1/2\" 0.5 ML Misc  Use 3 times daily. Dx: 250.00   Prescribe this syringe for insulin dosages under  50 units per injection. * B-D INS SYR ULTRAFINE 1CC/30G 30G X 1/2\" 1 ML Misc  Generic drug: Insulin Syringe-Needle U-100  AS DIRECTED WITH LANTUS     lidocaine 5 % ointment  Commonly known as: XYLOCAINE  Apply topically as needed. lisinopril 40 MG tablet  Commonly known as: PRINIVIL;ZESTRIL  Take 1 tablet by mouth daily     naloxone 4 MG/0.1ML Liqd nasal spray  1 spray by Nasal route as needed for Opioid Reversal     OneTouch Delica Lancets 32V Misc  USE AS DIRECTED TWICE A DAY     OneTouch Ultra strip  Generic drug: blood glucose test strips  TESTING TWICE A DAY     Ozempic (0.25 or 0.5 MG/DOSE) 2 MG/1.5ML Sopn  Generic drug: Semaglutide(0.25 or 0.5MG/DOS)  Inject 0.25 mg into the skin once a week     sildenafil 50 MG tablet  Commonly known as: Viagra  Take 1 tablet by mouth as needed for Erectile Dysfunction           * This list has 2 medication(s) that are the same as other medications prescribed for you. Read the directions carefully, and ask your doctor or other care provider to review them with you.                    Where to Get Your Medications        These medications were sent to [address], [address] [phone] - [phone]  [address], [address]  [address]      Phone: [phone]   [facility]

## 2023-05-13 NOTE — PROGRESS NOTES
[address] [facility] [address]     Emergency/Trauma Note    PATIENT NAME: Luciana Samaniego    Shift date: 05/12/23  Shift day: Thursday   Shift # 2    Room # 36/36   Name: Luciana Samaniego            Age: 60 y.o. Gender: male          Yazidism: Gnosticist   Place of Pentecostalism:     Trauma/Incident type: Adult Trauma Consult  Admit Date & Time: 5/11/2023  7:05 PM  TRAUMA NAME: N/A    ADVANCE DIRECTIVES IN CHART? No    NAME OF DECISION MAKER: N/A    RELATIONSHIP OF DECISION MAKER TO PATIENT: N/A    PATIENT/EVENT DESCRIPTION:  Luciana Samaniego is a 60 y.o. male who arrived at [de-identified].  received perfect serve for trauma consult. Pt to be admitted to 36/36. SPIRITUAL ASSESSMENT-INTERVENTION-OUTCOME:  Writer introduced self as . Patient did not appear to mind  presence and engaged in conversation. Patient discussed past events which led to his hospital visit. Patient appeared anxious and coping when discussing his back issues.  provided a supportive presence through active listening and words of affirmation. PATIENT BELONGINGS:   writing did not handle patient belongings    ANY BELONGINGS OF SIGNIFICANT VALUE NOTED:  N/A    REGISTRATION STAFF NOTIFIED? Yes      WHAT IS YOUR SPIRITUAL CARE PLAN FOR THIS PATIENT?:  Chaplains will remain available to offer spiritual and emotional support as needed.     Electronically signed by Maricel Jack on 5/12/2023 at 12:16 AM.  Olivier Arrington  [phone]
CLINICAL PHARMACY NOTE: MEDS TO BEDS    Total # of Prescriptions Filled: 6   The following medications were delivered to the patient:  One touch lancets  Basaglar  One touch test strips  Metformin  Ocyxodone-acetaminophen  gabapentin    Additional Documentation:
Comprehensive Nutrition Assessment    Type and Reason for Visit:  Positive Nutrition Screen    Nutrition Recommendations/Plan:   Continue current diet  Monitor need for ONS  Monitor weight, labs and intake     Malnutrition Assessment:  Malnutrition Status:  No malnutrition (05/12/23 1259)    Context:  Acute Illness     Findings of the 6 clinical characteristics of malnutrition:  Energy Intake:  No significant decrease in energy intake  Weight Loss:  No significant weight loss     Body Fat Loss:  No significant body fat loss     Muscle Mass Loss:  No significant muscle mass loss    Fluid Accumulation:  No significant fluid accumulation     Strength:  Not Performed    Nutrition Assessment:    Patient seen for reported weight loss and poor appetite. Admitted for hypoglycemia, back pain. Patient asleep during attempt to visit, did not awaken to knocking or his name. Breakfast tray at bedside, 100% consumed. Per EHR, patient with a 3% weight loss x1 month (4 lb). No significant weight changes. glu 69. Meds reviewed. Nutrition Related Findings:    Labs/meds reviewed Wound Type: None       Current Nutrition Intake & Therapies:    Average Meal Intake: %  Average Supplements Intake: None Ordered  ADULT DIET; Regular; 4 carb choices (60 gm/meal)    Anthropometric Measures:  Height: 5' 9\" (175.3 cm)  Ideal Body Weight (IBW): 160 lbs (73 kg)       Current Body Weight: 139 lb 12.4 oz (63.4 kg), 87.4 % IBW. Weight Source: Not Specified  Current BMI (kg/m2): 20.6                          BMI Categories: Normal Weight (BMI 18.5-24. 9)    Estimated Daily Nutrient Needs:  Energy Requirements Based On: Kcal/kg  Weight Used for Energy Requirements: Current  Energy (kcal/day): 0829-3598 kcal  Weight Used for Protein Requirements: Current  Protein (g/day): 60-70 gm/day  Method Used for Fluid Requirements: 1 ml/kcal  Fluid (ml/day): 1600 mL or per MD    Nutrition Diagnosis:   Altered nutrition-related lab values related to
Neurosurgery KALPESH/Resident    Daily Progress Note   Chief Complaint   Patient presents with    Back Pain     Sx 1 yr ago; pain increasing     Hyperglycemia     Reports having high reading of 300s at home     5/13/2023  11:39 AM    Chart reviewed. No acute events overnight. No new complaints. Has LSO brace. Does not want to stay to have workup completed. Would like to have MRI lumbar spine done as outpatient. Vitals:    05/13/23 0024 05/13/23 0322 05/13/23 0337 05/13/23 0809   BP: (!) 134/90  128/83 (!) 143/108   Pulse: 61  56 59   Resp:  18 14 18   Temp: 97.6 °F (36.4 °C)  97.9 °F (36.6 °C) 97.1 °F (36.2 °C)   TempSrc: Oral  Oral Axillary   SpO2: 99%   96%   Weight:       Height:             PE: AOx3   Motor   Diffuse weakness bilat LE 4+/5    Sensation intact      Lab Results   Component Value Date    WBC 7.6 05/13/2023    HGB 14.0 05/13/2023    HCT 41.5 05/13/2023     05/13/2023    CHOL 170 03/10/2023    TRIG 200 (H) 03/10/2023    HDL 64 03/10/2023    ALT 27 05/12/2023    AST 31 05/12/2023     (L) 05/13/2023    K 3.8 05/13/2023    CL 99 05/13/2023    CREATININE 1.06 05/13/2023    BUN 17 05/13/2023    CO2 27 05/13/2023    TSH 1.05 01/04/2020    INR 1.1 02/19/2020    LABA1C 6.5 (H) 05/12/2023    LABMICR 380 (H) 03/10/2023    CRP 46.1 (H) 05/12/2015    SEDRATE 29 (H) 05/14/2015         A/P  60 y.o. male who presents with acute on chronic back pain. - patient does not want work up as inpatient, would like to have MRI lumbar spine done as outpatient  - LSO brace while out of bed for comfort  - okay to discharge with MRI lumbar as outpatient (ordered)  - follow up in neurosurgery clinic in 4-6 weeks after MRI lumbar or sooner if symptoms worsen      Please contact neurosurgery with any changes in patients neurologic status.        Tashia Dominguez CNP  5/13/23  11:39 AM
PROGRESS NOTE          PATIENT NAME: Priscilla RECORD NO. [id number]  DATE: 5/12/2023    HD: # 1      Patient Active Problem List   Diagnosis    Liver disease    Type II or unspecified type diabetes mellitus without mention of complication, not stated as uncontrolled    Chronic bilateral low back pain with bilateral sciatica    Thoracic or lumbosacral neuritis or radiculitis, unspecified    Degeneration of lumbar or lumbosacral intervertebral disc    DM (diabetes mellitus) (HCC)    Hep C w/o coma, chronic (HCC)    Back pain    Back pain, chronic    HTN (hypertension)    Osteoarthritis of lumbar spine    Diabetes mellitus ([address]    Needs flu shot    Smoking    Chronic back pain    Spinal stenosis of lumbar region    Foot drop, right    Neuritis or radiculitis due to displacement of lumbar intervertebral disc    Lumbar disc herniation    Polyneuropathic pain    Displacement of lumbar intervertebral disc without myelopathy    Uncontrolled type 2 diabetes mellitus with diabetic mononeuropathy, with long-term current use of insulin    Smoker    Cellulitis and abscess    Exposed orthopaedic hardware ([address]    Hallux abducto valgus    Diabetes (HCC)    Syncope    Positional vertigo    Abdominal pain, right upper quadrant    Cellulitis    Esophagitis determined by endoscopy    Cholelithiasis    Diabetes type 2, uncontrolled    Diabetic mononeuropathy associated with diabetes mellitus due to underlying condition ([address]    Lumbar spondylolysis    Erectile dysfunction associated with type 2 diabetes mellitus ([address]    Encounter for smoking cessation counseling    Bloating    Family history of breast cancer    Dyslipidemia    Acute bacterial sinusitis    Blurry vision, bilateral    Dizziness    Syncope and collapse    Diabetic neuropathy with neurologic complication (HCC)    History of lumbar surgery    Essential hypertension    Mixed hyperlipidemia    Hypoglycemia    Fall       DIAGNOSIS AND PLAN    Syncopal fall
Referral received for DM, hypoglycemia episodes. Adm bs 59, A1c 6.5%. Pt states bs had been running high? He was in [place] visiting his mom for a week and ran out of his diabetes medicine, sensors for his [employer] and test strips for his one touch machine. He said he felt dizzy going upstairs then fell down the stairs. He was out of it for few seconds, his gf helped him up then he drove to the ER. Asked pt if he drank Etoh but he said occasionally and wasn't at that time. Lab Results   Component Value Date    LABA1C 6.5 (H) 05/12/2023     Lab Results   Component Value Date     05/12/2023       Discussed with Vivien Albert, their current diabetes self care routines prior to this admission. medication compliance: pt states he takes metformin 1000mg twice daily and Basaglar 46 units at bedtime but hasn't taken either for over a week as he ran out, diabetic diet compliance: pt has fluctuating appetite, is underweight and food insecurity issues (being followed by RD at home), home glucose monitoring: checks w meter or uses freestyle lilia when he has supplies. Briefly discussed role of diet, physical activity, daily use of medications and self monitoring for good diabetes control, provided diabetes education folder. Also referred patient to view diabetes education programs on education TV - Channel 75 and 76 at 9am, 3pm 6pm and 9pm      Discussed need to be aware of sign and symptoms of hypoglycemia and hyperglycemia  and treatment for hypoglycemia and hyperglycemia. (Emphasized pt not drive when bs <70. Needs to treat low bs (rule of 15) and fu with snack/meal). Encouraged keeping quick acting glucose with him at all times. (Pt again said he's used to running high, not low). When to call PCP for advice / sick day plan.     Education Folder provided with following support information:   _x__  Handout - What is diabetes? [employer] 2019  _x__  Handout - Eating Healthy for Life at every Meal / Gurney Grates
Trauma Tertiary Survey    Admit Date: 5/11/2023  Hospital day 1      Subjective:     Patient seen and evaluated. No new complaints this morning. Objective:   PHYSICAL EXAM:   Physical Exam  Vitals reviewed. Constitutional:       Appearance: Normal appearance. HENT:      Head: Normocephalic and atraumatic. Right Ear: External ear normal.      Left Ear: External ear normal.      Nose: Nose normal.      Mouth/Throat:      Pharynx: Oropharynx is clear. Eyes:      Conjunctiva/sclera: Conjunctivae normal.   Cardiovascular:      Rate and Rhythm: Normal rate and regular rhythm. Pulmonary:      Effort: Pulmonary effort is normal.   Abdominal:      Palpations: Abdomen is soft. Musculoskeletal:         General: Normal range of motion. Cervical back: Normal range of motion. Skin:     General: Skin is warm. Capillary Refill: Capillary refill takes less than 2 seconds. Neurological:      Mental Status: He is alert. Mental status is at baseline.    Psychiatric:         Mood and Affect: Mood normal.         Spine:     Spine Tenderness ROM   Cervical 0 /10 Normal   Thoracic 0 /10 Normal   Lumbar 3 /10 Abnormal, pain with range of motion that is baseline     Musculoskeletal    Joint Tenderness Swelling ROM   Right shoulder absent absent normal   Left shoulder absent absent normal   Right elbow absent absent normal   Left elbow absent absent normal   Right wrist absent absent normal   Left wrist absent absent normal   Right hand grasp absent absent normal   Left hand grasp absent absent normal   Right hip absent absent normal   Left hip absent absent normal   Right knee absent absent normal   Left knee absent absent normal   Right ankle absent absent normal   Left ankle absent absent normal   Right foot absent absent normal   Left foot absent absent normal       CONSULTS:    Neurosurgery    PROCEDURES:   None    [x] Reviewed radiology reports    [] Incidental findings:    [] Patient/family notified
SOCIAL HISTORY:      reports that he has been smoking cigarettes. He has a 3.75 pack-year smoking history. He has quit using smokeless tobacco.  His smokeless tobacco use included chew. He reports current alcohol use. He reports that he does not use drugs. FAMILY HISTORY:     family history includes High Blood Pressure in his father and mother. HOME MEDICATIONS:      Prior to Admission medications    Medication Sig Start Date End Date Taking?  Authorizing Provider   Semaglutide,0.25 or 0.5MG/DOS, (OZEMPIC, 0.25 OR 0.5 MG/DOSE,) 2 MG/1.5ML SOPN Inject 0.25 mg into the skin once a week 3/31/23   Gavino Villalta MD   sildenafil (VIAGRA) 50 MG tablet Take 1 tablet by mouth as needed for Erectile Dysfunction 3/31/23   Gavino Villalta MD   Continuous Blood Gluc Sensor (FREESTYLE SAM 2 SENSOR) MISC Use as directed 3/8/23   Jeremiah Mendosa MD   Continuous Blood Gluc  (FREESTYLE SAM 2 READER) KEVAN Use as directed 3/8/23   Jeremiah Mendosa MD   OneTouch Delica Lancets 44D MISC USE AS DIRECTED TWICE A DAY 3/8/23 4/27/23  Hector Gus[address] MD   Alcohol Swabs (EASY TOUCH ALCOHOL PREP MEDIUM) 70 % PADS Use as directed 3/8/23   Jeremiah Mendosa MD   amLODIPine (NORVASC) 10 MG tablet TAKE 1 TABLET BY MOUTH ONCE DAILY 3/8/23   Hector Gus[address] MD   atorvastatin (LIPITOR) 40 MG tablet Take 1 tablet by mouth daily 3/8/23   Jeremiah Mendosa MD   aspirin (ASPIRIN LOW DOSE) 81 MG EC tablet TAKE ONE TABLET BY MOUTH ONCE DAILY 3/8/23   Hector Gusman MD   Insulin Pen Needle (BD PEN NEEDLE ROMY U/F) 32G X 4 MM MISC AS DIRECTED WITH LANTUS 3/8/23   Hector [address] MD   blood glucose test strips (ONETOUCH ULTRA) strip TESTING TWICE A DAY 3/8/23   Hector [address] MD   metFORMIN (GLUCOPHAGE) 1000 MG tablet Take 1 tablet by mouth 2 times daily (with meals) 3/8/23   Hector [address] MD   lisinopril (PRINIVIL;ZESTRIL) 40 MG tablet Take 1 tablet by mouth daily 3/8/23   Jeremiah Mendosa MD   gabapentin

## 2023-05-15 ENCOUNTER — TELEPHONE (OUTPATIENT)
Dept: FAMILY MEDICINE CLINIC | Age: 58
End: 2023-05-15

## 2023-05-15 NOTE — TELEPHONE ENCOUNTER
Care Transitions Initial Follow Up Call    Outreach made within 2 business days of discharge: Yes    Patient: Girma Leavitt Patient : 1965   MRN: [id number]  Reason for Admission: There are no discharge diagnoses documented for the most recent discharge. Discharge Date: 23       Spoke with: JACQUIE for patient to call office to schedule appt.      Discharge department/facility: [facility]

## 2023-05-16 ENCOUNTER — TELEPHONE (OUTPATIENT)
Dept: FAMILY MEDICINE CLINIC | Age: 58
End: 2023-05-16

## 2023-05-16 NOTE — TELEPHONE ENCOUNTER
Care Transitions Initial Follow Up Call    Outreach made within 2 business days of discharge: Yes    Patient: Nino Door Patient : 1965   MRN: [id number]  Reason for Admission: There are no discharge diagnoses documented for the most recent discharge.   Discharge Date: 23       Spoke with: Left message for patient to call and schedule hospital follow up      Follow Up  Future Appointments   Date Time Provider Maryana Chairez   2023 10:00 AM Giles Barragan, [facility]   2023 11:00 AM RGIFFIN Herman - CNP [id number]       Noemi Thornton

## 2023-05-26 ENCOUNTER — TELEPHONE (OUTPATIENT)
Dept: FAMILY MEDICINE CLINIC | Age: 58
End: 2023-05-26

## 2023-06-01 PROBLEM — J01.90 ACUTE BACTERIAL SINUSITIS: Status: RESOLVED | Noted: 2019-10-24 | Resolved: 2023-06-01

## 2023-06-01 PROBLEM — B96.89 ACUTE BACTERIAL SINUSITIS: Status: RESOLVED | Noted: 2019-10-24 | Resolved: 2023-06-01
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
## 2023-06-13 DIAGNOSIS — K21.9 GASTROESOPHAGEAL REFLUX DISEASE WITHOUT ESOPHAGITIS: ICD-10-CM

## 2023-06-13 RX ORDER — FAMOTIDINE 40 MG/1
40 TABLET, FILM COATED ORAL EVERY EVENING
Qty: 30 TABLET | Refills: 11 | OUTPATIENT
Start: 2023-06-13

## 2023-11-12 ENCOUNTER — APPOINTMENT (OUTPATIENT)
Dept: GENERAL RADIOLOGY | Age: 58
End: 2023-11-12
Payer: MEDICAID

## 2023-11-12 ENCOUNTER — HOSPITAL ENCOUNTER (EMERGENCY)
Age: 58
Discharge: HOME | End: 2023-11-12
Attending: EMERGENCY MEDICINE
Payer: MEDICAID

## 2023-11-12 VITALS
DIASTOLIC BLOOD PRESSURE: 100 MMHG | BODY MASS INDEX: 22.96 KG/M2 | WEIGHT: 155 LBS | RESPIRATION RATE: 16 BRPM | TEMPERATURE: 97.6 F | OXYGEN SATURATION: 96 % | HEART RATE: 84 BPM | HEIGHT: 69 IN | SYSTOLIC BLOOD PRESSURE: 190 MMHG

## 2023-11-12 DIAGNOSIS — U07.1 COVID-19: Primary | ICD-10-CM

## 2023-11-12 LAB
FLUAV RNA RESP QL NAA+PROBE: NOT DETECTED
FLUBV RNA RESP QL NAA+PROBE: NOT DETECTED
SARS-COV-2 RNA RESP QL NAA+PROBE: DETECTED
SOURCE: ABNORMAL
SPECIMEN DESCRIPTION: ABNORMAL
SPECIMEN SOURCE: NORMAL
STREP A, MOLECULAR: NEGATIVE

## 2023-11-12 PROCEDURE — 87636 SARSCOV2 & INF A&B AMP PRB: CPT

## 2023-11-12 PROCEDURE — 71045 X-RAY EXAM CHEST 1 VIEW: CPT

## 2023-11-12 PROCEDURE — 87651 STREP A DNA AMP PROBE: CPT

## 2023-11-12 PROCEDURE — 99284 EMERGENCY DEPT VISIT MOD MDM: CPT

## 2023-11-12 RX ORDER — BENZONATATE 100 MG/1
100 CAPSULE ORAL 3 TIMES DAILY PRN
Qty: 21 CAPSULE | Refills: 0 | Status: SHIPPED | OUTPATIENT
Start: 2023-11-12 | End: 2023-11-19

## 2023-11-12 RX ORDER — PREDNISONE 50 MG/1
50 TABLET ORAL DAILY
Qty: 5 TABLET | Refills: 0 | Status: SHIPPED | OUTPATIENT
Start: 2023-11-12 | End: 2023-11-17

## 2023-11-12 ASSESSMENT — ENCOUNTER SYMPTOMS
EYE DISCHARGE: 0
COUGH: 1
SORE THROAT: 1
NAUSEA: 0
RHINORRHEA: 0
VOMITING: 0
EYE REDNESS: 0
COLOR CHANGE: 0
DIARRHEA: 0
SHORTNESS OF BREATH: 0

## 2023-11-12 NOTE — ED PROVIDER NOTES
Thoracic or lumbosacral neuritis or radiculitis, unspecified [id number]    Degeneration of lumbar or lumbosacral intervertebral disc M51.37    DM (diabetes mellitus) ([facility]) E11.9    Hep C w/o coma, chronic ([facility]) B18.2    Back pain M54.9    Back pain, chronic M54.9, G89.29    HTN (hypertension) I10    Osteoarthritis of lumbar spine M47.816    Diabetes mellitus ([address]) E11.9    Needs flu shot Z23    Smoking F17.200    Chronic back pain M54.9, G89.29    Spinal stenosis of lumbar region M48.061    Foot drop, right M21.371    Neuritis or radiculitis due to displacement of lumbar intervertebral disc M51.16    Lumbar disc herniation M51.26    Polyneuropathic pain M79.2    Displacement of lumbar intervertebral disc without myelopathy M51.26    Uncontrolled type 2 diabetes mellitus with diabetic mononeuropathy, with long-term current use of insulin [id number]    Smoker F17.200    Cellulitis and abscess L03.90, L02.91    Exposed orthopaedic hardware ([facility]) T84.498A    Hallux abducto valgus M20.10    Diabetes ([facility]) E11.9    Syncope R55    Positional vertigo [id number]    Abdominal pain, right upper quadrant R10.11    Cellulitis L03.90    Esophagitis determined by endoscopy K20.90    Cholelithiasis K80.20    Diabetes type 2, uncontrolled [id number]    Diabetic mononeuropathy associated with diabetes mellitus due to underlying condition ([address]) E08.41    Lumbar spondylolysis M43.06    Erectile dysfunction associated with type 2 diabetes mellitus ([facility]) E11.69, N52.1    Encounter for smoking cessation counseling Z71.6    Bloating R14.0    Family history of breast cancer Z80.3    Dyslipidemia E78.5    Blurry vision, bilateral H53.8    Dizziness R42    Syncope and collapse R55    Diabetic neuropathy with neurologic complication ([facility]) V41.74, E11.49    History of lumbar surgery Z98.890    Essential hypertension I10    Mixed hyperlipidemia E78.2    Hypoglycemia E16.2     SURGICAL HISTORY       Past Surgical History:   Procedure Laterality Date

## 2024-04-04 ENCOUNTER — OFFICE VISIT (OUTPATIENT)
Dept: FAMILY MEDICINE CLINIC | Age: 59
End: 2024-04-04
Payer: MEDICAID

## 2024-04-04 VITALS
DIASTOLIC BLOOD PRESSURE: 97 MMHG | SYSTOLIC BLOOD PRESSURE: 156 MMHG | WEIGHT: 135 LBS | BODY MASS INDEX: 19.99 KG/M2 | HEIGHT: 69 IN | HEART RATE: 83 BPM | OXYGEN SATURATION: 96 %

## 2024-04-04 DIAGNOSIS — Z23 PNEUMOCOCCAL VACCINE ADMINISTERED: ICD-10-CM

## 2024-04-04 DIAGNOSIS — Z79.4 TYPE 2 DIABETES MELLITUS WITH OTHER SPECIFIED COMPLICATION, WITH LONG-TERM CURRENT USE OF INSULIN (HCC): Primary | ICD-10-CM

## 2024-04-04 DIAGNOSIS — M54.41 CHRONIC BILATERAL LOW BACK PAIN WITH BILATERAL SCIATICA: ICD-10-CM

## 2024-04-04 DIAGNOSIS — E11.69 TYPE 2 DIABETES MELLITUS WITH OTHER SPECIFIED COMPLICATION, WITH LONG-TERM CURRENT USE OF INSULIN (HCC): Primary | ICD-10-CM

## 2024-04-04 DIAGNOSIS — N52.9 ERECTILE DYSFUNCTION, UNSPECIFIED ERECTILE DYSFUNCTION TYPE: ICD-10-CM

## 2024-04-04 DIAGNOSIS — I10 ESSENTIAL HYPERTENSION: ICD-10-CM

## 2024-04-04 DIAGNOSIS — E78.2 MIXED HYPERLIPIDEMIA: ICD-10-CM

## 2024-04-04 DIAGNOSIS — G89.29 CHRONIC BILATERAL LOW BACK PAIN WITH BILATERAL SCIATICA: ICD-10-CM

## 2024-04-04 DIAGNOSIS — M54.42 CHRONIC BILATERAL LOW BACK PAIN WITH BILATERAL SCIATICA: ICD-10-CM

## 2024-04-04 LAB — HBA1C MFR BLD: 10.2 %

## 2024-04-04 PROCEDURE — 3017F COLORECTAL CA SCREEN DOC REV: CPT

## 2024-04-04 PROCEDURE — 2022F DILAT RTA XM EVC RTNOPTHY: CPT

## 2024-04-04 PROCEDURE — G8427 DOCREV CUR MEDS BY ELIG CLIN: HCPCS

## 2024-04-04 PROCEDURE — 3077F SYST BP >= 140 MM HG: CPT

## 2024-04-04 PROCEDURE — 99213 OFFICE O/P EST LOW 20 MIN: CPT

## 2024-04-04 PROCEDURE — 83036 HEMOGLOBIN GLYCOSYLATED A1C: CPT

## 2024-04-04 PROCEDURE — 3080F DIAST BP >= 90 MM HG: CPT

## 2024-04-04 PROCEDURE — G8420 CALC BMI NORM PARAMETERS: HCPCS

## 2024-04-04 PROCEDURE — 4004F PT TOBACCO SCREEN RCVD TLK: CPT

## 2024-04-04 PROCEDURE — 90677 PCV20 VACCINE IM: CPT | Performed by: FAMILY MEDICINE

## 2024-04-04 PROCEDURE — 3046F HEMOGLOBIN A1C LEVEL >9.0%: CPT

## 2024-04-04 RX ORDER — SYRINGE-NEEDLE,INSULIN,0.5 ML 27GX1/2"
SYRINGE, EMPTY DISPOSABLE MISCELLANEOUS
Qty: 10 EACH | Refills: 3 | Status: SHIPPED | OUTPATIENT
Start: 2024-04-04

## 2024-04-04 RX ORDER — ATORVASTATIN CALCIUM 40 MG/1
40 TABLET, FILM COATED ORAL DAILY
Qty: 30 TABLET | Refills: 3 | Status: SHIPPED | OUTPATIENT
Start: 2024-04-04

## 2024-04-04 RX ORDER — BLOOD SUGAR DIAGNOSTIC
STRIP MISCELLANEOUS
Qty: 100 STRIP | Refills: 5 | Status: SHIPPED | OUTPATIENT
Start: 2024-04-04

## 2024-04-04 RX ORDER — INSULIN GLARGINE 100 [IU]/ML
10 INJECTION, SOLUTION SUBCUTANEOUS NIGHTLY
Qty: 3 ADJUSTABLE DOSE PRE-FILLED PEN SYRINGE | Refills: 5 | Status: SHIPPED | OUTPATIENT
Start: 2024-04-04

## 2024-04-04 RX ORDER — ASPIRIN 81 MG/1
81 TABLET ORAL DAILY
Qty: 30 TABLET | Refills: 5 | Status: SHIPPED | OUTPATIENT
Start: 2024-04-04

## 2024-04-04 RX ORDER — LISINOPRIL 40 MG/1
40 TABLET ORAL DAILY
Qty: 30 TABLET | Refills: 5 | Status: SHIPPED | OUTPATIENT
Start: 2024-04-04

## 2024-04-04 RX ORDER — LIDOCAINE 50 MG/G
OINTMENT TOPICAL
Qty: 1 EACH | Refills: 3 | Status: SHIPPED | OUTPATIENT
Start: 2024-04-04

## 2024-04-04 RX ORDER — SYRINGE-NEEDLE,INSULIN,0.5 ML 28GX1/2"
SYRINGE, EMPTY DISPOSABLE MISCELLANEOUS
Qty: 100 EACH | Refills: 5 | Status: SHIPPED | OUTPATIENT
Start: 2024-04-04

## 2024-04-04 RX ORDER — SEMAGLUTIDE 1.34 MG/ML
0.25 INJECTION, SOLUTION SUBCUTANEOUS WEEKLY
Qty: 1.5 ML | Refills: 1 | Status: SHIPPED | OUTPATIENT
Start: 2024-04-04

## 2024-04-04 RX ORDER — PEN NEEDLE, DIABETIC 32GX 5/32"
NEEDLE, DISPOSABLE MISCELLANEOUS
Qty: 100 EACH | Refills: 5 | Status: SHIPPED | OUTPATIENT
Start: 2024-04-04

## 2024-04-04 RX ORDER — ISOPROPYL ALCOHOL 70 ML/100ML
SWAB TOPICAL
Qty: 1 EACH | Refills: 5 | Status: SHIPPED | OUTPATIENT
Start: 2024-04-04

## 2024-04-04 RX ORDER — AMLODIPINE BESYLATE 10 MG/1
TABLET ORAL
Qty: 30 TABLET | Refills: 0 | Status: SHIPPED | OUTPATIENT
Start: 2024-04-04

## 2024-04-04 RX ORDER — SILDENAFIL 50 MG/1
50 TABLET, FILM COATED ORAL PRN
Qty: 9 TABLET | Refills: 1 | Status: SHIPPED | OUTPATIENT
Start: 2024-04-04

## 2024-04-04 SDOH — ECONOMIC STABILITY: FOOD INSECURITY: WITHIN THE PAST 12 MONTHS, YOU WORRIED THAT YOUR FOOD WOULD RUN OUT BEFORE YOU GOT MONEY TO BUY MORE.: NEVER TRUE

## 2024-04-04 SDOH — ECONOMIC STABILITY: FOOD INSECURITY: WITHIN THE PAST 12 MONTHS, THE FOOD YOU BOUGHT JUST DIDN'T LAST AND YOU DIDN'T HAVE MONEY TO GET MORE.: NEVER TRUE

## 2024-04-04 SDOH — ECONOMIC STABILITY: INCOME INSECURITY: HOW HARD IS IT FOR YOU TO PAY FOR THE VERY BASICS LIKE FOOD, HOUSING, MEDICAL CARE, AND HEATING?: PATIENT DECLINED

## 2024-04-04 ASSESSMENT — ENCOUNTER SYMPTOMS
CHEST TIGHTNESS: 0
NAUSEA: 0
VOMITING: 0
BACK PAIN: 1

## 2024-04-04 ASSESSMENT — PATIENT HEALTH QUESTIONNAIRE - PHQ9
SUM OF ALL RESPONSES TO PHQ QUESTIONS 1-9: 0
1. LITTLE INTEREST OR PLEASURE IN DOING THINGS: NOT AT ALL
SUM OF ALL RESPONSES TO PHQ9 QUESTIONS 1 & 2: 0
2. FEELING DOWN, DEPRESSED OR HOPELESS: NOT AT ALL

## 2024-04-04 NOTE — PROGRESS NOTES
Attending Physician Statement  I have discussed the care of WillieJohnsonincluding pertinent history and exam findings,  with the resident. I have reviewed the key elements of all parts of the encounter with the resident.  I agree with the assessment, plan and orders as documented by the resident.  (GE Modifier)    LBP s/p surgery 2 years ago. Cystic swelling on L1- See Neurosurgery/  Bilateral hip pain- with LE weakness-Bilat hip Xrays ortho referral

## 2024-04-04 NOTE — PROGRESS NOTES
[facility] - Outpatient Note      Subjective:    Jose Guadalupe Neumann is a 59 y.o. male with  has a past medical history of Chronic back pain, Diabetes mellitus (HCC), Diabetic neuropathy (HCC), DM (diabetes mellitus) (HCC), Hepatitis C, Hypertension, MDRO (multiple drug resistant organisms) resistance, Murmur, and Osteoarthritis.    Presented to the office today for:  Chief Complaint   Patient presents with    Diabetes     A1C Check    Back Pain     About a month or 2 now - 8/10 pain scale       HPI    Patient is a 59-year-old male presenting to clinic for complaints of back pain, patient has history of spinal stenosis and reports that he had back surgery about 2 to 3 years ago, states that he was pain-free for about 1 year and then started having back pain again.  Pain has been getting progressively worse over the past 3 weeks, bilateral sciatica, pain localized in thoracic and lumbar region, says that it hurts to walk long distances, pain at night with sleep and with bending.  No numbness or tingling in the groin, no loss of bladder or bowel control, no fevers or chills. Does have pain in hips as well.     Review of Systems   Constitutional:  Negative for chills, fatigue and fever.   Respiratory:  Negative for chest tightness and shortness of breath.    Cardiovascular:  Negative for chest pain, palpitations and leg swelling.   Gastrointestinal:  Negative for nausea and vomiting.   Genitourinary:  Negative for difficulty urinating.   Musculoskeletal:  Positive for arthralgias and back pain.         The patient has a   Family History   Problem Relation Age of Onset    High Blood Pressure Mother     High Blood Pressure Father        Objective:    BP (!) 156/97   Pulse 83   Ht 1.753 m (5' 9\")   Wt 61.2 kg (135 lb)   SpO2 96%   BMI 19.94 kg/m²    BP Readings from Last 3 Encounters:   04/04/24 (!) 156/97   11/12/23 (!) 190/100   05/13/23 (!) 143/108       Physical

## 2024-04-04 NOTE — PROGRESS NOTES
Visit Information    Have you changed or started any medications since your last visit including any over-the-counter medicines, vitamins, or herbal medicines? no   Have you stopped taking any of your medications? Is so, why? -  no  Are you having any side effects from any of your medications? - no    Have you seen any other physician or provider since your last visit?  no   Have you had any other diagnostic tests since your last visit?  yes - Labs - xray   Have you been seen in the emergency room and/or had an admission in a hospital since we last saw you?  yes - [facility]   Have you had your routine dental cleaning in the past 6 months?  no     Do you have an active MyChart account? If no, what is the barrier?  Yes, pending    Patient Care Team:  Nidia García MD as PCP - General (Family Medicine)  Vineet Gabriel MD as Orthopedic Surgeon (Orthopedic Surgery)  Tim Kam MD as Consulting Physician (Neurology)  Ja Kaba DPM as Surgeon (Podiatry)  Margaret Verduzco DO as Consulting Physician (General Surgery)  Belkis Bernabe MD as Consulting Physician (Gastroenterology)    Medical History Review  Past Medical, Family, and Social History reviewed and does not contribute to the patient presenting condition    Health Maintenance   Topic Date Due    Shingles vaccine (1 of 2) Never done    Diabetic retinal exam  03/10/2016    Pneumococcal 0-64 years Vaccine (2 of 2 - PCV) 05/13/2016    COVID-19 Vaccine (2 - 2023-24 season) 09/01/2023    Diabetic foot exam  01/31/2024    Depression Screen  01/31/2024    Respiratory Syncytial Virus (RSV) Pregnant or age 60 yrs+ (1 - 1-dose 60+ series) Never done    Diabetic Alb to Cr ratio (uACR) test  03/10/2024    Lipids  03/10/2024    A1C test (Diabetic or Prediabetic)  05/12/2024    GFR test (Diabetes, CKD 3-4, OR last GFR 15-59)  05/13/2024    Flu vaccine (Season Ended) 08/01/2024    DTaP/Tdap/Td vaccine (2 - Td or Tdap) 02/22/2026    Colorectal Cancer

## 2024-04-09 ENCOUNTER — TELEPHONE (OUTPATIENT)
Dept: FAMILY MEDICINE CLINIC | Age: 59
End: 2024-04-09

## 2024-04-09 NOTE — TELEPHONE ENCOUNTER
PA request received for ozempic    PA processed and submitted to pt insurance, waiting for response in regards to medication coverage         [employer] form faxed and scanned into media

## 2024-08-21 ENCOUNTER — TELEPHONE (OUTPATIENT)
Dept: FAMILY MEDICINE CLINIC | Age: 59
End: 2024-08-21

## 2024-08-26 RX ORDER — INSULIN LISPRO 100 [IU]/ML
INJECTION, SOLUTION INTRAVENOUS; SUBCUTANEOUS
COMMUNITY
Start: 2023-05-12 | End: 2024-08-27 | Stop reason: SDUPTHER

## 2024-08-27 ENCOUNTER — OFFICE VISIT (OUTPATIENT)
Dept: FAMILY MEDICINE CLINIC | Age: 59
End: 2024-08-27
Payer: MEDICAID

## 2024-08-27 VITALS
HEART RATE: 76 BPM | SYSTOLIC BLOOD PRESSURE: 130 MMHG | TEMPERATURE: 98.2 F | WEIGHT: 135 LBS | DIASTOLIC BLOOD PRESSURE: 80 MMHG | BODY MASS INDEX: 19.94 KG/M2 | OXYGEN SATURATION: 98 %

## 2024-08-27 DIAGNOSIS — I10 ESSENTIAL HYPERTENSION: ICD-10-CM

## 2024-08-27 DIAGNOSIS — R22.2 LUMP OF SKIN OF BACK: ICD-10-CM

## 2024-08-27 DIAGNOSIS — N52.9 ERECTILE DYSFUNCTION, UNSPECIFIED ERECTILE DYSFUNCTION TYPE: ICD-10-CM

## 2024-08-27 DIAGNOSIS — R63.4 WEIGHT LOSS: ICD-10-CM

## 2024-08-27 DIAGNOSIS — M54.41 CHRONIC BILATERAL LOW BACK PAIN WITH BILATERAL SCIATICA: ICD-10-CM

## 2024-08-27 DIAGNOSIS — M54.42 CHRONIC BILATERAL LOW BACK PAIN WITH BILATERAL SCIATICA: ICD-10-CM

## 2024-08-27 DIAGNOSIS — E11.69 TYPE 2 DIABETES MELLITUS WITH OTHER SPECIFIED COMPLICATION, WITH LONG-TERM CURRENT USE OF INSULIN (HCC): Primary | ICD-10-CM

## 2024-08-27 DIAGNOSIS — E11.40 DIABETIC NEUROPATHY WITH NEUROLOGIC COMPLICATION (HCC): ICD-10-CM

## 2024-08-27 DIAGNOSIS — N52.1 ERECTILE DYSFUNCTION ASSOCIATED WITH TYPE 2 DIABETES MELLITUS (HCC): ICD-10-CM

## 2024-08-27 DIAGNOSIS — R63.0 DECREASED APPETITE: ICD-10-CM

## 2024-08-27 DIAGNOSIS — G89.29 CHRONIC BILATERAL LOW BACK PAIN WITH BILATERAL SCIATICA: ICD-10-CM

## 2024-08-27 DIAGNOSIS — Z79.4 TYPE 2 DIABETES MELLITUS WITH OTHER SPECIFIED COMPLICATION, WITH LONG-TERM CURRENT USE OF INSULIN (HCC): Primary | ICD-10-CM

## 2024-08-27 DIAGNOSIS — E55.9 VITAMIN D DEFICIENCY: ICD-10-CM

## 2024-08-27 DIAGNOSIS — E11.69 ERECTILE DYSFUNCTION ASSOCIATED WITH TYPE 2 DIABETES MELLITUS (HCC): ICD-10-CM

## 2024-08-27 DIAGNOSIS — E11.49 DIABETIC NEUROPATHY WITH NEUROLOGIC COMPLICATION (HCC): ICD-10-CM

## 2024-08-27 DIAGNOSIS — E78.2 MIXED HYPERLIPIDEMIA: ICD-10-CM

## 2024-08-27 PROBLEM — E10.65 POOR CONTROL TYPE I DIABETES MELLITUS (HCC): Status: ACTIVE | Noted: 2024-08-27

## 2024-08-27 PROBLEM — F11.93 OPIOID WITHDRAWAL (HCC): Status: ACTIVE | Noted: 2024-08-27

## 2024-08-27 PROBLEM — E10.65 POOR CONTROL TYPE I DIABETES MELLITUS (HCC): Status: RESOLVED | Noted: 2024-08-27 | Resolved: 2024-08-27

## 2024-08-27 PROCEDURE — 4004F PT TOBACCO SCREEN RCVD TLK: CPT

## 2024-08-27 PROCEDURE — 3075F SYST BP GE 130 - 139MM HG: CPT

## 2024-08-27 PROCEDURE — 3046F HEMOGLOBIN A1C LEVEL >9.0%: CPT

## 2024-08-27 PROCEDURE — 2022F DILAT RTA XM EVC RTNOPTHY: CPT

## 2024-08-27 PROCEDURE — 3079F DIAST BP 80-89 MM HG: CPT

## 2024-08-27 PROCEDURE — G8427 DOCREV CUR MEDS BY ELIG CLIN: HCPCS

## 2024-08-27 PROCEDURE — 99214 OFFICE O/P EST MOD 30 MIN: CPT

## 2024-08-27 PROCEDURE — G8420 CALC BMI NORM PARAMETERS: HCPCS

## 2024-08-27 PROCEDURE — 3017F COLORECTAL CA SCREEN DOC REV: CPT

## 2024-08-27 RX ORDER — INSULIN LISPRO 100 [IU]/ML
0-12 INJECTION, SOLUTION INTRAVENOUS; SUBCUTANEOUS
Qty: 10 ML | Refills: 11 | Status: SHIPPED | OUTPATIENT
Start: 2024-08-27

## 2024-08-27 RX ORDER — LISINOPRIL 40 MG/1
40 TABLET ORAL DAILY
Qty: 30 TABLET | Refills: 5 | Status: SHIPPED | OUTPATIENT
Start: 2024-08-27

## 2024-08-27 RX ORDER — ATORVASTATIN CALCIUM 40 MG/1
40 TABLET, FILM COATED ORAL DAILY
Qty: 30 TABLET | Refills: 3 | Status: SHIPPED | OUTPATIENT
Start: 2024-08-27

## 2024-08-27 RX ORDER — AMLODIPINE BESYLATE 10 MG/1
TABLET ORAL
Qty: 30 TABLET | Refills: 0 | Status: SHIPPED | OUTPATIENT
Start: 2024-08-27

## 2024-08-27 RX ORDER — GABAPENTIN 400 MG/1
400 CAPSULE ORAL 3 TIMES DAILY
Qty: 90 CAPSULE | Refills: 0 | Status: SHIPPED | OUTPATIENT
Start: 2024-08-27 | End: 2024-09-26

## 2024-08-27 RX ORDER — SILDENAFIL 50 MG/1
50 TABLET, FILM COATED ORAL PRN
Qty: 9 TABLET | Refills: 1 | Status: SHIPPED | OUTPATIENT
Start: 2024-08-27

## 2024-08-27 RX ORDER — SILDENAFIL 50 MG/1
50 TABLET, FILM COATED ORAL PRN
Qty: 9 TABLET | Refills: 1 | Status: CANCELLED | OUTPATIENT
Start: 2024-08-27

## 2024-08-27 RX ORDER — INSULIN LISPRO 100 [IU]/ML
0-5 INJECTION, SOLUTION INTRAVENOUS; SUBCUTANEOUS
Qty: 10 ML | Refills: 11 | Status: SHIPPED | OUTPATIENT
Start: 2024-08-27 | End: 2024-08-27

## 2024-08-27 RX ORDER — INSULIN GLARGINE 100 [IU]/ML
10 INJECTION, SOLUTION SUBCUTANEOUS NIGHTLY
Qty: 3 ADJUSTABLE DOSE PRE-FILLED PEN SYRINGE | Refills: 5 | Status: SHIPPED | OUTPATIENT
Start: 2024-08-27

## 2024-08-27 ASSESSMENT — PATIENT HEALTH QUESTIONNAIRE - PHQ9
SUM OF ALL RESPONSES TO PHQ QUESTIONS 1-9: 1
1. LITTLE INTEREST OR PLEASURE IN DOING THINGS: SEVERAL DAYS
SUM OF ALL RESPONSES TO PHQ QUESTIONS 1-9: 1
SUM OF ALL RESPONSES TO PHQ9 QUESTIONS 1 & 2: 1
2. FEELING DOWN, DEPRESSED OR HOPELESS: NOT AT ALL

## 2024-08-27 ASSESSMENT — ENCOUNTER SYMPTOMS
ABDOMINAL PAIN: 0
WHEEZING: 0
VOMITING: 0
CONSTIPATION: 0
BACK PAIN: 1
SHORTNESS OF BREATH: 0
DIARRHEA: 0
NAUSEA: 0

## 2024-08-27 NOTE — ASSESSMENT & PLAN NOTE
Well-controlled, continue current medications, medication adherence emphasized, and lifestyle modifications recommended    Orders:    amLODIPine (NORVASC) 10 MG tablet; TAKE 1 TABLET BY MOUTH ONCE DAILY    lisinopril (PRINIVIL;ZESTRIL) 40 MG tablet; Take 1 tablet by mouth daily    Basic Metabolic Panel; Future

## 2024-08-27 NOTE — ASSESSMENT & PLAN NOTE
Patient requested Viagra, discussed risks of hypotension and further side effects of Viagra in details, patient voiced understanding and agreement to proceed with Viagra.

## 2024-08-27 NOTE — ASSESSMENT & PLAN NOTE
Along with 20 pound weight loss over the last year concerning, will review orders at this encounter along with ordering prealbumin.  Will discuss further in 2 weeks with patient for further workup    Orders:    CBC; Future

## 2024-08-27 NOTE — ASSESSMENT & PLAN NOTE
Uncontrolled,Patient is noncompliant with the medication has been off medication for a while last A1c check 10.5.  Resume medication, diabetic instruction discussed with the patient., continue current medications, continue current treatment plan, medication adherence emphasized, and lifestyle modifications recommended

## 2024-08-27 NOTE — ASSESSMENT & PLAN NOTE
Discussed risks and side effects of Viagra include but not limited to death, patient voiced understanding and still wanted to have the Viagra.    Orders:    sildenafil (VIAGRA) 50 MG tablet; Take 1 tablet by mouth as needed for Erectile Dysfunction

## 2024-08-27 NOTE — ASSESSMENT & PLAN NOTE
Likely simple cyst versus lipoma, will order ultrasound soft tissue.  No sign or symptoms of infection, or fluctuation.    Orders:    US SOFT TISSUE ABSCESS DRAINAGE PERC; Future

## 2024-08-27 NOTE — ASSESSMENT & PLAN NOTE
To recheck lab, continue current medications, medication adherence emphasized, and lifestyle modifications recommended    Orders:    atorvastatin (LIPITOR) 40 MG tablet; Take 1 tablet by mouth daily

## 2024-08-27 NOTE — ASSESSMENT & PLAN NOTE
Uncontrolled, last A1c was 10.5, patient has not been compliant with medication.  To resume Basaglar insulin 10 units nightly and sliding scale    Orders:    gabapentin (NEURONTIN) 400 MG capsule; Take 1 capsule by mouth 3 times daily for 30 days.    insulin glargine (BASAGLAR KWIKPEN) 100 UNIT/ML injection pen; Inject 10 Units into the skin nightly    metFORMIN (GLUCOPHAGE) 1000 MG tablet; Take 0.5 tablets by mouth 2 times daily (with meals)    Continuous Glucose Sensor (FREESTYLE SAM 2 SENSOR) MISC; Use as directed    Continuous Glucose  (FREESTYLE SAM 2 READER) KEVAN; Use as directed    Hemoglobin A1C [LAB90}; Future    insulin lispro (HUMALOG,ADMELOG) 100 UNIT/ML SOLN injection vial; Inject 0-12 Units into the skin 3 times daily (before meals) Per low-dose sliding scale; IF Blood Sugar 60 - 124 No Coverage  - 150 > INJECT 2 units of Humalog  - 200 > INJECT 4 units of Humalog  - 250 >INJECT 6 units of Humalog  - 300 >INJECT 8 units of Humalog  - 350> INJECT 10 units of Humalog  - 400> INJECT 12 units of Humalog

## 2024-08-27 NOTE — PROGRESS NOTES
Jose Guadalupe Neumann (:  1965) is a 59 y.o. male,New patient, here for evaluation of the following chief complaint(s):  Lower Back Pain (X Years. Had Surgery), Medication Refill, and Nutrition Recommendation (Would Like to try Ensure )      Screening for Depression: Adult population (age 18 and older)  - Pt have felt down, depressed or hopless over the last 2 week   - PHQ-2 -ve for depression, no further intervention     Screening for CVDs risk:  The ASCVD Risk score (Nini MUNOZ, et al., 2019) failed to calculate for the following reasons:    Unable to determine if patient is Non-      Screening for DM: Diabetic     Screening for Dyslipidemia: Diagnosed with dyslipidemia    Regular Annual Weight, Height and BMI percentile and BMI Monitorin.5-24.9 - Normal  Significant weight last of 20 pounds over the last year  Last Weight Metrics:      2024     2:08 PM 2024    11:03 AM 2023     1:05 PM 2023    12:49 PM 2023     3:30 AM 3/31/2023     1:53 PM 3/8/2023     2:02 PM   Weight Loss Metrics   Height  5' 9\" 5' 9\" 5' 9\" 5' 9\" 5' 9.016\" 5' 9\"   Weight - Scale 135 lbs 135 lbs 155 lbs  139 lbs 12 oz 143 lbs 142 lbs   BMI (Calculated) 0 kg/m2 20 kg/m2 22.9 kg/m2  20.7 kg/m2 21.2 kg/m2 21 kg/m2        Screening for Cancers:   - Colon Cancer: Next due 2028  - Lung Cancer: Not qualified  - Prostate Cancer: Needs PSA counseling         No data to display                 Social Hx:  Social Determinants of Health with Concerns     Tobacco Use: High Risk (2024)    Patient History     Smoking Tobacco Use: Some Days     Smokeless Tobacco Use: Former     Passive Exposure: Not on file   Alcohol Use: Unknown (2021)    Received from [facility]    AUDIT-C     Frequency of Alcohol Consumption: Not on file     Average Number of Drinks: Not on file     Frequency of Binge Drinking: Less than monthly   Financial Resource Strain: Patient Declined   (FREESTYLE SAM 2 READER) KEVAN REORDER    gabapentin (NEURONTIN) 400 MG capsule REORDER    amLODIPine (NORVASC) 10 MG tablet REORDER    atorvastatin (LIPITOR) 40 MG tablet REORDER    Continuous Blood Gluc Sensor (FREESTYLE SAM 2 SENSOR) MISC REORDER    lisinopril (PRINIVIL;ZESTRIL) 40 MG tablet REORDER    insulin glargine (BASAGLAR KWIKPEN) 100 UNIT/ML injection pen REORDER    metFORMIN (GLUCOPHAGE) 1000 MG tablet REORDER    insulin lispro (HUMALOG,ADMELOG) 100 UNIT/ML SOLN injection vial REORDER    sildenafil (VIAGRA) 50 MG tablet REORDER    insulin lispro (HUMALOG,ADMELOG) 100 UNIT/ML SOLN injection vial        Jose Guadalupe received counseling on the following healthy behaviors: nutrition, exercise and medication adherence    Discussed use,benefit, and side effects of prescribed medications.  Barriers to medication compliance addressed.     Discussed with patient signs and symptoms that necessitate emergent medical attention going to ER.     All patient questions answered.  Pt voiced understanding.     Return in about 2 weeks (around 9/10/2024) for REVIEW LABS.      Disclaimer: Some orall of this note was transcribed using voice-recognition software.This may cause typographical errors occasionally. Although all effort is made to fix these errors, please do not hesitate to contact our office if there isany concern with the understanding of this note.      Electronically signed by MERVAT LARA MD on 8/27/2024 at 3:55 PM

## 2024-08-28 ENCOUNTER — TELEPHONE (OUTPATIENT)
Dept: FAMILY MEDICINE CLINIC | Age: 59
End: 2024-08-28

## 2024-08-28 DIAGNOSIS — R22.2 LUMP OF SKIN OF BACK: Primary | ICD-10-CM

## 2024-08-28 NOTE — TELEPHONE ENCOUNTER
I called central scheduling and they stated that the order is incorrect.     If we are trying to check a lesion on the upper back they stated that we need to order a US Chest including Mediastinum    If we are checking a lesion on the lower back we need to order a US Abdominal limited    Was not sure location so both orders are pended for your review

## 2024-08-29 ENCOUNTER — HOSPITAL ENCOUNTER (OUTPATIENT)
Age: 59
Setting detail: SPECIMEN
Discharge: HOME | End: 2024-08-29

## 2024-08-29 DIAGNOSIS — E55.9 VITAMIN D DEFICIENCY: ICD-10-CM

## 2024-08-29 DIAGNOSIS — R63.0 DECREASED APPETITE: ICD-10-CM

## 2024-08-29 DIAGNOSIS — R63.4 WEIGHT LOSS: ICD-10-CM

## 2024-08-29 DIAGNOSIS — I10 ESSENTIAL HYPERTENSION: ICD-10-CM

## 2024-08-29 LAB
25(OH)D3 SERPL-MCNC: 15.9 NG/ML (ref 30–100)
ANION GAP SERPL CALCULATED.3IONS-SCNC: 14 MMOL/L (ref 9–16)
BUN SERPL-MCNC: 14 MG/DL (ref 8–23)
CALCIUM SERPL-MCNC: 10.3 MG/DL (ref 8.6–10.4)
CHLORIDE SERPL-SCNC: 96 MMOL/L (ref 98–107)
CO2 SERPL-SCNC: 26 MMOL/L (ref 20–31)
CREAT SERPL-MCNC: 1.4 MG/DL (ref 0.7–1.2)
ERYTHROCYTE [DISTWIDTH] IN BLOOD BY AUTOMATED COUNT: 11.9 % (ref 11.8–14.4)
GFR, ESTIMATED: 56 ML/MIN/1.73M2
GLUCOSE SERPL-MCNC: 228 MG/DL (ref 74–99)
HCT VFR BLD AUTO: 43.1 % (ref 40.7–50.3)
HGB BLD-MCNC: 14.8 G/DL (ref 13–17)
MCH RBC QN AUTO: 27.4 PG (ref 25.2–33.5)
MCHC RBC AUTO-ENTMCNC: 34.3 G/DL (ref 28.4–34.8)
MCV RBC AUTO: 79.8 FL (ref 82.6–102.9)
NRBC BLD-RTO: 0 PER 100 WBC
PLATELET # BLD AUTO: 340 K/UL (ref 138–453)
PMV BLD AUTO: 10.7 FL (ref 8.1–13.5)
POTASSIUM SERPL-SCNC: 5 MMOL/L (ref 3.7–5.3)
PREALB SERPL-MCNC: 26.9 MG/DL (ref 20–40)
RBC # BLD AUTO: 5.4 M/UL (ref 4.21–5.77)
SODIUM SERPL-SCNC: 136 MMOL/L (ref 136–145)
WBC OTHER # BLD: 8.7 K/UL (ref 3.5–11.3)

## 2024-08-30 DIAGNOSIS — E55.9 VITAMIN D DEFICIENCY: ICD-10-CM

## 2024-08-30 DIAGNOSIS — N17.9 AKI (ACUTE KIDNEY INJURY) (HCC): Primary | ICD-10-CM

## 2024-08-30 RX ORDER — ERGOCALCIFEROL 1.25 MG/1
50000 CAPSULE, LIQUID FILLED ORAL WEEKLY
Qty: 4 CAPSULE | Refills: 0 | Status: SHIPPED | OUTPATIENT
Start: 2024-08-30

## 2024-08-30 NOTE — RESULT ENCOUNTER NOTE
Results showed low vitamin D will sent script, also kidney number are elevated pt needs to hold taking Lisinopril and Metformin for 3 days and keep well hydrated and do Labs BMP on Monday or Tuesday.

## 2024-09-04 ENCOUNTER — HOSPITAL ENCOUNTER (OUTPATIENT)
Age: 59
Setting detail: SPECIMEN
Discharge: HOME | End: 2024-09-04

## 2024-09-04 DIAGNOSIS — N17.9 AKI (ACUTE KIDNEY INJURY) (HCC): ICD-10-CM

## 2024-09-04 LAB
ANION GAP SERPL CALCULATED.3IONS-SCNC: 12 MMOL/L (ref 9–16)
BUN SERPL-MCNC: 18 MG/DL (ref 8–23)
CALCIUM SERPL-MCNC: 9.9 MG/DL (ref 8.6–10.4)
CHLORIDE SERPL-SCNC: 98 MMOL/L (ref 98–107)
CO2 SERPL-SCNC: 25 MMOL/L (ref 20–31)
CREAT SERPL-MCNC: 1.3 MG/DL (ref 0.7–1.2)
GFR, ESTIMATED: 66 ML/MIN/1.73M2
GLUCOSE SERPL-MCNC: 349 MG/DL (ref 74–99)
POTASSIUM SERPL-SCNC: 4.6 MMOL/L (ref 3.7–5.3)
SODIUM SERPL-SCNC: 135 MMOL/L (ref 136–145)

## 2024-09-23 ENCOUNTER — OFFICE VISIT (OUTPATIENT)
Dept: FAMILY MEDICINE CLINIC | Age: 59
End: 2024-09-23
Payer: MEDICAID

## 2024-09-23 VITALS
OXYGEN SATURATION: 99 % | TEMPERATURE: 97.3 F | WEIGHT: 134.2 LBS | BODY MASS INDEX: 19.82 KG/M2 | SYSTOLIC BLOOD PRESSURE: 122 MMHG | HEART RATE: 69 BPM | DIASTOLIC BLOOD PRESSURE: 82 MMHG

## 2024-09-23 DIAGNOSIS — L60.8 BLACK NAILS: ICD-10-CM

## 2024-09-23 DIAGNOSIS — R22.2 LUMP OF SKIN OF BACK: ICD-10-CM

## 2024-09-23 DIAGNOSIS — N17.9 AKI (ACUTE KIDNEY INJURY) (HCC): ICD-10-CM

## 2024-09-23 DIAGNOSIS — I10 ESSENTIAL HYPERTENSION: Primary | ICD-10-CM

## 2024-09-23 DIAGNOSIS — E11.69 TYPE 2 DIABETES MELLITUS WITH OTHER SPECIFIED COMPLICATION, WITH LONG-TERM CURRENT USE OF INSULIN (HCC): ICD-10-CM

## 2024-09-23 DIAGNOSIS — Z79.4 TYPE 2 DIABETES MELLITUS WITH OTHER SPECIFIED COMPLICATION, WITH LONG-TERM CURRENT USE OF INSULIN (HCC): ICD-10-CM

## 2024-09-23 PROCEDURE — 3079F DIAST BP 80-89 MM HG: CPT

## 2024-09-23 PROCEDURE — 99213 OFFICE O/P EST LOW 20 MIN: CPT

## 2024-09-23 PROCEDURE — G8427 DOCREV CUR MEDS BY ELIG CLIN: HCPCS

## 2024-09-23 PROCEDURE — 4004F PT TOBACCO SCREEN RCVD TLK: CPT

## 2024-09-23 PROCEDURE — 3017F COLORECTAL CA SCREEN DOC REV: CPT

## 2024-09-23 PROCEDURE — 3074F SYST BP LT 130 MM HG: CPT

## 2024-09-23 PROCEDURE — 3046F HEMOGLOBIN A1C LEVEL >9.0%: CPT

## 2024-09-23 PROCEDURE — 2022F DILAT RTA XM EVC RTNOPTHY: CPT

## 2024-09-23 PROCEDURE — G8420 CALC BMI NORM PARAMETERS: HCPCS

## 2024-09-23 RX ORDER — LISINOPRIL 20 MG/1
20 TABLET ORAL DAILY
Qty: 90 TABLET | Refills: 1 | Status: SHIPPED | OUTPATIENT
Start: 2024-09-23

## 2024-09-23 ASSESSMENT — ENCOUNTER SYMPTOMS
NAUSEA: 0
WHEEZING: 0
ABDOMINAL PAIN: 0
BACK PAIN: 1
VOMITING: 0
SHORTNESS OF BREATH: 0
CONSTIPATION: 0
DIARRHEA: 0

## 2024-09-27 ENCOUNTER — HOSPITAL ENCOUNTER (OUTPATIENT)
Dept: ULTRASOUND IMAGING | Age: 59
Discharge: HOME | End: 2024-09-29
Payer: MEDICAID

## 2024-09-27 DIAGNOSIS — R22.2 LUMP OF SKIN OF BACK: ICD-10-CM

## 2024-09-27 PROCEDURE — 76705 ECHO EXAM OF ABDOMEN: CPT

## 2024-10-01 DIAGNOSIS — D17.1 LIPOMA OF TORSO: Primary | ICD-10-CM

## 2024-10-01 NOTE — RESULT ENCOUNTER NOTE
US showed lipoma of the back which is benign mass, I will refer him to general surgery to have it taken out. Order in. Please inform patient

## 2024-10-11 DIAGNOSIS — Z79.4 TYPE 2 DIABETES MELLITUS WITH OTHER SPECIFIED COMPLICATION, WITH LONG-TERM CURRENT USE OF INSULIN (HCC): ICD-10-CM

## 2024-10-11 DIAGNOSIS — I10 ESSENTIAL HYPERTENSION: ICD-10-CM

## 2024-10-11 DIAGNOSIS — E11.69 TYPE 2 DIABETES MELLITUS WITH OTHER SPECIFIED COMPLICATION, WITH LONG-TERM CURRENT USE OF INSULIN (HCC): ICD-10-CM

## 2024-10-11 DIAGNOSIS — E55.9 VITAMIN D DEFICIENCY: ICD-10-CM

## 2024-10-11 RX ORDER — ERGOCALCIFEROL 1.25 MG/1
50000 CAPSULE, LIQUID FILLED ORAL WEEKLY
Qty: 4 CAPSULE | Refills: 0 | OUTPATIENT
Start: 2024-10-11

## 2024-10-11 RX ORDER — GABAPENTIN 400 MG/1
400 CAPSULE ORAL 3 TIMES DAILY
Qty: 90 CAPSULE | Refills: 0 | Status: SHIPPED | OUTPATIENT
Start: 2024-10-11 | End: 2024-11-10

## 2024-10-11 RX ORDER — AMLODIPINE BESYLATE 10 MG/1
TABLET ORAL
Qty: 30 TABLET | Refills: 2 | Status: SHIPPED | OUTPATIENT
Start: 2024-10-11

## 2024-10-11 NOTE — TELEPHONE ENCOUNTER
Jose Guadalupe Neumann is calling to request a refill on the following medication(s):    Medication Request:  Requested Prescriptions     Pending Prescriptions Disp Refills    vitamin D (ERGOCALCIFEROL) 1.25 MG (33728 UT) CAPS capsule [Pharmacy Med Name: VITAMIN D [id number] CAPSULE] 4 capsule 0     Sig: TAKE 1 CAPSULE BY MOUTH ONCE A WEEK    amLODIPine (NORVASC) 10 MG tablet [Pharmacy Med Name: AMLODIPINE BESYLATE 10MG TABLET] 30 tablet 2     Sig: TAKE 1 TABLET BY MOUTH ONCE DAILY    gabapentin (NEURONTIN) 400 MG capsule [Pharmacy Med Name: GABAPENTIN 400MG CAPSULE] 90 capsule 2     Sig: Take 1 capsule by mouth 3 times daily for 30 days.       Last Visit Date (If Applicable):  9/23/2024    Next Visit Date:    11/20/2024

## 2024-10-15 ENCOUNTER — TELEPHONE (OUTPATIENT)
Dept: FAMILY MEDICINE CLINIC | Age: 59
End: 2024-10-15

## 2024-10-15 RX ORDER — BLOOD SUGAR DIAGNOSTIC
1 STRIP MISCELLANEOUS DAILY
Qty: 100 EACH | Refills: 3 | Status: SHIPPED | OUTPATIENT
Start: 2024-10-15

## 2024-10-15 NOTE — TELEPHONE ENCOUNTER
Patient is calling for insulin syringes      Jose Guadalupe Neumann is calling to request a refill on the following medication(s):    Medication Request:  Requested Prescriptions     Pending Prescriptions Disp Refills    Insulin Syringe-Needle U-100 31G X \" 0.3 ML MISC 100 each 3     Si each by Does not apply route daily       Last Visit Date (If Applicable):  2024    Next Visit Date:    2024

## 2024-11-15 DIAGNOSIS — E11.69 TYPE 2 DIABETES MELLITUS WITH OTHER SPECIFIED COMPLICATION, WITH LONG-TERM CURRENT USE OF INSULIN (HCC): ICD-10-CM

## 2024-11-15 DIAGNOSIS — Z79.4 TYPE 2 DIABETES MELLITUS WITH OTHER SPECIFIED COMPLICATION, WITH LONG-TERM CURRENT USE OF INSULIN (HCC): ICD-10-CM

## 2024-11-15 NOTE — TELEPHONE ENCOUNTER
Jose Guadalupe Neumann is calling to request a refill on the following medication(s):    Medication Request:  Requested Prescriptions     Pending Prescriptions Disp Refills    gabapentin (NEURONTIN) 400 MG capsule [Pharmacy Med Name: GABAPENTIN 400MG CAPSULE] 90 capsule 0     Sig: Take 1 capsule by mouth 3 times daily.       Last Visit Date (If Applicable):  9/23/2024    Next Visit Date:    11/20/2024

## 2024-11-16 RX ORDER — GABAPENTIN 400 MG/1
400 CAPSULE ORAL 3 TIMES DAILY
Qty: 90 CAPSULE | Refills: 0 | Status: SHIPPED | OUTPATIENT
Start: 2024-11-16 | End: 2024-12-16

## 2024-11-20 ENCOUNTER — OFFICE VISIT (OUTPATIENT)
Dept: FAMILY MEDICINE CLINIC | Age: 59
End: 2024-11-20

## 2024-11-20 DIAGNOSIS — R06.02 SHORTNESS OF BREATH: ICD-10-CM

## 2024-11-20 DIAGNOSIS — K21.9 GASTROESOPHAGEAL REFLUX DISEASE WITHOUT ESOPHAGITIS: ICD-10-CM

## 2024-11-20 DIAGNOSIS — J01.00 SUBACUTE MAXILLARY SINUSITIS: Primary | ICD-10-CM

## 2024-11-20 DIAGNOSIS — B18.2 HEP C W/O COMA, CHRONIC (HCC): ICD-10-CM

## 2024-11-20 DIAGNOSIS — F11.93 OPIOID WITHDRAWAL (HCC): ICD-10-CM

## 2024-11-20 DIAGNOSIS — D17.1 LIPOMA OF TORSO: ICD-10-CM

## 2024-11-20 DIAGNOSIS — Z79.4 TYPE 2 DIABETES MELLITUS WITH OTHER SPECIFIED COMPLICATION, WITH LONG-TERM CURRENT USE OF INSULIN (HCC): ICD-10-CM

## 2024-11-20 DIAGNOSIS — R05.2 SUBACUTE COUGH: ICD-10-CM

## 2024-11-20 DIAGNOSIS — E11.69 TYPE 2 DIABETES MELLITUS WITH OTHER SPECIFIED COMPLICATION, WITH LONG-TERM CURRENT USE OF INSULIN (HCC): ICD-10-CM

## 2024-11-20 LAB — HBA1C MFR BLD: 8.8 %

## 2024-11-20 RX ORDER — INSULIN GLARGINE 100 [IU]/ML
10 INJECTION, SOLUTION SUBCUTANEOUS NIGHTLY
Qty: 3 ADJUSTABLE DOSE PRE-FILLED PEN SYRINGE | Refills: 5 | Status: SHIPPED | OUTPATIENT
Start: 2024-11-20

## 2024-11-20 RX ORDER — ISOPROPYL ALCOHOL 0.75 G/1
SWAB TOPICAL
COMMUNITY
Start: 2024-11-15

## 2024-11-20 RX ORDER — BENZONATATE 100 MG/1
100 CAPSULE ORAL 2 TIMES DAILY PRN
Qty: 20 CAPSULE | Refills: 0 | Status: SHIPPED | OUTPATIENT
Start: 2024-11-20 | End: 2024-11-27

## 2024-11-20 RX ORDER — GABAPENTIN 400 MG/1
400 CAPSULE ORAL 3 TIMES DAILY
Qty: 90 CAPSULE | Refills: 0 | Status: SHIPPED | OUTPATIENT
Start: 2024-11-20 | End: 2024-12-20

## 2024-11-20 RX ORDER — FAMOTIDINE 20 MG/1
20 TABLET, FILM COATED ORAL 2 TIMES DAILY
Qty: 60 TABLET | Refills: 3 | Status: SHIPPED | OUTPATIENT
Start: 2024-11-20

## 2024-11-20 RX ORDER — BLOOD SUGAR DIAGNOSTIC
STRIP MISCELLANEOUS
Qty: 100 STRIP | Refills: 5 | Status: SHIPPED | OUTPATIENT
Start: 2024-11-20

## 2024-11-20 RX ORDER — FLUTICASONE PROPIONATE 50 MCG
2 SPRAY, SUSPENSION (ML) NASAL DAILY
Qty: 16 G | Refills: 0 | Status: SHIPPED | OUTPATIENT
Start: 2024-11-20

## 2024-11-20 RX ORDER — LISINOPRIL 40 MG/1
TABLET ORAL
COMMUNITY
Start: 2024-11-15

## 2024-11-20 ASSESSMENT — ENCOUNTER SYMPTOMS
ABDOMINAL PAIN: 0
SHORTNESS OF BREATH: 1
RHINORRHEA: 0
SINUS PAIN: 0
SORE THROAT: 1
VOMITING: 0
TROUBLE SWALLOWING: 1
COUGH: 1
VOICE CHANGE: 0
BACK PAIN: 1
NAUSEA: 0
SINUS PRESSURE: 0
FACIAL SWELLING: 0
DIARRHEA: 0
WHEEZING: 0
CONSTIPATION: 0

## 2024-11-20 NOTE — ASSESSMENT & PLAN NOTE
Will treat with antibiotic Augmentin and Flonase given duration more than 7 days.    Orders:    amoxicillin-clavulanate (AUGMENTIN) 875-125 MG per tablet; Take 1 tablet by mouth 2 times daily for 7 days    fluticasone (FLONASE) 50 MCG/ACT nasal spray; 2 sprays by Each Nostril route daily

## 2024-11-20 NOTE — ASSESSMENT & PLAN NOTE
Will treat with Tessalon    Orders:    benzonatate (TESSALON) 100 MG capsule; Take 1 capsule by mouth 2 times daily as needed for Cough

## 2024-11-20 NOTE — ASSESSMENT & PLAN NOTE
A1c trending down to 8.82 from above 10, patient reported compliance with insulin long-acting 10 units slightly however reported that he has been switched to Lantus from Basaglar that is giving him side effects of feeling not well, reported has not been taking Ozempic as he did not have it refilled, does not feel comfortable using the sensor for lilia and would prefer glucometer and requested test strips refill.  Patient agreeable to be seen by pharmacist to talk more about CGM and could be switched to lilia 3 and to review medication.    Orders:    gabapentin (NEURONTIN) 400 MG capsule; Take 1 capsule by mouth 3 times daily for 30 days.    blood glucose test strips (ONETOUCH ULTRA) strip; TESTING TWICE A DAY    Ambulatory Referral to Primary Care Pharmacist    POCT glycosylated hemoglobin (Hb A1C)    BASAGLAR KWIKPEN 100 UNIT/ML injection pen; Inject 10 Units into the skin nightly

## 2024-11-20 NOTE — ASSESSMENT & PLAN NOTE
Will do x-ray to rule out pneumonia and will treat for sinusitis and possible acute bronchitis with antibiotic    Orders:    XR CHEST STANDARD (2 VW); Future

## 2024-11-20 NOTE — ASSESSMENT & PLAN NOTE
Last seen by GI in 2018 as per notes patient was supposed to follow-up for treatment, cannot see follow-up appointment.  Need to discuss with the patient importance of following up with GI and to clarify if patient has ever been treated for hepatitis C as per labs showing in 2023 the patient is still positive for antibodies of hepatitis C.

## 2024-11-20 NOTE — PROGRESS NOTES
Jose Gaudalupe Neumann (:  1965) is a 59 y.o. male,New patient, here for evaluation of the following chief complaint(s):  Diabetes and Sore Throat    H&P    This is a 59 years old male with Hx of DM type II on Ozempic, insulin long-acting 10 units and low-dose sliding scale, peripheral neuropathy on gabapentin, dyslipidemia on Lipitor, chronic hepatitis C, esophagitis on EGD, cholelithiasis, chronic lower back pain, chronic lower back lump, Hx of opioid abuse, dyslipidemia, low BMI.    Here for follow-up    Patient reported upper respiratory tract symptoms listed in ROS    Reported compliance with the medication however he stated that his pharmacy switch the Basaglar to Lantus is giving him some side effects of not feeling well    Patient reporting having a black toenail over the last few months, was following up before with podiatry in .  Patient was referred to prior visit to place podiatry need to schedule    Screening for Depression: Adult population (age 18 and older)  - Pt have felt down, depressed or hopless over the last 2 week   - PHQ-2 -ve for depression, no further intervention     Screening for CVDs risk:  The ASCVD Risk score (Nini MUNOZ, et al., 2019) failed to calculate for the following reasons:    Unable to determine if patient is Non-      Screening for DM: Diabetic     Screening for Dyslipidemia: Diagnosed with dyslipidemia    Regular Annual Weight, Height and BMI percentile and BMI Monitorin.5-24.9 - Normal  Significant weight last of 20 pounds over the last year  Last Weight Metrics:      2024    10:19 AM 2024     2:08 PM 2024    11:03 AM 2023     1:05 PM 2023    12:49 PM 2023     3:30 AM 3/31/2023     1:53 PM   Weight Loss Metrics   Height   5' 9\" 5' 9\" 5' 9\" 5' 9\" 5' 9.016\"   Weight - Scale 134 lbs 3 oz 135 lbs 135 lbs 155 lbs  139 lbs 12 oz 143 lbs   BMI (Calculated) 0 kg/m2 0 kg/m2 20 kg/m2 22.9 kg/m2  20.7 kg/m2 21.2 kg/m2

## 2024-11-20 NOTE — ASSESSMENT & PLAN NOTE
Will refill the patient had control of his symptoms on that in the past.    Orders:    famotidine (PEPCID) 20 MG tablet; Take 1 tablet by mouth 2 times daily

## 2024-11-25 ENCOUNTER — PHARMACY VISIT (OUTPATIENT)
Dept: FAMILY MEDICINE CLINIC | Age: 59
End: 2024-11-25

## 2024-11-25 VITALS — WEIGHT: 135.6 LBS | BODY MASS INDEX: 20.02 KG/M2

## 2024-11-25 DIAGNOSIS — Z79.4 TYPE 2 DIABETES MELLITUS WITHOUT COMPLICATION, WITH LONG-TERM CURRENT USE OF INSULIN (HCC): Primary | ICD-10-CM

## 2024-11-25 DIAGNOSIS — E11.9 TYPE 2 DIABETES MELLITUS WITHOUT COMPLICATION, WITH LONG-TERM CURRENT USE OF INSULIN (HCC): Primary | ICD-10-CM

## 2024-11-25 PROCEDURE — 99999 PR OFFICE/OUTPT VISIT,PROCEDURE ONLY: CPT | Performed by: PHARMACIST

## 2024-11-25 RX ORDER — KETOROLAC TROMETHAMINE 30 MG/ML
INJECTION, SOLUTION INTRAMUSCULAR; INTRAVENOUS
Qty: 1 EACH | Refills: 0 | Status: SHIPPED | OUTPATIENT
Start: 2024-11-25

## 2024-11-25 RX ORDER — HYDROCHLOROTHIAZIDE 12.5 MG/1
CAPSULE ORAL
Qty: 2 EACH | Refills: 2 | Status: SHIPPED | OUTPATIENT
Start: 2024-11-25

## 2024-11-25 RX ORDER — METFORMIN HYDROCHLORIDE 500 MG/1
TABLET, EXTENDED RELEASE ORAL
Qty: 49 TABLET | Refills: 1 | Status: SHIPPED | OUTPATIENT
Start: 2024-11-25

## 2024-11-25 NOTE — PROGRESS NOTES
09/04/2024 10:12 AM    LABGLOM >60 05/13/2023 06:46 AM     CrCl cannot be calculated (Unknown ideal weight.).    CHOLESTEROL MANAGEMENT  Lab Results   Component Value Date    CHOL 170 03/10/2023    TRIG 200 (H) 03/10/2023    HDL 64 03/10/2023    LDL 66 03/10/2023     ALT   Date Value Ref Range Status   05/12/2023 27 5 - 41 U/L Final     AST   Date Value Ref Range Status   05/12/2023 31 <40 U/L Final       BLOOD PRESSURE MANAGEMENT  BP Readings from Last 3 Encounters:   09/23/24 122/82   08/27/24 130/80   04/04/24 (!) 156/97     RISK MANAGEMENT  ASCVD RISK: The ASCVD Risk score (Nini MUNOZ, et al., 2019) failed to calculate for the following reasons:    Unable to determine if patient is Non-    On Statin: No-prescribed a atorvastatin 40mg daily.  Was last filled on 10/11/2024.  Pt doesn't believe that he is taking it.  Unclear if it was stopped??  On ACE-I/ARB for HTN or Microalbuminuria: Yes-lisinopril 10mg daily  On GLP-1RA:No-never started semaglutide  On SGLT2i: No  Smoker: smoker  (0.25 ppd x 15 yrs)-did not clarify with patient today,  Immunizations Needed: Did not discuss  Date of last eye exam: Did not discuss  Date of last foot exam: Did not discuss    Assessment/Plan     Diabetes Management: Uncontrolled with most recent A1C of 8.8% (11/20/2024) not at target of < 7%. Currently prescribed Humalog, Lantus, Metformin. Denies hypoglycemia but reports having symptoms of \"feeling funny\".   Pharmacologic therapy:   Hold Humalog for now  Take Lantus 10 units daily.  Stressed the importance to take everyday.    Change metformin to ER formulation.  Recommended that patient start with 500mg daily for 7 days then on Day 8 increase to 2 tablets (1000mg daily).  Consideration for further titration if tolerable and renal function supports it.    New prescription sent for the ER formulation.    Glucose testing:   Continue to check via finger poke until CGM testing supplies received.      Patient

## 2024-11-25 NOTE — PATIENT INSTRUCTIONS
No more vial insulin (insulin lispro)-HOLD for now  Lantus 10 units once daily.    Metformin- Changing it to the 500mg ER tablets.  Take 1 tablet by mouth for 7 days.  Then on Day 8 increase to 2 tablets daily.  Ok to take them at the same time.    Prescriptions sent to [facility] pharmacy for Jerad 3+ and the Jerad 3 reader.  Follow up with [facility] to make sure they received the prescriptions and that the CGM is covered.      Pharmacist ([facility]) will follow up with Dr Solo regarding atorvastatin, Ensure, and vitamin D capsules.      [facility] pharmacist [phone].  Call if you have questions.

## 2024-12-02 ENCOUNTER — HOSPITAL ENCOUNTER (OUTPATIENT)
Dept: SURGERY | Age: 59
Discharge: HOME | End: 2024-12-02

## 2024-12-02 VITALS
WEIGHT: 133 LBS | RESPIRATION RATE: 16 BRPM | HEIGHT: 69 IN | SYSTOLIC BLOOD PRESSURE: 174 MMHG | OXYGEN SATURATION: 99 % | DIASTOLIC BLOOD PRESSURE: 93 MMHG | HEART RATE: 70 BPM | BODY MASS INDEX: 19.7 KG/M2

## 2024-12-03 NOTE — CONSULTS
[facility]  Consult Note            NAME:  Jose Guadalupe Neumann  MRN: [id number]   YOB: 1965   Date: 12/2/2024   Age: 59 y.o.  Gender: male     Body mass index is 19.64 kg/m².     Chief Complaint: lump on back    History of Present Illness: Patient is a 59-year-old male with a history of diabetes, GERD, hepatitis C, hypertension, OA who presents with a lump in the mid back.  Patient states that he noticed a lump about 8 months ago and feels like it has been getting larger.  Patient states that it is painful now that he would like to have it removed.  Denies any numbness or tingling down the back and into the legs.  Denies having similar type of lumps in the past.    Current Outpatient Medications on File Prior to Encounter   Medication Sig Dispense Refill    Continuous Glucose Sensor (FREESTYLE SAM 3 PLUS SENSOR) MISC Apply new sensor to back of arm every 15 days. 2 each 2    Continuous Glucose  (FREESTYLE SAM 3 READER) KEVAN Use reader for seeing glucose readings. 1 each 0    metFORMIN (GLUCOPHAGE-XR) 500 MG extended release tablet Take 1 tablet by mouth once daily in the morning for 7 days.  Then on Day 8 increase to 2 tablets daily. 49 tablet 1    Alcohol Swabs (B-D SINGLE USE SWABS REGULAR) PADS       lisinopril (PRINIVIL;ZESTRIL) 40 MG tablet       gabapentin (NEURONTIN) 400 MG capsule Take 1 capsule by mouth 3 times daily for 30 days. 90 capsule 0    blood glucose test strips (ONETOUCH ULTRA) strip TESTING TWICE A  strip 5    fluticasone (FLONASE) 50 MCG/ACT nasal spray 2 sprays by Each Nostril route daily 16 g 0    famotidine (PEPCID) 20 MG tablet Take 1 tablet by mouth 2 times daily 60 tablet 3    BASAGLAR KWIKPEN 100 UNIT/ML injection pen Inject 10 Units into the skin nightly 3 Adjustable Dose Pre-filled Pen Syringe 5    Insulin Syringe-Needle U-100 31G X 5/16\" 0.3 ML MISC 1 each by Does not apply route daily 100 each 3    amLODIPine (NORVASC) 10 MG tablet

## 2024-12-10 ENCOUNTER — HOSPITAL ENCOUNTER (OUTPATIENT)
Dept: PREADMISSION TESTING | Age: 59
Discharge: HOME | End: 2024-12-14
Payer: MEDICAID

## 2024-12-10 VITALS
SYSTOLIC BLOOD PRESSURE: 143 MMHG | TEMPERATURE: 97.3 F | RESPIRATION RATE: 16 BRPM | HEART RATE: 87 BPM | HEIGHT: 69 IN | BODY MASS INDEX: 19.34 KG/M2 | WEIGHT: 130.6 LBS | OXYGEN SATURATION: 96 % | DIASTOLIC BLOOD PRESSURE: 95 MMHG

## 2024-12-10 DIAGNOSIS — E55.9 VITAMIN D DEFICIENCY: ICD-10-CM

## 2024-12-10 DIAGNOSIS — Z01.818 PRE-OP TESTING: Primary | ICD-10-CM

## 2024-12-10 DIAGNOSIS — Z01.818 PREOP TESTING: ICD-10-CM

## 2024-12-10 LAB
ANION GAP SERPL CALCULATED.3IONS-SCNC: 10 MMOL/L (ref 9–16)
BASOPHILS # BLD: 0.04 K/UL (ref 0–0.2)
BASOPHILS NFR BLD: 0 % (ref 0–2)
BUN SERPL-MCNC: 19 MG/DL (ref 8–23)
CALCIUM SERPL-MCNC: 10.1 MG/DL (ref 8.6–10.4)
CHLORIDE SERPL-SCNC: 98 MMOL/L (ref 98–107)
CO2 SERPL-SCNC: 30 MMOL/L (ref 20–31)
CREAT SERPL-MCNC: 1.1 MG/DL (ref 0.7–1.2)
EOSINOPHIL # BLD: 0.18 K/UL (ref 0–0.44)
EOSINOPHILS RELATIVE PERCENT: 2 % (ref 1–4)
ERYTHROCYTE [DISTWIDTH] IN BLOOD BY AUTOMATED COUNT: 12.7 % (ref 11.8–14.4)
EST. AVERAGE GLUCOSE BLD GHB EST-MCNC: 197 MG/DL
GFR, ESTIMATED: 77 ML/MIN/1.73M2
GLUCOSE SERPL-MCNC: 171 MG/DL (ref 74–99)
HBA1C MFR BLD: 8.5 % (ref 4–6)
HCT VFR BLD AUTO: 54.4 % (ref 40.7–50.3)
HGB BLD-MCNC: 17.8 G/DL (ref 13–17)
IMM GRANULOCYTES # BLD AUTO: <0.03 K/UL (ref 0–0.3)
IMM GRANULOCYTES NFR BLD: 0 %
LYMPHOCYTES NFR BLD: 2.25 K/UL (ref 1.1–3.7)
LYMPHOCYTES RELATIVE PERCENT: 24 % (ref 24–43)
MCH RBC QN AUTO: 26.7 PG (ref 25.2–33.5)
MCHC RBC AUTO-ENTMCNC: 32.7 G/DL (ref 28.4–34.8)
MCV RBC AUTO: 81.6 FL (ref 82.6–102.9)
MONOCYTES NFR BLD: 0.95 K/UL (ref 0.1–1.2)
MONOCYTES NFR BLD: 10 % (ref 3–12)
NEUTROPHILS NFR BLD: 64 % (ref 36–65)
NEUTS SEG NFR BLD: 5.9 K/UL (ref 1.5–8.1)
NRBC BLD-RTO: 0 PER 100 WBC
PLATELET # BLD AUTO: 294 K/UL (ref 138–453)
PMV BLD AUTO: 10.5 FL (ref 8.1–13.5)
POTASSIUM SERPL-SCNC: 4.4 MMOL/L (ref 3.7–5.3)
RBC # BLD AUTO: 6.67 M/UL (ref 4.21–5.77)
RBC # BLD: ABNORMAL 10*6/UL
SODIUM SERPL-SCNC: 138 MMOL/L (ref 136–145)
WBC OTHER # BLD: 9.3 K/UL (ref 3.5–11.3)

## 2024-12-10 PROCEDURE — 36415 COLL VENOUS BLD VENIPUNCTURE: CPT

## 2024-12-10 PROCEDURE — 83036 HEMOGLOBIN GLYCOSYLATED A1C: CPT

## 2024-12-10 PROCEDURE — 80048 BASIC METABOLIC PNL TOTAL CA: CPT

## 2024-12-10 PROCEDURE — 85025 COMPLETE CBC W/AUTO DIFF WBC: CPT

## 2024-12-10 ASSESSMENT — PAIN DESCRIPTION - ORIENTATION: ORIENTATION: MID

## 2024-12-10 ASSESSMENT — PAIN DESCRIPTION - PAIN TYPE: TYPE: CHRONIC PAIN

## 2024-12-10 ASSESSMENT — PAIN - FUNCTIONAL ASSESSMENT: PAIN_FUNCTIONAL_ASSESSMENT: PREVENTS OR INTERFERES SOME ACTIVE ACTIVITIES AND ADLS

## 2024-12-10 ASSESSMENT — PAIN DESCRIPTION - ONSET: ONSET: AWAKENED FROM SLEEP

## 2024-12-10 ASSESSMENT — PAIN SCALES - GENERAL: PAINLEVEL_OUTOF10: 8

## 2024-12-10 ASSESSMENT — PAIN DESCRIPTION - DESCRIPTORS: DESCRIPTORS: BURNING;STABBING

## 2024-12-10 ASSESSMENT — PAIN DESCRIPTION - FREQUENCY: FREQUENCY: INTERMITTENT

## 2024-12-10 ASSESSMENT — PAIN DESCRIPTION - LOCATION: LOCATION: BACK

## 2024-12-10 NOTE — TELEPHONE ENCOUNTER
Jose Guadalupe Neumann is calling to request a refill on the following medication(s):    Medication Request:  Requested Prescriptions     Pending Prescriptions Disp Refills    vitamin D (ERGOCALCIFEROL) 1.25 MG (03376 UT) CAPS capsule [Pharmacy Med Name: VITAMIN D [id number] CAPSULE] 4 capsule 0     Sig: TAKE 1 CAPSULE BY MOUTH ONCE A WEEK       Last Visit Date (If Applicable):  11/20/2024    Next Visit Date:    2/24/2025

## 2024-12-10 NOTE — PRE-PROCEDURE INSTRUCTIONS
PEPCID    Please use your inhaler(s) if needed and bring your inhaler(s) from home the day of surgery.    Showering Before Surgery:     You can play an important role in your own health by carefully washing before surgery. Shower the night before and the morning of surgery using the instructions below. If you are allergic to Chlorhexidine Gluconate (CHG) use antibacterial soap instead.    If you were given Chlorhexidine soap, please follow the instructions included with the soap to shower the night before and the morning of surgery.  If you were not given Chlorhexidine, please shower or bathe the morning of surgery using an antibacterial soap.  Please dress in clean clothing after showering.     General information about Chlorhexidine Gluconate (CHG)   * It should not be used on hair, face, ears, genital area or skin that is not intact.   * It should not be used if breast feeding.   * It should not be used if you allergic to CHG.   * See the bottle for additional information.    Do Not apply any powder, deodorant, lotion/cream/oil, perfume/aftershave, cosmetics, or alcohol-based skin or hair products after showering.    Do Not shave near the planned surgical site unless specifically instructed to.     1. Wash your hair using your normal shampoo and rinse.   2. Wash your face and genital area (privates) using your own shampoo and rinse.   3. Turn off the shower water and pour half of the bottle of CHG onto a clean washcloth.   4. Wash your body from neck down to toes. Pay special attention to your surgical site.   5. Leave the CHG on your skin for 5 minutes and rinse it off.   6. Pat dry with a clean towel, sleep in clean clothes and clean sheets.   7. Do not shave near the surgical site.   8. Repeat process the morning of surgery.    CHG may cause dry skin, but should not cause redness, rash, or intense itching. If an suspect an allergic reaction, use your own soap and shampoo for the morning of surgery and report

## 2024-12-11 ENCOUNTER — TELEPHONE (OUTPATIENT)
Dept: SURGERY | Age: 59
End: 2024-12-11

## 2024-12-11 ENCOUNTER — TELEPHONE (OUTPATIENT)
Dept: FAMILY MEDICINE CLINIC | Age: 59
End: 2024-12-11

## 2024-12-11 NOTE — TELEPHONE ENCOUNTER
Patient called and stated his surgery for cyst removal on his spine was cancelled due to his A1C being high. He states the back pain is unbearable and he would like something sent in for the pain. The surgeon does not want to reschedule for a couple of months and he states he cannot deal with the pain until then. He has tried Tylenol, Advil and heating pad but nothing has helped. Patient uses [facility].

## 2024-12-11 NOTE — TELEPHONE ENCOUNTER
Pt surgery to be canceled per Dr Rodríguez due to elevated A1C. Pt notified and will follow up with PCP and follow up with us in 3 months.

## 2024-12-12 LAB
EKG ATRIAL RATE: 79 BPM
EKG P AXIS: 91 DEGREES
EKG P-R INTERVAL: 126 MS
EKG Q-T INTERVAL: 420 MS
EKG QRS DURATION: 76 MS
EKG QTC CALCULATION (BAZETT): 481 MS
EKG R AXIS: 9 DEGREES
EKG T AXIS: 90 DEGREES
EKG VENTRICULAR RATE: 79 BPM

## 2024-12-17 DIAGNOSIS — I10 ESSENTIAL HYPERTENSION: ICD-10-CM

## 2024-12-17 NOTE — TELEPHONE ENCOUNTER
Last visit: 11-20-24  Last Med refill:   Does patient have enough medication for 72 hours: No:     Next Visit Date:  Future Appointments   Date Time Provider Department Center   1/20/2025 10:00 AM Rand Epstein RPH [facility] DEP   2/24/2025  9:40 AM Nelsy Solo MD [facility] DEP       Health Maintenance   Topic Date Due    Diabetic retinal exam  03/10/2016    Diabetic Alb to Cr ratio (uACR) test  03/10/2024    Lipids  03/10/2024    Shingles vaccine (1 of 2) 08/26/2025 (Originally 2/11/2014)    Respiratory Syncytial Virus (RSV) Pregnant or age 60 yrs+ (1 - Risk 60-74 years 1-dose series) 08/26/2025 (Originally 2/11/2024)    COVID-19 Vaccine (2 - 2023-24 season) 09/20/2025 (Originally 9/1/2024)    Diabetic foot exam  04/04/2025    Depression Screen  08/27/2025    A1C test (Diabetic or Prediabetic)  12/10/2025    GFR test (Diabetes, CKD 3-4, OR last GFR 15-59)  12/10/2025    DTaP/Tdap/Td vaccine (2 - Td or Tdap) 02/22/2026    Colorectal Cancer Screen  11/19/2028    Hepatitis A vaccine  Completed    Hepatitis B vaccine  Completed    Flu vaccine  Completed    Pneumococcal 0-64 years Vaccine  Completed    HIV screen  Completed    Hib vaccine  Aged Out    Polio vaccine  Aged Out    Meningococcal (ACWY) vaccine  Aged Out       Hemoglobin A1C (%)   Date Value   12/10/2024 8.5 (H)   11/20/2024 8.8   04/04/2024 10.2             ( goal A1C is < 7)   No components found for: \"LABMICR\"  No components found for: \"LDLCHOLESTEROL\", \"LDLCALC\"    (goal LDL is <100)   AST (U/L)   Date Value   05/12/2023 31     ALT (U/L)   Date Value   05/12/2023 27     BUN (mg/dL)   Date Value   12/10/2024 19     BP Readings from Last 3 Encounters:   12/10/24 (!) 143/95   12/02/24 (!) 174/93   09/23/24 122/82          (goal 120/80)    All Future Testing planned in [facility]  Lab Frequency Next Occurrence   Lipid Panel Once 04/04/2024   Microalbumin, Ur Once 04/04/2024   XR HIP 3-4 VW W PELVIS BILATERAL Once 04/04/2024

## 2024-12-18 RX ORDER — ASPIRIN 81 MG/1
81 TABLET ORAL DAILY
Qty: 30 TABLET | Refills: 5 | Status: SHIPPED | OUTPATIENT
Start: 2024-12-18

## 2024-12-18 RX ORDER — ISOPROPYL ALCOHOL 0.75 G/1
1 SWAB TOPICAL 2 TIMES DAILY
Qty: 200 EACH | Refills: 1 | Status: SHIPPED | OUTPATIENT
Start: 2024-12-18

## 2025-02-18 DIAGNOSIS — E11.69 TYPE 2 DIABETES MELLITUS WITH OTHER SPECIFIED COMPLICATION, WITH LONG-TERM CURRENT USE OF INSULIN (HCC): ICD-10-CM

## 2025-02-18 DIAGNOSIS — K21.9 GASTROESOPHAGEAL REFLUX DISEASE WITHOUT ESOPHAGITIS: ICD-10-CM

## 2025-02-18 DIAGNOSIS — Z79.4 TYPE 2 DIABETES MELLITUS WITH OTHER SPECIFIED COMPLICATION, WITH LONG-TERM CURRENT USE OF INSULIN (HCC): ICD-10-CM

## 2025-02-18 RX ORDER — GABAPENTIN 400 MG/1
400 CAPSULE ORAL 3 TIMES DAILY
Qty: 90 CAPSULE | Refills: 0 | Status: SHIPPED | OUTPATIENT
Start: 2025-02-18 | End: 2025-03-20

## 2025-02-18 RX ORDER — FAMOTIDINE 20 MG/1
20 TABLET, FILM COATED ORAL 2 TIMES DAILY
Qty: 60 TABLET | Refills: 3 | Status: SHIPPED | OUTPATIENT
Start: 2025-02-18

## 2025-02-18 NOTE — TELEPHONE ENCOUNTER
Jose Guadalupe Neumann is calling to request a refill on the following medication(s):    Medication Request:  Requested Prescriptions     Pending Prescriptions Disp Refills    gabapentin (NEURONTIN) 400 MG capsule 90 capsule 0     Sig: Take 1 capsule by mouth 3 times daily for 30 days.    famotidine (PEPCID) 20 MG tablet 60 tablet 3     Sig: Take 1 tablet by mouth 2 times daily       Last Visit Date (If Applicable):  11/20/2024    Next Visit Date:    2/24/2025

## 2025-03-20 DIAGNOSIS — I10 ESSENTIAL HYPERTENSION: ICD-10-CM

## 2025-03-20 DIAGNOSIS — Z79.4 TYPE 2 DIABETES MELLITUS WITH OTHER SPECIFIED COMPLICATION, WITH LONG-TERM CURRENT USE OF INSULIN: ICD-10-CM

## 2025-03-20 DIAGNOSIS — E11.69 TYPE 2 DIABETES MELLITUS WITH OTHER SPECIFIED COMPLICATION, WITH LONG-TERM CURRENT USE OF INSULIN: ICD-10-CM

## 2025-03-20 DIAGNOSIS — E78.2 MIXED HYPERLIPIDEMIA: ICD-10-CM

## 2025-03-20 NOTE — TELEPHONE ENCOUNTER
Jose Guadalupe Neumann is calling to request a refill on the following medication(s):    Medication Request:  Requested Prescriptions     Pending Prescriptions Disp Refills    atorvastatin (LIPITOR) 40 MG tablet [Pharmacy Med Name: ATORVASTATIN CALCIUM 40MG TABLET] 30 tablet 3     Sig: TAKE 1 TABLET BY MOUTH DAILY    gabapentin (NEURONTIN) 400 MG capsule [Pharmacy Med Name: GABAPENTIN 400MG CAPSULE] 90 capsule 0     Sig: Take 1 capsule by mouth 3 times daily for 30 days.    amLODIPine (NORVASC) 10 MG tablet [Pharmacy Med Name: AMLODIPINE BESYLATE 10MG TABLET] 30 tablet 2     Sig: TAKE 1 TABLET BY MOUTH ONCE DAILY       Last Visit Date (If Applicable):  11/20/2024    Next Visit Date:    4/11/2025

## 2025-03-21 RX ORDER — GABAPENTIN 400 MG/1
400 CAPSULE ORAL 3 TIMES DAILY
Qty: 90 CAPSULE | Refills: 0 | Status: SHIPPED | OUTPATIENT
Start: 2025-03-21 | End: 2025-04-20

## 2025-03-21 RX ORDER — AMLODIPINE BESYLATE 10 MG/1
10 TABLET ORAL DAILY
Qty: 90 TABLET | Refills: 1 | Status: SHIPPED | OUTPATIENT
Start: 2025-03-21

## 2025-03-21 RX ORDER — ATORVASTATIN CALCIUM 40 MG/1
40 TABLET, FILM COATED ORAL DAILY
Qty: 90 TABLET | Refills: 1 | Status: SHIPPED | OUTPATIENT
Start: 2025-03-21

## 2025-04-24 ENCOUNTER — APPOINTMENT (OUTPATIENT)
Dept: GENERAL RADIOLOGY | Age: 60
End: 2025-04-24
Payer: MEDICAID

## 2025-04-24 ENCOUNTER — APPOINTMENT (OUTPATIENT)
Dept: CT IMAGING | Age: 60
End: 2025-04-24
Payer: MEDICAID

## 2025-04-24 ENCOUNTER — HOSPITAL ENCOUNTER (OUTPATIENT)
Age: 60
Setting detail: OBSERVATION
Discharge: HOME | End: 2025-04-25
Attending: EMERGENCY MEDICINE
Payer: MEDICAID
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
## 2025-04-24 DIAGNOSIS — M79.604 PAIN OF RIGHT LOWER EXTREMITY: ICD-10-CM

## 2025-04-24 DIAGNOSIS — M79.2 POLYNEUROPATHIC PAIN: ICD-10-CM

## 2025-04-24 DIAGNOSIS — R07.9 CHEST PAIN, UNSPECIFIED TYPE: ICD-10-CM

## 2025-04-24 DIAGNOSIS — R73.9 HYPERGLYCEMIA: ICD-10-CM

## 2025-04-24 PROBLEM — R26.2 DIFFICULTY IN WALKING: Status: ACTIVE | Noted: 2025-04-24

## 2025-04-24 PROBLEM — M25.473 ANKLE SWELLING: Status: ACTIVE | Noted: 2025-04-24

## 2025-04-24 PROBLEM — M25.519 SHOULDER PAIN: Status: ACTIVE | Noted: 2025-04-24

## 2025-04-24 LAB
ALBUMIN SERPL-MCNC: 3.7 G/DL (ref 3.5–5.2)
ALBUMIN/GLOB SERPL: 1 {RATIO} (ref 1–2.5)
ALP SERPL-CCNC: 87 U/L (ref 40–129)
ALT SERPL-CCNC: 17 U/L (ref 10–50)
ANION GAP SERPL CALCULATED.3IONS-SCNC: 14 MMOL/L (ref 9–16)
AST SERPL-CCNC: 24 U/L (ref 10–50)
BASOPHILS # BLD: <0.03 K/UL (ref 0–0.2)
BASOPHILS NFR BLD: 0 % (ref 0–2)
BILIRUB SERPL-MCNC: 0.5 MG/DL (ref 0–1.2)
BUN BLD-MCNC: 28 MG/DL (ref 8–26)
BUN SERPL-MCNC: 26 MG/DL (ref 8–23)
CA-I BLD-SCNC: 1.29 MMOL/L (ref 1.15–1.33)
CALCIUM SERPL-MCNC: 9.5 MG/DL (ref 8.6–10.4)
CHLORIDE BLD-SCNC: 93 MMOL/L (ref 98–107)
CHLORIDE SERPL-SCNC: 92 MMOL/L (ref 98–107)
CHP ED QC CHECK: YES
CO2 BLD CALC-SCNC: 32 MMOL/L (ref 22–30)
CO2 SERPL-SCNC: 25 MMOL/L (ref 20–31)
CREAT SERPL-MCNC: 1.5 MG/DL (ref 0.7–1.2)
EGFR, POC: 63 ML/MIN/1.73M2
EOSINOPHIL # BLD: 0.05 K/UL (ref 0–0.44)
EOSINOPHILS RELATIVE PERCENT: 1 % (ref 1–4)
ERYTHROCYTE [DISTWIDTH] IN BLOOD BY AUTOMATED COUNT: 11.4 % (ref 11.8–14.4)
GFR, ESTIMATED: 53 ML/MIN/1.73M2
GLUCOSE BLD-MCNC: 199 MG/DL (ref 75–110)
GLUCOSE BLD-MCNC: 289 MG/DL (ref 75–110)
GLUCOSE BLD-MCNC: 359 MG/DL
GLUCOSE BLD-MCNC: 359 MG/DL (ref 75–110)
GLUCOSE BLD-MCNC: 561 MG/DL
GLUCOSE BLD-MCNC: 561 MG/DL (ref 74–100)
GLUCOSE SERPL-MCNC: 508 MG/DL (ref 74–99)
HCO3 VENOUS: 32.9 MMOL/L (ref 22–29)
HCT VFR BLD AUTO: 39.2 % (ref 40.7–50.3)
HCT VFR BLD AUTO: 47 % (ref 41–53)
HGB BLD-MCNC: 13.6 G/DL (ref 13–17)
IMM GRANULOCYTES # BLD AUTO: 0.06 K/UL (ref 0–0.3)
IMM GRANULOCYTES NFR BLD: 1 %
LYMPHOCYTES NFR BLD: 1.17 K/UL (ref 1.1–3.7)
LYMPHOCYTES RELATIVE PERCENT: 11 % (ref 24–43)
MCH RBC QN AUTO: 26.5 PG (ref 25.2–33.5)
MCHC RBC AUTO-ENTMCNC: 34.7 G/DL (ref 28.4–34.8)
MCV RBC AUTO: 76.3 FL (ref 82.6–102.9)
MONOCYTES NFR BLD: 1.08 K/UL (ref 0.1–1.2)
MONOCYTES NFR BLD: 10 % (ref 3–12)
NEUTROPHILS NFR BLD: 77 % (ref 36–65)
NEUTS SEG NFR BLD: 8.61 K/UL (ref 1.5–8.1)
NRBC BLD-RTO: 0 PER 100 WBC
O2 SAT, VEN: 35 % (ref 60–85)
PCO2 VENOUS: 55.7 MM HG (ref 41–51)
PH VENOUS: 7.38 (ref 7.32–7.43)
PLATELET # BLD AUTO: 349 K/UL (ref 138–453)
PMV BLD AUTO: 9.9 FL (ref 8.1–13.5)
PO2 VENOUS: 22.1 MM HG (ref 30–50)
POC ANION GAP: 11 MMOL/L (ref 7–16)
POC CREATININE: 1.3 MG/DL (ref 0.51–1.19)
POC HEMOGLOBIN (CALC): 16.1 G/DL (ref 13.5–17.5)
POC LACTIC ACID: 1.4 MMOL/L (ref 0.56–1.39)
POSITIVE BASE EXCESS, VEN: 5.8 MMOL/L (ref 0–3)
POTASSIUM BLD-SCNC: 4.5 MMOL/L (ref 3.5–4.5)
POTASSIUM SERPL-SCNC: 4.4 MMOL/L (ref 3.7–5.3)
PROT SERPL-MCNC: 7.3 G/DL (ref 6.6–8.7)
RBC # BLD AUTO: 5.14 M/UL (ref 4.21–5.77)
RBC # BLD: ABNORMAL 10*6/UL
SODIUM BLD-SCNC: 135 MMOL/L (ref 138–146)
SODIUM SERPL-SCNC: 131 MMOL/L (ref 136–145)
TROPONIN I SERPL HS-MCNC: 12 NG/L (ref 0–22)
TROPONIN I SERPL HS-MCNC: 18 NG/L (ref 0–22)
WBC OTHER # BLD: 11 K/UL (ref 3.5–11.3)

## 2025-04-24 PROCEDURE — 82947 ASSAY GLUCOSE BLOOD QUANT: CPT

## 2025-04-24 PROCEDURE — 82565 ASSAY OF CREATININE: CPT

## 2025-04-24 PROCEDURE — G0378 HOSPITAL OBSERVATION PER HR: HCPCS

## 2025-04-24 PROCEDURE — 2500000003 HC RX 250 WO HCPCS: Performed by: NURSE PRACTITIONER

## 2025-04-24 PROCEDURE — 85025 COMPLETE CBC W/AUTO DIFF WBC: CPT

## 2025-04-24 PROCEDURE — 73200 CT UPPER EXTREMITY W/O DYE: CPT

## 2025-04-24 PROCEDURE — 96361 HYDRATE IV INFUSION ADD-ON: CPT

## 2025-04-24 PROCEDURE — 73030 X-RAY EXAM OF SHOULDER: CPT

## 2025-04-24 PROCEDURE — 6370000000 HC RX 637 (ALT 250 FOR IP): Performed by: NURSE PRACTITIONER

## 2025-04-24 PROCEDURE — 82803 BLOOD GASES ANY COMBINATION: CPT

## 2025-04-24 PROCEDURE — 73552 X-RAY EXAM OF FEMUR 2/>: CPT

## 2025-04-24 PROCEDURE — 93005 ELECTROCARDIOGRAM TRACING: CPT

## 2025-04-24 PROCEDURE — 2580000003 HC RX 258: Performed by: NURSE PRACTITIONER

## 2025-04-24 PROCEDURE — 83605 ASSAY OF LACTIC ACID: CPT

## 2025-04-24 PROCEDURE — 6370000000 HC RX 637 (ALT 250 FOR IP)

## 2025-04-24 PROCEDURE — 73590 X-RAY EXAM OF LOWER LEG: CPT

## 2025-04-24 PROCEDURE — 84520 ASSAY OF UREA NITROGEN: CPT

## 2025-04-24 PROCEDURE — 80053 COMPREHEN METABOLIC PANEL: CPT

## 2025-04-24 PROCEDURE — 99285 EMERGENCY DEPT VISIT HI MDM: CPT

## 2025-04-24 PROCEDURE — 85014 HEMATOCRIT: CPT

## 2025-04-24 PROCEDURE — 80051 ELECTROLYTE PANEL: CPT

## 2025-04-24 PROCEDURE — 2580000003 HC RX 258

## 2025-04-24 PROCEDURE — 84484 ASSAY OF TROPONIN QUANT: CPT

## 2025-04-24 PROCEDURE — 82330 ASSAY OF CALCIUM: CPT

## 2025-04-24 PROCEDURE — 73610 X-RAY EXAM OF ANKLE: CPT

## 2025-04-24 PROCEDURE — 71046 X-RAY EXAM CHEST 2 VIEWS: CPT

## 2025-04-24 PROCEDURE — 73630 X-RAY EXAM OF FOOT: CPT

## 2025-04-24 RX ORDER — AMLODIPINE BESYLATE 10 MG/1
10 TABLET ORAL DAILY
Status: DISCONTINUED | OUTPATIENT
Start: 2025-04-24 | End: 2025-04-25 | Stop reason: HOSPADM

## 2025-04-24 RX ORDER — SODIUM CHLORIDE 9 MG/ML
INJECTION, SOLUTION INTRAVENOUS PRN
Status: DISCONTINUED | OUTPATIENT
Start: 2025-04-24 | End: 2025-04-25 | Stop reason: HOSPADM

## 2025-04-24 RX ORDER — MECLIZINE HCL 12.5 MG 12.5 MG/1
25 TABLET ORAL ONCE
Status: COMPLETED | OUTPATIENT
Start: 2025-04-24 | End: 2025-04-24

## 2025-04-24 RX ORDER — BISACODYL 10 MG
10 SUPPOSITORY, RECTAL RECTAL DAILY PRN
Status: DISCONTINUED | OUTPATIENT
Start: 2025-04-24 | End: 2025-04-25 | Stop reason: HOSPADM

## 2025-04-24 RX ORDER — ACETAMINOPHEN 500 MG
1000 TABLET ORAL ONCE
Status: DISCONTINUED | OUTPATIENT
Start: 2025-04-24 | End: 2025-04-25 | Stop reason: HOSPADM

## 2025-04-24 RX ORDER — ONDANSETRON 2 MG/ML
4 INJECTION INTRAMUSCULAR; INTRAVENOUS EVERY 6 HOURS PRN
Status: DISCONTINUED | OUTPATIENT
Start: 2025-04-24 | End: 2025-04-25 | Stop reason: HOSPADM

## 2025-04-24 RX ORDER — ONDANSETRON 4 MG/1
4 TABLET, ORALLY DISINTEGRATING ORAL EVERY 8 HOURS PRN
Status: DISCONTINUED | OUTPATIENT
Start: 2025-04-24 | End: 2025-04-25 | Stop reason: HOSPADM

## 2025-04-24 RX ORDER — ACETAMINOPHEN 325 MG/1
650 TABLET ORAL EVERY 6 HOURS PRN
Status: DISCONTINUED | OUTPATIENT
Start: 2025-04-24 | End: 2025-04-25 | Stop reason: HOSPADM

## 2025-04-24 RX ORDER — OXYCODONE HYDROCHLORIDE 5 MG/1
5 TABLET ORAL ONCE
Refills: 0 | Status: COMPLETED | OUTPATIENT
Start: 2025-04-24 | End: 2025-04-24

## 2025-04-24 RX ORDER — ENOXAPARIN SODIUM 100 MG/ML
40 INJECTION SUBCUTANEOUS DAILY
Status: DISCONTINUED | OUTPATIENT
Start: 2025-04-24 | End: 2025-04-24

## 2025-04-24 RX ORDER — SODIUM CHLORIDE, SODIUM LACTATE, POTASSIUM CHLORIDE, AND CALCIUM CHLORIDE .6; .31; .03; .02 G/100ML; G/100ML; G/100ML; G/100ML
1000 INJECTION, SOLUTION INTRAVENOUS ONCE
Status: COMPLETED | OUTPATIENT
Start: 2025-04-24 | End: 2025-04-24

## 2025-04-24 RX ORDER — HEPARIN SODIUM 5000 [USP'U]/ML
5000 INJECTION, SOLUTION INTRAVENOUS; SUBCUTANEOUS EVERY 8 HOURS SCHEDULED
Status: DISCONTINUED | OUTPATIENT
Start: 2025-04-24 | End: 2025-04-25 | Stop reason: HOSPADM

## 2025-04-24 RX ORDER — GABAPENTIN 300 MG/1
300 CAPSULE ORAL ONCE
Status: COMPLETED | OUTPATIENT
Start: 2025-04-24 | End: 2025-04-24

## 2025-04-24 RX ORDER — INSULIN GLARGINE 100 [IU]/ML
10 INJECTION, SOLUTION SUBCUTANEOUS NIGHTLY
Status: DISCONTINUED | OUTPATIENT
Start: 2025-04-24 | End: 2025-04-25 | Stop reason: HOSPADM

## 2025-04-24 RX ORDER — GLUCAGON 1 MG/ML
1 KIT INJECTION PRN
Status: DISCONTINUED | OUTPATIENT
Start: 2025-04-24 | End: 2025-04-25 | Stop reason: HOSPADM

## 2025-04-24 RX ORDER — VITAMIN B COMPLEX
1000 TABLET ORAL DAILY
Status: DISCONTINUED | OUTPATIENT
Start: 2025-04-24 | End: 2025-04-25 | Stop reason: HOSPADM

## 2025-04-24 RX ORDER — ASPIRIN 81 MG/1
81 TABLET ORAL DAILY
Status: DISCONTINUED | OUTPATIENT
Start: 2025-04-24 | End: 2025-04-25 | Stop reason: HOSPADM

## 2025-04-24 RX ORDER — OXYCODONE HYDROCHLORIDE 5 MG/1
5 TABLET ORAL EVERY 6 HOURS PRN
Refills: 0 | Status: DISCONTINUED | OUTPATIENT
Start: 2025-04-24 | End: 2025-04-25 | Stop reason: HOSPADM

## 2025-04-24 RX ORDER — HYDRALAZINE HYDROCHLORIDE 20 MG/ML
10 INJECTION INTRAMUSCULAR; INTRAVENOUS EVERY 6 HOURS PRN
Status: DISCONTINUED | OUTPATIENT
Start: 2025-04-24 | End: 2025-04-25 | Stop reason: HOSPADM

## 2025-04-24 RX ORDER — INSULIN LISPRO 100 [IU]/ML
0-8 INJECTION, SOLUTION INTRAVENOUS; SUBCUTANEOUS
Status: DISCONTINUED | OUTPATIENT
Start: 2025-04-24 | End: 2025-04-25 | Stop reason: HOSPADM

## 2025-04-24 RX ORDER — DEXTROSE MONOHYDRATE 100 MG/ML
INJECTION, SOLUTION INTRAVENOUS CONTINUOUS PRN
Status: DISCONTINUED | OUTPATIENT
Start: 2025-04-24 | End: 2025-04-25 | Stop reason: HOSPADM

## 2025-04-24 RX ORDER — ACETAMINOPHEN 650 MG/1
650 SUPPOSITORY RECTAL EVERY 6 HOURS PRN
Status: DISCONTINUED | OUTPATIENT
Start: 2025-04-24 | End: 2025-04-25 | Stop reason: HOSPADM

## 2025-04-24 RX ORDER — FAMOTIDINE 20 MG/1
20 TABLET, FILM COATED ORAL 2 TIMES DAILY
Status: DISCONTINUED | OUTPATIENT
Start: 2025-04-24 | End: 2025-04-25 | Stop reason: HOSPADM

## 2025-04-24 RX ORDER — SODIUM CHLORIDE 0.9 % (FLUSH) 0.9 %
5-40 SYRINGE (ML) INJECTION EVERY 12 HOURS SCHEDULED
Status: DISCONTINUED | OUTPATIENT
Start: 2025-04-24 | End: 2025-04-25 | Stop reason: HOSPADM

## 2025-04-24 RX ORDER — SODIUM CHLORIDE 9 MG/ML
INJECTION, SOLUTION INTRAVENOUS CONTINUOUS
Status: DISCONTINUED | OUTPATIENT
Start: 2025-04-24 | End: 2025-04-25 | Stop reason: HOSPADM

## 2025-04-24 RX ORDER — POTASSIUM CHLORIDE 1500 MG/1
40 TABLET, EXTENDED RELEASE ORAL PRN
Status: DISCONTINUED | OUTPATIENT
Start: 2025-04-24 | End: 2025-04-25 | Stop reason: HOSPADM

## 2025-04-24 RX ORDER — SODIUM CHLORIDE, SODIUM LACTATE, POTASSIUM CHLORIDE, AND CALCIUM CHLORIDE .6; .31; .03; .02 G/100ML; G/100ML; G/100ML; G/100ML
500 INJECTION, SOLUTION INTRAVENOUS ONCE
Status: DISCONTINUED | OUTPATIENT
Start: 2025-04-24 | End: 2025-04-24

## 2025-04-24 RX ORDER — ATORVASTATIN CALCIUM 40 MG/1
40 TABLET, FILM COATED ORAL DAILY
Status: DISCONTINUED | OUTPATIENT
Start: 2025-04-24 | End: 2025-04-25 | Stop reason: HOSPADM

## 2025-04-24 RX ORDER — SODIUM CHLORIDE 0.9 % (FLUSH) 0.9 %
5-40 SYRINGE (ML) INJECTION PRN
Status: DISCONTINUED | OUTPATIENT
Start: 2025-04-24 | End: 2025-04-25 | Stop reason: HOSPADM

## 2025-04-24 RX ORDER — POLYETHYLENE GLYCOL 3350 17 G/17G
17 POWDER, FOR SOLUTION ORAL DAILY PRN
Status: DISCONTINUED | OUTPATIENT
Start: 2025-04-24 | End: 2025-04-25 | Stop reason: HOSPADM

## 2025-04-24 RX ORDER — ACETAMINOPHEN 325 MG/1
650 TABLET ORAL ONCE
Status: COMPLETED | OUTPATIENT
Start: 2025-04-24 | End: 2025-04-24

## 2025-04-24 RX ORDER — POTASSIUM CHLORIDE 7.45 MG/ML
10 INJECTION INTRAVENOUS PRN
Status: DISCONTINUED | OUTPATIENT
Start: 2025-04-24 | End: 2025-04-25 | Stop reason: HOSPADM

## 2025-04-24 RX ADMIN — FAMOTIDINE 20 MG: 20 TABLET, FILM COATED ORAL at 20:33

## 2025-04-24 RX ADMIN — ACETAMINOPHEN 650 MG: 325 TABLET ORAL at 20:41

## 2025-04-24 RX ADMIN — SODIUM CHLORIDE: 0.9 INJECTION, SOLUTION INTRAVENOUS at 20:38

## 2025-04-24 RX ADMIN — OXYCODONE 5 MG: 5 TABLET ORAL at 13:39

## 2025-04-24 RX ADMIN — SODIUM CHLORIDE, PRESERVATIVE FREE 10 ML: 5 INJECTION INTRAVENOUS at 20:34

## 2025-04-24 RX ADMIN — GABAPENTIN 300 MG: 300 CAPSULE ORAL at 17:17

## 2025-04-24 RX ADMIN — AMLODIPINE BESYLATE 10 MG: 10 TABLET ORAL at 20:33

## 2025-04-24 RX ADMIN — GABAPENTIN 400 MG: 300 CAPSULE ORAL at 20:33

## 2025-04-24 RX ADMIN — INSULIN LISPRO 2 UNITS: 100 INJECTION, SOLUTION INTRAVENOUS; SUBCUTANEOUS at 20:34

## 2025-04-24 RX ADMIN — MECLIZINE 25 MG: 12.5 TABLET ORAL at 17:17

## 2025-04-24 RX ADMIN — SODIUM CHLORIDE, POTASSIUM CHLORIDE, SODIUM LACTATE AND CALCIUM CHLORIDE 1000 ML: 600; 310; 30; 20 INJECTION, SOLUTION INTRAVENOUS at 12:25

## 2025-04-24 RX ADMIN — OXYCODONE 5 MG: 5 TABLET ORAL at 20:41

## 2025-04-24 RX ADMIN — INSULIN HUMAN 10 UNITS: 100 INJECTION, SOLUTION PARENTERAL at 13:38

## 2025-04-24 RX ADMIN — ACETAMINOPHEN 650 MG: 325 TABLET ORAL at 14:07

## 2025-04-24 RX ADMIN — Medication 1000 UNITS: at 20:33

## 2025-04-24 RX ADMIN — ASPIRIN 81 MG: 81 TABLET, COATED ORAL at 20:33

## 2025-04-24 RX ADMIN — ATORVASTATIN CALCIUM 40 MG: 40 TABLET, FILM COATED ORAL at 20:33

## 2025-04-24 ASSESSMENT — PAIN SCALES - GENERAL
PAINLEVEL_OUTOF10: 10
PAINLEVEL_OUTOF10: 9

## 2025-04-24 ASSESSMENT — PAIN DESCRIPTION - LOCATION: LOCATION: ANKLE;FOOT

## 2025-04-24 ASSESSMENT — PAIN DESCRIPTION - ORIENTATION: ORIENTATION: RIGHT

## 2025-04-24 NOTE — ED NOTES
Orthostatic VS:    Lying  /112  HR 85  RR 17  O2 96%    Sitting  /111  HR 86  RR 16  O2 96%    Standing: Deferred patient's choice d/t unsteady gait.

## 2025-04-24 NOTE — ED NOTES
Patient ambulating with crutches in room with RN. Patient states he is too dizzy and had to sit back down in bed. Dr. Rodriguez notified

## 2025-04-24 NOTE — PROGRESS NOTES
Spiritual Health History and Assessment/Progress Note  [facility]    (P) Initial Encounter,  ,  ,      Name: Jose Guadalupe Neumann MRN: [id number]    Age: 60 y.o.     Sex: male   Language: English   Roman Catholic: Restorationism   <principal problem not specified>     Date: 4/24/2025            Total Time Calculated: (P) 10 min              Spiritual Assessment began in [facility] EMERGENCY DEPARTMENT        Referral/Consult From: (P) Rounding   Encounter Overview/Reason: (P) Initial Encounter  Service Provided For: (P) Patient    Lo, Belief, Meaning:   Patient is connected with a lo tradition or spiritual practice  Family/Friends No family/friends present      Importance and Influence:  Patient has no beliefs influential to healthcare decision-making identified during this visit  Family/Friends No family/friends present    Community:  Patient feels well-supported. Support system includes: Spouse/Partner  Family/Friends No family/friends present    Assessment and Plan of Care:     Patient Interventions include: Other:  provided a supportive presence through active listening and words of affirmation.  Family/Friends Interventions include: No family/friends present    Patient Plan of Care: Spiritual Care available upon further referral  Family/Friends Plan of Care: No family/friends present    Electronically signed by Chaplain Guido on 4/24/2025 at 6:23 PM

## 2025-04-24 NOTE — ED PROVIDER NOTES
[facility]  FACULTY HANDOFF       Handoff taken on the following patient from prior Attending Physician:  Pt Name: Jose Guadalupe Neumann  PCP:  Nelsy Solo MD    Attestation  I was available and discussed any additional care issues that arose and coordinated the management plans with all resident(s) caring for the patient during my duty period. Any areas of disagreement with resident's documentation of care or procedures are noted on the chart. I was personally present for the key portions of any/all procedures during my duty period. I have documented in the chart those procedures where I was not present during the key portions.          Harlan Huang MD  04/24/25 3610

## 2025-04-24 NOTE — ED NOTES
ED to inpatient nurses report      Chief Complaint:  Chief Complaint   Patient presents with    Fall     Present to ED from: Home    MOA: EMS     LOC: alert and orientated to name, place, date  Mobility: Requires assistance * 1  Oxygen Baseline: 96% on RA    Current needs required: Assist walking and eval for PT/OT   Pending ED orders: None  Present condition: Stable    Why did the patient come to the ED?   Patient to ED for fall 2 days ago. Patient states he missed a step while carrying a laundry basket down the stairs. Patient states he did hit his head and loose consciousness. C/o right ankle pain and left shoulder and neck pain. Patient also reports shortness of breath, chest pain, and dizziness that started this morning after taking a shower. Patient states he vomited 2 times today as well. Patient reports hx of diabetes and states his glucose has been \"high.\" Patient states he has been taking his insulin and metformin. Patient repeatedly stated that his main concern is his ankle pain and swelling. States he has been taking motrin at home with no relief of pain. Patient alert and oriented x4, talking in complete sentences. Respirations even and unlabored. Patient placed on continuous cardiac monitoring, BP cuff, and pulse ox. EKG obtained, blood work obtained. Call light in reach, all needs met at this time  What is the plan? Admit to OBS  Any procedures or intervention occur? Labs, imaging  Any safety concerns??    Mental Status:  Level of Consciousness: Alert (0)    Psych Assessment:   Psychosocial  Psychosocial (WDL): Within Defined Limits  Vital signs   Vitals:    04/24/25 1530 04/24/25 1555 04/24/25 1730 04/24/25 1900   BP:   (!) 148/95 (!) 155/105   Pulse: 79 74 76 75   Resp: 13 13 13 18   Temp:       TempSrc:       SpO2: 97% 100% 95% 96%   Weight:            Vitals:  Patient Vitals for the past 24 hrs:   BP Temp Temp src Pulse Resp SpO2 Weight   04/24/25 1900 (!) 155/105 -- -- 75 18 96 % --   04/24/25  (TYLENOL) tablet 650 mg    acetaminophen (TYLENOL) tablet 1,000 mg    gabapentin (NEURONTIN) capsule 300 mg    meclizine (ANTIVERT) tablet 25 mg    amLODIPine (NORVASC) tablet 10 mg    aspirin EC tablet 81 mg    atorvastatin (LIPITOR) tablet 40 mg    famotidine (PEPCID) tablet 20 mg    gabapentin (NEURONTIN) capsule 400 mg    vitamin D (CHOLECALCIFEROL) tablet 1,000 Units    insulin lispro (HUMALOG,ADMELOG) injection vial 0-8 Units    0.9 % sodium chloride infusion    sodium chloride flush 0.9 % injection 5-40 mL    sodium chloride flush 0.9 % injection 5-40 mL    0.9 % sodium chloride infusion    OR Linked Order Group     potassium chloride (KLOR-CON M) extended release tablet 40 mEq     potassium bicarb-citric acid (EFFER-K) effervescent tablet 40 mEq     potassium chloride 10 mEq/100 mL IVPB (Peripheral Line)    DISCONTD: enoxaparin (LOVENOX) injection 40 mg     Indication of Use:   Prophylaxis-DVT/PE    OR Linked Order Group     ondansetron (ZOFRAN-ODT) disintegrating tablet 4 mg     ondansetron (ZOFRAN) injection 4 mg    polyethylene glycol (GLYCOLAX) packet 17 g    bisacodyl (DULCOLAX) suppository 10 mg    OR Linked Order Group     acetaminophen (TYLENOL) tablet 650 mg     acetaminophen (TYLENOL) suppository 650 mg       SURGICAL HISTORY       Past Surgical History:   Procedure Laterality Date    BACK SURGERY      L4-5    BUNIONECTOMY Right     BUNIONECTOMY Left 02/19/2020    LEFT FOOT MODIFIED LAPIDUS WITH AKIN OSTEOTOMY performed by Ja Kaba DPM at [facility] OR    CHOLECYSTECTOMY  09/08/2015    laprascopic    COLONOSCOPY N/A 11/19/2018    COLONOSCOPY DIAGNOSTIC performed by Les Esparza IV, DO at [facility]    ENDOSCOPY, COLON, DIAGNOSTIC      FOOT SURGERY      FOOT SURGERY Right     FOOT SURGERY      hardware removed    FOOT SURGERY Right 06/26/2014    delayed closure    FOOT SURGERY Right 10/15/2014    removal of hardware    FOOT SURGERY Left 02/19/2020    Left Foot Modified Lapidus With Akin

## 2025-04-24 NOTE — ED NOTES
Patient to ED for fall 2 days ago. Patient states he missed a step while carrying a laundry basket down the stairs. Patient states he did hit his head and loose consciousness. C/o right ankle pain and left shoulder and neck pain. Patient also reports shortness of breath, chest pain, and dizziness that started this morning after taking a shower. Patient states he vomited 2 times today as well. Patient reports hx of diabetes and states his glucose has been \"high.\" Patient states he has been taking his insulin and metformin. Patient repeatedly stated that his main concern is his ankle pain and swelling. States he has been taking motrin at home with no relief of pain. Patient alert and oriented x4, talking in complete sentences. Respirations even and unlabored. Patient placed on continuous cardiac monitoring, BP cuff, and pulse ox. EKG obtained, blood work obtained. Call light in reach, all needs met at this time

## 2025-04-24 NOTE — ED PROVIDER NOTES
[facility] EMERGENCY DEPARTMENT     Emergency Department     Faculty Attestation    I performed a history and physical examination of the patient and discussed management with the resident. I reviewed the resident’s note and agree with the documented findings and plan of care. Any areas of disagreement are noted on the chart. I was personally present for the key portions of any procedures. I have documented in the chart those procedures where I was not present during the key portions. I have reviewed the emergency nurses triage note. I agree with the chief complaint, past medical history, past surgical history, allergies, medications, social and family history as documented unless otherwise noted below. For Physician Assistant/ Nurse Practitioner cases/documentation I have personally evaluated this patient and have completed at least one if not all key elements of the E/M (history, physical exam, and MDM). Additional findings are as noted.    12:13 PM EDT    Patient presents with right foot and ankle pain after he had a fall 2 days ago.  He says that he was coming downstairs and his ankle twisted causing him to fall.  He denies hitting his head or having loss of consciousness.  He is not on any anticoagulants.  Patient says he has been feeling a little dizzy and lightheaded since the fall and is also having some pain to his left shoulder that radiates into his chest.  Patient also states that his blood sugar has been over 400 today.  He says he has been taking all of his medication including his insulin he denies any recent fever or illness.  On exam, patient is resting comfortably in the bed.  He is alert and oriented and answering questions appropriately.  There is moderate edema and tenderness to the right foot and ankle.  Distal sensation and pulses are intact.  Lungs are clear to auscultation bilaterally.  Abdomen is soft and nontender.  Will get imaging and labs and reassess.    EKG

## 2025-04-24 NOTE — ED PROVIDER NOTES
[facility] OBSERVATION UNIT  Emergency Department Encounter  Emergency Medicine Resident     Pt Name:Jose Guadalupe Neumann  MRN: [id number]  Birthdate 1965  Date of evaluation: 4/24/25  PCP:  Nelsy Solo MD  Note Started: 11:56 AM EDT      CHIEF COMPLAINT       Chief Complaint   Patient presents with    Fall       HISTORY OF PRESENT ILLNESS  (Location/Symptom, Timing/Onset, Context/Setting, Quality, Duration, Modifying Factors, Severity.)      Jose Guadalupe Neumann is a 60 y.o. male who presents with right foot, ankle swelling and pain after missing a step 2 days ago and twisting his ankle.  Patient states he landed on his left shoulder.  Possibly struck his head.  Is not on any blood thinners.  Was able to get up and ambulate without difficulty since.  Worsening pain.  Patient also complaining of a left shoulder numbness and pain as he landed on that.  Has been able to move his left shoulder and arm but states that it feels like his nerve is pinched.  Pain goes into his chest.  No shortness of breath.  Patient with a past medical history of diabetes, hypertension with previous surgeries on bilateral feet.  Patient does also note his blood glucose levels have been high    PAST MEDICAL / SURGICAL / SOCIAL / FAMILY HISTORY      has a past medical history of Chronic back pain, Diabetes mellitus (HCC), Diabetic neuropathy (HCC), DM (diabetes mellitus) (HCC), Full dentures, Gastroesophageal reflux disease without esophagitis, Hepatitis C, Hyperlipidemia, Hypertension, MDRO (multiple drug resistant organisms) resistance, Murmur, Neuropathy, Opioid withdrawal (HCC), and Osteoarthritis.     has a past surgical history that includes Foot surgery; Bunionectomy (Right); Foot surgery (Right); Foot surgery; Foot surgery (Right, 06/26/2014); Foot surgery (Right, 10/15/2014); osteotomy (Right, 02/06/2015); Cholecystectomy (09/08/2015); Colonoscopy (N/A, 11/19/2018); Foot surgery (Left, 02/19/2020); Bunionectomy (Left, 02/19/2020);

## 2025-04-25 ENCOUNTER — APPOINTMENT (OUTPATIENT)
Dept: CT IMAGING | Age: 60
End: 2025-04-25
Payer: MEDICAID

## 2025-04-25 VITALS
DIASTOLIC BLOOD PRESSURE: 85 MMHG | SYSTOLIC BLOOD PRESSURE: 153 MMHG | WEIGHT: 150 LBS | HEART RATE: 94 BPM | TEMPERATURE: 98.1 F | BODY MASS INDEX: 22.15 KG/M2 | RESPIRATION RATE: 14 BRPM | OXYGEN SATURATION: 97 %

## 2025-04-25 PROBLEM — N17.9 AKI (ACUTE KIDNEY INJURY): Status: RESOLVED | Noted: 2024-08-30 | Resolved: 2025-04-25

## 2025-04-25 LAB
ANION GAP SERPL CALCULATED.3IONS-SCNC: 11 MMOL/L (ref 9–16)
BASOPHILS # BLD: 0.03 K/UL (ref 0–0.2)
BASOPHILS NFR BLD: 0 % (ref 0–2)
BUN SERPL-MCNC: 17 MG/DL (ref 8–23)
CALCIUM SERPL-MCNC: 9 MG/DL (ref 8.6–10.4)
CHLORIDE SERPL-SCNC: 100 MMOL/L (ref 98–107)
CHOLEST SERPL-MCNC: 79 MG/DL (ref 0–199)
CHOLESTEROL/HDL RATIO: 2.4
CO2 SERPL-SCNC: 24 MMOL/L (ref 20–31)
CREAT SERPL-MCNC: 1.1 MG/DL (ref 0.7–1.2)
EOSINOPHIL # BLD: 0.18 K/UL (ref 0–0.44)
EOSINOPHILS RELATIVE PERCENT: 2 % (ref 1–4)
ERYTHROCYTE [DISTWIDTH] IN BLOOD BY AUTOMATED COUNT: 11.5 % (ref 11.8–14.4)
EST. AVERAGE GLUCOSE BLD GHB EST-MCNC: 237 MG/DL
GFR, ESTIMATED: 76 ML/MIN/1.73M2
GLUCOSE BLD-MCNC: 360 MG/DL (ref 75–110)
GLUCOSE SERPL-MCNC: 304 MG/DL (ref 74–99)
HBA1C MFR BLD: 9.9 % (ref 4–6)
HCT VFR BLD AUTO: 36.6 % (ref 40.7–50.3)
HDLC SERPL-MCNC: 33 MG/DL
HGB BLD-MCNC: 11.8 G/DL (ref 13–17)
IMM GRANULOCYTES # BLD AUTO: 0.05 K/UL (ref 0–0.3)
IMM GRANULOCYTES NFR BLD: 1 %
LDLC SERPL CALC-MCNC: 30 MG/DL (ref 0–100)
LYMPHOCYTES NFR BLD: 1.72 K/UL (ref 1.1–3.7)
LYMPHOCYTES RELATIVE PERCENT: 18 % (ref 24–43)
MCH RBC QN AUTO: 26.4 PG (ref 25.2–33.5)
MCHC RBC AUTO-ENTMCNC: 32.2 G/DL (ref 28.4–34.8)
MCV RBC AUTO: 81.9 FL (ref 82.6–102.9)
MONOCYTES NFR BLD: 0.99 K/UL (ref 0.1–1.2)
MONOCYTES NFR BLD: 10 % (ref 3–12)
NEUTROPHILS NFR BLD: 69 % (ref 36–65)
NEUTS SEG NFR BLD: 6.74 K/UL (ref 1.5–8.1)
NRBC BLD-RTO: 0 PER 100 WBC
PLATELET # BLD AUTO: 318 K/UL (ref 138–453)
PMV BLD AUTO: 10.3 FL (ref 8.1–13.5)
POTASSIUM SERPL-SCNC: 4 MMOL/L (ref 3.7–5.3)
RBC # BLD AUTO: 4.47 M/UL (ref 4.21–5.77)
RBC # BLD: ABNORMAL 10*6/UL
SODIUM SERPL-SCNC: 135 MMOL/L (ref 136–145)
TRIGL SERPL-MCNC: 79 MG/DL (ref 0–149)
VLDLC SERPL CALC-MCNC: 16 MG/DL (ref 1–30)
WBC OTHER # BLD: 9.7 K/UL (ref 3.5–11.3)

## 2025-04-25 PROCEDURE — 96361 HYDRATE IV INFUSION ADD-ON: CPT

## 2025-04-25 PROCEDURE — 97530 THERAPEUTIC ACTIVITIES: CPT

## 2025-04-25 PROCEDURE — 97162 PT EVAL MOD COMPLEX 30 MIN: CPT

## 2025-04-25 PROCEDURE — 6370000000 HC RX 637 (ALT 250 FOR IP): Performed by: NURSE PRACTITIONER

## 2025-04-25 PROCEDURE — 83036 HEMOGLOBIN GLYCOSYLATED A1C: CPT

## 2025-04-25 PROCEDURE — 6370000000 HC RX 637 (ALT 250 FOR IP): Performed by: STUDENT IN AN ORGANIZED HEALTH CARE EDUCATION/TRAINING PROGRAM

## 2025-04-25 PROCEDURE — 2500000003 HC RX 250 WO HCPCS: Performed by: NURSE PRACTITIONER

## 2025-04-25 PROCEDURE — G0108 DIAB MANAGE TRN  PER INDIV: HCPCS

## 2025-04-25 PROCEDURE — 82947 ASSAY GLUCOSE BLOOD QUANT: CPT

## 2025-04-25 PROCEDURE — G0378 HOSPITAL OBSERVATION PER HR: HCPCS

## 2025-04-25 PROCEDURE — 80048 BASIC METABOLIC PNL TOTAL CA: CPT

## 2025-04-25 PROCEDURE — 72192 CT PELVIS W/O DYE: CPT

## 2025-04-25 PROCEDURE — 97166 OT EVAL MOD COMPLEX 45 MIN: CPT

## 2025-04-25 PROCEDURE — 85025 COMPLETE CBC W/AUTO DIFF WBC: CPT

## 2025-04-25 PROCEDURE — 36415 COLL VENOUS BLD VENIPUNCTURE: CPT

## 2025-04-25 PROCEDURE — 80061 LIPID PANEL: CPT

## 2025-04-25 PROCEDURE — 2580000003 HC RX 258: Performed by: NURSE PRACTITIONER

## 2025-04-25 PROCEDURE — 70450 CT HEAD/BRAIN W/O DYE: CPT

## 2025-04-25 PROCEDURE — 96374 THER/PROPH/DIAG INJ IV PUSH: CPT

## 2025-04-25 PROCEDURE — 96375 TX/PRO/DX INJ NEW DRUG ADDON: CPT

## 2025-04-25 PROCEDURE — 6360000002 HC RX W HCPCS: Performed by: STUDENT IN AN ORGANIZED HEALTH CARE EDUCATION/TRAINING PROGRAM

## 2025-04-25 PROCEDURE — 99223 1ST HOSP IP/OBS HIGH 75: CPT | Performed by: STUDENT IN AN ORGANIZED HEALTH CARE EDUCATION/TRAINING PROGRAM

## 2025-04-25 PROCEDURE — 99239 HOSP IP/OBS DSCHRG MGMT >30: CPT | Performed by: STUDENT IN AN ORGANIZED HEALTH CARE EDUCATION/TRAINING PROGRAM

## 2025-04-25 PROCEDURE — 6360000002 HC RX W HCPCS: Performed by: NURSE PRACTITIONER

## 2025-04-25 RX ORDER — FENTANYL CITRATE 50 UG/ML
12.5 INJECTION, SOLUTION INTRAMUSCULAR; INTRAVENOUS ONCE
Status: COMPLETED | OUTPATIENT
Start: 2025-04-25 | End: 2025-04-25

## 2025-04-25 RX ORDER — MORPHINE SULFATE 2 MG/ML
2 INJECTION, SOLUTION INTRAMUSCULAR; INTRAVENOUS EVERY 4 HOURS PRN
Refills: 0 | Status: CANCELLED | OUTPATIENT
Start: 2025-04-25

## 2025-04-25 RX ORDER — LISINOPRIL 20 MG/1
40 TABLET ORAL DAILY
Status: DISCONTINUED | OUTPATIENT
Start: 2025-04-25 | End: 2025-04-25 | Stop reason: HOSPADM

## 2025-04-25 RX ORDER — OXYCODONE HYDROCHLORIDE 5 MG/1
5 TABLET ORAL ONCE
Refills: 0 | Status: COMPLETED | OUTPATIENT
Start: 2025-04-25 | End: 2025-04-25

## 2025-04-25 RX ORDER — OXYCODONE HYDROCHLORIDE 5 MG/1
5 TABLET ORAL EVERY 8 HOURS PRN
Qty: 6 TABLET | Refills: 0 | Status: SHIPPED | OUTPATIENT
Start: 2025-04-25 | End: 2025-04-27

## 2025-04-25 RX ADMIN — ATORVASTATIN CALCIUM 40 MG: 40 TABLET, FILM COATED ORAL at 09:13

## 2025-04-25 RX ADMIN — OXYCODONE 5 MG: 5 TABLET ORAL at 02:37

## 2025-04-25 RX ADMIN — ACETAMINOPHEN 650 MG: 325 TABLET ORAL at 09:13

## 2025-04-25 RX ADMIN — OXYCODONE 5 MG: 5 TABLET ORAL at 09:13

## 2025-04-25 RX ADMIN — SODIUM CHLORIDE: 0.9 INJECTION, SOLUTION INTRAVENOUS at 10:59

## 2025-04-25 RX ADMIN — GABAPENTIN 400 MG: 300 CAPSULE ORAL at 13:16

## 2025-04-25 RX ADMIN — INSULIN LISPRO 8 UNITS: 100 INJECTION, SOLUTION INTRAVENOUS; SUBCUTANEOUS at 12:30

## 2025-04-25 RX ADMIN — AMLODIPINE BESYLATE 10 MG: 10 TABLET ORAL at 09:13

## 2025-04-25 RX ADMIN — INSULIN GLARGINE 10 UNITS: 100 INJECTION, SOLUTION SUBCUTANEOUS at 00:07

## 2025-04-25 RX ADMIN — HYDRALAZINE HYDROCHLORIDE 10 MG: 20 INJECTION INTRAMUSCULAR; INTRAVENOUS at 10:55

## 2025-04-25 RX ADMIN — INSULIN LISPRO 6 UNITS: 100 INJECTION, SOLUTION INTRAVENOUS; SUBCUTANEOUS at 09:14

## 2025-04-25 RX ADMIN — FAMOTIDINE 20 MG: 20 TABLET, FILM COATED ORAL at 09:13

## 2025-04-25 RX ADMIN — FENTANYL CITRATE 12.5 MCG: 50 INJECTION, SOLUTION INTRAMUSCULAR; INTRAVENOUS at 04:47

## 2025-04-25 RX ADMIN — SODIUM CHLORIDE, PRESERVATIVE FREE 10 ML: 5 INJECTION INTRAVENOUS at 09:18

## 2025-04-25 RX ADMIN — LISINOPRIL 40 MG: 20 TABLET ORAL at 14:01

## 2025-04-25 RX ADMIN — OXYCODONE 5 MG: 5 TABLET ORAL at 14:01

## 2025-04-25 RX ADMIN — GABAPENTIN 400 MG: 300 CAPSULE ORAL at 09:18

## 2025-04-25 RX ADMIN — ACETAMINOPHEN 650 MG: 325 TABLET ORAL at 02:37

## 2025-04-25 RX ADMIN — Medication 1000 UNITS: at 09:15

## 2025-04-25 ASSESSMENT — PAIN SCALES - GENERAL
PAINLEVEL_OUTOF10: 9
PAINLEVEL_OUTOF10: 8

## 2025-04-25 ASSESSMENT — PAIN DESCRIPTION - ORIENTATION: ORIENTATION: RIGHT

## 2025-04-25 ASSESSMENT — PAIN DESCRIPTION - DESCRIPTORS
DESCRIPTORS: ACHING
DESCRIPTORS: ACHING

## 2025-04-25 ASSESSMENT — PAIN DESCRIPTION - LOCATION
LOCATION: FOOT
LOCATION: FOOT;BACK

## 2025-04-25 NOTE — CARE COORDINATION
Case Management Assessment  Initial Evaluation    Date/Time of Evaluation: 4/25/2025 11:50 AM  Assessment Completed by: Noemí Barrios RN    If patient is discharged prior to next notation, then this note serves as note for discharge by case management.    Patient Name: Jose Guadalupe Neumann                   YOB: 1965  Diagnosis: TEMO (acute kidney injury) [N17.9]                   Date / Time: 4/24/2025 11:55 AM    Patient Admission Status: Observation   Readmission Risk (Low < 19, Mod (19-27), High > 27): No data recorded  Current PCP: Nelsy Solo MD  PCP verified by CM? (P) Yes    Chart Reviewed: Yes      History Provided by: (P) Patient  Patient Orientation: (P) Alert and Oriented    Patient Cognition: (P) Alert    Hospitalization in the last 30 days (Readmission):  No    If yes, Readmission Assessment in  Navigator will be completed.    Advance Directives:      Code Status: Full Code   Patient's Primary Decision Maker is: (P) Patient Declined (Legal Next of Kin Remains as Decision Maker)      Discharge Planning:    Patient lives with: (P) Spouse/Significant Other Type of Home: (P) House  Primary Care Giver: (P) Self  Patient Support Systems include: (P) Spouse/Significant Other, Family Members, Children   Current Financial resources: (P) Medicaid  Current community resources:    Current services prior to admission: (P) None            Current DME:              Type of Home Care services:  (P) None    ADLS  Prior functional level: (P) Independent in ADLs/IADLs  Current functional level: (P) Independent in ADLs/IADLs    PT AM-PAC:   /24  OT AM-PAC:   /24    Family can provide assistance at DC: (P) Yes  Would you like Case Management to discuss the discharge plan with any other family members/significant others, and if so, who?    Plans to Return to Present Housing:    Other Identified Issues/Barriers to RETURNING to current housing: none  Potential Assistance needed at discharge: (P) N/A             Potential DME:    Patient expects to discharge to: (P) House  Plan for transportation at discharge: (P) Family    Financial    Payor: [employer] / Plan: [employer] [place] / Product Type: *No Product type* /     Does insurance require precert for SNF: Yes    Potential assistance Purchasing Medications: (P) No  Meds-to-Beds request: Yes      [facility] - [place], [place] - [address] - P [phone] - F [phone]  [address] [address]  Phone: [phone] Fax: [phone]      Notes:    Factors facilitating achievement of predicted outcomes: Family support, Motivated, and Cooperative    Barriers to discharge: clinical status    Additional Case Management Notes: Home independently no hc needs has transportation    The Plan for Transition of Care is related to the following treatment goals of TEMO (acute kidney injury) [N17.9]    IF APPLICABLE: The Patient and/or patient representative Jose Guadalupe and his family were provided with a choice of provider and agrees with the discharge plan. Freedom of choice list with basic dialogue that supports the patient's individualized plan of care/goals and shares the quality data associated with the providers was provided to: (P) Patient   Patient Representative Name:       The Patient and/or Patient Representative Agree with the Discharge Plan? (P) Yes    Noemí Barrios RN  Case Management Department  Ph:  Fax:

## 2025-04-25 NOTE — PROGRESS NOTES
Physical Therapy        Physical Therapy Cancel Note      DATE: 2025    NAME: Jose Guadalupe Neumann  MRN: [id number]   : 1965      Patient not seen this date for Physical Therapy due to:    Testing: pt off floor this AM for CT of pelvis. Will await results of pelvis CT prior to PT evaluation. Ck in PM if able, or       Electronically signed by Abi Moura PT on 2025 at 9:20 AM

## 2025-04-25 NOTE — DISCHARGE SUMMARY
[facility]  Office: [phone]  Dami Guillen DO, Link Sigala DO, Robert Hernandez DO, Alexander Fay DO, Dar Harvey MD, Samantha Brewer MD, Javier Rodriguez MD, Keri Gomez MD,  Shayan Cabello MD, Marcos Bautista MD, Bakari Zhu DO, Brennen Kincaid MD,  Jayme Leavitt DO, Brenda Hurt MD, Alton Bui MD, Kehinde Guillen DO, Alicia Key MD,  Randy Padilla DO, Greta Ochoa MD, Jessica Frazier MD, Adali Chance MD, Radha Esquivel MD,  Godwin Locke MD, Felicia Soto MD, Cristina Lowe MD, Yfn Martinez DO, Shruthi Man MD,  Edgar Pena MD, Shirley Waterhouse, CNP,  Marilia Pryor, CNP, Jazzmine Tang, CNP, Сергей Kovacs, CNP,  Gloria Bentley, DNP, Rianna Mazariegos, CNP, Irma Perez, CNP, Giulia vIy, CNP, Meliza Fofana, CNP, Zahra Hagen, CNP, To Osullivan PA-C, Una Padilla, CNS, Elsa Escobedo, CNP, Sarina Dodd, CNP         [facility]   IN-PATIENT SERVICE   [facility]    Discharge Summary     Patient ID: Jose Guadalupe Neumann  :  1965   MRN: [id number]     ACCOUNT:  [id number]   Patient's PCP: Nelsy Solo MD  Admit Date: 2025   Discharge Date: 2025  Length of Stay: 0  Code Status:  Full Code  Admitting Physician: No admitting provider for patient encounter.  Discharge Physician: Ayaan Oneil MD     Active Discharge Diagnoses:     Hospital Problem Lists:  Principal Problem (Resolved):    TEMO (acute kidney injury)  Active Problems:    Essential hypertension    Mixed hyperlipidemia    Chronic bilateral low back pain with bilateral sciatica    Osteoarthritis of lumbar spine    Chronic back pain    Polyneuropathic pain    Smoker    Dyslipidemia    Dizziness    Hyperglycemia    Difficulty in walking    Ankle swelling    Shoulder pain    Fall      Admission Condition:  fair     Discharged Condition: good    Hospital Stay:     Hospital Course:  Jose Guadalupe Neumann is a 60 y.o. male who was admitted for the

## 2025-04-25 NOTE — PLAN OF CARE
Problem: Discharge Planning  Goal: Discharge to home or other facility with appropriate resources  4/25/2025 0023 by Zahra Owens, RN  Outcome: Progressing     Problem: Pain  Goal: Verbalizes/displays adequate comfort level or baseline comfort level  4/25/2025 1117 by Kelsie Stone, RN  Outcome: Progressing  4/25/2025 0023 by Zahra Owens, RN  Outcome: Progressing     Problem: Safety - Adult  Goal: Free from fall injury  4/25/2025 1117 by Kelsie Stone, RN  Outcome: Progressing  4/25/2025 0023 by Zahra Owens, RN  Outcome: Progressing

## 2025-04-25 NOTE — PROGRESS NOTES
Inpatient Diabetes Education    Jose Guadalupe Neumann was seen for evaluation and education on       Lab Results   Component Value Date    LABA1C 9.9 (H) 04/25/2025    LABA1C 8.5 (H) 12/10/2024    LABA1C 8.8 11/20/2024     Per chart review, patient's last appt with his PCP was on 11/20/24.    Jose Guadalupe states that he has had diabetes for about 10 years. He reports a family hx of diabetes stating that his father had it. He states that he knows how to use a glucometer. He reports that he may have a glucometer at home but is not sure where it is located. I gave him a complimentary Contour glucometer along with 10 test strips and lancets. He reports that he was using Jerad 2 CGM but his reader got damaged. He states that he has an upcoming appt with PCP on 4/30/25 and plans to address the issue then. We discussed the fact that he can use an Horace for his reader instead.     Jose Guadalupe states that his home medications for diabetes are metformin and basaglar insulin 32 units nightly. He states that the pharmacy delivered some kind of injection to his house but he has not started taking it yet.     He is able to tell me some facts about diabetes correctly such as correct target range but their are some gaps in knowledge.     He states that he tries to avoid sugary beverages. He reports that sometimes he skips lunch. We discussed the benefits of eating regular meals.    Reviewed the following Diabetes Survival Skills with patient:   A1C, Blood glucose targets, hypo and hyperglycemia, importance of home blood glucose monitoring, healthy eating, be active as recommended by health care providers, take medications oral and/or insulin as directed    We discussed strategies to help patient be more consistent with his diabetes self care.     Educational materials provided:   _x__  Handout - Living with diabetes? from [employer] 2022  _x__  Blood sugar log  _x__ Handout - How to Check Your Blood Sugar from www.[facility].org  _x__

## 2025-04-25 NOTE — PROGRESS NOTES
Physical Therapy  Facility/Department: [facility] OBSERVATION UNIT   Physical Therapy Initial Evaluation    Patient Name: Jose Guadalupe Neumann        MRN: [id number]    : 1965    Date of Service: 2025    Chief Complaint   Patient presents with    Fall     Jose Guadalupe Neumann is a 60 y.o. Non- / non  male who presents with Fall and is admitted to the hospital for the management of TEMO (acute kidney injury).    Patient had a fall, he mentioned that he was coming downstairs and his ankle twisted causing him to fall down. Patient hit his head and landed on his left shoulder. Denies being on blood thinners. Patient was able to stand back up and ambulate without difficulty. Denies losing consciousness.     Past Medical History:  has a past medical history of Chronic back pain, Diabetes mellitus (HCC), Diabetic neuropathy (HCC), DM (diabetes mellitus) (HCC), Full dentures, Gastroesophageal reflux disease without esophagitis, Hepatitis C, Hyperlipidemia, Hypertension, MDRO (multiple drug resistant organisms) resistance, Murmur, Neuropathy, Opioid withdrawal (HCC), and Osteoarthritis.  Past Surgical History:  has a past surgical history that includes Foot surgery; Bunionectomy (Right); Foot surgery (Right); Foot surgery; Foot surgery (Right, 2014); Foot surgery (Right, 10/15/2014); osteotomy (Right, 2015); Cholecystectomy (2015); Colonoscopy (N/A, 2018); Foot surgery (Left, 2020); Bunionectomy (Left, 2020); back surgery; and Endoscopy, colon, diagnostic.    Discharge Recommendations   Further therapy recommended at discharge.    PT Equipment Recommendations  Equipment Needed: No (pt owns RW and cane)    Assessment  Body Structures, Functions, Activity Limitations Requiring Skilled Therapeutic Intervention: Decreased functional mobility , Decreased ADL status, Decreased body mechanics, Decreased strength, Decreased high-level IADLs, Decreased balance, Decreased endurance,  Ambulation?: No (Not assessed d/t pt reported significant dizziness upon sitting on EOB)    Stairs/Curb  Stairs?: No          Balance   Balance  Posture: Good  Sitting - Static: Good  Sitting - Dynamic: Good;-  Comments: Assessed without AD. pt sits on EOB x5 minues with SBA.      Patient Education  Patient Education  Education Given To: Patient  Education Provided: Role of Therapy;Plan of Care  Education Method: Verbal  Barriers to Learning: None  Education Outcome: Verbalized understanding;Demonstrated understanding    Plan  Physical Therapy Plan  General Plan:  (5-6x)  Current Treatment Recommendations: Strengthening, Balance training, Functional mobility training, Transfer training, ADL/Self-care training, Endurance training, Equipment evaluation, education, & procurement, Patient/Caregiver education & training, Neuromuscular re-education, Safety education & training, Stair training, Gait training, Home exercise program, Therapeutic activities    Goals  Short Term Goals  Time Frame for Short Term Goals: 14 visits  Short Term Goal 1: Complete bed mobility with mod I  Short Term Goal 2: Complete transfers with  SBA and RW  Short Term Goal 3: Ambulate 150 ft with SBA and RW  Short Term Goal 4: Participate in 30 minutes of therapy to promote endurance  Short Term Goal 5: Complete full fight of stairs with one rail and CGA    Minutes  PT Individual Minutes  Time In: 1137  Time Out: 1151  Minutes: 14  Time Code Minutes  Timed Code Treatment Minutes: 8 Minutes    Electronically signed by Abi Moura PT on 4/25/25 at 12:20 PM EDT

## 2025-04-25 NOTE — H&P
readings. 11/25/24   Nelsy Solo MD   metFORMIN (GLUCOPHAGE-XR) 500 MG extended release tablet Take 1 tablet by mouth once daily in the morning for 7 days.  Then on Day 8 increase to 2 tablets daily. 11/25/24   Nlesy Solo MD   lisinopril (PRINIVIL;ZESTRIL) 40 MG tablet Take 1 tablet by mouth daily 11/15/24   Provider, MD Trista   blood glucose test strips (ONETOUCH ULTRA) strip TESTING TWICE A DAY 11/20/24   Nelsy Solo MD   fluticasone (FLONASE) 50 MCG/ACT nasal spray 2 sprays by Each Nostril route daily 11/20/24   Nelsy Solo MD   BASAGLAR KWIKPEN 100 UNIT/ML injection pen Inject 10 Units into the skin nightly 11/20/24   Nelsy Solo MD   Insulin Syringe-Needle U-100 31G X 5/16\" 0.3 ML MISC 1 each by Does not apply route daily 10/15/24   Nelsy Solo MD   Continuous Glucose Sensor (FREESTYLE SAM 2 SENSOR) MISC Use as directed  Patient not taking: Reported on 11/25/2024 8/27/24   Nelsy Solo MD   Continuous Glucose  (FREESTYLE SAM 2 READER) KEVAN Use as directed  Patient not taking: Reported on 11/25/2024 8/27/24   Nelsy Solo MD   sildenafil (VIAGRA) 50 MG tablet Take 1 tablet by mouth as needed for Erectile Dysfunction  Patient not taking: Reported on 11/25/2024 8/27/24   Nelsy Solo MD   insulin lispro (HUMALOG,ADMELOG) 100 UNIT/ML SOLN injection vial Inject 0-12 Units into the skin 3 times daily (before meals) Per low-dose sliding scale; IF Blood Sugar 60 - 124 No Coverage  - 150 > INJECT 2 units of Humalog  - 200 > INJECT 4 units of Humalog  - 250 >INJECT 6 units of Humalog  - 300 >INJECT 8 units of Humalog  - 350> INJECT 10 units of Humalog  - 400> INJECT 12 units of Humalog  Patient taking differently: Inject 30 Units into the skin daily (with breakfast) Per low-dose sliding scale; IF Blood Sugar 60 - 124 No Coverage  - 150 > INJECT 2 units of Humalog  - 200 > INJECT 4 units  humeral head seen on several views, may be related to prior trauma and degenerative change, nondisplaced fracture although less likely cannot be excluded, if patient has pain in this area or point tenderness recommend further evaluation with left shoulder CT.       Assessment :      Hospital Problems           Last Modified POA    * (Principal) TEMO (acute kidney injury) 9/23/2024 Yes    Essential hypertension 9/23/2024 Yes    Mixed hyperlipidemia 8/27/2024 Yes    Chronic bilateral low back pain with bilateral sciatica 8/27/2024 Yes    Osteoarthritis of lumbar spine 4/24/2025 Yes    Overview Signed 9/7/2015  4:35 AM by Ambulatory, Admin   replace inactive diagnosis         Chronic back pain 4/24/2025 Yes    Polyneuropathic pain 4/24/2025 Yes    Smoker 4/24/2025 Yes    Dyslipidemia 4/24/2025 Yes    Dizziness 4/24/2025 Yes    Hyperglycemia 4/24/2025 Yes    Difficulty in walking 4/24/2025 Yes    Ankle swelling 4/24/2025 Yes    Shoulder pain 4/24/2025 Yes       Plan:     Patient status obs  in the Med/Surge    Fall: It was a mechanical fall.  Head injury, left shoulder injury and right ankle twisted.  Getting CT head due to dizziness and head injury.  X-ray of right ankle negative for fracture.  CT left shoulder negative for fracture shows osteoarthritis and tendinopathy.  X-ray femur is not clear getting CT of the pelvis to rule out femur fracture.  Holding patient aspirin and chemical DVT prophylaxis until CT head is done.    Right ankle pain/swelling after trauma . Discussed with ortho recommend ankle /foot foot for sprain. No fracture  Left shoulder pain: no fracture. Arthritis and tentinopathy. Need PT  Femur fracture: follow ct pelvis. Ortho mentioned that if there is fracture please consult.  Dizziness/lightheadedness: Placing on gentle IV hydration LR at 100 cc/h.  Patient is currently hypertensive.  Orthostatic is negative.  Patient has a history of positional vertigo in his chart.  EKG no acute ischemic

## 2025-04-25 NOTE — PROGRESS NOTES
Occupational Therapy Initial Evaluation  Facility/Department: [facility] OBSERVATION UNIT   Patient Name: Jose Guadalupe Neumann        MRN: [id number]    : 1965    Date of Service: 2025    Chief Complaint   Patient presents with    Fall     Past Medical History:  has a past medical history of Chronic back pain, Diabetes mellitus (HCC), Diabetic neuropathy (HCC), DM (diabetes mellitus) (HCC), Full dentures, Gastroesophageal reflux disease without esophagitis, Hepatitis C, Hyperlipidemia, Hypertension, MDRO (multiple drug resistant organisms) resistance, Murmur, Neuropathy, Opioid withdrawal (HCC), and Osteoarthritis.  Past Surgical History:  has a past surgical history that includes Foot surgery; Bunionectomy (Right); Foot surgery (Right); Foot surgery; Foot surgery (Right, 2014); Foot surgery (Right, 10/15/2014); osteotomy (Right, 2015); Cholecystectomy (2015); Colonoscopy (N/A, 2018); Foot surgery (Left, 2020); Bunionectomy (Left, 2020); back surgery; and Endoscopy, colon, diagnostic.    Discharge Recommendations   Further therapy recommended at discharge. Continue to assess        Assessment  Performance deficits / Impairments: Decreased functional mobility ;Decreased ADL status;Decreased endurance;Decreased high-level IADLs;Decreased balance  Assessment: pt significantly limited by dizziness this date impacting ADLs and funcitonal trasnfers/mobility. pt would benefit from continued acute OT services in order to maximize safety and independence.  Prognosis: Good  Decision Making: Medium Complexity  REQUIRES OT FOLLOW-UP: Yes  Activity Tolerance  Activity Tolerance: Patient Tolerated treatment well  Activity Tolerance Comments: limited by dizziness  Safety Devices  Type of Devices: Call light within reach;Left in bed;Nurse notified  Restraints  Restraints Initially in Place: No    AM-PAC  AM-PAC Daily Activity - Inpatient   How much help is needed for putting on and taking off  signed by Amanda Tovar, OTR/L on 4/25/25 at 2:18 PM EDT

## 2025-04-25 NOTE — DISCHARGE INSTRUCTIONS
Follow-up outpatient with PCP.  Would recommend outpatient referral for endocrinology.   Continue medications as prescribed.

## 2025-04-27 LAB
EKG ATRIAL RATE: 100 BPM
EKG P AXIS: 72 DEGREES
EKG P-R INTERVAL: 106 MS
EKG Q-T INTERVAL: 356 MS
EKG QRS DURATION: 76 MS
EKG QTC CALCULATION (BAZETT): 459 MS
EKG R AXIS: -1 DEGREES
EKG T AXIS: 93 DEGREES
EKG VENTRICULAR RATE: 100 BPM

## 2025-04-27 PROCEDURE — 93010 ELECTROCARDIOGRAM REPORT: CPT | Performed by: INTERNAL MEDICINE

## 2025-04-30 ENCOUNTER — HOSPITAL ENCOUNTER (OUTPATIENT)
Age: 60
Setting detail: SPECIMEN
Discharge: HOME | End: 2025-04-30

## 2025-04-30 ENCOUNTER — OFFICE VISIT (OUTPATIENT)
Dept: FAMILY MEDICINE CLINIC | Age: 60
End: 2025-04-30
Payer: MEDICAID

## 2025-04-30 VITALS
DIASTOLIC BLOOD PRESSURE: 72 MMHG | BODY MASS INDEX: 19.52 KG/M2 | HEART RATE: 94 BPM | TEMPERATURE: 97.2 F | WEIGHT: 132.2 LBS | SYSTOLIC BLOOD PRESSURE: 122 MMHG | OXYGEN SATURATION: 98 %

## 2025-04-30 DIAGNOSIS — E11.69 TYPE 2 DIABETES MELLITUS WITH OTHER SPECIFIED COMPLICATION, WITH LONG-TERM CURRENT USE OF INSULIN (HCC): ICD-10-CM

## 2025-04-30 DIAGNOSIS — E55.9 VITAMIN D DEFICIENCY: ICD-10-CM

## 2025-04-30 DIAGNOSIS — E78.2 MIXED HYPERLIPIDEMIA: ICD-10-CM

## 2025-04-30 DIAGNOSIS — B18.2 HEP C W/O COMA, CHRONIC (HCC): ICD-10-CM

## 2025-04-30 DIAGNOSIS — R36.9 PENILE DISCHARGE: ICD-10-CM

## 2025-04-30 DIAGNOSIS — K21.9 GASTROESOPHAGEAL REFLUX DISEASE WITHOUT ESOPHAGITIS: ICD-10-CM

## 2025-04-30 DIAGNOSIS — S72.91XD CLOSED FRACTURE OF RIGHT FEMUR WITH ROUTINE HEALING, UNSPECIFIED FRACTURE MORPHOLOGY, UNSPECIFIED PORTION OF FEMUR, SUBSEQUENT ENCOUNTER: Primary | ICD-10-CM

## 2025-04-30 DIAGNOSIS — N52.9 ERECTILE DYSFUNCTION, UNSPECIFIED ERECTILE DYSFUNCTION TYPE: ICD-10-CM

## 2025-04-30 DIAGNOSIS — Z79.4 TYPE 2 DIABETES MELLITUS WITH OTHER SPECIFIED COMPLICATION, WITH LONG-TERM CURRENT USE OF INSULIN (HCC): ICD-10-CM

## 2025-04-30 LAB
BACTERIA URNS QL MICRO: NORMAL
BILIRUB UR QL STRIP: NEGATIVE
CASTS #/AREA URNS LPF: NORMAL /LPF (ref 0–8)
CLARITY UR: CLEAR
COLOR UR: YELLOW
EPI CELLS #/AREA URNS HPF: NORMAL /HPF (ref 0–5)
GLUCOSE UR STRIP-MCNC: ABNORMAL MG/DL
HGB UR QL STRIP.AUTO: NEGATIVE
KETONES UR STRIP-MCNC: NEGATIVE MG/DL
LEUKOCYTE ESTERASE UR QL STRIP: NEGATIVE
NITRITE UR QL STRIP: NEGATIVE
PH UR STRIP: 7 [PH] (ref 5–8)
PROT UR STRIP-MCNC: ABNORMAL MG/DL
RBC #/AREA URNS HPF: NORMAL /HPF (ref 0–4)
SP GR UR STRIP: 1.02 (ref 1–1.03)
UROBILINOGEN UR STRIP-ACNC: NORMAL EU/DL (ref 0–1)
WBC #/AREA URNS HPF: NORMAL /HPF (ref 0–5)

## 2025-04-30 PROCEDURE — 99215 OFFICE O/P EST HI 40 MIN: CPT

## 2025-04-30 PROCEDURE — 3017F COLORECTAL CA SCREEN DOC REV: CPT

## 2025-04-30 PROCEDURE — 3078F DIAST BP <80 MM HG: CPT

## 2025-04-30 PROCEDURE — G8420 CALC BMI NORM PARAMETERS: HCPCS

## 2025-04-30 PROCEDURE — G8427 DOCREV CUR MEDS BY ELIG CLIN: HCPCS

## 2025-04-30 PROCEDURE — 3046F HEMOGLOBIN A1C LEVEL >9.0%: CPT

## 2025-04-30 PROCEDURE — G2211 COMPLEX E/M VISIT ADD ON: HCPCS

## 2025-04-30 PROCEDURE — 2022F DILAT RTA XM EVC RTNOPTHY: CPT

## 2025-04-30 PROCEDURE — 1036F TOBACCO NON-USER: CPT

## 2025-04-30 PROCEDURE — 3074F SYST BP LT 130 MM HG: CPT

## 2025-04-30 RX ORDER — LISINOPRIL 40 MG/1
40 TABLET ORAL DAILY
Qty: 30 TABLET | Refills: 0 | Status: SHIPPED | OUTPATIENT
Start: 2025-04-30

## 2025-04-30 RX ORDER — PEN NEEDLE, DIABETIC 32GX 5/32"
NEEDLE, DISPOSABLE MISCELLANEOUS
Qty: 100 EACH | Refills: 5 | Status: SHIPPED | OUTPATIENT
Start: 2025-04-30

## 2025-04-30 RX ORDER — ATORVASTATIN CALCIUM 40 MG/1
40 TABLET, FILM COATED ORAL DAILY
Qty: 90 TABLET | Refills: 1 | Status: SHIPPED | OUTPATIENT
Start: 2025-04-30

## 2025-04-30 RX ORDER — INSULIN LISPRO 100 [IU]/ML
0-12 INJECTION, SOLUTION INTRAVENOUS; SUBCUTANEOUS
Qty: 10 ML | Refills: 11 | Status: CANCELLED | OUTPATIENT
Start: 2025-04-30

## 2025-04-30 RX ORDER — SILDENAFIL 50 MG/1
50 TABLET, FILM COATED ORAL PRN
Qty: 9 TABLET | Refills: 1 | Status: SHIPPED | OUTPATIENT
Start: 2025-04-30 | End: 2025-04-30

## 2025-04-30 RX ORDER — INSULIN GLARGINE 100 [IU]/ML
10 INJECTION, SOLUTION SUBCUTANEOUS NIGHTLY
Qty: 3 ADJUSTABLE DOSE PRE-FILLED PEN SYRINGE | Refills: 5 | Status: SHIPPED | OUTPATIENT
Start: 2025-04-30

## 2025-04-30 RX ORDER — FLUTICASONE PROPIONATE 50 MCG
2 SPRAY, SUSPENSION (ML) NASAL DAILY
Qty: 16 G | Refills: 0 | Status: SHIPPED | OUTPATIENT
Start: 2025-04-30

## 2025-04-30 RX ORDER — ISOPROPYL ALCOHOL 0.75 G/1
1 SWAB TOPICAL 2 TIMES DAILY
Qty: 200 EACH | Refills: 1 | Status: SHIPPED | OUTPATIENT
Start: 2025-04-30

## 2025-04-30 RX ORDER — SILDENAFIL 100 MG/1
100 TABLET, FILM COATED ORAL PRN
Qty: 9 TABLET | Refills: 1 | Status: SHIPPED | OUTPATIENT
Start: 2025-04-30

## 2025-04-30 RX ORDER — METRONIDAZOLE 500 MG/1
500 TABLET ORAL 2 TIMES DAILY
Qty: 14 TABLET | Refills: 0 | Status: SHIPPED | OUTPATIENT
Start: 2025-04-30 | End: 2025-05-07

## 2025-04-30 RX ORDER — METFORMIN HYDROCHLORIDE 500 MG/1
TABLET, EXTENDED RELEASE ORAL
Qty: 49 TABLET | Refills: 1 | Status: CANCELLED | OUTPATIENT
Start: 2025-04-30

## 2025-04-30 RX ORDER — BLOOD SUGAR DIAGNOSTIC
1 STRIP MISCELLANEOUS DAILY
Qty: 100 EACH | Refills: 3 | Status: SHIPPED | OUTPATIENT
Start: 2025-04-30

## 2025-04-30 RX ORDER — AMLODIPINE BESYLATE 10 MG/1
10 TABLET ORAL DAILY
Qty: 90 TABLET | Refills: 1 | Status: SHIPPED | OUTPATIENT
Start: 2025-04-30

## 2025-04-30 RX ORDER — GABAPENTIN 400 MG/1
400 CAPSULE ORAL 3 TIMES DAILY
Qty: 90 CAPSULE | Refills: 0 | Status: SHIPPED | OUTPATIENT
Start: 2025-04-30 | End: 2025-05-30

## 2025-04-30 RX ORDER — FAMOTIDINE 20 MG/1
20 TABLET, FILM COATED ORAL 2 TIMES DAILY
Qty: 60 TABLET | Refills: 3 | Status: SHIPPED | OUTPATIENT
Start: 2025-04-30

## 2025-04-30 RX ORDER — BLOOD SUGAR DIAGNOSTIC
STRIP MISCELLANEOUS
Qty: 100 STRIP | Refills: 5 | Status: SHIPPED | OUTPATIENT
Start: 2025-04-30

## 2025-04-30 RX ORDER — ASPIRIN 81 MG/1
81 TABLET ORAL DAILY
Qty: 30 TABLET | Refills: 5 | Status: SHIPPED | OUTPATIENT
Start: 2025-04-30

## 2025-04-30 SDOH — ECONOMIC STABILITY: FOOD INSECURITY: WITHIN THE PAST 12 MONTHS, YOU WORRIED THAT YOUR FOOD WOULD RUN OUT BEFORE YOU GOT MONEY TO BUY MORE.: NEVER TRUE

## 2025-04-30 SDOH — ECONOMIC STABILITY: FOOD INSECURITY: WITHIN THE PAST 12 MONTHS, THE FOOD YOU BOUGHT JUST DIDN'T LAST AND YOU DIDN'T HAVE MONEY TO GET MORE.: NEVER TRUE

## 2025-04-30 ASSESSMENT — PATIENT HEALTH QUESTIONNAIRE - PHQ9
2. FEELING DOWN, DEPRESSED OR HOPELESS: NOT AT ALL
SUM OF ALL RESPONSES TO PHQ QUESTIONS 1-9: 0
1. LITTLE INTEREST OR PLEASURE IN DOING THINGS: NOT AT ALL

## 2025-04-30 ASSESSMENT — ENCOUNTER SYMPTOMS
SHORTNESS OF BREATH: 1
VOMITING: 0
NAUSEA: 0
SINUS PRESSURE: 0
TROUBLE SWALLOWING: 1
VOICE CHANGE: 0
SINUS PAIN: 0
BACK PAIN: 1
RHINORRHEA: 0
FACIAL SWELLING: 0
WHEEZING: 0
CONSTIPATION: 0
COUGH: 1
DIARRHEA: 0
ABDOMINAL PAIN: 0
SORE THROAT: 1

## 2025-04-30 NOTE — ASSESSMENT & PLAN NOTE
Post-hospitalization follow-up  - Admitted on 04/24/2025 and discharged on 04/25/2025 after presenting to the ER following a fall with right foot and ankle pain  - EKG in the ER was normal, afebrile upon presentation, lab work revealed hyponatremia  - Imaging included x-rays of the left shoulder, tibia-fibula, right ankle, and right femur    - Ankle x-ray showed no fracture but indicated soft tissue swelling    - Chest x-ray was unremarkable    - Persistent clinical concern for a right proximal femur fracture necessitating a CT scan that I will order today    - Shoulder x-ray revealed irregularity of the left humeral head, and a subsequent CT scan showed advanced glenohumeral and mild acromioclavicular joint osteomyelitis  - Blood glucose levels were elevated in the ER, and it was noted that medication was not taken consistently  - Discharged home in stable condition with a prescription for pain management, specifically oxycodone    Orders:    CT FEMUR RIGHT W WO CONTRAST; Future

## 2025-04-30 NOTE — ASSESSMENT & PLAN NOTE
Chronic, at goal (stable), continue current treatment plan, medication adherence emphasized, and lifestyle modifications recommended  Lab Results   Component Value Date    CHOL 170 03/10/2023    TRIG 200 (H) 03/10/2023    HDL 33 (L) 04/25/2025    LDL 30 04/25/2025    VLDL 16 04/25/2025    CHOLHDLRATIO 2.4 04/25/2025      Orders:    atorvastatin (LIPITOR) 40 MG tablet; Take 1 tablet by mouth daily

## 2025-04-30 NOTE — ASSESSMENT & PLAN NOTE
Chronic, at goal (stable), continue current treatment plan, medication adherence emphasized, and lifestyle modifications recommended    Orders:    vitamin D (CHOLECALCIFEROL) 25 MCG (1000 UT) TABS tablet; Take 1 tablet by mouth daily

## 2025-04-30 NOTE — ASSESSMENT & PLAN NOTE
- Chronic not at goal very noncompliant  - Blood sugar levels were high in the emergency room, and medication was not taken consistently  - Physical exam findings and test results indicated elevated blood glucose levels  - Discussion included the provision of a free Jerad monitor to better manage blood sugar levels  - Advised to take insulin every night and discontinue metformin due to gastrointestinal side effects  - Jardiance will be prescribed as an alternative  - Medications prescribed include insulin and Jardiance  - Advised to monitor blood sugar levels closely and report any issues    Orders:    BASAGLAR KWIKPEN 100 UNIT/ML injection pen; Inject 10 Units into the skin nightly    blood glucose test strips (ONETOUCH ULTRA) strip; TESTING TWICE A DAY    gabapentin (NEURONTIN) 400 MG capsule; Take 1 capsule by mouth 3 times daily for 30 days.    Insulin Pen Needle (BD PEN NEEDLE ROMY U/F) 32G X 4 MM MISC; AS DIRECTED WITH LANTUS

## 2025-04-30 NOTE — ASSESSMENT & PLAN NOTE
- New uncertain prognosis  - Symptoms include discharge and possible infection  - Physical exam findings include discharge  - Discussion included the need for a urine test to rule out UTI, chlamydia, and gonorrhea  - Flagyl will be prescribed to treat trichomonas vaginitis  - Advised to provide a urine sample for further evaluation    Orders:    Chlamydia/GC DNA, Urine; Future    metroNIDAZOLE (FLAGYL) 500 MG tablet; Take 1 tablet by mouth 2 times daily for 7 days

## 2025-04-30 NOTE — ASSESSMENT & PLAN NOTE
Chronic, at goal (stable), continue current treatment plan, medication adherence emphasized, and lifestyle modifications recommended    Orders:    famotidine (PEPCID) 20 MG tablet; Take 1 tablet by mouth 2 times daily

## 2025-04-30 NOTE — ASSESSMENT & PLAN NOTE
- Requested an increase in the dosage of current medication  - Physical exam findings and test results indicated the need for dosage adjustment  - Discussion included the importance of taking medications as prescribed and reporting any side effects such as lightheadedness or chest pain  - Dosage will be adjusted accordingly  - Advised to continue taking medications as prescribed and to report any side effects    Orders:    Urinalysis with Reflex to Culture; Future    sildenafil (VIAGRA) 100 MG tablet; Take 1 tablet by mouth as needed for Erectile Dysfunction

## 2025-04-30 NOTE — PROGRESS NOTES
hepatitis C as per labs showing in  the patient is still positive for antibodies of hepatitis C.              Return in about 1 month (around 2025) for Regular follow up .       Plan for Next Visit   1.)  Need to follow-up on A1c after switching him off the metformin to Jardiance due to side effects of metformin  2.)  Needs PSA counseling,   3.)  Notes from specialist podiatrist, general surgeon,  4.)  Need to address follow-up with GI for treatment of hepatitis C  5.) follow-up on the CT of the femur if the fracture confirmed or not  6.)  Follow-up on resolution of penile discharge after Flagyl and follow-up on urine chlamydia gonorrhea and UA  7.)  Follow-up if any side effects from increasing the Viagra to 100      Requested Prescriptions     Signed Prescriptions Disp Refills    Alcohol Swabs (B-D SINGLE USE SWABS REGULAR) PADS 200 each 1     Sig: Apply 1 each topically in the morning and at bedtime    amLODIPine (NORVASC) 10 MG tablet 90 tablet 1     Sig: Take 1 tablet by mouth daily    aspirin 81 MG EC tablet 30 tablet 5     Sig: Take 1 tablet by mouth daily    atorvastatin (LIPITOR) 40 MG tablet 90 tablet 1     Sig: Take 1 tablet by mouth daily    BASAGLAR KWIKPEN 100 UNIT/ML injection pen 3 Adjustable Dose Pre-filled Pen Syringe 5     Sig: Inject 10 Units into the skin nightly    blood glucose test strips (ONETOUCH ULTRA) strip 100 strip 5     Sig: TESTING TWICE A DAY    famotidine (PEPCID) 20 MG tablet 60 tablet 3     Sig: Take 1 tablet by mouth 2 times daily    fluticasone (FLONASE) 50 MCG/ACT nasal spray 16 g 0     Si sprays by Each Nostril route daily    gabapentin (NEURONTIN) 400 MG capsule 90 capsule 0     Sig: Take 1 capsule by mouth 3 times daily for 30 days.    Insulin Pen Needle (BD PEN NEEDLE ROMY U/F) 32G X 4 MM MISC 100 each 5     Sig: AS DIRECTED WITH LANTUS    Insulin Syringe-Needle U-100 31G X 5/16\" 0.3 ML MISC 100 each 3     Si each by Does not apply route daily    vitamin D

## 2025-05-01 ENCOUNTER — TELEPHONE (OUTPATIENT)
Dept: FAMILY MEDICINE CLINIC | Age: 60
End: 2025-05-01

## 2025-05-01 LAB
CHLAMYDIA DNA UR QL NAA+PROBE: NEGATIVE
N GONORRHOEA DNA UR QL NAA+PROBE: NEGATIVE
SPECIMEN DESCRIPTION: NORMAL

## 2025-05-01 NOTE — TELEPHONE ENCOUNTER
Called patient yesterday and today and was unable to reach him. The phone number on file does not work and the back up number has a full voicemail.     Trying to reach patient to schedule a 1 month follow up back have been unable to reach him to schedule it.

## 2025-05-02 ENCOUNTER — RESULTS FOLLOW-UP (OUTPATIENT)
Dept: FAMILY MEDICINE CLINIC | Age: 60
End: 2025-05-02

## 2025-05-03 NOTE — RESULT ENCOUNTER NOTE
The urine did not show any significant infection. However, please advise pt to continue on the antibiotic that I gave him as the test does not cover all the STD. If the symptoms continue then he needs to follow back in the clinic

## 2025-05-06 ENCOUNTER — TELEPHONE (OUTPATIENT)
Dept: FAMILY MEDICINE CLINIC | Age: 60
End: 2025-05-06

## 2025-05-07 ENCOUNTER — HOSPITAL ENCOUNTER (OUTPATIENT)
Dept: CT IMAGING | Age: 60
Discharge: HOME | End: 2025-05-09
Payer: MEDICAID

## 2025-05-07 ENCOUNTER — TELEPHONE (OUTPATIENT)
Dept: FAMILY MEDICINE CLINIC | Age: 60
End: 2025-05-07

## 2025-05-07 DIAGNOSIS — S72.91XD CLOSED FRACTURE OF RIGHT FEMUR WITH ROUTINE HEALING, UNSPECIFIED FRACTURE MORPHOLOGY, UNSPECIFIED PORTION OF FEMUR, SUBSEQUENT ENCOUNTER: ICD-10-CM

## 2025-05-07 DIAGNOSIS — S72.001D CLOSED FRACTURE OF NECK OF RIGHT FEMUR WITH ROUTINE HEALING, SUBSEQUENT ENCOUNTER: Primary | ICD-10-CM

## 2025-05-07 DIAGNOSIS — S72.001D CLOSED FRACTURE OF NECK OF RIGHT FEMUR WITH ROUTINE HEALING, SUBSEQUENT ENCOUNTER: ICD-10-CM

## 2025-05-07 PROCEDURE — 73700 CT LOWER EXTREMITY W/O DYE: CPT

## 2025-05-07 NOTE — TELEPHONE ENCOUNTER
Medication Management Service ([facility])  [phone]     CLINICAL PHARMACY NOTE:    Message received from patient's PCP, Dr. Solo.  Patient recently see by PCP and wanting to get patient on continuous glucose monitoring along with changes being made to diabetes medications.  Patient no-showed Pharm.D. visit back on 01/20/2025.    Phone call to patient to reestablish care with Pharm.D.  Phone call was unable to be completed as dial.  Appears that the phone number is no-longer a working phone number.  Unable to schedule PharmD follow-up at this time.  Will let PCP know.      Rand Epstein, Pharm.D., BCACP  Clinical Pharmacist  [facility] Medication Management Service  [phone]  5/6/2025  2:59 PM        
Noted thanks   
4 or more times/wk

## 2025-05-15 ENCOUNTER — RESULTS FOLLOW-UP (OUTPATIENT)
Dept: FAMILY MEDICINE CLINIC | Age: 60
End: 2025-05-15

## 2025-05-15 NOTE — RESULT ENCOUNTER NOTE
CT of the bone showed no current fracture or bone damage, there is spot of abnormality in the bone likely from previous reduced blood flow that is now stable and not dangerous does not need follow-up
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
## 2025-06-12 ENCOUNTER — OFFICE VISIT (OUTPATIENT)
Age: 60
End: 2025-06-12
Payer: MEDICAID

## 2025-06-12 VITALS
WEIGHT: 130.6 LBS | SYSTOLIC BLOOD PRESSURE: 122 MMHG | OXYGEN SATURATION: 100 % | DIASTOLIC BLOOD PRESSURE: 68 MMHG | TEMPERATURE: 97.3 F | HEART RATE: 85 BPM | BODY MASS INDEX: 19.29 KG/M2

## 2025-06-12 DIAGNOSIS — B18.2 HEP C W/O COMA, CHRONIC (HCC): ICD-10-CM

## 2025-06-12 DIAGNOSIS — K21.9 GASTROESOPHAGEAL REFLUX DISEASE WITHOUT ESOPHAGITIS: ICD-10-CM

## 2025-06-12 DIAGNOSIS — E78.2 MIXED HYPERLIPIDEMIA: ICD-10-CM

## 2025-06-12 DIAGNOSIS — N52.9 ERECTILE DYSFUNCTION, UNSPECIFIED ERECTILE DYSFUNCTION TYPE: ICD-10-CM

## 2025-06-12 DIAGNOSIS — E11.69 TYPE 2 DIABETES MELLITUS WITH OTHER SPECIFIED COMPLICATION, WITH LONG-TERM CURRENT USE OF INSULIN (HCC): ICD-10-CM

## 2025-06-12 DIAGNOSIS — Z79.4 TYPE 2 DIABETES MELLITUS WITH OTHER SPECIFIED COMPLICATION, WITH LONG-TERM CURRENT USE OF INSULIN (HCC): ICD-10-CM

## 2025-06-12 DIAGNOSIS — Z09 HOSPITAL DISCHARGE FOLLOW-UP: Primary | ICD-10-CM

## 2025-06-12 DIAGNOSIS — E55.9 VITAMIN D DEFICIENCY: ICD-10-CM

## 2025-06-12 LAB
CHP ED QC CHECK: NORMAL
GLUCOSE BLD-MCNC: 115 MG/DL

## 2025-06-12 PROCEDURE — 2022F DILAT RTA XM EVC RTNOPTHY: CPT

## 2025-06-12 PROCEDURE — 3017F COLORECTAL CA SCREEN DOC REV: CPT

## 2025-06-12 PROCEDURE — 3046F HEMOGLOBIN A1C LEVEL >9.0%: CPT

## 2025-06-12 PROCEDURE — G8427 DOCREV CUR MEDS BY ELIG CLIN: HCPCS

## 2025-06-12 PROCEDURE — 82962 GLUCOSE BLOOD TEST: CPT

## 2025-06-12 PROCEDURE — 4004F PT TOBACCO SCREEN RCVD TLK: CPT

## 2025-06-12 PROCEDURE — 99215 OFFICE O/P EST HI 40 MIN: CPT

## 2025-06-12 PROCEDURE — 1111F DSCHRG MED/CURRENT MED MERGE: CPT

## 2025-06-12 PROCEDURE — G8420 CALC BMI NORM PARAMETERS: HCPCS

## 2025-06-12 PROCEDURE — 3074F SYST BP LT 130 MM HG: CPT

## 2025-06-12 PROCEDURE — 3078F DIAST BP <80 MM HG: CPT

## 2025-06-12 RX ORDER — CYCLOBENZAPRINE HCL 5 MG
TABLET ORAL
COMMUNITY
Start: 2025-05-30

## 2025-06-12 RX ORDER — AMLODIPINE BESYLATE 10 MG/1
10 TABLET ORAL DAILY
Qty: 90 TABLET | Refills: 1 | Status: SHIPPED | OUTPATIENT
Start: 2025-06-12

## 2025-06-12 RX ORDER — ISOPROPYL ALCOHOL 0.75 G/1
1 SWAB TOPICAL 2 TIMES DAILY
Qty: 200 EACH | Refills: 1 | Status: SHIPPED | OUTPATIENT
Start: 2025-06-12

## 2025-06-12 RX ORDER — LISINOPRIL 40 MG/1
40 TABLET ORAL DAILY
Qty: 90 TABLET | Refills: 1 | Status: SHIPPED | OUTPATIENT
Start: 2025-06-12

## 2025-06-12 RX ORDER — BLOOD SUGAR DIAGNOSTIC
STRIP MISCELLANEOUS
Qty: 100 STRIP | Refills: 5 | Status: SHIPPED | OUTPATIENT
Start: 2025-06-12

## 2025-06-12 RX ORDER — SILDENAFIL 100 MG/1
100 TABLET, FILM COATED ORAL PRN
Qty: 9 TABLET | Refills: 1 | Status: SHIPPED | OUTPATIENT
Start: 2025-06-12

## 2025-06-12 RX ORDER — INSULIN GLARGINE 100 [IU]/ML
10 INJECTION, SOLUTION SUBCUTANEOUS NIGHTLY
Qty: 3 ADJUSTABLE DOSE PRE-FILLED PEN SYRINGE | Refills: 5 | Status: SHIPPED | OUTPATIENT
Start: 2025-06-12

## 2025-06-12 RX ORDER — PEN NEEDLE, DIABETIC 32GX 5/32"
NEEDLE, DISPOSABLE MISCELLANEOUS
Qty: 100 EACH | Refills: 5 | Status: SHIPPED | OUTPATIENT
Start: 2025-06-12

## 2025-06-12 RX ORDER — ASPIRIN 81 MG/1
81 TABLET ORAL DAILY
Qty: 90 TABLET | Refills: 1 | Status: SHIPPED | OUTPATIENT
Start: 2025-06-12

## 2025-06-12 RX ORDER — ATORVASTATIN CALCIUM 40 MG/1
40 TABLET, FILM COATED ORAL DAILY
Qty: 90 TABLET | Refills: 1 | Status: SHIPPED | OUTPATIENT
Start: 2025-06-12

## 2025-06-12 RX ORDER — LINEZOLID 600 MG/1
600 TABLET, FILM COATED ORAL 2 TIMES DAILY
COMMUNITY
Start: 2025-05-22

## 2025-06-12 RX ORDER — OXYCODONE AND ACETAMINOPHEN 10; 325 MG/1; MG/1
TABLET ORAL
COMMUNITY
Start: 2025-05-30

## 2025-06-12 RX ORDER — FAMOTIDINE 20 MG/1
20 TABLET, FILM COATED ORAL 2 TIMES DAILY
Qty: 120 TABLET | Refills: 1 | Status: SHIPPED | OUTPATIENT
Start: 2025-06-12

## 2025-06-12 NOTE — PROGRESS NOTES
Post-Discharge Transitional Care  Follow Up      Jose Guadalupe Neumann   YOB: 1965    Date of Office Visit:  6/12/2025  Date of Hospital Admission: 5/19/2025  Date of Hospital Discharge: 5/22/2025  Risk of hospital readmission (high >=14%. Medium >=10%) :No data recorded    Care management risk score Rising risk (score 2-5) and Complex Care (Scores >=6): No Risk Score On File     Non face to face  following discharge, date last encounter closed (first attempt may have been earlier): *No documented post hospital discharge outreach found in the last 14 days    Call initiated 2 business days of discharge: *No response recorded in the last 14 days    ASSESSMENT/PLAN:   Hospital discharge follow-up  -     SD DISCHARGE MEDS RECONCILED W/ CURRENT OUTPATIENT MED LIST  Mixed hyperlipidemia  -     atorvastatin (LIPITOR) 40 MG tablet; Take 1 tablet by mouth daily, Disp-90 tablet, R-1Normal  Type 2 diabetes mellitus with other specified complication, with long-term current use of insulin (HCC)  -     BASAGLAR KWIKPEN 100 UNIT/ML injection pen; Inject 10 Units into the skin nightly, Disp-3 Adjustable Dose Pre-filled Pen Syringe, R-5, DAWPatient has SE from Lant[facility], Please dispense BasglarNormal  -     blood glucose test strips (ONETOUCH ULTRA) strip; TESTING TWICE A DAY, Disp-100 strip, R-5Normal  -     Insulin Pen Needle (BD PEN NEEDLE ROMY U/F) 32G X 4 MM MISC; Disp-100 each, R-5, NormalAS DIRECTED WITH LANTUS  -     POCT Glucose  Gastroesophageal reflux disease without esophagitis  -     famotidine (PEPCID) 20 MG tablet; Take 1 tablet by mouth 2 times daily, Disp-120 tablet, R-1Normal  Erectile dysfunction, unspecified erectile dysfunction type  -     sildenafil (VIAGRA) 100 MG tablet; Take 1 tablet by mouth as needed for Erectile Dysfunction, Disp-9 tablet, R-1Normal  Vitamin D deficiency  -     vitamin D (CHOLECALCIFEROL) 25 MCG (1000 UT) TABS tablet; Take 1 tablet by mouth daily, Disp-90 tablet,

## 2025-07-08 DIAGNOSIS — E11.69 TYPE 2 DIABETES MELLITUS WITH OTHER SPECIFIED COMPLICATION, WITH LONG-TERM CURRENT USE OF INSULIN (HCC): ICD-10-CM

## 2025-07-08 DIAGNOSIS — Z79.4 TYPE 2 DIABETES MELLITUS WITH OTHER SPECIFIED COMPLICATION, WITH LONG-TERM CURRENT USE OF INSULIN (HCC): ICD-10-CM

## 2025-07-08 RX ORDER — GABAPENTIN 400 MG/1
400 CAPSULE ORAL 3 TIMES DAILY
Qty: 90 CAPSULE | Refills: 0 | Status: SHIPPED | OUTPATIENT
Start: 2025-07-08 | End: 2025-08-07

## 2025-07-08 NOTE — TELEPHONE ENCOUNTER
Jose Guadalupe Neumann is calling to request a refill on the following medication(s):    Medication Request:  Requested Prescriptions     Pending Prescriptions Disp Refills    gabapentin (NEURONTIN) 400 MG capsule 90 capsule 0     Sig: Take 1 capsule by mouth 3 times daily for 30 days.       Last Visit Date (If Applicable):  6/12/2025    Next Visit Date:    7/16/2025

## 2025-07-26 DIAGNOSIS — N52.9 ERECTILE DYSFUNCTION, UNSPECIFIED ERECTILE DYSFUNCTION TYPE: ICD-10-CM

## 2025-07-28 RX ORDER — SILDENAFIL 100 MG/1
100 TABLET, FILM COATED ORAL PRN
Qty: 9 TABLET | Refills: 1 | Status: SHIPPED | OUTPATIENT
Start: 2025-07-28

## 2025-07-28 NOTE — TELEPHONE ENCOUNTER
Jose Guadalupe Neumann is calling to request a refill on the following medication(s):    Medication Request:  Requested Prescriptions     Pending Prescriptions Disp Refills    sildenafil (VIAGRA) 100 MG tablet [Pharmacy Med Name: SILDENAFIL CITRATE 100MG TABLET] 9 tablet 1     Sig: TAKE 1 TABLET BY MOUTH AS NEEDED FOR ERECTILE DYSFUNCTION       Last Visit Date (If Applicable):  4/30/2025    Next Visit Date:    Visit date not found

## 2025-08-31 NOTE — TELEPHONE ENCOUNTER
Problem: At Risk for Injury Due to Fall  Goal: Patient does not fall  Outcome: Monitoring/Evaluating progress    Call light within reach. Monitor for needs frequently. Bed alarm on.      Problem: Pain  Goal: Acceptable pain level achieved/maintained at rest using appropriate pain scale for the patient  Outcome: Monitoring/Evaluating progress    Patient denies pain. PRN meds available if needed.       Gabapentin pending for refill   Health Maintenance   Topic Date Due    COVID-19 Vaccine (1) Never done    Diabetic retinal exam  03/10/2016    Diabetic microalbuminuria test  08/15/2020    Lipid screen  08/15/2020    Potassium monitoring  01/05/2021    Shingles Vaccine (1 of 2) 06/23/2021 (Originally 2/11/2014)    Diabetic foot exam  08/19/2021    Creatinine monitoring  10/08/2021    A1C test (Diabetic or Prediabetic)  04/19/2022    DTaP/Tdap/Td vaccine (2 - Td) 02/22/2026    Colon cancer screen colonoscopy  11/19/2028    Hepatitis A vaccine  Completed    Hepatitis B vaccine  Completed    Flu vaccine  Completed    Pneumococcal 0-64 years Vaccine  Completed    HIV screen  Completed    Hib vaccine  Aged Out    Meningococcal (ACWY) vaccine  Aged Out             (applicable per patient's age: Cancer Screenings, Depression Screening, Fall Risk Screening, Immunizations)    Hemoglobin A1C (%)   Date Value   04/19/2021 6.5   12/31/2020 6.7   01/05/2020 6.6 (H)     Microalb/Crt. Ratio (mcg/mg creat)   Date Value   08/15/2019 99 (H)     LDL Cholesterol (mg/dL)   Date Value   08/15/2019 112     AST (U/L)   Date Value   02/19/2020 36     ALT (U/L)   Date Value   02/19/2020 34     BUN (mg/dL)   Date Value   01/05/2020 16      (goal A1C is < 7)   (goal LDL is <100) need 30-50% reduction from baseline     BP Readings from Last 3 Encounters:   04/19/21 (!) 158/105   12/31/20 130/86   12/03/20 135/85    (goal /80)      All Future Testing planned in [facility]:  Lab Frequency Next Occurrence   COVID-19 Once 01/10/2021   Microalbumin, Ur Once 05/19/2021   Lipid Panel Once 88/16/9936   Basic Metabolic Panel Once 79/34/7220       Next Visit Date:  No future appointments.          Patient Active Problem List:     Liver disease     Type II or unspecified type diabetes mellitus without mention of complication, not stated as uncontrolled     Chronic bilateral low back pain with bilateral sciatica     Thoracic or

## (undated) DEVICE — SKIN PREP TRAY W/CHG: Brand: MEDLINE INDUSTRIES, INC.

## (undated) DEVICE — K-WIRE, SMOOTH: Brand: VARIAX

## (undated) DEVICE — PADDING CAST W4INXL4YD SPUN DACRON POLY POR SYN VERSATILE

## (undated) DEVICE — CANNULATED DRILL

## (undated) DEVICE — PIN ANCHORAGE FIX
Type: IMPLANTABLE DEVICE | Site: FOOT | Status: NON-FUNCTIONAL
Removed: 2020-02-19

## (undated) DEVICE — GLOVE SURG SZ 75 CRM LTX FREE POLYISOPRENE POLYMER BEAD ANTI

## (undated) DEVICE — YANKAUER,FLEXIBLE HANDLE,REGLR CAPACITY: Brand: MEDLINE INDUSTRIES, INC.

## (undated) DEVICE — Device

## (undated) DEVICE — BLANKET WRM W29.9XL79.1IN UP BODY FORC AIR MISTRAL-AIR

## (undated) DEVICE — CHLORAPREP 26ML ORANGE

## (undated) DEVICE — NEEDLE HYPO 25GA L1.5IN BLU POLYPR HUB S STL REG BVL STR

## (undated) DEVICE — SUTURE ETHLN SZ 4-0 L18IN NONABSORBABLE BLK L19MM PS-2 3/8 1667H

## (undated) DEVICE — LOCKING SCREW
Type: IMPLANTABLE DEVICE | Site: FOOT | Status: NON-FUNCTIONAL
Brand: VARIAX
Removed: 2020-02-19

## (undated) DEVICE — GLOVE SURG SZ 8 CRM LTX FREE POLYISOPRENE POLYMER BEAD ANTI

## (undated) DEVICE — K WIRE FIX L6IN DIA0.062IN 1600662] MICROAIRE SURGICAL INSTRUMENTS INC]

## (undated) DEVICE — REAMER FOR CROSS-PLATES: Brand: ANCHORAGE

## (undated) DEVICE — UNTHREADED GUIDE WIRE: Brand: FIXOS

## (undated) DEVICE — GARMENT,MEDLINE,DVT,INT,CALF,MED, GEN2: Brand: MEDLINE

## (undated) DEVICE — SUTURE VCRL SZ 3-0 L27IN ABSRB UD L19MM PS-2 3/8 CIR PRIM J427H

## (undated) DEVICE — CP LAG SCREW (T10)
Type: IMPLANTABLE DEVICE | Site: FOOT | Status: NON-FUNCTIONAL
Brand: ANCHORAGE
Removed: 2020-02-19

## (undated) DEVICE — STANDARD DRILL BIT , AO

## (undated) DEVICE — SOLUTION PREP POVIDONE IOD FOR SKIN MUCOUS MEM PRIOR TO

## (undated) DEVICE — TOURNIQUET CUF BLD PRESSURE 4X18 IN 2 PRT SINGLE BLDR RED

## (undated) DEVICE — DRAPE C ARM UNIV W41XL74IN CLR PLAS XR VELC CLSR POLY STRP

## (undated) DEVICE — CANNULATED COUNTERSINK

## (undated) DEVICE — DRILL BIT, AO DIA2.6MM X 135MM, SCALED: Brand: VARIAX

## (undated) DEVICE — SUTURE VCRL SZ 4-0 L27IN ABSRB UD L19MM PS-2 3/8 CIR PRIM J426H

## (undated) DEVICE — INTENDED FOR TISSUE SEPARATION, AND OTHER PROCEDURES THAT REQUIRE A SHARP SURGICAL BLADE TO PUNCTURE OR CUT.: Brand: BARD-PARKER ® CARBON RIB-BACK BLADES